# Patient Record
Sex: FEMALE | Race: WHITE | NOT HISPANIC OR LATINO
[De-identification: names, ages, dates, MRNs, and addresses within clinical notes are randomized per-mention and may not be internally consistent; named-entity substitution may affect disease eponyms.]

---

## 2017-01-17 ENCOUNTER — RX RENEWAL (OUTPATIENT)
Age: 62
End: 2017-01-17

## 2017-02-08 ENCOUNTER — APPOINTMENT (OUTPATIENT)
Dept: INTERNAL MEDICINE | Facility: CLINIC | Age: 62
End: 2017-02-08

## 2017-02-08 VITALS
RESPIRATION RATE: 14 BRPM | HEIGHT: 63 IN | OXYGEN SATURATION: 98 % | BODY MASS INDEX: 25.87 KG/M2 | HEART RATE: 53 BPM | DIASTOLIC BLOOD PRESSURE: 80 MMHG | TEMPERATURE: 98.1 F | WEIGHT: 146 LBS | SYSTOLIC BLOOD PRESSURE: 118 MMHG

## 2017-03-09 ENCOUNTER — APPOINTMENT (OUTPATIENT)
Dept: INTERNAL MEDICINE | Facility: CLINIC | Age: 62
End: 2017-03-09

## 2017-03-09 VITALS
OXYGEN SATURATION: 95 % | HEIGHT: 63 IN | TEMPERATURE: 98.2 F | DIASTOLIC BLOOD PRESSURE: 60 MMHG | HEART RATE: 62 BPM | WEIGHT: 142 LBS | BODY MASS INDEX: 25.16 KG/M2 | RESPIRATION RATE: 14 BRPM | SYSTOLIC BLOOD PRESSURE: 110 MMHG

## 2017-03-09 DIAGNOSIS — R11.2 NAUSEA WITH VOMITING, UNSPECIFIED: ICD-10-CM

## 2017-03-09 RX ORDER — DOXYCYCLINE HYCLATE 100 MG/1
100 CAPSULE ORAL TWICE DAILY
Qty: 14 | Refills: 0 | Status: DISCONTINUED | COMMUNITY
Start: 2017-02-08 | End: 2017-03-09

## 2017-03-13 LAB
ALBUMIN SERPL ELPH-MCNC: 5.1 G/DL
ALP BLD-CCNC: 133 U/L
ALT SERPL-CCNC: 10 U/L
ANION GAP SERPL CALC-SCNC: 19 MMOL/L
AST SERPL-CCNC: 19 U/L
BASOPHILS # BLD AUTO: 0.05 K/UL
BASOPHILS NFR BLD AUTO: 0.7 %
BILIRUB SERPL-MCNC: 0.5 MG/DL
BUN SERPL-MCNC: 13 MG/DL
CALCIUM SERPL-MCNC: 10.2 MG/DL
CHLORIDE SERPL-SCNC: 99 MMOL/L
CO2 SERPL-SCNC: 21 MMOL/L
CREAT SERPL-MCNC: 1.05 MG/DL
EOSINOPHIL # BLD AUTO: 0.1 K/UL
EOSINOPHIL NFR BLD AUTO: 1.5 %
GLUCOSE SERPL-MCNC: 109 MG/DL
HCT VFR BLD CALC: 42.6 %
HGB BLD-MCNC: 13.6 G/DL
IMM GRANULOCYTES NFR BLD AUTO: 0 %
LPL SERPL-CCNC: 31 U/L
LYMPHOCYTES # BLD AUTO: 1.92 K/UL
LYMPHOCYTES NFR BLD AUTO: 28.8 %
MAN DIFF?: NORMAL
MCHC RBC-ENTMCNC: 29.6 PG
MCHC RBC-ENTMCNC: 31.9 GM/DL
MCV RBC AUTO: 92.6 FL
MONOCYTES # BLD AUTO: 0.42 K/UL
MONOCYTES NFR BLD AUTO: 6.3 %
NEUTROPHILS # BLD AUTO: 4.18 K/UL
NEUTROPHILS NFR BLD AUTO: 62.7 %
PLATELET # BLD AUTO: 335 K/UL
POTASSIUM SERPL-SCNC: 4.9 MMOL/L
PROT SERPL-MCNC: 7.6 G/DL
RBC # BLD: 4.6 M/UL
RBC # FLD: 13.6 %
SODIUM SERPL-SCNC: 139 MMOL/L
WBC # FLD AUTO: 6.67 K/UL

## 2017-04-13 ENCOUNTER — APPOINTMENT (OUTPATIENT)
Dept: INTERNAL MEDICINE | Facility: CLINIC | Age: 62
End: 2017-04-13

## 2017-04-13 VITALS
OXYGEN SATURATION: 97 % | DIASTOLIC BLOOD PRESSURE: 70 MMHG | RESPIRATION RATE: 14 BRPM | HEIGHT: 63 IN | BODY MASS INDEX: 24.63 KG/M2 | WEIGHT: 139 LBS | SYSTOLIC BLOOD PRESSURE: 100 MMHG | HEART RATE: 65 BPM

## 2017-04-13 VITALS — TEMPERATURE: 98.2 F

## 2017-04-13 DIAGNOSIS — M54.2 CERVICALGIA: ICD-10-CM

## 2017-04-14 ENCOUNTER — MEDICATION RENEWAL (OUTPATIENT)
Age: 62
End: 2017-04-14

## 2017-04-17 LAB
ALBUMIN SERPL ELPH-MCNC: 4.9 G/DL
ALP BLD-CCNC: 118 U/L
ALT SERPL-CCNC: 11 U/L
ANION GAP SERPL CALC-SCNC: 19 MMOL/L
AST SERPL-CCNC: 15 U/L
BASOPHILS # BLD AUTO: 0.02 K/UL
BASOPHILS NFR BLD AUTO: 0.2 %
BILIRUB SERPL-MCNC: 0.3 MG/DL
BUN SERPL-MCNC: 23 MG/DL
CALCIUM SERPL-MCNC: 10.1 MG/DL
CHLORIDE SERPL-SCNC: 99 MMOL/L
CO2 SERPL-SCNC: 19 MMOL/L
CREAT SERPL-MCNC: 0.91 MG/DL
EOSINOPHIL # BLD AUTO: 0.09 K/UL
EOSINOPHIL NFR BLD AUTO: 1 %
ERYTHROCYTE [SEDIMENTATION RATE] IN BLOOD BY WESTERGREN METHOD: 18 MM/HR
GLUCOSE SERPL-MCNC: 109 MG/DL
HCT VFR BLD CALC: 40.1 %
HGB BLD-MCNC: 13 G/DL
IMM GRANULOCYTES NFR BLD AUTO: 0.1 %
LYMPHOCYTES # BLD AUTO: 2.03 K/UL
LYMPHOCYTES NFR BLD AUTO: 23.4 %
MAN DIFF?: NORMAL
MCHC RBC-ENTMCNC: 29.3 PG
MCHC RBC-ENTMCNC: 32.4 GM/DL
MCV RBC AUTO: 90.5 FL
MONOCYTES # BLD AUTO: 0.77 K/UL
MONOCYTES NFR BLD AUTO: 8.9 %
NEUTROPHILS # BLD AUTO: 5.77 K/UL
NEUTROPHILS NFR BLD AUTO: 66.4 %
PLATELET # BLD AUTO: 311 K/UL
POTASSIUM SERPL-SCNC: 4.2 MMOL/L
PROT SERPL-MCNC: 7.2 G/DL
RBC # BLD: 4.43 M/UL
RBC # FLD: 13.2 %
SODIUM SERPL-SCNC: 137 MMOL/L
WBC # FLD AUTO: 8.69 K/UL

## 2017-04-20 ENCOUNTER — RX RENEWAL (OUTPATIENT)
Age: 62
End: 2017-04-20

## 2017-07-20 ENCOUNTER — APPOINTMENT (OUTPATIENT)
Dept: INTERNAL MEDICINE | Facility: CLINIC | Age: 62
End: 2017-07-20

## 2017-07-20 VITALS
OXYGEN SATURATION: 98 % | HEIGHT: 63 IN | BODY MASS INDEX: 24.8 KG/M2 | SYSTOLIC BLOOD PRESSURE: 144 MMHG | WEIGHT: 140 LBS | RESPIRATION RATE: 14 BRPM | DIASTOLIC BLOOD PRESSURE: 82 MMHG | TEMPERATURE: 97.6 F | HEART RATE: 57 BPM

## 2017-07-20 RX ORDER — BENZONATATE 100 MG/1
100 CAPSULE ORAL
Qty: 15 | Refills: 0 | Status: DISCONTINUED | COMMUNITY
Start: 2017-02-01 | End: 2017-07-20

## 2017-07-20 RX ORDER — AZITHROMYCIN 250 MG/1
250 TABLET, FILM COATED ORAL
Qty: 1 | Refills: 0 | Status: DISCONTINUED | COMMUNITY
Start: 2017-02-08 | End: 2017-07-20

## 2017-08-06 ENCOUNTER — RX RENEWAL (OUTPATIENT)
Age: 62
End: 2017-08-06

## 2017-08-14 ENCOUNTER — RX RENEWAL (OUTPATIENT)
Age: 62
End: 2017-08-14

## 2017-09-20 ENCOUNTER — APPOINTMENT (OUTPATIENT)
Dept: INTERNAL MEDICINE | Facility: CLINIC | Age: 62
End: 2017-09-20
Payer: MEDICARE

## 2017-09-20 VITALS
OXYGEN SATURATION: 99 % | WEIGHT: 145 LBS | HEIGHT: 63 IN | SYSTOLIC BLOOD PRESSURE: 108 MMHG | TEMPERATURE: 98 F | DIASTOLIC BLOOD PRESSURE: 76 MMHG | BODY MASS INDEX: 25.69 KG/M2 | HEART RATE: 59 BPM | RESPIRATION RATE: 14 BRPM

## 2017-09-20 PROCEDURE — 99214 OFFICE O/P EST MOD 30 MIN: CPT

## 2017-10-03 ENCOUNTER — APPOINTMENT (OUTPATIENT)
Dept: NEUROLOGY | Facility: CLINIC | Age: 62
End: 2017-10-03

## 2017-10-11 ENCOUNTER — APPOINTMENT (OUTPATIENT)
Dept: INTERNAL MEDICINE | Facility: CLINIC | Age: 62
End: 2017-10-11
Payer: MEDICARE

## 2017-10-11 VITALS
HEART RATE: 76 BPM | HEIGHT: 63 IN | TEMPERATURE: 98 F | BODY MASS INDEX: 26.22 KG/M2 | RESPIRATION RATE: 14 BRPM | SYSTOLIC BLOOD PRESSURE: 124 MMHG | DIASTOLIC BLOOD PRESSURE: 76 MMHG | OXYGEN SATURATION: 98 % | WEIGHT: 148 LBS

## 2017-10-11 DIAGNOSIS — R79.9 ABNORMAL FINDING OF BLOOD CHEMISTRY, UNSPECIFIED: ICD-10-CM

## 2017-10-11 DIAGNOSIS — Z12.31 ENCOUNTER FOR SCREENING MAMMOGRAM FOR MALIGNANT NEOPLASM OF BREAST: ICD-10-CM

## 2017-10-11 PROCEDURE — 99213 OFFICE O/P EST LOW 20 MIN: CPT

## 2017-10-12 LAB
BUN SERPL-MCNC: 25 MG/DL
CREAT SERPL-MCNC: 1.15 MG/DL
HBA1C MFR BLD HPLC: 5.8 %

## 2017-10-31 ENCOUNTER — NON-APPOINTMENT (OUTPATIENT)
Age: 62
End: 2017-10-31

## 2017-10-31 ENCOUNTER — RECORD ABSTRACTING (OUTPATIENT)
Age: 62
End: 2017-10-31

## 2017-10-31 ENCOUNTER — APPOINTMENT (OUTPATIENT)
Dept: CARDIOLOGY | Facility: CLINIC | Age: 62
End: 2017-10-31
Payer: MEDICARE

## 2017-10-31 VITALS
OXYGEN SATURATION: 99 % | WEIGHT: 148 LBS | SYSTOLIC BLOOD PRESSURE: 156 MMHG | DIASTOLIC BLOOD PRESSURE: 78 MMHG | BODY MASS INDEX: 26.22 KG/M2 | HEART RATE: 55 BPM | HEIGHT: 63 IN

## 2017-10-31 VITALS — SYSTOLIC BLOOD PRESSURE: 146 MMHG | DIASTOLIC BLOOD PRESSURE: 80 MMHG

## 2017-10-31 DIAGNOSIS — R94.31 ABNORMAL ELECTROCARDIOGRAM [ECG] [EKG]: ICD-10-CM

## 2017-10-31 PROCEDURE — 99214 OFFICE O/P EST MOD 30 MIN: CPT | Mod: 25

## 2017-10-31 PROCEDURE — 93000 ELECTROCARDIOGRAM COMPLETE: CPT

## 2017-11-03 ENCOUNTER — MEDICATION RENEWAL (OUTPATIENT)
Age: 62
End: 2017-11-03

## 2017-11-16 ENCOUNTER — RESULT CHARGE (OUTPATIENT)
Age: 62
End: 2017-11-16

## 2017-11-27 ENCOUNTER — APPOINTMENT (OUTPATIENT)
Dept: CARDIOLOGY | Facility: CLINIC | Age: 62
End: 2017-11-27

## 2017-12-11 ENCOUNTER — APPOINTMENT (OUTPATIENT)
Dept: ORTHOPEDIC SURGERY | Facility: CLINIC | Age: 62
End: 2017-12-11
Payer: MEDICARE

## 2017-12-11 VITALS — SYSTOLIC BLOOD PRESSURE: 145 MMHG | DIASTOLIC BLOOD PRESSURE: 75 MMHG | HEART RATE: 80 BPM

## 2017-12-11 PROCEDURE — 99214 OFFICE O/P EST MOD 30 MIN: CPT

## 2017-12-11 PROCEDURE — 73522 X-RAY EXAM HIPS BI 3-4 VIEWS: CPT

## 2017-12-11 RX ORDER — SUMATRIPTAN 100 MG/1
100 TABLET, FILM COATED ORAL
Qty: 12 | Refills: 0 | Status: ACTIVE | COMMUNITY
Start: 2017-11-13

## 2017-12-12 ENCOUNTER — CHART COPY (OUTPATIENT)
Age: 62
End: 2017-12-12

## 2017-12-18 ENCOUNTER — APPOINTMENT (OUTPATIENT)
Dept: INTERNAL MEDICINE | Facility: CLINIC | Age: 62
End: 2017-12-18
Payer: MEDICARE

## 2017-12-18 VITALS
WEIGHT: 145 LBS | OXYGEN SATURATION: 98 % | TEMPERATURE: 97.9 F | HEART RATE: 63 BPM | DIASTOLIC BLOOD PRESSURE: 84 MMHG | RESPIRATION RATE: 14 BRPM | HEIGHT: 63 IN | SYSTOLIC BLOOD PRESSURE: 120 MMHG | BODY MASS INDEX: 25.69 KG/M2

## 2017-12-18 DIAGNOSIS — M79.641 PAIN IN RIGHT HAND: ICD-10-CM

## 2017-12-18 PROCEDURE — 99213 OFFICE O/P EST LOW 20 MIN: CPT

## 2017-12-19 LAB
ALBUMIN SERPL ELPH-MCNC: 4.2 G/DL
ALP BLD-CCNC: 128 U/L
ALT SERPL-CCNC: 10 U/L
ANION GAP SERPL CALC-SCNC: 16 MMOL/L
AST SERPL-CCNC: 14 U/L
BILIRUB SERPL-MCNC: 0.2 MG/DL
BUN SERPL-MCNC: 22 MG/DL
CALCIUM SERPL-MCNC: 9.5 MG/DL
CHLORIDE SERPL-SCNC: 106 MMOL/L
CHOLEST SERPL-MCNC: 166 MG/DL
CHOLEST/HDLC SERPL: 2.4 RATIO
CO2 SERPL-SCNC: 18 MMOL/L
CREAT SERPL-MCNC: 1.05 MG/DL
GLUCOSE SERPL-MCNC: 112 MG/DL
HBA1C MFR BLD HPLC: 5.7 %
HDLC SERPL-MCNC: 69 MG/DL
LDLC SERPL CALC-MCNC: 69 MG/DL
POTASSIUM SERPL-SCNC: 4.4 MMOL/L
PROT SERPL-MCNC: 7.4 G/DL
SODIUM SERPL-SCNC: 140 MMOL/L
TRIGL SERPL-MCNC: 138 MG/DL

## 2017-12-20 LAB — HEMOCCULT STL QL IA: NEGATIVE

## 2018-02-02 ENCOUNTER — RX RENEWAL (OUTPATIENT)
Age: 63
End: 2018-02-02

## 2018-02-09 ENCOUNTER — RX RENEWAL (OUTPATIENT)
Age: 63
End: 2018-02-09

## 2018-02-15 ENCOUNTER — APPOINTMENT (OUTPATIENT)
Dept: NEUROLOGY | Facility: CLINIC | Age: 63
End: 2018-02-15

## 2018-07-07 ENCOUNTER — APPOINTMENT (OUTPATIENT)
Dept: INTERNAL MEDICINE | Facility: CLINIC | Age: 63
End: 2018-07-07

## 2018-07-11 ENCOUNTER — APPOINTMENT (OUTPATIENT)
Dept: INTERNAL MEDICINE | Facility: CLINIC | Age: 63
End: 2018-07-11
Payer: MEDICARE

## 2018-07-11 VITALS
HEIGHT: 63 IN | WEIGHT: 147 LBS | DIASTOLIC BLOOD PRESSURE: 80 MMHG | HEART RATE: 58 BPM | TEMPERATURE: 97.8 F | BODY MASS INDEX: 26.05 KG/M2 | OXYGEN SATURATION: 97 % | RESPIRATION RATE: 14 BRPM | SYSTOLIC BLOOD PRESSURE: 122 MMHG

## 2018-07-11 DIAGNOSIS — T75.3XXA MOTION SICKNESS, INITIAL ENCOUNTER: ICD-10-CM

## 2018-07-11 PROCEDURE — 99214 OFFICE O/P EST MOD 30 MIN: CPT

## 2018-07-11 NOTE — HISTORY OF PRESENT ILLNESS
[FreeTextEntry1] : needs meds [de-identified] : Pt is going on a cruise to [place] for 4-5 days. She gets terrible sea sickness and would like the patches. \par \par c/o diarrhea after she eats, mostly after dinner. gets painful cramps and goes away after the BM. Happens 1-2 times a day. Weight is stable. \par \par Hip continues to hurt.

## 2018-07-11 NOTE — PHYSICAL EXAM
[No Acute Distress] : no acute distress [Well Nourished] : well nourished [Well Developed] : well developed [No Respiratory Distress] : no respiratory distress  [Clear to Auscultation] : lungs were clear to auscultation bilaterally [Normal Rate] : normal rate  [No Edema] : there was no peripheral edema [Soft] : abdomen soft [Non Tender] : non-tender [Normal Affect] : the affect was normal [Normal Insight/Judgement] : insight and judgment were intact

## 2018-07-26 ENCOUNTER — RX RENEWAL (OUTPATIENT)
Age: 63
End: 2018-07-26

## 2018-08-07 ENCOUNTER — RX RENEWAL (OUTPATIENT)
Age: 63
End: 2018-08-07

## 2018-10-23 ENCOUNTER — APPOINTMENT (OUTPATIENT)
Dept: INTERNAL MEDICINE | Facility: CLINIC | Age: 63
End: 2018-10-23
Payer: MEDICARE

## 2018-10-23 DIAGNOSIS — Z23 ENCOUNTER FOR IMMUNIZATION: ICD-10-CM

## 2018-10-23 PROCEDURE — G0008: CPT

## 2018-10-23 PROCEDURE — 90686 IIV4 VACC NO PRSV 0.5 ML IM: CPT

## 2018-11-01 ENCOUNTER — APPOINTMENT (OUTPATIENT)
Dept: INTERNAL MEDICINE | Facility: CLINIC | Age: 63
End: 2018-11-01
Payer: MEDICARE

## 2018-11-01 ENCOUNTER — NON-APPOINTMENT (OUTPATIENT)
Age: 63
End: 2018-11-01

## 2018-11-01 VITALS
HEART RATE: 55 BPM | SYSTOLIC BLOOD PRESSURE: 120 MMHG | OXYGEN SATURATION: 91 % | BODY MASS INDEX: 27.11 KG/M2 | TEMPERATURE: 98.8 F | RESPIRATION RATE: 14 BRPM | HEIGHT: 63 IN | DIASTOLIC BLOOD PRESSURE: 70 MMHG | WEIGHT: 153 LBS

## 2018-11-01 DIAGNOSIS — Z00.00 ENCOUNTER FOR GENERAL ADULT MEDICAL EXAMINATION W/OUT ABNORMAL FINDINGS: ICD-10-CM

## 2018-11-01 PROCEDURE — 93000 ELECTROCARDIOGRAM COMPLETE: CPT

## 2018-11-01 PROCEDURE — G0439: CPT

## 2018-11-01 RX ORDER — BENZTROPINE MESYLATE 0.5 MG/1
0.5 TABLET ORAL
Qty: 60 | Refills: 0 | Status: DISCONTINUED | COMMUNITY
Start: 2017-11-13 | End: 2018-11-01

## 2018-11-01 RX ORDER — DOXYCYCLINE HYCLATE 100 MG/1
100 TABLET ORAL
Qty: 20 | Refills: 0 | Status: DISCONTINUED | COMMUNITY
Start: 2017-07-20 | End: 2018-11-01

## 2018-11-01 RX ORDER — METHYLPREDNISOLONE 4 MG/1
4 TABLET ORAL
Qty: 21 | Refills: 0 | Status: DISCONTINUED | COMMUNITY
Start: 2017-09-20 | End: 2018-11-01

## 2018-11-01 RX ORDER — TOPIRAMATE 25 MG/1
25 TABLET, FILM COATED ORAL
Qty: 60 | Refills: 0 | Status: DISCONTINUED | COMMUNITY
Start: 2017-09-27 | End: 2018-11-01

## 2018-11-01 RX ORDER — TRIAZOLAM 0.25 MG/1
0.25 TABLET ORAL
Qty: 2 | Refills: 0 | Status: DISCONTINUED | COMMUNITY
Start: 2017-11-03 | End: 2018-11-01

## 2018-11-01 RX ORDER — ZALEPLON 10 MG/1
10 CAPSULE ORAL
Qty: 30 | Refills: 0 | Status: DISCONTINUED | COMMUNITY
Start: 2018-01-26 | End: 2018-11-01

## 2018-11-01 NOTE — HEALTH RISK ASSESSMENT
Clinic hours for Dr. García:  Monday    In Surgery  Tuesday 7:30am - 4:30pm  Wednesday 7:30am - 4:30pm  Thursday       7:30am - 4:30pm  Friday            7:30 - 11am    If you need a refill on your prescription please call your pharmacy and let them know. Please be proactive and call before your medication runs out.    The pharmacy will then contact us for the refill.  Please allow 24-48 hours for the refill to be processed.     If your physician has ordered additional laboratory or radiology testing as part of your ongoing plan of care, please allow 5-7 business days from the day of your lab draw or test for the results to be sent and reviewed by your provider. If your results are critical and require more immediate intervention, you will be contacted sooner. Your results will be conveyed to you via a phone call or letter.    You may be receiving a patient satisfaction survey in the mail.   Please take the time to complete, as your feedback is very important to us.   We strive to make your experience exceptional and your comments help us with that goal.  We look forward to hearing from you.       [] : No [No falls in past year] : Patient reported no falls in the past year [0] : 2) Feeling down, depressed, or hopeless: Not at all (0)

## 2019-01-24 ENCOUNTER — APPOINTMENT (OUTPATIENT)
Dept: CARDIOLOGY | Facility: CLINIC | Age: 64
End: 2019-01-24

## 2019-01-24 ENCOUNTER — APPOINTMENT (OUTPATIENT)
Dept: INTERNAL MEDICINE | Facility: CLINIC | Age: 64
End: 2019-01-24
Payer: MEDICARE

## 2019-01-24 ENCOUNTER — NON-APPOINTMENT (OUTPATIENT)
Age: 64
End: 2019-01-24

## 2019-01-24 VITALS
DIASTOLIC BLOOD PRESSURE: 60 MMHG | BODY MASS INDEX: 26.93 KG/M2 | SYSTOLIC BLOOD PRESSURE: 90 MMHG | TEMPERATURE: 98 F | RESPIRATION RATE: 14 BRPM | HEART RATE: 64 BPM | OXYGEN SATURATION: 95 % | HEIGHT: 63 IN | WEIGHT: 152 LBS

## 2019-01-24 VITALS — DIASTOLIC BLOOD PRESSURE: 62 MMHG | SYSTOLIC BLOOD PRESSURE: 118 MMHG

## 2019-01-24 PROCEDURE — 99214 OFFICE O/P EST MOD 30 MIN: CPT | Mod: 25

## 2019-01-24 PROCEDURE — 93000 ELECTROCARDIOGRAM COMPLETE: CPT

## 2019-01-24 RX ORDER — DEUTETRABENAZINE 9 MG/1
9 TABLET, COATED ORAL TWICE DAILY
Refills: 0 | Status: DISCONTINUED | COMMUNITY
Start: 2018-11-01 | End: 2019-01-24
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
## 2019-01-24 NOTE — PHYSICAL EXAM
[No Acute Distress] : no acute distress [Well Nourished] : well nourished [Well Developed] : well developed [Well-Appearing] : well-appearing [No Respiratory Distress] : no respiratory distress  [Clear to Auscultation] : lungs were clear to auscultation bilaterally [Normal Rate] : normal rate  [Regular Rhythm] : with a regular rhythm [No Edema] : there was no peripheral edema [Soft] : abdomen soft [Non Tender] : non-tender [Normal Gait] : normal gait [Coordination Grossly Intact] : coordination grossly intact [Normal Affect] : the affect was normal [Normal Insight/Judgement] : insight and judgment were intact [de-identified] : bradycardic [de-identified] : abrasion to left hand

## 2019-01-25 ENCOUNTER — APPOINTMENT (OUTPATIENT)
Dept: CARDIOLOGY | Facility: CLINIC | Age: 64
End: 2019-01-25
Payer: MEDICARE

## 2019-01-25 VITALS
SYSTOLIC BLOOD PRESSURE: 145 MMHG | DIASTOLIC BLOOD PRESSURE: 77 MMHG | HEIGHT: 63 IN | HEART RATE: 59 BPM | BODY MASS INDEX: 26.93 KG/M2 | WEIGHT: 152 LBS | OXYGEN SATURATION: 98 %

## 2019-01-25 DIAGNOSIS — Z87.09 PERSONAL HISTORY OF OTHER DISEASES OF THE RESPIRATORY SYSTEM: ICD-10-CM

## 2019-01-25 LAB
25(OH)D3 SERPL-MCNC: 33.3 NG/ML
ALBUMIN SERPL ELPH-MCNC: 4.4 G/DL
ALP BLD-CCNC: 105 U/L
ALT SERPL-CCNC: 5 U/L
ANION GAP SERPL CALC-SCNC: 12 MMOL/L
APPEARANCE: CLEAR
AST SERPL-CCNC: 15 U/L
BASOPHILS # BLD AUTO: 0.03 K/UL
BASOPHILS NFR BLD AUTO: 0.4 %
BILIRUB SERPL-MCNC: 0.3 MG/DL
BILIRUBIN URINE: NEGATIVE
BLOOD URINE: NEGATIVE
BUN SERPL-MCNC: 17 MG/DL
CALCIUM SERPL-MCNC: 9.2 MG/DL
CHLORIDE SERPL-SCNC: 114 MMOL/L
CHOLEST SERPL-MCNC: 159 MG/DL
CHOLEST/HDLC SERPL: 2.7 RATIO
CO2 SERPL-SCNC: 18 MMOL/L
COLOR: YELLOW
CREAT SERPL-MCNC: 0.92 MG/DL
CREAT SPEC-SCNC: 177 MG/DL
EOSINOPHIL # BLD AUTO: 0.11 K/UL
EOSINOPHIL NFR BLD AUTO: 1.3 %
FOLATE SERPL-MCNC: 7.4 NG/ML
GLUCOSE QUALITATIVE U: NEGATIVE MG/DL
GLUCOSE SERPL-MCNC: 108 MG/DL
HBA1C MFR BLD HPLC: 5.4 %
HCT VFR BLD CALC: 44.1 %
HDLC SERPL-MCNC: 58 MG/DL
HGB BLD-MCNC: 13.6 G/DL
IMM GRANULOCYTES NFR BLD AUTO: 0.2 %
KETONES URINE: NEGATIVE
LDLC SERPL CALC-MCNC: 58 MG/DL
LEUKOCYTE ESTERASE URINE: NEGATIVE
LYMPHOCYTES # BLD AUTO: 1.72 K/UL
LYMPHOCYTES NFR BLD AUTO: 20.6 %
MAGNESIUM SERPL-MCNC: 2.2 MG/DL
MAN DIFF?: NORMAL
MCHC RBC-ENTMCNC: 29.4 PG
MCHC RBC-ENTMCNC: 30.8 GM/DL
MCV RBC AUTO: 95.2 FL
MICROALBUMIN 24H UR DL<=1MG/L-MCNC: <1.2 MG/DL
MICROALBUMIN/CREAT 24H UR-RTO: NORMAL
MONOCYTES # BLD AUTO: 0.45 K/UL
MONOCYTES NFR BLD AUTO: 5.4 %
NEUTROPHILS # BLD AUTO: 6 K/UL
NEUTROPHILS NFR BLD AUTO: 72.1 %
NITRITE URINE: NEGATIVE
PH URINE: 6
PLATELET # BLD AUTO: 259 K/UL
POTASSIUM SERPL-SCNC: 4.1 MMOL/L
PROT SERPL-MCNC: 7.1 G/DL
PROTEIN URINE: NEGATIVE MG/DL
RBC # BLD: 4.63 M/UL
RBC # FLD: 13.7 %
SODIUM SERPL-SCNC: 144 MMOL/L
SPECIFIC GRAVITY URINE: 1.03
TRIGL SERPL-MCNC: 214 MG/DL
TSH SERPL-ACNC: 1.18 UIU/ML
URATE SERPL-MCNC: 4.6 MG/DL
UROBILINOGEN URINE: NEGATIVE MG/DL
VIT B12 SERPL-MCNC: 447 PG/ML
WBC # FLD AUTO: 8.33 K/UL

## 2019-01-25 PROCEDURE — 99214 OFFICE O/P EST MOD 30 MIN: CPT
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
## 2019-01-25 NOTE — PHYSICAL EXAM
[Well Groomed] : well groomed [General Appearance - In No Acute Distress] : no acute distress [No Oral Cyanosis] : no oral cyanosis [FreeTextEntry1] : No JVD [Respiration, Rhythm And Depth] : normal respiratory rhythm and effort [Auscultation Breath Sounds / Voice Sounds] : lungs were clear to auscultation bilaterally [Heart Rate And Rhythm] : heart rate and rhythm were normal [Heart Sounds] : normal S1 and S2 [Murmurs] : no murmurs present [Edema] : no peripheral edema present [Abnormal Walk] : normal gait [Nail Clubbing] : no clubbing of the fingernails [Cyanosis, Localized] : no localized cyanosis [Skin Color & Pigmentation] : normal skin color and pigmentation [] : no rash [Oriented To Time, Place, And Person] : oriented to person, place, and time [Impaired Insight] : insight and judgment were intact

## 2019-01-25 NOTE — REASON FOR VISIT
[Follow-Up - Clinic] : a clinic follow-up of [Abnormal ECG] : an abnormal ECG [Hyperlipidemia] : hyperlipidemia [Medication Management] : Medication management [FreeTextEntry1] : possible syncope

## 2019-01-25 NOTE — REVIEW OF SYSTEMS
[Headache] : no headache [Feeling Fatigued] : feeling fatigued [Eyeglasses] : currently wearing eyeglasses [see HPI] : see HPI [Chest  Pressure] : no chest pressure [Chest Pain] : no chest pain [Lower Ext Edema] : no extremity edema [Palpitations] : no palpitations [Cough] : no cough [Depression] : depression [Anxiety] : anxiety [Under Stress] : under stress [Negative] : Heme/Lymph [FreeTextEntry2] : dry eyes [FreeTextEntry3] : Allergies

## 2019-01-31 ENCOUNTER — RX RENEWAL (OUTPATIENT)
Age: 64
End: 2019-01-31

## 2019-02-04 ENCOUNTER — APPOINTMENT (OUTPATIENT)
Dept: CARDIOLOGY | Facility: CLINIC | Age: 64
End: 2019-02-04
Payer: MEDICARE

## 2019-02-04 PROCEDURE — 93224 XTRNL ECG REC UP TO 48 HRS: CPT

## 2019-02-05 ENCOUNTER — APPOINTMENT (OUTPATIENT)
Dept: CARDIOLOGY | Facility: CLINIC | Age: 64
End: 2019-02-05
Payer: MEDICARE

## 2019-02-05 ENCOUNTER — RX RENEWAL (OUTPATIENT)
Age: 64
End: 2019-02-05

## 2019-02-05 PROCEDURE — 93306 TTE W/DOPPLER COMPLETE: CPT

## 2019-02-07 ENCOUNTER — NON-APPOINTMENT (OUTPATIENT)
Age: 64
End: 2019-02-07

## 2019-02-08 ENCOUNTER — NON-APPOINTMENT (OUTPATIENT)
Age: 64
End: 2019-02-08

## 2019-02-08 ENCOUNTER — RESULT REVIEW (OUTPATIENT)
Age: 64
End: 2019-02-08

## 2019-02-14 ENCOUNTER — APPOINTMENT (OUTPATIENT)
Dept: INTERNAL MEDICINE | Facility: CLINIC | Age: 64
End: 2019-02-14

## 2019-02-15 ENCOUNTER — APPOINTMENT (OUTPATIENT)
Dept: FAMILY MEDICINE | Facility: CLINIC | Age: 64
End: 2019-02-15
Payer: MEDICARE

## 2019-02-15 VITALS
OXYGEN SATURATION: 95 % | TEMPERATURE: 97.8 F | WEIGHT: 150 LBS | DIASTOLIC BLOOD PRESSURE: 74 MMHG | SYSTOLIC BLOOD PRESSURE: 118 MMHG | HEART RATE: 92 BPM | BODY MASS INDEX: 26.58 KG/M2 | RESPIRATION RATE: 14 BRPM | HEIGHT: 63 IN

## 2019-02-15 DIAGNOSIS — B37.2 CANDIDIASIS OF SKIN AND NAIL: ICD-10-CM

## 2019-02-15 PROCEDURE — 99213 OFFICE O/P EST LOW 20 MIN: CPT

## 2019-02-15 NOTE — PHYSICAL EXAM

## 2019-02-15 NOTE — HISTORY OF PRESENT ILLNESS
[FreeTextEntry8] : Pt here for red rash under both breast folds. Applying hydrocortisone and improving somewhat but not completely. Has had this before.

## 2019-02-15 NOTE — PLAN
[FreeTextEntry1] : -advised to not wear constricting garments including bra for next few days, pat area dry several times a day \par

## 2019-03-01 ENCOUNTER — MEDICATION RENEWAL (OUTPATIENT)
Age: 64
End: 2019-03-01

## 2019-03-27 ENCOUNTER — MEDICATION RENEWAL (OUTPATIENT)
Age: 64
End: 2019-03-27

## 2019-04-19 ENCOUNTER — TRANSCRIPTION ENCOUNTER (OUTPATIENT)
Age: 64
End: 2019-04-19

## 2019-05-02 ENCOUNTER — MEDICATION RENEWAL (OUTPATIENT)
Age: 64
End: 2019-05-02

## 2019-10-21 ENCOUNTER — APPOINTMENT (OUTPATIENT)
Dept: INTERNAL MEDICINE | Facility: CLINIC | Age: 64
End: 2019-10-21

## 2020-01-16 ENCOUNTER — EMERGENCY (EMERGENCY)
Facility: HOSPITAL | Age: 65
LOS: 1 days | Discharge: HOME | End: 2020-01-16
Attending: EMERGENCY MEDICINE | Admitting: EMERGENCY MEDICINE
Payer: MEDICARE

## 2020-01-16 VITALS
DIASTOLIC BLOOD PRESSURE: 94 MMHG | SYSTOLIC BLOOD PRESSURE: 158 MMHG | WEIGHT: 149.91 LBS | TEMPERATURE: 98 F | RESPIRATION RATE: 17 BRPM | HEART RATE: 83 BPM | HEIGHT: 63 IN | OXYGEN SATURATION: 99 %

## 2020-01-16 VITALS
OXYGEN SATURATION: 99 % | RESPIRATION RATE: 14 BRPM | HEART RATE: 85 BPM | DIASTOLIC BLOOD PRESSURE: 89 MMHG | TEMPERATURE: 98 F | SYSTOLIC BLOOD PRESSURE: 148 MMHG

## 2020-01-16 DIAGNOSIS — I44.7 LEFT BUNDLE-BRANCH BLOCK, UNSPECIFIED: ICD-10-CM

## 2020-01-16 DIAGNOSIS — H40.9 UNSPECIFIED GLAUCOMA: ICD-10-CM

## 2020-01-16 DIAGNOSIS — F31.9 BIPOLAR DISORDER, UNSPECIFIED: ICD-10-CM

## 2020-01-16 DIAGNOSIS — Z79.01 LONG TERM (CURRENT) USE OF ANTICOAGULANTS: ICD-10-CM

## 2020-01-16 DIAGNOSIS — I50.9 HEART FAILURE, UNSPECIFIED: ICD-10-CM

## 2020-01-16 DIAGNOSIS — R05 COUGH: ICD-10-CM

## 2020-01-16 DIAGNOSIS — Z88.2 ALLERGY STATUS TO SULFONAMIDES: ICD-10-CM

## 2020-01-16 DIAGNOSIS — I10 ESSENTIAL (PRIMARY) HYPERTENSION: ICD-10-CM

## 2020-01-16 DIAGNOSIS — J40 BRONCHITIS, NOT SPECIFIED AS ACUTE OR CHRONIC: ICD-10-CM

## 2020-01-16 DIAGNOSIS — G25.2 OTHER SPECIFIED FORMS OF TREMOR: ICD-10-CM

## 2020-01-16 DIAGNOSIS — Z88.0 ALLERGY STATUS TO PENICILLIN: ICD-10-CM

## 2020-01-16 DIAGNOSIS — K57.92 DIVERTICULITIS OF INTESTINE, PART UNSPECIFIED, WITHOUT PERFORATION OR ABSCESS WITHOUT BLEEDING: ICD-10-CM

## 2020-01-16 DIAGNOSIS — Z98.89 OTHER SPECIFIED POSTPROCEDURAL STATES: Chronic | ICD-10-CM

## 2020-01-16 LAB
ALBUMIN SERPL ELPH-MCNC: 3.7 G/DL — SIGNIFICANT CHANGE UP (ref 3.3–5)
ALP SERPL-CCNC: 127 U/L — HIGH (ref 30–120)
ALT FLD-CCNC: 59 U/L DA — SIGNIFICANT CHANGE UP (ref 10–60)
ANION GAP SERPL CALC-SCNC: 10 MMOL/L — SIGNIFICANT CHANGE UP (ref 5–17)
APTT BLD: 29.9 SEC — SIGNIFICANT CHANGE UP (ref 28.5–37)
AST SERPL-CCNC: 27 U/L — SIGNIFICANT CHANGE UP (ref 10–40)
BASOPHILS # BLD AUTO: 0.07 K/UL — SIGNIFICANT CHANGE UP (ref 0–0.2)
BASOPHILS NFR BLD AUTO: 0.7 % — SIGNIFICANT CHANGE UP (ref 0–2)
BILIRUB SERPL-MCNC: 0.2 MG/DL — SIGNIFICANT CHANGE UP (ref 0.2–1.2)
BUN SERPL-MCNC: 17 MG/DL — SIGNIFICANT CHANGE UP (ref 7–23)
CALCIUM SERPL-MCNC: 9.1 MG/DL — SIGNIFICANT CHANGE UP (ref 8.4–10.5)
CHLORIDE SERPL-SCNC: 105 MMOL/L — SIGNIFICANT CHANGE UP (ref 96–108)
CO2 SERPL-SCNC: 23 MMOL/L — SIGNIFICANT CHANGE UP (ref 22–31)
CREAT SERPL-MCNC: 1.07 MG/DL — SIGNIFICANT CHANGE UP (ref 0.5–1.3)
EOSINOPHIL # BLD AUTO: 0.13 K/UL — SIGNIFICANT CHANGE UP (ref 0–0.5)
EOSINOPHIL NFR BLD AUTO: 1.3 % — SIGNIFICANT CHANGE UP (ref 0–6)
GLUCOSE SERPL-MCNC: 118 MG/DL — HIGH (ref 70–99)
HCT VFR BLD CALC: 42.7 % — SIGNIFICANT CHANGE UP (ref 34.5–45)
HGB BLD-MCNC: 13.3 G/DL — SIGNIFICANT CHANGE UP (ref 11.5–15.5)
IMM GRANULOCYTES NFR BLD AUTO: 1 % — SIGNIFICANT CHANGE UP (ref 0–1.5)
INR BLD: 0.99 RATIO — SIGNIFICANT CHANGE UP (ref 0.88–1.16)
LYMPHOCYTES # BLD AUTO: 1.7 K/UL — SIGNIFICANT CHANGE UP (ref 1–3.3)
LYMPHOCYTES # BLD AUTO: 17.1 % — SIGNIFICANT CHANGE UP (ref 13–44)
MCHC RBC-ENTMCNC: 29.2 PG — SIGNIFICANT CHANGE UP (ref 27–34)
MCHC RBC-ENTMCNC: 31.1 GM/DL — LOW (ref 32–36)
MCV RBC AUTO: 93.8 FL — SIGNIFICANT CHANGE UP (ref 80–100)
MONOCYTES # BLD AUTO: 0.59 K/UL — SIGNIFICANT CHANGE UP (ref 0–0.9)
MONOCYTES NFR BLD AUTO: 5.9 % — SIGNIFICANT CHANGE UP (ref 2–14)
NEUTROPHILS # BLD AUTO: 7.36 K/UL — SIGNIFICANT CHANGE UP (ref 1.8–7.4)
NEUTROPHILS NFR BLD AUTO: 74 % — SIGNIFICANT CHANGE UP (ref 43–77)
NRBC # BLD: 0 /100 WBCS — SIGNIFICANT CHANGE UP (ref 0–0)
PLATELET # BLD AUTO: 262 K/UL — SIGNIFICANT CHANGE UP (ref 150–400)
POTASSIUM SERPL-MCNC: 3.8 MMOL/L — SIGNIFICANT CHANGE UP (ref 3.5–5.3)
POTASSIUM SERPL-SCNC: 3.8 MMOL/L — SIGNIFICANT CHANGE UP (ref 3.5–5.3)
PROT SERPL-MCNC: 7.4 G/DL — SIGNIFICANT CHANGE UP (ref 6–8.3)
PROTHROM AB SERPL-ACNC: 11 SEC — SIGNIFICANT CHANGE UP (ref 10–12.9)
RBC # BLD: 4.55 M/UL — SIGNIFICANT CHANGE UP (ref 3.8–5.2)
RBC # FLD: 13.3 % — SIGNIFICANT CHANGE UP (ref 10.3–14.5)
SODIUM SERPL-SCNC: 138 MMOL/L — SIGNIFICANT CHANGE UP (ref 135–145)
TROPONIN I SERPL-MCNC: 0 NG/ML — LOW (ref 0.02–0.06)
WBC # BLD: 9.95 K/UL — SIGNIFICANT CHANGE UP (ref 3.8–10.5)
WBC # FLD AUTO: 9.95 K/UL — SIGNIFICANT CHANGE UP (ref 3.8–10.5)

## 2020-01-16 PROCEDURE — 36415 COLL VENOUS BLD VENIPUNCTURE: CPT

## 2020-01-16 PROCEDURE — 84484 ASSAY OF TROPONIN QUANT: CPT

## 2020-01-16 PROCEDURE — 71046 X-RAY EXAM CHEST 2 VIEWS: CPT | Mod: 26

## 2020-01-16 PROCEDURE — 93010 ELECTROCARDIOGRAM REPORT: CPT

## 2020-01-16 PROCEDURE — 85730 THROMBOPLASTIN TIME PARTIAL: CPT

## 2020-01-16 PROCEDURE — 71046 X-RAY EXAM CHEST 2 VIEWS: CPT

## 2020-01-16 PROCEDURE — 94640 AIRWAY INHALATION TREATMENT: CPT

## 2020-01-16 PROCEDURE — 85610 PROTHROMBIN TIME: CPT

## 2020-01-16 PROCEDURE — 80053 COMPREHEN METABOLIC PANEL: CPT

## 2020-01-16 PROCEDURE — 99283 EMERGENCY DEPT VISIT LOW MDM: CPT | Mod: 25

## 2020-01-16 PROCEDURE — 93005 ELECTROCARDIOGRAM TRACING: CPT

## 2020-01-16 PROCEDURE — 99285 EMERGENCY DEPT VISIT HI MDM: CPT

## 2020-01-16 PROCEDURE — 85027 COMPLETE CBC AUTOMATED: CPT

## 2020-01-16 RX ORDER — IPRATROPIUM/ALBUTEROL SULFATE 18-103MCG
3 AEROSOL WITH ADAPTER (GRAM) INHALATION
Refills: 0 | Status: COMPLETED | OUTPATIENT
Start: 2020-01-16 | End: 2020-01-16

## 2020-01-16 RX ADMIN — Medication 50 MILLIGRAM(S): at 14:08

## 2020-01-16 RX ADMIN — Medication 3 MILLILITER(S): at 13:32

## 2020-01-16 RX ADMIN — Medication 3 MILLILITER(S): at 13:35

## 2020-01-16 NOTE — ED PROVIDER NOTE - PATIENT PORTAL LINK FT
You can access the FollowMyHealth Patient Portal offered by [facility] by registering at the following website: [email or web address]. By joining [employer]’s FollowMyHealth portal, you will also be able to view your health information using other applications (apps) compatible with our system.

## 2020-01-16 NOTE — ED PROVIDER NOTE - CLINICAL SUMMARY MEDICAL DECISION MAKING FREE TEXT BOX
63 y/o with cough x 10 days without other URI sx or fever. went to  and sent to ER for abnormal ekg. well appearing with rhonchi on exam without resp. distress. suspect bronchitis however will obtain labs, rule out PNA, repeat ekg and troponin prior to contacting her cardiologist to discuss dispo planning. - Kiersten Dennison PA-C

## 2020-01-16 NOTE — ED PROVIDER NOTE - OBJECTIVE STATEMENT
63 y/o female hx bipolar, HTN presents from  for abnormal EKG. patient went to  this morning with complaint of s 65 y/o female hx bipolar, HTN presents from  for abnormal EKG. patient went to  this morning with complaint of nonproductive cough x 10 days that seems to be lingering. reporting she has phlegm but is unable to expectorate. no fever/chills or other URI symptoms. UTD on flu shot and endorses a neg flu swab at . she noted during her  visit that she had some "heartburn" last night, didn't have much appetite for dinner, resolved spontaneously. had an additional episode this morning that improved somewhat with tums. patient with no discomfort now. denies ever having chest pain or sob. 2/2 this symptom patient had EKG done at  which was abnormal, it was faxed to her cardiologist office who recommended she come here for enzymes. steroids and proventil inhaler sent to patients pharmacy by

## 2020-01-16 NOTE — ED ADULT NURSE NOTE - OBJECTIVE STATEMENT
Sent from Urgent Care, with abnormal EKG. Pt stated she had a cough x 10 days, and has been to Urgent care twice, has completed steroids, and now was just prescribed oral antibiotics.

## 2020-01-16 NOTE — ED PROVIDER NOTE - ATTENDING CONTRIBUTION TO CARE
pt referred from urgent care for abnormal ekg when seen for 10 days of sob and minimally productive cough. Pt did c/o heartburn type pain last night. pt given rx for inhaler and steroids sent to pharmacy by urgent care. no fevers, chills, ha, d/n/v, edema, calf pain, travel, no current chest pain. [facility]  [facility], wn female, nad mmm, s1s2 rrr normal pulses, lungs  mild wheeze, abd soft, nt, nd, ext no edema or calf tenderness

## 2020-01-16 NOTE — ED PROVIDER NOTE - NSFOLLOWUPINSTRUCTIONS_ED_ALL_ED_FT
Follow up with your Primary Care Physician within the next 2-3 days  Bring a copy of your test results with you to your appointment  Continue your current medication regimen  Return to the Emergency Room if you experience new or worsening symptoms   Take prednisone as prescribed by urgent car  Use inhaler as advised  Take Motrin 400-600mg every 6 hrs as needed for fever. Take with food   Take Tylenol 650mg every 6 hrs as needed for fever. Follow up with your Primary Care Physician within the next 2-3 days  Follow up with your cardiologist Dr Fernando regarding your EKG  Bring a copy of your test results with you to your appointment  Continue your current medication regimen  Return to the Emergency Room if you experience new or worsening symptoms   Take prednisone as prescribed by urgent care  Use inhaler as advised  Take Motrin 400-600mg every 6 hrs as needed for fever. Take with food   Take Tylenol 650mg every 6 hrs as needed for fever.

## 2020-01-16 NOTE — ED PROVIDER NOTE - PMH
Avascular necrosis of bone of hip, left    Bipolar 1 disorder    Bipolar disorder  last episode of suicidal thoughts was 1.5 yrs ago.  Pt also self mutilate last was "many yrs ago maybe 20 yrs ago".  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

## 2020-01-16 NOTE — ED PROVIDER NOTE - PROGRESS NOTE DETAILS
patient with persistent rhonchorus/bronchial breath sounds, no wheezing appreciated. Dr. Hutson spoke with Dr. Palla regarding patients enzyme and EKG, agree patient safe to DC home. discussed plan for steroids and inhaler with patient and return precautions - Kiersten Dennison PA-C

## 2020-02-10 ENCOUNTER — RX RENEWAL (OUTPATIENT)
Age: 65
End: 2020-02-10

## 2020-03-04 ENCOUNTER — RX RENEWAL (OUTPATIENT)
Age: 65
End: 2020-03-04

## 2020-03-12 ENCOUNTER — APPOINTMENT (OUTPATIENT)
Dept: INTERNAL MEDICINE | Facility: CLINIC | Age: 65
End: 2020-03-12

## 2020-03-13 NOTE — ED ADULT TRIAGE NOTE - ACCOMPANIED BY
Spouse/Significant other [Memory Loss] : memory loss [Anxiety] : anxiety [Negative] : Heme/Lymph [de-identified] : speech impairment, agitation

## 2020-03-17 ENCOUNTER — APPOINTMENT (OUTPATIENT)
Dept: CARDIOLOGY | Facility: CLINIC | Age: 65
End: 2020-03-17

## 2020-05-06 ENCOUNTER — RX RENEWAL (OUTPATIENT)
Age: 65
End: 2020-05-06

## 2020-07-22 ENCOUNTER — APPOINTMENT (OUTPATIENT)
Dept: INTERNAL MEDICINE | Facility: CLINIC | Age: 65
End: 2020-07-22
Payer: MEDICARE

## 2020-07-22 VITALS — BODY MASS INDEX: 26.58 KG/M2 | WEIGHT: 150 LBS | HEIGHT: 63 IN

## 2020-07-22 VITALS — HEART RATE: 73 BPM | RESPIRATION RATE: 12 BRPM | DIASTOLIC BLOOD PRESSURE: 82 MMHG | SYSTOLIC BLOOD PRESSURE: 128 MMHG

## 2020-07-22 PROCEDURE — 99213 OFFICE O/P EST LOW 20 MIN: CPT | Mod: 95

## 2020-07-22 NOTE — PHYSICAL EXAM
[No Respiratory Distress] : no respiratory distress  [Normal] : affect was normal and insight and judgment were intact

## 2020-07-22 NOTE — HISTORY OF PRESENT ILLNESS
[Home] : at home, [unfilled] , at the time of the visit. [Verbal consent obtained from patient] : the patient, [unfilled] [Medical Office: ([facility])___] : at the medical office located in  [de-identified] : Pt is telehealth visit for medication refills. She has been feeling well. Does not feel comfortable about an in office appt.\par She has an upcoming appt with Dr Gupta. \par She was sick with a severe flu symptoms and flu test was negative.

## 2020-08-04 ENCOUNTER — APPOINTMENT (OUTPATIENT)
Dept: CARDIOLOGY | Facility: CLINIC | Age: 65
End: 2020-08-04
Payer: MEDICARE

## 2020-08-04 ENCOUNTER — NON-APPOINTMENT (OUTPATIENT)
Age: 65
End: 2020-08-04

## 2020-08-04 VITALS
HEIGHT: 63 IN | DIASTOLIC BLOOD PRESSURE: 76 MMHG | OXYGEN SATURATION: 100 % | HEART RATE: 55 BPM | BODY MASS INDEX: 26.58 KG/M2 | RESPIRATION RATE: 12 BRPM | SYSTOLIC BLOOD PRESSURE: 133 MMHG | WEIGHT: 150 LBS

## 2020-08-04 VITALS
BODY MASS INDEX: 26.58 KG/M2 | WEIGHT: 150 LBS | HEIGHT: 63 IN | OXYGEN SATURATION: 100 % | DIASTOLIC BLOOD PRESSURE: 76 MMHG | SYSTOLIC BLOOD PRESSURE: 133 MMHG | HEART RATE: 55 BPM

## 2020-08-04 PROCEDURE — 93000 ELECTROCARDIOGRAM COMPLETE: CPT

## 2020-08-04 PROCEDURE — 99214 OFFICE O/P EST MOD 30 MIN: CPT

## 2020-08-04 NOTE — PHYSICAL EXAM
[Normal Appearance] : normal appearance [General Appearance - In No Acute Distress] : no acute distress [Eyelids - No Xanthelasma] : the eyelids demonstrated no xanthelasmas [FreeTextEntry1] : No JVD [Auscultation Breath Sounds / Voice Sounds] : lungs were clear to auscultation bilaterally [Heart Sounds] : normal S1 and S2 [Respiration, Rhythm And Depth] : normal respiratory rhythm and effort [Abnormal Walk] : normal gait [Murmurs] : no murmurs present [Edema] : no peripheral edema present [Skin Color & Pigmentation] : normal skin color and pigmentation [Nail Clubbing] : no clubbing of the fingernails [Cyanosis, Localized] : no localized cyanosis [] : no rash [Oriented To Time, Place, And Person] : oriented to person, place, and time

## 2020-08-04 NOTE — DISCUSSION/SUMMARY
[With Me] : with me [___ Year(s)] : [unfilled] year(s) [FreeTextEntry1] : \par Left bundle branch block: Today's ECG is similar to previous tracings; no cardiac symptoms.\par \par Sinus bradycardia:  Chronic; continue low-dose metoprolol.\par \par Hyperlipidemia: Tolerating low-dose atorvastatin.

## 2020-08-04 NOTE — REASON FOR VISIT
[Follow-Up - Clinic] : a clinic follow-up of [Abnormal ECG] : an abnormal ECG [Hyperlipidemia] : hyperlipidemia [Medication Management] : Medication management

## 2020-08-04 NOTE — HISTORY OF PRESENT ILLNESS
[FreeTextEntry1] : Samantha Flores is a 64-year-old woman with a history of remote cardiomyopathy (at age 52) with recovery/normalization of LV systolic function, left bundle branch block, hypertension, hypertriglyceridemia, and psychiatric illness (bipolar affective, obsessive-compulsive disorder) who returns for routine examination.  She has been feeling well since our last encounter in January 2019 and offers no new complaints.   She has been sedentary - hasn't been leaving her home during the pandemic.\par \par

## 2020-08-04 NOTE — REVIEW OF SYSTEMS
[Feeling Fatigued] : feeling fatigued [see HPI] : see HPI [Eyeglasses] : currently wearing eyeglasses [Shortness Of Breath] : no shortness of breath [Palpitations] : no palpitations [Chest Pain] : no chest pain [Chest  Pressure] : no chest pressure [Anxiety] : anxiety [Under Stress] : under stress [Negative] : Heme/Lymph [FreeTextEntry2] : dry eyes [FreeTextEntry3] : Allergies

## 2020-10-17 ENCOUNTER — INPATIENT (INPATIENT)
Facility: HOSPITAL | Age: 65
LOS: 7 days | Discharge: HOME | DRG: 417 | End: 2020-10-25
Attending: HOSPITALIST | Admitting: INTERNAL MEDICINE
Payer: MEDICARE

## 2020-10-17 VITALS
SYSTOLIC BLOOD PRESSURE: 170 MMHG | HEART RATE: 69 BPM | TEMPERATURE: 98 F | OXYGEN SATURATION: 99 % | WEIGHT: 149.91 LBS | DIASTOLIC BLOOD PRESSURE: 90 MMHG | HEIGHT: 63 IN | RESPIRATION RATE: 15 BRPM

## 2020-10-17 DIAGNOSIS — Z98.89 OTHER SPECIFIED POSTPROCEDURAL STATES: Chronic | ICD-10-CM

## 2020-10-17 DIAGNOSIS — K83.1 OBSTRUCTION OF BILE DUCT: ICD-10-CM

## 2020-10-17 LAB
ALBUMIN SERPL ELPH-MCNC: 4 G/DL — SIGNIFICANT CHANGE UP (ref 3.3–5)
ALP SERPL-CCNC: 123 U/L — HIGH (ref 40–120)
ALT FLD-CCNC: 32 U/L — SIGNIFICANT CHANGE UP (ref 12–78)
ANION GAP SERPL CALC-SCNC: 8 MMOL/L — SIGNIFICANT CHANGE UP (ref 5–17)
APPEARANCE UR: CLEAR — SIGNIFICANT CHANGE UP
APTT BLD: 31.8 SEC — SIGNIFICANT CHANGE UP (ref 27.5–35.5)
AST SERPL-CCNC: 40 U/L — HIGH (ref 15–37)
BACTERIA # UR AUTO: ABNORMAL
BASOPHILS # BLD AUTO: 0.08 K/UL — SIGNIFICANT CHANGE UP (ref 0–0.2)
BASOPHILS NFR BLD AUTO: 0.8 % — SIGNIFICANT CHANGE UP (ref 0–2)
BILIRUB SERPL-MCNC: 0.6 MG/DL — SIGNIFICANT CHANGE UP (ref 0.2–1.2)
BILIRUB UR-MCNC: NEGATIVE — SIGNIFICANT CHANGE UP
BLD GP AB SCN SERPL QL: SIGNIFICANT CHANGE UP
BUN SERPL-MCNC: 18 MG/DL — SIGNIFICANT CHANGE UP (ref 7–23)
CALCIUM SERPL-MCNC: 9.9 MG/DL — SIGNIFICANT CHANGE UP (ref 8.5–10.1)
CHLORIDE SERPL-SCNC: 112 MMOL/L — HIGH (ref 96–108)
CO2 SERPL-SCNC: 20 MMOL/L — LOW (ref 22–31)
COLOR SPEC: YELLOW — SIGNIFICANT CHANGE UP
CREAT SERPL-MCNC: 1.2 MG/DL — SIGNIFICANT CHANGE UP (ref 0.5–1.3)
DIFF PNL FLD: NEGATIVE — SIGNIFICANT CHANGE UP
EOSINOPHIL # BLD AUTO: 0.22 K/UL — SIGNIFICANT CHANGE UP (ref 0–0.5)
EOSINOPHIL NFR BLD AUTO: 2.1 % — SIGNIFICANT CHANGE UP (ref 0–6)
EPI CELLS # UR: SIGNIFICANT CHANGE UP
GLUCOSE SERPL-MCNC: 112 MG/DL — HIGH (ref 70–99)
GLUCOSE UR QL: NEGATIVE — SIGNIFICANT CHANGE UP
HCT VFR BLD CALC: 43.7 % — SIGNIFICANT CHANGE UP (ref 34.5–45)
HGB BLD-MCNC: 14.2 G/DL — SIGNIFICANT CHANGE UP (ref 11.5–15.5)
IMM GRANULOCYTES NFR BLD AUTO: 0.4 % — SIGNIFICANT CHANGE UP (ref 0–1.5)
INR BLD: 0.99 RATIO — SIGNIFICANT CHANGE UP (ref 0.88–1.16)
KETONES UR-MCNC: NEGATIVE — SIGNIFICANT CHANGE UP
LEUKOCYTE ESTERASE UR-ACNC: NEGATIVE — SIGNIFICANT CHANGE UP
LIDOCAIN IGE QN: 488 U/L — HIGH (ref 73–393)
LITHIUM SERPL-MCNC: 0.7 MMOL/L — SIGNIFICANT CHANGE UP (ref 0.6–1.2)
LYMPHOCYTES # BLD AUTO: 2.79 K/UL — SIGNIFICANT CHANGE UP (ref 1–3.3)
LYMPHOCYTES # BLD AUTO: 26.2 % — SIGNIFICANT CHANGE UP (ref 13–44)
MCHC RBC-ENTMCNC: 29.5 PG — SIGNIFICANT CHANGE UP (ref 27–34)
MCHC RBC-ENTMCNC: 32.5 GM/DL — SIGNIFICANT CHANGE UP (ref 32–36)
MCV RBC AUTO: 90.7 FL — SIGNIFICANT CHANGE UP (ref 80–100)
MONOCYTES # BLD AUTO: 0.7 K/UL — SIGNIFICANT CHANGE UP (ref 0–0.9)
MONOCYTES NFR BLD AUTO: 6.6 % — SIGNIFICANT CHANGE UP (ref 2–14)
NEUTROPHILS # BLD AUTO: 6.81 K/UL — SIGNIFICANT CHANGE UP (ref 1.8–7.4)
NEUTROPHILS NFR BLD AUTO: 63.9 % — SIGNIFICANT CHANGE UP (ref 43–77)
NITRITE UR-MCNC: NEGATIVE — SIGNIFICANT CHANGE UP
NRBC # BLD: 0 /100 WBCS — SIGNIFICANT CHANGE UP (ref 0–0)
PH UR: 7 — SIGNIFICANT CHANGE UP (ref 5–8)
PLATELET # BLD AUTO: 258 K/UL — SIGNIFICANT CHANGE UP (ref 150–400)
POTASSIUM SERPL-MCNC: 4.5 MMOL/L — SIGNIFICANT CHANGE UP (ref 3.5–5.3)
POTASSIUM SERPL-SCNC: 4.5 MMOL/L — SIGNIFICANT CHANGE UP (ref 3.5–5.3)
PROT SERPL-MCNC: 8 G/DL — SIGNIFICANT CHANGE UP (ref 6–8.3)
PROT UR-MCNC: NEGATIVE — SIGNIFICANT CHANGE UP
PROTHROM AB SERPL-ACNC: 11.6 SEC — SIGNIFICANT CHANGE UP (ref 10.6–13.6)
RBC # BLD: 4.82 M/UL — SIGNIFICANT CHANGE UP (ref 3.8–5.2)
RBC # FLD: 13.2 % — SIGNIFICANT CHANGE UP (ref 10.3–14.5)
SODIUM SERPL-SCNC: 140 MMOL/L — SIGNIFICANT CHANGE UP (ref 135–145)
SP GR SPEC: 1.01 — SIGNIFICANT CHANGE UP (ref 1.01–1.02)
UROBILINOGEN FLD QL: NEGATIVE — SIGNIFICANT CHANGE UP
WBC # BLD: 10.64 K/UL — HIGH (ref 3.8–10.5)
WBC # FLD AUTO: 10.64 K/UL — HIGH (ref 3.8–10.5)
WBC UR QL: SIGNIFICANT CHANGE UP

## 2020-10-17 PROCEDURE — 76700 US EXAM ABDOM COMPLETE: CPT | Mod: 26

## 2020-10-17 PROCEDURE — 99222 1ST HOSP IP/OBS MODERATE 55: CPT

## 2020-10-17 PROCEDURE — 71046 X-RAY EXAM CHEST 2 VIEWS: CPT | Mod: 26

## 2020-10-17 PROCEDURE — 99223 1ST HOSP IP/OBS HIGH 75: CPT

## 2020-10-17 PROCEDURE — 99285 EMERGENCY DEPT VISIT HI MDM: CPT

## 2020-10-17 PROCEDURE — 74176 CT ABD & PELVIS W/O CONTRAST: CPT | Mod: 26

## 2020-10-17 RX ORDER — LAMOTRIGINE 25 MG/1
200 TABLET, ORALLY DISINTEGRATING ORAL
Refills: 0 | Status: DISCONTINUED | OUTPATIENT
Start: 2020-10-17 | End: 2020-10-23

## 2020-10-17 RX ORDER — ONDANSETRON 8 MG/1
4 TABLET, FILM COATED ORAL EVERY 8 HOURS
Refills: 0 | Status: DISCONTINUED | OUTPATIENT
Start: 2020-10-17 | End: 2020-10-23

## 2020-10-17 RX ORDER — ZOLPIDEM TARTRATE 10 MG/1
5 TABLET ORAL AT BEDTIME
Refills: 0 | Status: DISCONTINUED | OUTPATIENT
Start: 2020-10-17 | End: 2020-10-22

## 2020-10-17 RX ORDER — FAMOTIDINE 10 MG/ML
20 INJECTION INTRAVENOUS ONCE
Refills: 0 | Status: COMPLETED | OUTPATIENT
Start: 2020-10-17 | End: 2020-10-17

## 2020-10-17 RX ORDER — FAMOTIDINE 10 MG/ML
20 INJECTION INTRAVENOUS DAILY
Refills: 0 | Status: DISCONTINUED | OUTPATIENT
Start: 2020-10-17 | End: 2020-10-23

## 2020-10-17 RX ORDER — MORPHINE SULFATE 50 MG/1
4 CAPSULE, EXTENDED RELEASE ORAL ONCE
Refills: 0 | Status: DISCONTINUED | OUTPATIENT
Start: 2020-10-17 | End: 2020-10-17

## 2020-10-17 RX ORDER — METOPROLOL TARTRATE 50 MG
25 TABLET ORAL DAILY
Refills: 0 | Status: DISCONTINUED | OUTPATIENT
Start: 2020-10-17 | End: 2020-10-23

## 2020-10-17 RX ORDER — ONDANSETRON 8 MG/1
4 TABLET, FILM COATED ORAL ONCE
Refills: 0 | Status: COMPLETED | OUTPATIENT
Start: 2020-10-17 | End: 2020-10-17

## 2020-10-17 RX ORDER — ZOLPIDEM TARTRATE 10 MG/1
10 TABLET ORAL AT BEDTIME
Refills: 0 | Status: DISCONTINUED | OUTPATIENT
Start: 2020-10-17 | End: 2020-10-23

## 2020-10-17 RX ORDER — CEFTRIAXONE 500 MG/1
1000 INJECTION, POWDER, FOR SOLUTION INTRAMUSCULAR; INTRAVENOUS EVERY 24 HOURS
Refills: 0 | Status: DISCONTINUED | OUTPATIENT
Start: 2020-10-17 | End: 2020-10-23

## 2020-10-17 RX ORDER — SODIUM CHLORIDE 9 MG/ML
1000 INJECTION INTRAMUSCULAR; INTRAVENOUS; SUBCUTANEOUS ONCE
Refills: 0 | Status: COMPLETED | OUTPATIENT
Start: 2020-10-17 | End: 2020-10-17

## 2020-10-17 RX ORDER — ACETAMINOPHEN 500 MG
650 TABLET ORAL EVERY 6 HOURS
Refills: 0 | Status: DISCONTINUED | OUTPATIENT
Start: 2020-10-17 | End: 2020-10-23

## 2020-10-17 RX ORDER — ENOXAPARIN SODIUM 100 MG/ML
40 INJECTION SUBCUTANEOUS DAILY
Refills: 0 | Status: DISCONTINUED | OUTPATIENT
Start: 2020-10-18 | End: 2020-10-22

## 2020-10-17 RX ORDER — TOPIRAMATE 25 MG
100 TABLET ORAL
Refills: 0 | Status: DISCONTINUED | OUTPATIENT
Start: 2020-10-17 | End: 2020-10-23

## 2020-10-17 RX ORDER — METOCLOPRAMIDE HCL 10 MG
10 TABLET ORAL ONCE
Refills: 0 | Status: COMPLETED | OUTPATIENT
Start: 2020-10-17 | End: 2020-10-17

## 2020-10-17 RX ORDER — LITHIUM CARBONATE 300 MG/1
300 TABLET, EXTENDED RELEASE ORAL
Refills: 0 | Status: DISCONTINUED | OUTPATIENT
Start: 2020-10-17 | End: 2020-10-23

## 2020-10-17 RX ORDER — MORPHINE SULFATE 50 MG/1
2 CAPSULE, EXTENDED RELEASE ORAL EVERY 4 HOURS
Refills: 0 | Status: DISCONTINUED | OUTPATIENT
Start: 2020-10-17 | End: 2020-10-22

## 2020-10-17 RX ORDER — SODIUM CHLORIDE 9 MG/ML
1000 INJECTION, SOLUTION INTRAVENOUS
Refills: 0 | Status: DISCONTINUED | OUTPATIENT
Start: 2020-10-17 | End: 2020-10-22

## 2020-10-17 RX ORDER — METRONIDAZOLE 500 MG
500 TABLET ORAL EVERY 8 HOURS
Refills: 0 | Status: DISCONTINUED | OUTPATIENT
Start: 2020-10-17 | End: 2020-10-23

## 2020-10-17 RX ORDER — ZIPRASIDONE HYDROCHLORIDE 20 MG/1
80 CAPSULE ORAL
Refills: 0 | Status: DISCONTINUED | OUTPATIENT
Start: 2020-10-18 | End: 2020-10-23

## 2020-10-17 RX ADMIN — LAMOTRIGINE 200 MILLIGRAM(S): 25 TABLET, ORALLY DISINTEGRATING ORAL at 17:49

## 2020-10-17 RX ADMIN — MORPHINE SULFATE 2 MILLIGRAM(S): 50 CAPSULE, EXTENDED RELEASE ORAL at 19:10

## 2020-10-17 RX ADMIN — ZOLPIDEM TARTRATE 5 MILLIGRAM(S): 10 TABLET ORAL at 23:35

## 2020-10-17 RX ADMIN — ONDANSETRON 4 MILLIGRAM(S): 8 TABLET, FILM COATED ORAL at 18:54

## 2020-10-17 RX ADMIN — SODIUM CHLORIDE 70 MILLILITER(S): 9 INJECTION, SOLUTION INTRAVENOUS at 18:30

## 2020-10-17 RX ADMIN — Medication 10 MILLIGRAM(S): at 12:44

## 2020-10-17 RX ADMIN — Medication 100 MILLIGRAM(S): at 17:50

## 2020-10-17 RX ADMIN — SODIUM CHLORIDE 1000 MILLILITER(S): 9 INJECTION INTRAMUSCULAR; INTRAVENOUS; SUBCUTANEOUS at 07:50

## 2020-10-17 RX ADMIN — MORPHINE SULFATE 4 MILLIGRAM(S): 50 CAPSULE, EXTENDED RELEASE ORAL at 12:44

## 2020-10-17 RX ADMIN — MORPHINE SULFATE 2 MILLIGRAM(S): 50 CAPSULE, EXTENDED RELEASE ORAL at 18:55

## 2020-10-17 RX ADMIN — MORPHINE SULFATE 4 MILLIGRAM(S): 50 CAPSULE, EXTENDED RELEASE ORAL at 12:59

## 2020-10-17 RX ADMIN — CEFTRIAXONE 100 MILLIGRAM(S): 500 INJECTION, POWDER, FOR SOLUTION INTRAMUSCULAR; INTRAVENOUS at 16:39

## 2020-10-17 RX ADMIN — LITHIUM CARBONATE 300 MILLIGRAM(S): 300 TABLET, EXTENDED RELEASE ORAL at 17:50

## 2020-10-17 RX ADMIN — FAMOTIDINE 20 MILLIGRAM(S): 10 INJECTION INTRAVENOUS at 07:55

## 2020-10-17 RX ADMIN — Medication 100 MILLIGRAM(S): at 21:46

## 2020-10-17 RX ADMIN — SODIUM CHLORIDE 1000 MILLILITER(S): 9 INJECTION INTRAMUSCULAR; INTRAVENOUS; SUBCUTANEOUS at 08:50

## 2020-10-17 NOTE — CONSULT NOTE ADULT - SUBJECTIVE AND OBJECTIVE BOX
SURGERY PA CONSULT NOTE:    CHIEF COMPLAINT:  Patient is a 65y old female who presents with a chief complaint of epigastric abdominal pain      HPI:  This is a pleasant, 65-yo female with pmhx of HTN, CHF, bipolar disorder, CBD stricture, diverticulitis, and bilateral hip AVN who presents to the ED earlier today with c/o severe epigastric abdominal pain since midnight last night.  Patient lives with her father, and is his primary caregiver, waited until he was awake before coming to the hospital at 0700.  Pain at time of onset is described as sharp, unremitting, localized to the epigastrium, non-radiating, and constant.  No relief with OTC Tums.  Currently, pain is considerably improved after Morphine given in the ED.  Patient also reports some nausea that began at about 1300 hours today (resolved with Reglan given in ED).  Denies vomiting, fevers, chills, sweats, CP/SOB/dyspnea, palpitations, dizziness, lightheadedness.  Reports slight increase in frequency of stool yesterday, otherwise reports no change in bowel/bladder habits.  Denies hematochezia or BRBPR.  Passing flatus.    Of note, patient has h/o CBD stricture prior, for which ERCP was performed approx. 10 years ago - she describes the reason for ERCP as "wildly elevated liver function".      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  Pt also self mutilate last was "many yrs ago maybe 20 yrs ago"    PAST SURGICAL HISTORY:  History of tonsillectomy  at 18 yrs old  Left CARLOS    REVIEW OF SYSTEMS:  General: No acute distress, awake and alert  Head: Normal cephalic/atraumatic  Neck: Denies neck pain or palpable masses, hoarseness or stridor  Lymphatic: Denies cervical or axillary or femoral lymphadenopathy  Chest: No chest pain, cough or hemoptysis  Heart: No chest pain, palpitations  Abdomen: No abdominal distention, nausea or vomiting, no abdominal pain  Extremities: Denies cyanosis, open sores or swollen extremity  Neuro: Denies weakness, paralysis, syncope, loss of vision  Psych: Denies hallucinations, visual disturbances, or depression    MEDICATIONS:  Home Medications:  Ambien 10 mg oral tablet: 1 tab(s) orally once a day (at bedtime) (17 Oct 2020 10:12)  atorvastatin 10 mg oral tablet: 1 tab(s) orally once a day (17 Oct 2020 16:10)  hydrocodone-acetaminophen 5 mg-325 mg oral tablet: 1 tab(s) orally every 6 hours, As Needed (17 Oct 2020 16:10)  LaMICtal 200 mg oral tablet: 1 tab(s) orally 2 times a day (17 Oct 2020 16:10)  lithium 300 mg oral capsule: 1 cap(s) orally 2 times a day (17 Oct 2020 16:10)  Metoprolol Tartrate 25 mg oral tablet: 1 tab(s) orally once a day    *pt confirms taking once per day (17 Oct 2020 16:10)  multivitamin: 1 tab(s) orally once a day (17 Oct 2020 16:10)  topiramate 100 mg oral tablet: 1 tab(s) orally 2 times a day (17 Oct 2020 16:10)  ziprasidone 80 mg oral capsule: 1 capsule by mouth once daily (17 Oct 2020 16:10)    ALLERGIES:  penicillins (Rash)  sulfa drugs (Rash)    SOCIAL HISTORY:  lives at home with father (she is his primary caregiver)  former nurse  former smoker (quit in teens)  Occasional/social ETOH  Denies illicit drug use    FAMILY HISTORY:  Family history of breast cancer in mother (Mother)  Family history of prostate cancer in father (Father)  Family history of atrial fibrillation (Mother)  mother  Family history of depression (Mother, Sibling)  mother and 3 siblings  Family history of hypertension (Father, Mother)  mother and father    VITALS SIGNS:  T(C): 36.6 (17 Oct 2020 07:10), Max: 36.6 (17 Oct 2020 07:10)  T(F): 97.9 (17 Oct 2020 07:10), Max: 97.9 (17 Oct 2020 07:10)  HR: 69 (17 Oct 2020 07:10) (69 - 69)  BP: 170/90 (17 Oct 2020 07:10) (170/90 - 170/90)  BP(mean): --  RR: 15 (17 Oct 2020 07:10) (15 - 15)  SpO2: 99% (17 Oct 2020 07:10) (99% - 99%)    PHYSICAL EXAM:  General:  Appears stated age, well-groomed, well-nourished, no distress  HENT:  WNL, no JVD  Chest:  Clear to auscultation bilaterally without W/R/R  Cardiovascular:  Regular rate & rhythm, S1S2  Abdomen: Soft, ND.  Moderate to marked tenderness RUQ.  +BS x 4, no rebound or guarding.    Extremities:  Calves soft, NT bilat without edema  Skin:  No rash  Musculoskeletal:  Normal strength  Neuro/Psych:  Alert, oriented to time, place, and person     LABS:                        14.2   10.64 )-----------( 258      ( 17 Oct 2020 07:52 )             43.7     10    140  |  112<H>  |  18  ----------------------------<  112<H>  4.5   |  20<L>  |  1.20    Ca    9.9      17 Oct 2020 07:52    TPro  8.0  /  Alb  4.0  /  TBili  0.6  /  DBili  x   /  AST  40<H>  /  ALT  32  /  AlkPhos  123<H>  1017    PT/INR - ( 17 Oct 2020 07:52 )   PT: 11.6 sec;   INR: 0.99 ratio         PTT - ( 17 Oct 2020 07:52 )  PTT:31.8 sec  Urinalysis Basic - ( 17 Oct 2020 08:59 )    Color: Yellow / Appearance: Clear / S.010 / pH: x  Gluc: x / Ketone: Negative  / Bili: Negative / Urobili: Negative   Blood: x / Protein: Negative / Nitrite: Negative   Leuk Esterase: Negative / RBC: x / WBC 0-2   Sq Epi: x / Non Sq Epi: Occasional / Bacteria: Occasional    RADIOLOGY & ADDITIONAL STUDIES:    EXAM:  CT ABDOMEN AND PELVIS                        PROCEDURE DATE:  10/17/2020    INTERPRETATION:  CT ABDOMEN AND PELVIS  CLINICAL INFORMATION: Epigastric pain  abd pain  COMPARISON: Same day abdominal ultrasound  PROCEDURE:  CT ofthe Abdomen and Pelvis was performed without intravenous contrast.  Intravenous contrast: None.  Oral contrast: None.  Sagittal and coronal reformats were performed.  FINDINGS:  LOWER CHEST: Clear.  LIVER: Normal.  BILE DUCTS: 17 mm CBD dilatation without intrahepatic dilatation.  GALLBLADDER: Normal.  SPLEEN: Normal.  PANCREAS: Normal.  ADRENALS: Normal.  KIDNEYS/URETERS: Punctate nonobstructing left renal calculus.  BLADDER: Obscured  REPRODUCTIVE ORGANS: Obscured  BOWEL: No bowel-related abnormality. Specifically, no evidence of acute diverticulitis. Normal appendix and ileocecal region. No bowel obstruction or bowel inflammation.  PERITONEUM: No free air or ascites.  VESSELS:  Aortoiliac atherosclerosis without aneurysm.  RETROPERITONEUM/LYMPH NODES: No adenopathy.  ABDOMINAL WALL: Normal.  BONES: Left hip Arthroplasty. No acute bony abnormality.  IMPRESSION:  17 mm CBD dilatation without intrahepatic dilatation. Correlation with LFTs is needed to determine the clinical significance.  ERIC PLUMMER M.D., ATTENDING RADIOLOGIST  This document has been electronically signed. Oct 17 2020  2:18PM    ASSESSMENT: 65-yo female with pmhx of HTN, CHF, bipolar disorder, CBD stricture, diverticulitis, and bilateral hip AVN who presents to the ED earlier today with c/o severe epigastric abdominal pain and profound dilation of the common bile duct  Likely choledocholithiasis (passed stone) with +/- components of low-grade Cholangitis, CBD stricture    PLAN: Seen in conjunction with Dr. Sharma  Hypertension noted - VS otherwise stable/non-suggestive.  Afeb   Mild leukocytosis, as well as somewhat elevated LFT's and lipase  Admit to Medical service  GI consult (possible MRCP/ERCP)  Rocephin/Flagyl initiated  Maintain NPO status  Discussed possibility of a repeat ERCP, as well as likelihood of further diagnostic testing (possible HIDA).  Also discussed the eventual possibility of cholecystectomy   Further treatment planning pending outcome of diagnostic workup  Will follow    SURGERY PA CONSULT NOTE:    CHIEF COMPLAINT:  Patient is a 65y old female who presents with a chief complaint of epigastric abdominal pain      HPI:  This is a pleasant, 65-yo female with pmhx of HTN, CHF, bipolar disorder, CBD stricture, diverticulitis, and bilateral hip AVN who presents to the ED earlier today with c/o severe epigastric abdominal pain since midnight last night.  Patient lives with her father, and is his primary caregiver, waited until he was awake before coming to the hospital at 0700.  Pain at time of onset is described as sharp, unremitting, localized to the epigastrium, non-radiating, and constant.  No relief with OTC Tums.  Currently, pain is considerably improved after Morphine given in the ED.  Patient also reports some nausea that began at about 1300 hours today (resolved with Reglan given in ED).  Denies vomiting, fevers, chills, sweats, CP/SOB/dyspnea, palpitations, dizziness, lightheadedness.  Reports slight increase in frequency of stool yesterday, otherwise reports no change in bowel/bladder habits.  Denies hematochezia or BRBPR.  Passing flatus.    Of note, patient has h/o CBD stricture prior, for which ERCP was performed approx. 10 years ago - she describes the reason for ERCP as "wildly elevated liver function".        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  Pt also self mutilate last was "many yrs ago maybe 20 yrs ago"      PAST SURGICAL HISTORY:  History of tonsillectomy  at 18 yrs old  Left CARLOS      REVIEW OF SYSTEMS:  General: No acute distress, awake and alert  Head: Normal cephalic/atraumatic  Neck: Denies neck pain or palpable masses, hoarseness or stridor  Lymphatic: Denies cervical or axillary or femoral lymphadenopathy  Chest: No chest pain, cough or hemoptysis  Heart: No chest pain, palpitations  Abdomen: No abdominal distention, nausea or vomiting, no abdominal pain  Extremities: Denies cyanosis, open sores or swollen extremity  Neuro: Denies weakness, paralysis, syncope, loss of vision  Psych: Denies hallucinations, visual disturbances, or depression      MEDICATIONS:  Home Medications:  Ambien 10 mg oral tablet: 1 tab(s) orally once a day (at bedtime) (17 Oct 2020 10:12)  atorvastatin 10 mg oral tablet: 1 tab(s) orally once a day (17 Oct 2020 16:10)  hydrocodone-acetaminophen 5 mg-325 mg oral tablet: 1 tab(s) orally every 6 hours, As Needed (17 Oct 2020 16:10)  LaMICtal 200 mg oral tablet: 1 tab(s) orally 2 times a day (17 Oct 2020 16:10)  lithium 300 mg oral capsule: 1 cap(s) orally 2 times a day (17 Oct 2020 16:10)  Metoprolol Tartrate 25 mg oral tablet: 1 tab(s) orally once a day      *pt confirms taking once per day (17 Oct 2020 16:10)  multivitamin: 1 tab(s) orally once a day (17 Oct 2020 16:10)  topiramate 100 mg oral tablet: 1 tab(s) orally 2 times a day (17 Oct 2020 16:10)  ziprasidone 80 mg oral capsule: 1 capsule by mouth once daily (17 Oct 2020 16:10)      ALLERGIES:  penicillins (Rash)  sulfa drugs (Rash)      SOCIAL HISTORY:  lives at home with father (she is his primary caregiver)  former nurse  former smoker (quit in teens)  Occasional/social ETOH  Denies illicit drug use      FAMILY HISTORY:  Family history of breast cancer in mother (Mother)  Family history of prostate cancer in father (Father)  Family history of atrial fibrillation (Mother)  mother  Family history of depression (Mother, Sibling)  mother and 3 siblings  Family history of hypertension (Father, Mother)  mother and father      VITALS SIGNS:  T(C): 36.6 (17 Oct 2020 07:10), Max: 36.6 (17 Oct 2020 07:10)  T(F): 97.9 (17 Oct 2020 07:10), Max: 97.9 (17 Oct 2020 07:10)  HR: 69 (17 Oct 2020 07:10) (69 - 69)  BP: 170/90 (17 Oct 2020 07:10) (170/90 - 170/90)  RR: 15 (17 Oct 2020 07:10) (15 - 15)  SpO2: 99% (17 Oct 2020 07:10) (99% - 99%)      PHYSICAL EXAM:  General:  Appears stated age, well-groomed, well-nourished, no distress  HENT:  WNL, no JVD  Chest:  Clear to auscultation bilaterally without W/R/R  Cardiovascular:  Regular rate & rhythm, S1S2  Abdomen: Soft, ND.  Moderate to marked tenderness RUQ.  +BS x 4, no rebound or guarding.    Extremities:  Calves soft, NT bilat without edema  Skin:  No rash  Musculoskeletal:  Normal strength  Neuro/Psych:  Alert, oriented to time, place, and person       LABS:                        14.2   10.64 )-----------( 258      ( 17 Oct 2020 07:52 )             43.7     10    140  |  112<H>  |  18  ----------------------------<  112<H>  4.5   |  20<L>  |  1.20    Ca    9.9      17 Oct 2020 07:52    TPro  8.0  /  Alb  4.0  /  TBili  0.6  /  DBili  x   /  AST  40<H>  /  ALT  32  /  AlkPhos  123<H>  1017    PT/INR - ( 17 Oct 2020 07:52 )   PT: 11.6 sec;   INR: 0.99 ratio    PTT - ( 17 Oct 2020 07:52 )  PTT:31.8 sec    Urinalysis Basic - ( 17 Oct 2020 08:59 )  Color: Yellow / Appearance: Clear / S.010 / pH: x  Gluc: x / Ketone: Negative  / Bili: Negative / Urobili: Negative   Blood: x / Protein: Negative / Nitrite: Negative   Leuk Esterase: Negative / RBC: x / WBC 0-2   Sq Epi: x / Non Sq Epi: Occasional / Bacteria: Occasional      RADIOLOGY & ADDITIONAL STUDIES:    EXAM:  CT ABDOMEN AND PELVIS                        PROCEDURE DATE:  10/17/2020    INTERPRETATION:  CT ABDOMEN AND PELVIS  CLINICAL INFORMATION: Epigastric pain  abd pain  COMPARISON: Same day abdominal ultrasound  PROCEDURE:  CT ofthe Abdomen and Pelvis was performed without intravenous contrast.  Intravenous contrast: None.  Oral contrast: None.  Sagittal and coronal reformats were performed.  FINDINGS:  LOWER CHEST: Clear.  LIVER: Normal.  BILE DUCTS: 17 mm CBD dilatation without intrahepatic dilatation.  GALLBLADDER: Normal.  SPLEEN: Normal.  PANCREAS: Normal.  ADRENALS: Normal.  KIDNEYS/URETERS: Punctate nonobstructing left renal calculus.  BLADDER: Obscured  REPRODUCTIVE ORGANS: Obscured  BOWEL: No bowel-related abnormality. Specifically, no evidence of acute diverticulitis. Normal appendix and ileocecal region. No bowel obstruction or bowel inflammation.  PERITONEUM: No free air or ascites.  VESSELS:  Aortoiliac atherosclerosis without aneurysm.  RETROPERITONEUM/LYMPH NODES: No adenopathy.  ABDOMINAL WALL: Normal.  BONES: Left hip Arthroplasty. No acute bony abnormality.  IMPRESSION:  17 mm CBD dilatation without intrahepatic dilatation. Correlation with LFTs is needed to determine the clinical significance.  ERIC PLUMMER M.D., ATTENDING RADIOLOGIST  This document has been electronically signed. Oct 17 2020  2:18PM      ASSESSMENT: 65-yo female with pmhx of HTN, CHF, bipolar disorder, CBD stricture, diverticulitis, and bilateral hip AVN who presents to the ED earlier today with c/o severe epigastric abdominal pain and profound dilation of the common bile duct  Likely choledocholithiasis (passed stone) with +/- components of low-grade Cholangitis, CBD stricture      PLAN: Seen in conjunction with Dr. Sharma  Hypertension noted - VS otherwise stable/non-suggestive.  Afeb   Mild leukocytosis, as well as somewhat elevated LFT's and lipase  Admit to Medical service  GI consult (possible MRCP/ERCP)  Rocephin/Flagyl initiated  Maintain NPO status  Discussed possibility of a repeat ERCP, as well as likelihood of further diagnostic testing (possible HIDA).  Also discussed the eventual possibility of cholecystectomy   Further treatment planning pending outcome of diagnostic workup  Will follow

## 2020-10-17 NOTE — CONSULT NOTE ADULT - ASSESSMENT
All as above in PA notation.  Patient personally seen and examined.  All labs reviewed and discussed with patient.  All radiology reports and images reviewed and discussed with PA.  Pain improving/ improved, though not resolved.  Labs consistent with low grade pancreatitis +/- passage of CBD debris.  Sono and CT go against acute cholecystitis.  However, given CBD dilation and history of prior distant ERCP, I would advocate for admission, supportive care and MRCP to assist with decision making.  She is pleased, she and her brother agree, they are able to verbalize concerns and plan as outlined above.

## 2020-10-17 NOTE — H&P ADULT - NSHPPHYSICALEXAM_GEN_ALL_CORE
Vital Signs (24 Hrs):  T(C): 36.6 (10-17-20 @ 07:10), Max: 36.6 (10-17-20 @ 07:10)  HR: 69 (10-17-20 @ 07:10) (69 - 69)  BP: 170/90 (10-17-20 @ 07:10) (170/90 - 170/90)  RR: 15 (10-17-20 @ 07:10) (15 - 15)  SpO2: 99% (10-17-20 @ 07:10) (99% - 99%)  Daily Height in cm: 160.02 (17 Oct 2020 07:10)

## 2020-10-17 NOTE — ED PROVIDER NOTE - PROGRESS NOTE DETAILS
anmol chao surgery, pt to be admitted to medicine for evaluation and futher study of the dilated cbd, abnl lft and amlyase. pmd is lidia, hospitalist called to admit

## 2020-10-17 NOTE — ED PROVIDER NOTE - OBJECTIVE STATEMENT
pt c/o 6 hours of upper abd pain. nonradiating. no fevers, chills, ha, nausea, vomiting, cp, sob, cough, diarrhea, dysuria, hematuria, freq.  pmd - lidia

## 2020-10-17 NOTE — H&P ADULT - NSICDXFAMILYHX_GEN_ALL_CORE_FT
FAMILY HISTORY:  Father  Still living? Yes, Estimated age: Age Unknown  Family history of hypertension, Age at diagnosis: Age Unknown  Family history of prostate cancer in father, Age at diagnosis: Age Unknown    Mother  Still living? Yes, Estimated age: 81-90  Family history of atrial fibrillation, Age at diagnosis: Age Unknown  Family history of breast cancer in mother, Age at diagnosis: Age Unknown  Family history of depression, Age at diagnosis: Age Unknown  Family history of hypertension, Age at diagnosis: Age Unknown    Sibling  Still living? Yes, Estimated age: Age Unknown  Family history of depression, Age at diagnosis: Age Unknown

## 2020-10-17 NOTE — H&P ADULT - HISTORY OF PRESENT ILLNESS
·65y Female complaining of abdominal pain c/o 6 hours of upper abd pain. nonradiating. no fevers, chills, ha, nausea, vomiting, cp, sob, cough, diarrhea, dysuria, hematuria, freq.     ·65y Female complaining of abdominal pain c/o 6 hours of upper abd pain. nonradiating. no fevers, chills, ha, nausea, vomiting, cp, sob, cough, diarrhea, dysuria, hematuria, freq.    Dilated common bile duct without intrahepatic biliary dilatation, correlate with bilirubin levels and further evaluation as clinically directed.    Mild hepatic steatosis.    Small gallbladder sludge. ·65y Female complaining of abdominal pain c/o 6 hours of upper abd pain. nonradiating. no fevers, chills, ha, nausea, vomiting, cp, sob, cough, diarrhea, dysuria, hematuria, freq.    Dilated common bile duct without intrahepatic biliary dilatation, correlate with bilirubin levels and further evaluation as clinically directed.    Mild hepatic steatosis.    LIVER: Normal.  BILE DUCTS: 17 mm CBD dilatation without intrahepatic dilatation.  GALLBLADDER: Normal.  SPLEEN: Normal.  PANCREAS: Normal.  ADRENALS: Normal.  KIDNEYS/URETERS: Punctate nonobstructing left renal calculus.  Small gallbladder sludge. 65y Female with HTN, Bipolar disorder, presents to the ED with c/o abdominal pain, epigastric and RUQ since 12 midnight.  Pt states she took 3 TUMS wity no relief.  Last meal was dinner, last night, she was unable to eat breakfast and lunch because of the discomfort.  She states pain is nonradiating, asso with nausea, but no vomiting, diarrhea, fever nor chills.  Denies chest pain, dyspnea, dysuria.    Abdominal sono reports 17 mm dilated common bile duct without intrahepatic biliary dilatation, mild hepatic steatosis. Small gallbladder sludge. Pancreas within normal.  Punctate nonobstructing left renal calculus.  Pt was given a dose of Ceftriaxone and Flagyl IV in the ED.

## 2020-10-17 NOTE — ED PROVIDER NOTE - NONTENDER LOCATION
left lower quadrant/right lower quadrant/left costovertebral angle/left upper quadrant/right costovertebral angle

## 2020-10-17 NOTE — H&P ADULT - NSICDXPASTMEDICALHX_GEN_ALL_CORE_FT
PAST MEDICAL HISTORY:  Avascular necrosis of bone of hip, left     Bipolar 1 disorder     Bipolar disorder last episode of suicidal thoughts was 1.5 yrs ago.  Pt also self mutilate last was "many yrs ago maybe 20 yrs ago".    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

## 2020-10-17 NOTE — ED ADULT NURSE REASSESSMENT NOTE - NS ED NURSE REASSESS COMMENT FT1
pt very particular about IV placement.  Pt wanted earlier one removed.  New IV placed on left hand.  Flushed well with 10 ml syringe.

## 2020-10-17 NOTE — ED ADULT NURSE NOTE - OBJECTIVE STATEMENT
Received pt in bed alert and oriented x4.  C/O abdominal pain x 6 hrs right upper quad radiating ot middle.  Pt denies n/v/d. Received pt in bed alert and oriented x4.  C/O abdominal pain x 6 hrs right upper quad radiating ot middle.  Pt denies n/v/d. Ongoing nursing care and safety maintained.

## 2020-10-17 NOTE — H&P ADULT - ASSESSMENT
65y Female with HTN, Bipolar disorder, presents with abdominal pain, epigastric and RUQ since 12 midnight, mild elev LFTs, abd sono with 17 mm dilated CBD, small GB sludge.    Admit  NPO, IV hydration  ffup LFTs, Lipase  Cont lithium, metoprolol, lamictal, topiramate, ziprasidone  GI eval - Dr Corbett group  GI/DVT prophylaxis    IMPROVE VTE Individual Risk Assessment    RISK                                                                Points  [  ] Previous VTE                                                  3  [  ] Thrombophilia                                               2  [  ] Lower limb paralysis                                      2        (unable to hold up >15 seconds)    [  ] Current Cancer                                              2         (within 6 months)  [  ] Immobilization > 24 hrs                                1  [  ] ICU/CCU stay > 24 hours                              1  [ x ] Age > 60                                                      1  IMPROVE VTE Score __1____  IMPROVE Score 0-1: Low Risk, No VTE prophylaxis required for most patients, encourage ambulation.   IMPROVE Score 2-3: At risk, pharmacologic VTE prophylaxis is indicated for most patients (in the absence of a contraindication)  IMPROVE Score > or = 4: High Risk, pharmacologic VTE prophylaxis is indicated for most patients (in the absence of a contraindication)

## 2020-10-17 NOTE — ED PROVIDER NOTE - CARDIOVASCULAR NEGATIVE STATEMENT, MLM
[[email or web address]] : Pt. c/o pain in lower abdomen, started about 1 month go and it is getting worse.\par Pain to lower abdomen on playing soccer. no chest pain

## 2020-10-18 DIAGNOSIS — K85.10 BILIARY ACUTE PANCREATITIS WITHOUT NECROSIS OR INFECTION: ICD-10-CM

## 2020-10-18 DIAGNOSIS — G43.909 MIGRAINE, UNSPECIFIED, NOT INTRACTABLE, WITHOUT STATUS MIGRAINOSUS: ICD-10-CM

## 2020-10-18 LAB
ALBUMIN SERPL ELPH-MCNC: 3.4 G/DL — SIGNIFICANT CHANGE UP (ref 3.3–5)
ALP SERPL-CCNC: 94 U/L — SIGNIFICANT CHANGE UP (ref 40–120)
ALT FLD-CCNC: 29 U/L — SIGNIFICANT CHANGE UP (ref 12–78)
ANION GAP SERPL CALC-SCNC: 9 MMOL/L — SIGNIFICANT CHANGE UP (ref 5–17)
AST SERPL-CCNC: 21 U/L — SIGNIFICANT CHANGE UP (ref 15–37)
BILIRUB SERPL-MCNC: 0.7 MG/DL — SIGNIFICANT CHANGE UP (ref 0.2–1.2)
BUN SERPL-MCNC: 13 MG/DL — SIGNIFICANT CHANGE UP (ref 7–23)
CALCIUM SERPL-MCNC: 8.7 MG/DL — SIGNIFICANT CHANGE UP (ref 8.5–10.1)
CHLORIDE SERPL-SCNC: 115 MMOL/L — HIGH (ref 96–108)
CO2 SERPL-SCNC: 21 MMOL/L — LOW (ref 22–31)
CREAT SERPL-MCNC: 0.92 MG/DL — SIGNIFICANT CHANGE UP (ref 0.5–1.3)
CULTURE RESULTS: SIGNIFICANT CHANGE UP
GLUCOSE SERPL-MCNC: 97 MG/DL — SIGNIFICANT CHANGE UP (ref 70–99)
HCT VFR BLD CALC: 37.6 % — SIGNIFICANT CHANGE UP (ref 34.5–45)
HCV AB S/CO SERPL IA: 0.08 S/CO — SIGNIFICANT CHANGE UP (ref 0–0.99)
HCV AB SERPL-IMP: SIGNIFICANT CHANGE UP
HGB BLD-MCNC: 12.2 G/DL — SIGNIFICANT CHANGE UP (ref 11.5–15.5)
LIDOCAIN IGE QN: 179 U/L — SIGNIFICANT CHANGE UP (ref 73–393)
MCHC RBC-ENTMCNC: 29.8 PG — SIGNIFICANT CHANGE UP (ref 27–34)
MCHC RBC-ENTMCNC: 32.4 GM/DL — SIGNIFICANT CHANGE UP (ref 32–36)
MCV RBC AUTO: 91.7 FL — SIGNIFICANT CHANGE UP (ref 80–100)
NRBC # BLD: 0 /100 WBCS — SIGNIFICANT CHANGE UP (ref 0–0)
PLATELET # BLD AUTO: 206 K/UL — SIGNIFICANT CHANGE UP (ref 150–400)
POTASSIUM SERPL-MCNC: 3.8 MMOL/L — SIGNIFICANT CHANGE UP (ref 3.5–5.3)
POTASSIUM SERPL-SCNC: 3.8 MMOL/L — SIGNIFICANT CHANGE UP (ref 3.5–5.3)
PROT SERPL-MCNC: 6.9 G/DL — SIGNIFICANT CHANGE UP (ref 6–8.3)
RBC # BLD: 4.1 M/UL — SIGNIFICANT CHANGE UP (ref 3.8–5.2)
RBC # FLD: 13.1 % — SIGNIFICANT CHANGE UP (ref 10.3–14.5)
SARS-COV-2 IGG SERPL QL IA: NEGATIVE — SIGNIFICANT CHANGE UP
SARS-COV-2 IGM SERPL IA-ACNC: <0.1 INDEX — SIGNIFICANT CHANGE UP
SODIUM SERPL-SCNC: 145 MMOL/L — SIGNIFICANT CHANGE UP (ref 135–145)
SPECIMEN SOURCE: SIGNIFICANT CHANGE UP
WBC # BLD: 8.67 K/UL — SIGNIFICANT CHANGE UP (ref 3.8–10.5)
WBC # FLD AUTO: 8.67 K/UL — SIGNIFICANT CHANGE UP (ref 3.8–10.5)

## 2020-10-18 PROCEDURE — 99233 SBSQ HOSP IP/OBS HIGH 50: CPT

## 2020-10-18 RX ORDER — SUMATRIPTAN SUCCINATE 4 MG/.5ML
100 INJECTION, SOLUTION SUBCUTANEOUS DAILY
Refills: 0 | Status: DISCONTINUED | OUTPATIENT
Start: 2020-10-18 | End: 2020-10-23

## 2020-10-18 RX ORDER — ONDANSETRON 8 MG/1
4 TABLET, FILM COATED ORAL ONCE
Refills: 0 | Status: COMPLETED | OUTPATIENT
Start: 2020-10-18 | End: 2020-10-18

## 2020-10-18 RX ADMIN — LITHIUM CARBONATE 300 MILLIGRAM(S): 300 TABLET, EXTENDED RELEASE ORAL at 21:14

## 2020-10-18 RX ADMIN — Medication 25 MILLIGRAM(S): at 10:30

## 2020-10-18 RX ADMIN — ONDANSETRON 4 MILLIGRAM(S): 8 TABLET, FILM COATED ORAL at 10:28

## 2020-10-18 RX ADMIN — FAMOTIDINE 20 MILLIGRAM(S): 10 INJECTION INTRAVENOUS at 12:10

## 2020-10-18 RX ADMIN — Medication 100 MILLIGRAM(S): at 21:14

## 2020-10-18 RX ADMIN — LAMOTRIGINE 200 MILLIGRAM(S): 25 TABLET, ORALLY DISINTEGRATING ORAL at 10:27

## 2020-10-18 RX ADMIN — CEFTRIAXONE 100 MILLIGRAM(S): 500 INJECTION, POWDER, FOR SOLUTION INTRAMUSCULAR; INTRAVENOUS at 17:29

## 2020-10-18 RX ADMIN — MORPHINE SULFATE 2 MILLIGRAM(S): 50 CAPSULE, EXTENDED RELEASE ORAL at 03:55

## 2020-10-18 RX ADMIN — ONDANSETRON 4 MILLIGRAM(S): 8 TABLET, FILM COATED ORAL at 21:14

## 2020-10-18 RX ADMIN — Medication 100 MILLIGRAM(S): at 14:42

## 2020-10-18 RX ADMIN — LITHIUM CARBONATE 300 MILLIGRAM(S): 300 TABLET, EXTENDED RELEASE ORAL at 10:27

## 2020-10-18 RX ADMIN — ZIPRASIDONE HYDROCHLORIDE 80 MILLIGRAM(S): 20 CAPSULE ORAL at 10:27

## 2020-10-18 RX ADMIN — SUMATRIPTAN SUCCINATE 100 MILLIGRAM(S): 4 INJECTION, SOLUTION SUBCUTANEOUS at 16:46

## 2020-10-18 RX ADMIN — LAMOTRIGINE 200 MILLIGRAM(S): 25 TABLET, ORALLY DISINTEGRATING ORAL at 21:14

## 2020-10-18 RX ADMIN — Medication 100 MILLIGRAM(S): at 06:47

## 2020-10-18 RX ADMIN — ZOLPIDEM TARTRATE 5 MILLIGRAM(S): 10 TABLET ORAL at 21:28

## 2020-10-18 RX ADMIN — MORPHINE SULFATE 2 MILLIGRAM(S): 50 CAPSULE, EXTENDED RELEASE ORAL at 03:40

## 2020-10-18 RX ADMIN — ONDANSETRON 4 MILLIGRAM(S): 8 TABLET, FILM COATED ORAL at 03:42

## 2020-10-18 RX ADMIN — Medication 100 MILLIGRAM(S): at 10:27

## 2020-10-18 RX ADMIN — SUMATRIPTAN SUCCINATE 100 MILLIGRAM(S): 4 INJECTION, SOLUTION SUBCUTANEOUS at 17:30

## 2020-10-18 RX ADMIN — ENOXAPARIN SODIUM 40 MILLIGRAM(S): 100 INJECTION SUBCUTANEOUS at 12:10

## 2020-10-18 NOTE — PROGRESS NOTE ADULT - ASSESSMENT
All as above in PA notation.  Patient personally seen and examined.  She reports "minimal" discomfort at present.  Vitals non-suggestive.  Abdomen soft and non tense and non tender for me at time of this examination.  Labs improving with stable CBC and normalized lipase and LFTs.  As such, clinical course goes against acute cholecystitis.  Favor CBD process.  I agrees with MRCP given dilation.  Further surgery recommendations to follow...

## 2020-10-18 NOTE — CONSULT NOTE ADULT - SUBJECTIVE AND OBJECTIVE BOX
[facility]  Ever Núñez PA-C  Formerly [facility] [place] [address]  [phone]      Chief Complaint:  Patient is a 65y old  Female who presents with a chief complaint of abdominal pain - epigastric, RUQ (17 Oct 2020 16:40)      HPI: 65y Female with HTN, Bipolar disorder, presents to the ED with c/o abdominal pain, epigastric and RUQ since 12 midnight.  Pt states she took 3 TUMS wity no relief.  Last meal was dinner, last night, she was unable to eat breakfast and lunch because of the discomfort.  She states pain is nonradiating, asso with nausea, but no vomiting, diarrhea, fever nor chills.  Denies chest pain, dyspnea, dysuria.    Abdominal sono reports 17 mm dilated common bile duct without intrahepatic biliary dilatation, mild hepatic steatosis. Small gallbladder sludge. Pancreas within normal.  Punctate nonobstructing left renal calculus.  Pt was given a dose of Ceftriaxone and Flagyl IV in the ED.    Allergies:  penicillins (Rash)  sulfa drugs (Rash)      Medications:  acetaminophen   Tablet .. 650 milliGRAM(s) Oral every 6 hours PRN  aluminum hydroxide/magnesium hydroxide/simethicone Suspension 30 milliLiter(s) Oral every 6 hours PRN  cefTRIAXone   IVPB 1000 milliGRAM(s) IV Intermittent every 24 hours  enoxaparin Injectable 40 milliGRAM(s) SubCutaneous daily  famotidine    Tablet 20 milliGRAM(s) Oral daily  lactated ringers. 1000 milliLiter(s) IV Continuous <Continuous>  lamoTRIgine 200 milliGRAM(s) Oral two times a day  lithium 300 milliGRAM(s) Oral two times a day  metoprolol tartrate 25 milliGRAM(s) Oral daily  metroNIDAZOLE  IVPB 500 milliGRAM(s) IV Intermittent every 8 hours  morphine  - Injectable 2 milliGRAM(s) IV Push every 4 hours PRN  ondansetron Injectable 4 milliGRAM(s) IV Push every 8 hours PRN  topiramate 100 milliGRAM(s) Oral two times a day  ziprasidone 80 milliGRAM(s) Oral <User Schedule>  zolpidem 5 milliGRAM(s) Oral at bedtime PRN  zolpidem 5 milliGRAM(s) Oral at bedtime PRN      PMHX/PSHX:  Pneumonia    Pill rolling tremors    Migraines    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, bilateral    Left bundle branch block    Avascular necrosis of bone of hip, left    Common bile duct (CBD) stricture    Diverticulitis    CHF (congestive heart failure)    HTN (hypertension)    Bipolar disorder    Restless leg syndrome    Hypertension    Bipolar 1 disorder    History of tonsillectomy    S/P tonsillectomy        Family history:  Family history of breast cancer in mother (Mother)    Family history of prostate cancer in father (Father)    Family history of atrial fibrillation (Mother)    Family history of depression (Mother, Sibling)    Family history of hypertension (Father, Mother)        Social History:     ROS:     General:  No wt loss, fevers, chills, night sweats, fatigue,   Eyes:  Good vision, no reported pain  ENT:  No sore throat, pain, runny nose, dysphagia  CV:  No pain, palpitations, hypo/hypertension  Resp:  No dyspnea, cough, tachypnea, wheezing  GI:  + pain, No nausea, No vomiting, No diarrhea, No constipation, No weight loss, No fever, No pruritis, No rectal bleeding, No tarry stools, No dysphagia,  :  No pain, bleeding, incontinence, nocturia  Muscle:  No pain, weakness  Neuro:  No weakness, tingling, memory problems  Psych:  No fatigue, insomnia, mood problems, depression  Endocrine:  No polyuria, polydipsia, cold/heat intolerance  Heme:  No petechiae, ecchymosis, easy bruisability  Skin:  No rash, tattoos, scars, edema      PHYSICAL EXAM:   Vital Signs:  Vital Signs Last 24 Hrs  T(C): 37 (18 Oct 2020 04:55), Max: 37.1 (17 Oct 2020 23:14)  T(F): 98.6 (18 Oct 2020 04:55), Max: 98.8 (17 Oct 2020 23:14)  HR: 59 (18 Oct 2020 04:55) (59 - 75)  BP: 128/71 (18 Oct 2020 04:55) (128/71 - 146/70)  BP(mean): --  RR: 17 (18 Oct 2020 04:55) (16 - 17)  SpO2: 95% (18 Oct 2020 04:55) (95% - 98%)  Daily     Daily     GENERAL:  Appears stated age, well-groomed, well-nourished, no distress  HEENT:  NC/AT,  conjunctivae clear and pink, no thyromegaly, nodules, adenopathy, no JVD, sclera -anicteric  CHEST:  Full & symmetric excursion, no increased effort, breath sounds clear  HEART:  Regular rhythm, S1, S2, no murmur/rub/S3/S4, no abdominal bruit, no edema  ABDOMEN:  Soft, non-tender, non-distended, normoactive bowel sounds,  no masses ,no hepato-splenomegaly, no signs of chronic liver disease  EXTEREMITIES:  no cyanosis,clubbing or edema  SKIN:  No rash/erythema/ecchymoses/petechiae/wounds/abscess/warm/dry  NEURO:  Alert, oriented, no asterixis, no tremor, no encephalopathy    LABS:                        14.2   10.64 )-----------( 258      ( 17 Oct 2020 07:52 )             43.7     10-17    140  |  112<H>  |  18  ----------------------------<  112<H>  4.5   |  20<L>  |  1.20    Ca    9.9      17 Oct 2020 07:52    TPro  8.0  /  Alb  4.0  /  TBili  0.6  /  DBili  x   /  AST  40<H>  /  ALT  32  /  AlkPhos  123<H>  10-17    LIVER FUNCTIONS - ( 17 Oct 2020 07:52 )  Alb: 4.0 g/dL / Pro: 8.0 g/dL / ALK PHOS: 123 U/L / ALT: 32 U/L / AST: 40 U/L / GGT: x           PT/INR - ( 17 Oct 2020 07:52 )   PT: 11.6 sec;   INR: 0.99 ratio         PTT - ( 17 Oct 2020 07:52 )  PTT:31.8 sec  Urinalysis Basic - ( 17 Oct 2020 08:59 )    Color: Yellow / Appearance: Clear / S.010 / pH: x  Gluc: x / Ketone: Negative  / Bili: Negative / Urobili: Negative   Blood: x / Protein: Negative / Nitrite: Negative   Leuk Esterase: Negative / RBC: x / WBC 0-2   Sq Epi: x / Non Sq Epi: Occasional / Bacteria: Occasional          Imaging:

## 2020-10-18 NOTE — PROGRESS NOTE ADULT - SUBJECTIVE AND OBJECTIVE BOX
Patient seen and examined at bedside.  +Nausea    Review of Systems:  General:denies fever chills, headache, weakness  HEENT: denies blurry vision,diffculty swallowing, difficulty hearing, tinnitus  Cardiovascular: denies chest pain  ,palpitations  Pulmonary:denies shortness of breath, cough, wheezing, hemoptysis  Gastrointestinal: denies abdominal pain, constipation, diarrhea,+++ nausea , vomiting, hematochezia  : denies hematuria, dysuria, or incontinence  Neurological: denies weakness, numbness , tingling, dizziness, tremors  MSK: denies muscle pain, difficulty ambulating, swelling, back pain  skin: denies skin rash, itching, burning, or  skin lesions  Psychiatrical: denies mood disturbances, anxierty, feeling depressed, depression , or difficulty sleeping    Objective:  Vitals  T(C): 37 (10-18-20 @ 04:55), Max: 37.1 (10-17-20 @ 23:14)  HR: 59 (10-18-20 @ 04:55) (59 - 75)  BP: 132/70 (10-18-20 @ 10:23) (128/71 - 146/70)  RR: 17 (10-18-20 @ 04:55) (16 - 17)  SpO2: 95% (10-18-20 @ 04:55) (95% - 98%)    Physical Exam:  General: comfortable, no acute distress, well nourished  HEENT: Atraumatic, no LAD, trachea midline, PERRLA  Cardiovascular: normal s1s2, no murmurs, gallops or fricition rubs  Pulmonary: clear to ausculation Bilaterally, no wheezing , rhonchi  Gastrointestinal: soft non tender non distended, no masses felt, no organomegally  Muscloskeletal: no lower extremity edema, intact bilateral lower extremity pulses  Neurological: CN II-12 intact. No focal weakness  Psychiatrical: normal mood, cooperative  SKIN: no rash, lesions or ulcers    Labs:                          12.2   8.67  )-----------( 206      ( 18 Oct 2020 08:57 )             37.6     10-18    145  |  115<H>  |  13  ----------------------------<  97  3.8   |  21<L>  |  0.92    Ca    8.7      18 Oct 2020 08:57    TPro  6.9  /  Alb  3.4  /  TBili  0.7  /  DBili  .20  /  AST  21  /  ALT  29  /  AlkPhos  94  10-18    LIVER FUNCTIONS - ( 18 Oct 2020 08:57 )  Alb: 3.4 g/dL / Pro: 6.9 g/dL / ALK PHOS: 94 U/L / ALT: 29 U/L / AST: 21 U/L / GGT: x           PT/INR - ( 17 Oct 2020 07:52 )   PT: 11.6 sec;   INR: 0.99 ratio         PTT - ( 17 Oct 2020 07:52 )  PTT:31.8 sec      Active Medications  MEDICATIONS  (STANDING):  cefTRIAXone   IVPB 1000 milliGRAM(s) IV Intermittent every 24 hours  enoxaparin Injectable 40 milliGRAM(s) SubCutaneous daily  famotidine    Tablet 20 milliGRAM(s) Oral daily  lactated ringers. 1000 milliLiter(s) (70 mL/Hr) IV Continuous <Continuous>  lamoTRIgine 200 milliGRAM(s) Oral two times a day  lithium 300 milliGRAM(s) Oral two times a day  metoprolol tartrate 25 milliGRAM(s) Oral daily  metroNIDAZOLE  IVPB 500 milliGRAM(s) IV Intermittent every 8 hours  topiramate 100 milliGRAM(s) Oral two times a day  ziprasidone 80 milliGRAM(s) Oral <User Schedule>    MEDICATIONS  (PRN):  acetaminophen   Tablet .. 650 milliGRAM(s) Oral every 6 hours PRN Temp greater or equal to 38C (100.4F), Mild Pain (1 - 3), Moderate Pain (4 - 6)  aluminum hydroxide/magnesium hydroxide/simethicone Suspension 30 milliLiter(s) Oral every 6 hours PRN Dyspepsia  morphine  - Injectable 2 milliGRAM(s) IV Push every 4 hours PRN Severe Pain (7 - 10)  ondansetron Injectable 4 milliGRAM(s) IV Push every 8 hours PRN Nausea and/or Vomiting  zolpidem 5 milliGRAM(s) Oral at bedtime PRN Insomnia  zolpidem 5 milliGRAM(s) Oral at bedtime PRN Insomnia

## 2020-10-18 NOTE — PROGRESS NOTE ADULT - SUBJECTIVE AND OBJECTIVE BOX
Hospital day: 1    65y Female admitted with Obstruction of bile duct    .    Patient seen and examined bedside resting comfortably.  Currently C/O nausea and a migraine.  States otherwise feels well.  Currently NPO with a non functioning IV.  Will replace.  States is awaiting home medication for her migraines.  Offered IV tylenol that patient refused.  Awaiting GI recs.  Pt seen with Dr. Medeiros in room.  Denies fevers, chills, SOB, chest pain, back pain.       T(F): 98.6 (10-18-20 @ 04:55), Max: 98.8 (10-17-20 @ 23:14)  HR: 59 (10-18-20 @ 04:55) (59 - 75)  BP: 128/71 (10-18-20 @ 04:55) (128/71 - 146/70)  RR: 17 (10-18-20 @ 04:55) (16 - 17)  SpO2: 95% (10-18-20 @ 04:55) (95% - 98%)  Wt(kg): --  CAPILLARY BLOOD GLUCOSE          PHYSICAL EXAM:  General: NAD  Neuro:  Alert & oriented x 3  CV: +S1+S2 regular rate and rhythm  Lung: clear to ausculation bilaterally  Abdomen: soft, BS+  + Tenderness to epigastric region of abdomen.    Extremities: no pedal edema or calf tenderness noted, Ambulating.   Right hand: Swelling proximal to IV site.  No surrounding erythema.       LABS:                        12.2   8.67  )-----------( 206      ( 18 Oct 2020 08:57 )             37.6     10-18    145  |  115<H>  |  13  ----------------------------<  97  3.8   |  21<L>  |  0.92    Ca    8.7      18 Oct 2020 08:57    TPro  6.9  /  Alb  3.4  /  TBili  0.7  /  DBili  .20  /  AST  21  /  ALT  29  /  AlkPhos  94  10-18    PT/INR - ( 17 Oct 2020 07:52 )   PT: 11.6 sec;   INR: 0.99 ratio         PTT - ( 17 Oct 2020 07:52 )  PTT:31.8 sec  I&O's Detail    17 Oct 2020 07:01  -  18 Oct 2020 07:00  --------------------------------------------------------  IN:    IV PiggyBack: 100 mL  Total IN: 100 mL    OUT:  Total OUT: 0 mL    Total NET: 100 mL          Impression: 65y Female admitted with Obstruction of bile duct        Plan:  -continue VTE prophylaxis- SCD, OOB  -Increase activity with PT, OOB, Ambulate  -Patient instructed on and encouraged incentive spirometry use  -Awaiting home medications for migraines (Sumatriptan- once received will be verified by pharmacy, then give to patient)  - Iv replaced due to infiltrating of prior IV.    - Zofran for nausea- already written  -GI recommends MRCP.    -Continue care per primary team.  -Continue NPO.  -will discuss with Dr Sharma. Hospital day: 1    65y Female admitted with Obstruction of bile duct    .    Patient seen and examined bedside resting comfortably.  Currently C/O nausea and a migraine.  States otherwise feels well.  Currently NPO with a non functioning IV.  Will replace.  States is awaiting home medication for her migraines.  Offered IV tylenol that patient refused.  Awaiting GI recs.  Pt seen with Dr. Medeiros in room.  Denies fevers, chills, SOB, chest pain, back pain.       T(F): 98.6 (10-18-20 @ 04:55), Max: 98.8 (10-17-20 @ 23:14)  HR: 59 (10-18-20 @ 04:55) (59 - 75)  BP: 128/71 (10-18-20 @ 04:55) (128/71 - 146/70)  RR: 17 (10-18-20 @ 04:55) (16 - 17)  SpO2: 95% (10-18-20 @ 04:55) (95% - 98%)  Wt(kg): --  CAPILLARY BLOOD GLUCOSE          PHYSICAL EXAM:  General: NAD  Neuro:  Alert & oriented x 3  CV: +S1+S2 regular rate and rhythm  Lung: clear to ausculation bilaterally  Abdomen: soft, BS+  + Tenderness to epigastric region of abdomen.    Extremities: no pedal edema or calf tenderness noted, Ambulating.   Right hand: Swelling proximal to IV site.  No surrounding erythema.       LABS:                        12.2   8.67  )-----------( 206      ( 18 Oct 2020 08:57 )             37.6     10-18    145  |  115<H>  |  13  ----------------------------<  97  3.8   |  21<L>  |  0.92    Ca    8.7      18 Oct 2020 08:57    TPro  6.9  /  Alb  3.4  /  TBili  0.7  /  DBili  .20  /  AST  21  /  ALT  29  /  AlkPhos  94  10-18    PT/INR - ( 17 Oct 2020 07:52 )   PT: 11.6 sec;   INR: 0.99 ratio         PTT - ( 17 Oct 2020 07:52 )  PTT:31.8 sec  I&O's Detail    17 Oct 2020 07:01  -  18 Oct 2020 07:00  --------------------------------------------------------  IN:    IV PiggyBack: 100 mL  Total IN: 100 mL    OUT:  Total OUT: 0 mL    Total NET: 100 mL          Impression: 65y Female admitted with Obstruction of bile duct  Currently with a normal white count, normal lipase level, normal LFT levels.         Plan:  -continue VTE prophylaxis- SCD, OOB  -Increase activity with PT, OOB, Ambulate  -Patient instructed on and encouraged incentive spirometry use  -Awaiting home medications for migraines (Sumatriptan- once received will be verified by pharmacy, then give to patient)  - Iv replaced due to infiltrating of prior IV.    - Zofran for nausea- already written  -GI recommends MRCP.    -Continue care per primary team.  -Continue NPO.  -will discuss with Dr Sharma. Hospital day: 1      65y Female admitted with Obstruction of bile duct      Patient seen and examined bedside resting comfortably.  Currently C/O nausea and a migraine.  States otherwise feels well.  Currently NPO with a non functioning IV.  Will replace.  States is awaiting home medication for her migraines.  Offered IV tylenol that patient refused.  Awaiting GI recs.  Pt seen with Dr. Medeiros in room.  Denies fevers, chills, SOB, chest pain, back pain.       T(F): 98.6 (10-18-20 @ 04:55), Max: 98.8 (10-17-20 @ 23:14)  HR: 59 (10-18-20 @ 04:55) (59 - 75)  BP: 128/71 (10-18-20 @ 04:55) (128/71 - 146/70)  RR: 17 (10-18-20 @ 04:55) (16 - 17)  SpO2: 95% (10-18-20 @ 04:55) (95% - 98%)      PHYSICAL EXAM:  General: NAD  Neuro:  Alert & oriented x 3  CV: +S1+S2 regular rate and rhythm  Lung: clear to ausculation bilaterally  Abdomen: soft, BS+  + Tenderness to epigastric region of abdomen.    Extremities: no pedal edema or calf tenderness noted, Ambulating.   Right hand: Swelling proximal to IV site.  No surrounding erythema.       LABS:                        12.2   8.67  )-----------( 206      ( 18 Oct 2020 08:57 )             37.6     10-18    145  |  115<H>  |  13  ----------------------------<  97  3.8   |  21<L>  |  0.92    Ca    8.7      18 Oct 2020 08:57    TPro  6.9  /  Alb  3.4  /  TBili  0.7  /  DBili  .20  /  AST  21  /  ALT  29  /  AlkPhos  94  10-18    PT/INR - ( 17 Oct 2020 07:52 )   PT: 11.6 sec;   INR: 0.99 ratio    PTT - ( 17 Oct 2020 07:52 )  PTT:31.8 sec      I&O's Detail    17 Oct 2020 07:01  -  18 Oct 2020 07:00  --------------------------------------------------------  IN:    IV PiggyBack: 100 mL  Total IN: 100 mL    OUT:  Total OUT: 0 mL    Total NET: 100 mL      Impression: 65y Female admitted with Obstruction of bile duct  Currently with a normal white count, normal lipase level, normal LFT levels.       Plan:  -continue VTE prophylaxis- SCD, OOB  -Increase activity with PT, OOB, Ambulate  -Patient instructed on and encouraged incentive spirometry use  -Awaiting home medications for migraines (Sumatriptan- once received will be verified by pharmacy, then give to patient)  - Iv replaced due to infiltrating of prior IV.    - Zofran for nausea- already written  -GI recommends MRCP.    -Continue care per primary team.  -Continue NPO.      -will discuss with Dr Sharma.

## 2020-10-18 NOTE — PROGRESS NOTE ADULT - ASSESSMENT
65y Female with HTN, Bipolar disorder, presents with abdominal pain, epigastric and RUQ since 12 midnight, mild elev LFTs, abd sono with 17 mm dilated CBD, small GB sludge.    RUQ Pain r/o cholecystitis vs choledolethaisis  Diet advanced  MRCP    Surgery and GI consulted  on rocephin and flaygl. ok     Bipolar disorder  Cont lithium, metoprolol, lamictal, topiramate, ziprasidone    GI/DVT prophylaxis  with pepcid and lovenox

## 2020-10-19 ENCOUNTER — TRANSCRIPTION ENCOUNTER (OUTPATIENT)
Age: 65
End: 2020-10-19

## 2020-10-19 LAB
ALBUMIN SERPL ELPH-MCNC: 3.4 G/DL — SIGNIFICANT CHANGE UP (ref 3.3–5)
ALP SERPL-CCNC: 100 U/L — SIGNIFICANT CHANGE UP (ref 40–120)
ALT FLD-CCNC: 27 U/L — SIGNIFICANT CHANGE UP (ref 12–78)
ANION GAP SERPL CALC-SCNC: 6 MMOL/L — SIGNIFICANT CHANGE UP (ref 5–17)
AST SERPL-CCNC: 15 U/L — SIGNIFICANT CHANGE UP (ref 15–37)
BASOPHILS # BLD AUTO: 0.08 K/UL — SIGNIFICANT CHANGE UP (ref 0–0.2)
BASOPHILS NFR BLD AUTO: 0.9 % — SIGNIFICANT CHANGE UP (ref 0–2)
BILIRUB SERPL-MCNC: 0.5 MG/DL — SIGNIFICANT CHANGE UP (ref 0.2–1.2)
BUN SERPL-MCNC: 13 MG/DL — SIGNIFICANT CHANGE UP (ref 7–23)
CALCIUM SERPL-MCNC: 8.7 MG/DL — SIGNIFICANT CHANGE UP (ref 8.5–10.1)
CHLORIDE SERPL-SCNC: 115 MMOL/L — HIGH (ref 96–108)
CO2 SERPL-SCNC: 23 MMOL/L — SIGNIFICANT CHANGE UP (ref 22–31)
CREAT SERPL-MCNC: 1 MG/DL — SIGNIFICANT CHANGE UP (ref 0.5–1.3)
EOSINOPHIL # BLD AUTO: 0.15 K/UL — SIGNIFICANT CHANGE UP (ref 0–0.5)
EOSINOPHIL NFR BLD AUTO: 1.8 % — SIGNIFICANT CHANGE UP (ref 0–6)
GLUCOSE SERPL-MCNC: 116 MG/DL — HIGH (ref 70–99)
HCT VFR BLD CALC: 38.6 % — SIGNIFICANT CHANGE UP (ref 34.5–45)
HGB BLD-MCNC: 12.4 G/DL — SIGNIFICANT CHANGE UP (ref 11.5–15.5)
IMM GRANULOCYTES NFR BLD AUTO: 0.4 % — SIGNIFICANT CHANGE UP (ref 0–1.5)
LIDOCAIN IGE QN: 218 U/L — SIGNIFICANT CHANGE UP (ref 73–393)
LYMPHOCYTES # BLD AUTO: 1.55 K/UL — SIGNIFICANT CHANGE UP (ref 1–3.3)
LYMPHOCYTES # BLD AUTO: 18.4 % — SIGNIFICANT CHANGE UP (ref 13–44)
MAGNESIUM SERPL-MCNC: 2.2 MG/DL — SIGNIFICANT CHANGE UP (ref 1.6–2.6)
MCHC RBC-ENTMCNC: 29.5 PG — SIGNIFICANT CHANGE UP (ref 27–34)
MCHC RBC-ENTMCNC: 32.1 GM/DL — SIGNIFICANT CHANGE UP (ref 32–36)
MCV RBC AUTO: 91.9 FL — SIGNIFICANT CHANGE UP (ref 80–100)
MONOCYTES # BLD AUTO: 0.94 K/UL — HIGH (ref 0–0.9)
MONOCYTES NFR BLD AUTO: 11.2 % — SIGNIFICANT CHANGE UP (ref 2–14)
NEUTROPHILS # BLD AUTO: 5.68 K/UL — SIGNIFICANT CHANGE UP (ref 1.8–7.4)
NEUTROPHILS NFR BLD AUTO: 67.3 % — SIGNIFICANT CHANGE UP (ref 43–77)
NRBC # BLD: 0 /100 WBCS — SIGNIFICANT CHANGE UP (ref 0–0)
PHOSPHATE SERPL-MCNC: 2.5 MG/DL — SIGNIFICANT CHANGE UP (ref 2.5–4.5)
PLATELET # BLD AUTO: 204 K/UL — SIGNIFICANT CHANGE UP (ref 150–400)
POTASSIUM SERPL-MCNC: 4 MMOL/L — SIGNIFICANT CHANGE UP (ref 3.5–5.3)
POTASSIUM SERPL-SCNC: 4 MMOL/L — SIGNIFICANT CHANGE UP (ref 3.5–5.3)
PROT SERPL-MCNC: 7.3 G/DL — SIGNIFICANT CHANGE UP (ref 6–8.3)
RBC # BLD: 4.2 M/UL — SIGNIFICANT CHANGE UP (ref 3.8–5.2)
RBC # FLD: 13 % — SIGNIFICANT CHANGE UP (ref 10.3–14.5)
SODIUM SERPL-SCNC: 144 MMOL/L — SIGNIFICANT CHANGE UP (ref 135–145)
WBC # BLD: 8.43 K/UL — SIGNIFICANT CHANGE UP (ref 3.8–10.5)
WBC # FLD AUTO: 8.43 K/UL — SIGNIFICANT CHANGE UP (ref 3.8–10.5)

## 2020-10-19 PROCEDURE — 74181 MRI ABDOMEN W/O CONTRAST: CPT | Mod: 26

## 2020-10-19 PROCEDURE — 99233 SBSQ HOSP IP/OBS HIGH 50: CPT

## 2020-10-19 RX ORDER — SIMETHICONE 80 MG/1
80 TABLET, CHEWABLE ORAL THREE TIMES A DAY
Refills: 0 | Status: DISCONTINUED | OUTPATIENT
Start: 2020-10-19 | End: 2020-10-23

## 2020-10-19 RX ADMIN — Medication 100 MILLIGRAM(S): at 21:45

## 2020-10-19 RX ADMIN — Medication 25 MILLIGRAM(S): at 05:27

## 2020-10-19 RX ADMIN — Medication 100 MILLIGRAM(S): at 21:40

## 2020-10-19 RX ADMIN — LITHIUM CARBONATE 300 MILLIGRAM(S): 300 TABLET, EXTENDED RELEASE ORAL at 21:40

## 2020-10-19 RX ADMIN — SODIUM CHLORIDE 70 MILLILITER(S): 9 INJECTION, SOLUTION INTRAVENOUS at 00:50

## 2020-10-19 RX ADMIN — ZOLPIDEM TARTRATE 5 MILLIGRAM(S): 10 TABLET ORAL at 23:08

## 2020-10-19 RX ADMIN — ZIPRASIDONE HYDROCHLORIDE 80 MILLIGRAM(S): 20 CAPSULE ORAL at 11:40

## 2020-10-19 RX ADMIN — FAMOTIDINE 20 MILLIGRAM(S): 10 INJECTION INTRAVENOUS at 11:40

## 2020-10-19 RX ADMIN — Medication 100 MILLIGRAM(S): at 05:26

## 2020-10-19 RX ADMIN — ZOLPIDEM TARTRATE 5 MILLIGRAM(S): 10 TABLET ORAL at 21:45

## 2020-10-19 RX ADMIN — Medication 100 MILLIGRAM(S): at 11:40

## 2020-10-19 RX ADMIN — Medication 100 MILLIGRAM(S): at 13:47

## 2020-10-19 RX ADMIN — SIMETHICONE 80 MILLIGRAM(S): 80 TABLET, CHEWABLE ORAL at 21:40

## 2020-10-19 RX ADMIN — ENOXAPARIN SODIUM 40 MILLIGRAM(S): 100 INJECTION SUBCUTANEOUS at 13:46

## 2020-10-19 RX ADMIN — LAMOTRIGINE 200 MILLIGRAM(S): 25 TABLET, ORALLY DISINTEGRATING ORAL at 21:40

## 2020-10-19 RX ADMIN — SIMETHICONE 80 MILLIGRAM(S): 80 TABLET, CHEWABLE ORAL at 13:46

## 2020-10-19 RX ADMIN — LAMOTRIGINE 200 MILLIGRAM(S): 25 TABLET, ORALLY DISINTEGRATING ORAL at 11:40

## 2020-10-19 RX ADMIN — CEFTRIAXONE 100 MILLIGRAM(S): 500 INJECTION, POWDER, FOR SOLUTION INTRAMUSCULAR; INTRAVENOUS at 17:28

## 2020-10-19 RX ADMIN — LITHIUM CARBONATE 300 MILLIGRAM(S): 300 TABLET, EXTENDED RELEASE ORAL at 11:40

## 2020-10-19 NOTE — PROGRESS NOTE ADULT - ASSESSMENT
65y Female with HTN, Bipolar disorder, presents with abdominal pain, epigastric and RUQ since midnight, mild elev LFTs, abd sono with 17 mm dilated CBD, small GB sludge.    RUQ Pain r/o cholecystitis vs choledolethaisis  -Diet advanced post MRCP  - MRCP (10/19): Small sludge or gallstones in the gallbladder. No evidence for thickened gallbladder wall, or pericholecystic fluid. Dilated common bile duct. Possible small sludge or gallstones in the very distal common bile duct.  -c/w rocephin and flagyl  -Surgery and GI consulted. f/u recs    Bipolar disorder  - Cont lithium, metoprolol, lamictal, topiramate, ziprasidone    GI/DVT prophylaxis  - c/w pepcid and lovenox     65y Female with HTN, Bipolar disorder, presents with abdominal pain, epigastric and RUQ since midnight, mild elev LFTs, abd sono with 17 mm dilated CBD, small GB sludge.    RUQ Pain r/o cholecystitis vs choledolethaisis  -Diet advanced post MRCP, now eating regular diet  - MRCP (10/19): Small sludge or gallstones in the gallbladder. No evidence for thickened gallbladder wall, or pericholecystic fluid. Dilated common bile duct. Possible small sludge or gallstones in the very distal common bile duct.  -c/w rocephin and flagyl  -Surgery and GI consulted  -GI recommending ERCP tomorrow 10/20, NPO after midnight tonight    Bipolar disorder  - Cont lithium, metoprolol, lamictal, topiramate, ziprasidone    GI/DVT prophylaxis  - c/w pepcid and lovenox     65y Female with HTN, Bipolar disorder, presents with abdominal pain, epigastric and RUQ since midnight, mild elev LFTs, abd sono with 17 mm dilated CBD, small GB sludge.    RUQ Pain r/o cholecystitis vs choledolethaisis  - Diet advanced post MRCP, now eating regular diet  - MRCP (10/19): Small sludge or gallstones in the gallbladder. No evidence for thickened gallbladder wall, or pericholecystic fluid. Dilated common bile duct. Possible small sludge or gallstones in the very distal common bile duct.  - c/w rocephin and flagyl  - Surgery and GI following  - GI recommending ERCP tomorrow 10/20, NPO after midnight tonight    Bipolar disorder  - Cont lithium, metoprolol, lamictal, topiramate, ziprasidone    GI/DVT prophylaxis  - c/w pepcid and lovenox

## 2020-10-19 NOTE — PROGRESS NOTE ADULT - SUBJECTIVE AND OBJECTIVE BOX
INTERVAL HPI/OVERNIGHT EVENTS:  pt seen and examined  c/o diffuse, dull abd pain, attributes to gas, also w mild nausea  denies vomiting, no recent bm  chetan diet yesterday  for mri today    MEDICATIONS  (STANDING):  cefTRIAXone   IVPB 1000 milliGRAM(s) IV Intermittent every 24 hours  enoxaparin Injectable 40 milliGRAM(s) SubCutaneous daily  famotidine    Tablet 20 milliGRAM(s) Oral daily  lactated ringers. 1000 milliLiter(s) (70 mL/Hr) IV Continuous <Continuous>  lamoTRIgine 200 milliGRAM(s) Oral two times a day  lithium 300 milliGRAM(s) Oral two times a day  metoprolol tartrate 25 milliGRAM(s) Oral daily  metroNIDAZOLE  IVPB 500 milliGRAM(s) IV Intermittent every 8 hours  topiramate 100 milliGRAM(s) Oral two times a day  ziprasidone 80 milliGRAM(s) Oral <User Schedule>    MEDICATIONS  (PRN):  acetaminophen   Tablet .. 650 milliGRAM(s) Oral every 6 hours PRN Temp greater or equal to 38C (100.4F), Mild Pain (1 - 3), Moderate Pain (4 - 6)  aluminum hydroxide/magnesium hydroxide/simethicone Suspension 30 milliLiter(s) Oral every 6 hours PRN Dyspepsia  morphine  - Injectable 2 milliGRAM(s) IV Push every 4 hours PRN Severe Pain (7 - 10)  ondansetron Injectable 4 milliGRAM(s) IV Push every 8 hours PRN Nausea and/or Vomiting  SUMAtriptan 100 milliGRAM(s) Oral daily PRN Headache/migraine  zolpidem 5 milliGRAM(s) Oral at bedtime PRN Insomnia  zolpidem 5 milliGRAM(s) Oral at bedtime PRN Insomnia      Allergies    penicillins (Rash)  sulfa drugs (Rash)    Intolerances        Review of Systems:    General:  No wt loss, fevers, chills, night sweats, fatigue   Eyes:  Good vision, no reported pain  ENT:  No sore throat, pain, runny nose, dysphagia  CV:  No pain, palpitations, hypo/hypertension  Resp:  No dyspnea, cough, tachypnea, wheezing  GI:  see above  :  No pain, bleeding, incontinence, nocturia  Muscle:  No pain, weakness  Neuro:  No weakness, tingling, memory problems  Psych:  No fatigue, insomnia, mood problems, depression  Endocrine:  No polyuria, polydypsia, cold/heat intolerance  Heme:  No petechiae, ecchymosis, easy bruisability  Skin:  No rash, tattoos, scars, edema      Vital Signs Last 24 Hrs  T(C): 37.2 (19 Oct 2020 04:33), Max: 37.2 (19 Oct 2020 04:33)  T(F): 98.9 (19 Oct 2020 04:33), Max: 98.9 (19 Oct 2020 04:33)  HR: 68 (19 Oct 2020 04:33) (59 - 68)  BP: 134/68 (19 Oct 2020 04:33) (132/69 - 156/83)  BP(mean): --  RR: 19 (19 Oct 2020 04:33) (18 - 19)  SpO2: 96% (19 Oct 2020 04:33) (95% - 96%)    PHYSICAL EXAM:    Constitutional: lying in bed  HEENT: ncat  Gastrointestinal: softly dt nt  Extremities: No peripheral edema  Vascular: 2+ peripheral pulses  Neurological: Awake alert appropriate     LABS:                        12.2   8.67  )-----------( 206      ( 18 Oct 2020 08:57 )             37.6     10-18    145  |  115<H>  |  13  ----------------------------<  97  3.8   |  21<L>  |  0.92    Ca    8.7      18 Oct 2020 08:57    TPro  6.9  /  Alb  3.4  /  TBili  0.7  /  DBili  .20  /  AST  21  /  ALT  29  /  AlkPhos  94  10-18          RADIOLOGY & ADDITIONAL TESTS:

## 2020-10-19 NOTE — PROGRESS NOTE ADULT - PROBLEM SELECTOR PLAN 1
had ercp in past   clinically improved  f/u am labs  f/u MRI/mrcp  on empiric abx  diet as tolerated

## 2020-10-19 NOTE — PROGRESS NOTE ADULT - SUBJECTIVE AND OBJECTIVE BOX
CHARTING IN PROGRESS    Interval Events:    Review of Systems:  General:denies fever chills, headache, weakness  HEENT: denies blurry vision,diffculty swallowing, difficulty hearing, tinnitus  Cardiovascular: denies chest pain  ,palpitations  Pulmonary:denies shortness of breath, cough, wheezing, hemoptysis  Gastrointestinal: denies abdominal pain, constipation, diarrhea,nausea , vomiting, hematochezia  : denies hematuria, dysuria, or incontinence  Neurological: denies weakness, numbness , tingling, dizziness, tremors  MSK: denies muscle pain, difficulty ambulating, swelling, back pain  skin: denies skin rash, itching, burning, or  skin lesions  Psychiatrical: denies mood disturbances, anxierty, feeling depressed, depression , or difficulty sleeping    Objective:  Vitals  T(C): 37.2 (10-19-20 @ 04:33), Max: 37.2 (10-19-20 @ 04:33)  HR: 68 (10-19-20 @ 04:33) (59 - 68)  BP: 134/68 (10-19-20 @ 04:33) (132/69 - 156/83)  RR: 19 (10-19-20 @ 04:33) (18 - 19)  SpO2: 96% (10-19-20 @ 04:33) (95% - 96%)    Physical Exam:  General: comfortable, no acute distress, well nourished  HEENT: Atraumatic, no LAD, trachea midline, PERRLA  Cardiovascular: normal s1s2, no murmurs, gallops or fricition rubs  Pulmonary: clear to ausculation Bilaterally, no wheezing , rhonchi  Gastrointestinal: soft non tender non distended, no masses felt, no organomegally  Muscloskeletal: no lower extremity edema, intact bilateral lower extremity pulses  Neurological: CN II-12 intact. No focal weakness  Psychiatrical: normal mood, cooperative  SKIN: no rash, lesions or ulcers    Labs:                          12.2   8.67  )-----------( 206      ( 18 Oct 2020 08:57 )             37.6     10-18    145  |  115<H>  |  13  ----------------------------<  97  3.8   |  21<L>  |  0.92    Ca    8.7      18 Oct 2020 08:57    TPro  6.9  /  Alb  3.4  /  TBili  0.7  /  DBili  .20  /  AST  21  /  ALT  29  /  AlkPhos  94  10-18    LIVER FUNCTIONS - ( 18 Oct 2020 08:57 )  Alb: 3.4 g/dL / Pro: 6.9 g/dL / ALK PHOS: 94 U/L / ALT: 29 U/L / AST: 21 U/L / GGT: x           PT/INR - ( 17 Oct 2020 07:52 )   PT: 11.6 sec;   INR: 0.99 ratio         PTT - ( 17 Oct 2020 07:52 )  PTT:31.8 sec      Active Medications  MEDICATIONS  (STANDING):  cefTRIAXone   IVPB 1000 milliGRAM(s) IV Intermittent every 24 hours  enoxaparin Injectable 40 milliGRAM(s) SubCutaneous daily  famotidine    Tablet 20 milliGRAM(s) Oral daily  lactated ringers. 1000 milliLiter(s) (70 mL/Hr) IV Continuous <Continuous>  lamoTRIgine 200 milliGRAM(s) Oral two times a day  lithium 300 milliGRAM(s) Oral two times a day  metoprolol tartrate 25 milliGRAM(s) Oral daily  metroNIDAZOLE  IVPB 500 milliGRAM(s) IV Intermittent every 8 hours  topiramate 100 milliGRAM(s) Oral two times a day  ziprasidone 80 milliGRAM(s) Oral <User Schedule>    MEDICATIONS  (PRN):  acetaminophen   Tablet .. 650 milliGRAM(s) Oral every 6 hours PRN Temp greater or equal to 38C (100.4F), Mild Pain (1 - 3), Moderate Pain (4 - 6)  aluminum hydroxide/magnesium hydroxide/simethicone Suspension 30 milliLiter(s) Oral every 6 hours PRN Dyspepsia  morphine  - Injectable 2 milliGRAM(s) IV Push every 4 hours PRN Severe Pain (7 - 10)  ondansetron Injectable 4 milliGRAM(s) IV Push every 8 hours PRN Nausea and/or Vomiting  SUMAtriptan 100 milliGRAM(s) Oral daily PRN Headache/migraine  zolpidem 5 milliGRAM(s) Oral at bedtime PRN Insomnia  zolpidem 5 milliGRAM(s) Oral at bedtime PRN Insomnia     CHARTING IN PROGRESS    Interval Events: Patient seen and examined at bedside this morning. Admits to diffuse abdominal discomfort and mild nausea but denies any localized pain in the RUQ. Denies vomiting, diarrhea, constipation, SOB, palpitations. NPO for MCRP this morning, now on regular diet.      Review of Systems:  General:denies fever chills, headache, weakness  HEENT: denies blurry vision, throat pain  Cardiovascular: denies chest pain, palpitations  Pulmonary:denies SOB, cough, wheezing  Gastrointestinal: admits to dull abdominal ache and mild nausea, denies constipation, diarrhea, vomiting  : denies hematuria, dysuria, or incontinence  Neurological: denies weakness, numbness , tingling, dizziness  MSK: denies muscle pain  skin: denies skin rash, itching, burning, or  skin lesions  Psychiatrical: denies mood disturbances, anxiety     Objective:  Vitals  T(C): 37.2 (10-19-20 @ 04:33), Max: 37.2 (10-19-20 @ 04:33)  HR: 68 (10-19-20 @ 04:33) (59 - 68)  BP: 134/68 (10-19-20 @ 04:33) (132/69 - 156/83)  RR: 19 (10-19-20 @ 04:33) (18 - 19)  SpO2: 96% (10-19-20 @ 04:33) (95% - 96%)    Physical Exam:  General: appears stated age, comfortable, no acute distress  HEENT: NCAT, PERRLA  Cardiovascular: RRR, s1s2, no murmurs, gallops or fricition rubs  Pulmonary: cta b/l, no wheezing , rhonchi  Gastrointestinal: soft non tender non distended, + BS  Muscloskeletal: no lower extremity edema, intact bilateral lower extremity pulses  Neurological: No focal weakness or tingling  Psychiatrical: normal mood, cooperative  SKIN: no rash, lesions or ulcers    Labs:                          12.2   8.67  )-----------( 206      ( 18 Oct 2020 08:57 )             37.6     10-18    145  |  115<H>  |  13  ----------------------------<  97  3.8   |  21<L>  |  0.92    Ca    8.7      18 Oct 2020 08:57    TPro  6.9  /  Alb  3.4  /  TBili  0.7  /  DBili  .20  /  AST  21  /  ALT  29  /  AlkPhos  94  10-18    LIVER FUNCTIONS - ( 18 Oct 2020 08:57 )  Alb: 3.4 g/dL / Pro: 6.9 g/dL / ALK PHOS: 94 U/L / ALT: 29 U/L / AST: 21 U/L / GGT: x           PT/INR - ( 17 Oct 2020 07:52 )   PT: 11.6 sec;   INR: 0.99 ratio         PTT - ( 17 Oct 2020 07:52 )  PTT:31.8 sec      Active Medications  MEDICATIONS  (STANDING):  cefTRIAXone   IVPB 1000 milliGRAM(s) IV Intermittent every 24 hours  enoxaparin Injectable 40 milliGRAM(s) SubCutaneous daily  famotidine    Tablet 20 milliGRAM(s) Oral daily  lactated ringers. 1000 milliLiter(s) (70 mL/Hr) IV Continuous <Continuous>  lamoTRIgine 200 milliGRAM(s) Oral two times a day  lithium 300 milliGRAM(s) Oral two times a day  metoprolol tartrate 25 milliGRAM(s) Oral daily  metroNIDAZOLE  IVPB 500 milliGRAM(s) IV Intermittent every 8 hours  topiramate 100 milliGRAM(s) Oral two times a day  ziprasidone 80 milliGRAM(s) Oral <User Schedule>    MEDICATIONS  (PRN):  acetaminophen   Tablet .. 650 milliGRAM(s) Oral every 6 hours PRN Temp greater or equal to 38C (100.4F), Mild Pain (1 - 3), Moderate Pain (4 - 6)  aluminum hydroxide/magnesium hydroxide/simethicone Suspension 30 milliLiter(s) Oral every 6 hours PRN Dyspepsia  morphine  - Injectable 2 milliGRAM(s) IV Push every 4 hours PRN Severe Pain (7 - 10)  ondansetron Injectable 4 milliGRAM(s) IV Push every 8 hours PRN Nausea and/or Vomiting  SUMAtriptan 100 milliGRAM(s) Oral daily PRN Headache/migraine  zolpidem 5 milliGRAM(s) Oral at bedtime PRN Insomnia  zolpidem 5 milliGRAM(s) Oral at bedtime PRN Insomnia     Interval Events: Patient seen and examined at bedside this morning. No acute events overnight. Admits to diffuse abdominal discomfort and mild nausea but denies any localized pain in the RUQ. Did not require analgesia overnight. Denies vomiting, diarrhea, constipation, SOB, palpitations. NPO for MCRP this morning, now on regular diet.    Review of Systems:  General:denies fever chills, headache, weakness  HEENT: denies blurry vision, throat pain  Cardiovascular: denies chest pain, palpitations  Pulmonary:denies SOB, cough, wheezing  Gastrointestinal: admits to dull abdominal ache and mild nausea, denies constipation, diarrhea, vomiting  : denies hematuria, dysuria, or incontinence  Neurological: denies weakness, numbness , tingling, dizziness  MSK: denies muscle pain  skin: denies skin rash, itching, burning, or  skin lesions  Psychiatrical: denies mood disturbances, anxiety     Objective:  Vitals  T(C): 37.2 (10-19-20 @ 04:33), Max: 37.2 (10-19-20 @ 04:33)  HR: 68 (10-19-20 @ 04:33) (59 - 68)  BP: 134/68 (10-19-20 @ 04:33) (132/69 - 156/83)  RR: 19 (10-19-20 @ 04:33) (18 - 19)  SpO2: 96% (10-19-20 @ 04:33) (95% - 96%)    Physical Exam:  General: appears stated age, comfortable, no acute distress  HEENT: NCAT, PERRLA  Cardiovascular: RRR, s1s2, no murmurs, gallops or friction rubs  Pulmonary: cta b/l, no wheezing , rhonchi  Gastrointestinal: soft non tender non distended, + BS  Musculoskeletal no lower extremity edema, intact bilateral lower extremity pulses  Neurological: No focal weakness or tingling  Psychiatrical: normal mood, cooperative  SKIN: no rash, lesions or ulcers    Labs:    Complete Blood Count + Automated Diff in AM (10.19.20 @ 09:24)   WBC Count: 8.43 K/uL   RBC Count: 4.20 M/uL   Hemoglobin: 12.4 g/dL   Hematocrit: 38.6 %   Mean Cell Volume: 91.9 fl   Mean Cell Hemoglobin: 29.5 pg   Mean Cell Hemoglobin Conc: 32.1 gm/dL   Red Cell Distrib Width: 13.0 %   Platelet Count - Automated: 204 K/uL Lipase, Serum in AM (10.19.20 @ 09:24)   Lipase, Serum: 218 U/L Magnesium, Serum: 2.2 mg/dL (10.19.20 @ 09:24) Phosphorus Level, Serum: 2.5 mg/dL (10.19.20 @ 09:24) Comprehensive Metabolic Panel in AM (10.19.20 @ 09:24)   Sodium, Serum: 144 mmol/L   Potassium, Serum: 4.0 mmol/L   Chloride, Serum: 115 mmol/L   Carbon Dioxide, Serum: 23 mmol/L   Anion Gap, Serum: 6 mmol/L   Blood Urea Nitrogen, Serum: 13 mg/dL   Creatinine, Serum: 1.00 mg/dL   Glucose, Serum: 116 mg/dL   Calcium, Total Serum: 8.7 mg/dL   Protein Total, Serum: 7.3 g/dL   Albumin, Serum: 3.4 g/dL   Bilirubin Total, Serum: 0.5 mg/dL   Alkaline Phosphatase, Serum: 100 U/L   Aspartate Aminotransferase (AST/SGOT): 15 U/L   Alanine Aminotransferase (ALT/SGPT): 27 U/L                         12.2   8.67  )-----------( 206      ( 18 Oct 2020 08:57 )             37.6     10-18    145  |  115<H>  |  13  ----------------------------<  97  3.8   |  21<L>  |  0.92    Ca    8.7      18 Oct 2020 08:57    TPro  6.9  /  Alb  3.4  /  TBili  0.7  /  DBili  .20  /  AST  21  /  ALT  29  /  AlkPhos  94  10-18    LIVER FUNCTIONS - ( 18 Oct 2020 08:57 )  Alb: 3.4 g/dL / Pro: 6.9 g/dL / ALK PHOS: 94 U/L / ALT: 29 U/L / AST: 21 U/L / GGT: x           PT/INR - ( 17 Oct 2020 07:52 )   PT: 11.6 sec;   INR: 0.99 ratio         PTT - ( 17 Oct 2020 07:52 )  PTT:31.8 sec      Active Medications  MEDICATIONS  (STANDING):  cefTRIAXone   IVPB 1000 milliGRAM(s) IV Intermittent every 24 hours  enoxaparin Injectable 40 milliGRAM(s) SubCutaneous daily  famotidine    Tablet 20 milliGRAM(s) Oral daily  lactated ringers. 1000 milliLiter(s) (70 mL/Hr) IV Continuous <Continuous>  lamoTRIgine 200 milliGRAM(s) Oral two times a day  lithium 300 milliGRAM(s) Oral two times a day  metoprolol tartrate 25 milliGRAM(s) Oral daily  metroNIDAZOLE  IVPB 500 milliGRAM(s) IV Intermittent every 8 hours  topiramate 100 milliGRAM(s) Oral two times a day  ziprasidone 80 milliGRAM(s) Oral <User Schedule>    MEDICATIONS  (PRN):  acetaminophen   Tablet .. 650 milliGRAM(s) Oral every 6 hours PRN Temp greater or equal to 38C (100.4F), Mild Pain (1 - 3), Moderate Pain (4 - 6)  aluminum hydroxide/magnesium hydroxide/simethicone Suspension 30 milliLiter(s) Oral every 6 hours PRN Dyspepsia  morphine  - Injectable 2 milliGRAM(s) IV Push every 4 hours PRN Severe Pain (7 - 10)  ondansetron Injectable 4 milliGRAM(s) IV Push every 8 hours PRN Nausea and/or Vomiting  SUMAtriptan 100 milliGRAM(s) Oral daily PRN Headache/migraine  zolpidem 5 milliGRAM(s) Oral at bedtime PRN Insomnia  zolpidem 5 milliGRAM(s) Oral at bedtime PRN Insomnia     Interval Events: Patient seen and examined at bedside this morning. No acute events overnight. Admits to diffuse abdominal discomfort and mild nausea but denies any localized pain in the RUQ. Did not require analgesia overnight. Denies vomiting, diarrhea, constipation, SOB, palpitations. NPO for MCRP this morning, now on regular diet.    Review of Systems:  General:denies fever chills, headache, weakness  HEENT: denies blurry vision, throat pain  Cardiovascular: denies chest pain, palpitations  Pulmonary:denies SOB, cough, wheezing  Gastrointestinal: admits to dull abdominal ache and mild nausea, denies constipation, diarrhea, vomiting  : denies hematuria, dysuria, or incontinence  Neurological: denies weakness, numbness , tingling, dizziness  MSK: denies muscle pain  skin: denies skin rash, itching, burning, or  skin lesions  Psychiatrical: denies mood disturbances, anxiety     Objective:  Vitals  T(C): 37.2 (10-19-20 @ 04:33), Max: 37.2 (10-19-20 @ 04:33)  HR: 68 (10-19-20 @ 04:33) (59 - 68)  BP: 134/68 (10-19-20 @ 04:33) (132/69 - 156/83)  RR: 19 (10-19-20 @ 04:33) (18 - 19)  SpO2: 96% (10-19-20 @ 04:33) (95% - 96%)    Physical Exam:  General: appears stated age, comfortable, no acute distress  HEENT: NCAT, PERRLA  Cardiovascular: RRR, s1s2, no murmurs, gallops or friction rubs  Pulmonary: cta b/l, no wheezing , rhonchi  Gastrointestinal: soft non tender non distended, + BS  Musculoskeletal no lower extremity edema, intact bilateral lower extremity pulses  Neurological: No focal weakness or tingling  Psychiatrical: normal mood, cooperative  SKIN: no rash, lesions or ulcers    Labs:    Complete Blood Count + Automated Diff in AM (10.19.20 @ 09:24)   WBC Count: 8.43 K/uL   Hemoglobin: 12.4 g/dL   Hematocrit: 38.6 %   Platelet Count - Automated: 204 K/uL     Lipase, Serum: 218 U/L   Magnesium, Serum: 2.2 mg/dL (10.19.20 @ 09:24)   Phosphorus Level, Serum: 2.5 mg/dL (10.19.20 @ 09:24)   Comprehensive Metabolic Panel in AM (10.19.20 @ 09:24)   Sodium, Serum: 144 mmol/L   Potassium, Serum: 4.0 mmol/L   Chloride, Serum: 115 mmol/L   Carbon Dioxide, Serum: 23 mmol/L   Blood Urea Nitrogen, Serum: 13 mg/dL   Creatinine, Serum: 1.00 mg/dL   Glucose, Serum: 116 mg/dL   Calcium, Total Serum: 8.7 mg/dL   Protein Total, Serum: 7.3 g/dL   Albumin, Serum: 3.4 g/dL   Bilirubin Total, Serum: 0.5 mg/dL   Alkaline Phosphatase, Serum: 100 U/L   Aspartate Aminotransferase (AST/SGOT): 15 U/L   Alanine Aminotransferase (ALT/SGPT): 27 U/L                         12.2   8.67  )-----------( 206      ( 18 Oct 2020 08:57 )             37.6     10-18    145  |  115<H>  |  13  ----------------------------<  97  3.8   |  21<L>  |  0.92    Ca    8.7      18 Oct 2020 08:57    TPro  6.9  /  Alb  3.4  /  TBili  0.7  /  DBili  .20  /  AST  21  /  ALT  29  /  AlkPhos  94  10-18    LIVER FUNCTIONS - ( 18 Oct 2020 08:57 )  Alb: 3.4 g/dL / Pro: 6.9 g/dL / ALK PHOS: 94 U/L / ALT: 29 U/L / AST: 21 U/L / GGT: x           PT/INR - ( 17 Oct 2020 07:52 )   PT: 11.6 sec;   INR: 0.99 ratio         PTT - ( 17 Oct 2020 07:52 )  PTT:31.8 sec      Active Medications  MEDICATIONS  (STANDING):  cefTRIAXone   IVPB 1000 milliGRAM(s) IV Intermittent every 24 hours  enoxaparin Injectable 40 milliGRAM(s) SubCutaneous daily  famotidine    Tablet 20 milliGRAM(s) Oral daily  lactated ringers. 1000 milliLiter(s) (70 mL/Hr) IV Continuous <Continuous>  lamoTRIgine 200 milliGRAM(s) Oral two times a day  lithium 300 milliGRAM(s) Oral two times a day  metoprolol tartrate 25 milliGRAM(s) Oral daily  metroNIDAZOLE  IVPB 500 milliGRAM(s) IV Intermittent every 8 hours  topiramate 100 milliGRAM(s) Oral two times a day  ziprasidone 80 milliGRAM(s) Oral <User Schedule>    MEDICATIONS  (PRN):  acetaminophen   Tablet .. 650 milliGRAM(s) Oral every 6 hours PRN Temp greater or equal to 38C (100.4F), Mild Pain (1 - 3), Moderate Pain (4 - 6)  aluminum hydroxide/magnesium hydroxide/simethicone Suspension 30 milliLiter(s) Oral every 6 hours PRN Dyspepsia  morphine  - Injectable 2 milliGRAM(s) IV Push every 4 hours PRN Severe Pain (7 - 10)  ondansetron Injectable 4 milliGRAM(s) IV Push every 8 hours PRN Nausea and/or Vomiting  SUMAtriptan 100 milliGRAM(s) Oral daily PRN Headache/migraine  zolpidem 5 milliGRAM(s) Oral at bedtime PRN Insomnia  zolpidem 5 milliGRAM(s) Oral at bedtime PRN Insomnia

## 2020-10-19 NOTE — PROGRESS NOTE ADULT - ASSESSMENT
All as above in PA notation.  Patient personally seen and examined.  Reports minimal residual discomfort now.  Vitals non-suggestive.  Abdomen soft and non tense and non tender on my examination.  Labs reviewed and reassuring overall with normal WBCs, Lipase and LFTs.  MRCP results noted:  -possible CBD sludge, debris, small stones  -negative for obvious signs of acute cholecystitis with no local gallbladder wall thickening or obvious inflammatory changes  Will discuss above with GI.  Pending their assessment, may be a candidate for ERCP.  Completion cholecystectomy recommended to patient.

## 2020-10-19 NOTE — PROGRESS NOTE ADULT - SUBJECTIVE AND OBJECTIVE BOX
Department of Surgery ACP Note    Overnight there were no significant events noted  Patient states pain is improved but admits to some generalized discomfort, worse in RUQ  Admits to associated nausea but denies vomiting  Patient denies back pain, fever, chills, diarrhea  Patient alert and orientated x 3 at time of visit        T(C): 37.2 (10-19-20 @ 04:33), Max: 37.2 (10-19-20 @ 04:33)  HR: 68 (10-19-20 @ 04:33) (59 - 68)  BP: 134/68 (10-19-20 @ 04:33) (132/69 - 156/83)  RR: 19 (10-19-20 @ 04:33) (18 - 19)  SpO2: 96% (10-19-20 @ 04:33) (95% - 96%)      10-18-20 @ 07:01  -  10-19-20 @ 07:00  --------------------------------------------------------  IN: 1680 mL / OUT: 0 mL / NET: 1680 mL        acetaminophen   Tablet .. 650 milliGRAM(s) Oral PRN  aluminum hydroxide/magnesium hydroxide/simethicone Suspension 30 milliLiter(s) Oral PRN  cefTRIAXone   IVPB 1000 milliGRAM(s) IV Intermittent  enoxaparin Injectable 40 milliGRAM(s) SubCutaneous  famotidine    Tablet 20 milliGRAM(s) Oral  lactated ringers. 1000 milliLiter(s) IV Continuous  lamoTRIgine 200 milliGRAM(s) Oral  lithium 300 milliGRAM(s) Oral  metoprolol tartrate 25 milliGRAM(s) Oral  metroNIDAZOLE  IVPB 500 milliGRAM(s) IV Intermittent  morphine  - Injectable 2 milliGRAM(s) IV Push PRN  ondansetron Injectable 4 milliGRAM(s) IV Push PRN  SUMAtriptan 100 milliGRAM(s) Oral PRN  topiramate 100 milliGRAM(s) Oral  ziprasidone 80 milliGRAM(s) Oral  zolpidem 5 milliGRAM(s) Oral PRN  zolpidem 5 milliGRAM(s) Oral PRN          General: A+O x 3 in NAD  HEENT: PERRLA, EOM intact  Neck: trachea midline  Abdomen: soft non distended, + mild tenderness to RUQ/epigastric region, non tympanic, BS x 4, no guarding noted  Extremities: no edema noted, warm,  no calf tenderness                   LABS PENDING      Assessment and Plan  65-yo female with significant pmhx of CBD stricture treated with ERCP ~10 years ago with improving abdominal pain with likely passed CBD stone        Plan  -  follow up MRCP   - continue NPO for now  - IV fluid hydration   - continue IV antibiotics and will convert to PO when appropriate  - Activity as tolerated  - check labs  - F/u GI recommendations after MRCP  - plan to be discussed with Surgical Attending     Department of Surgery ACP Note      Overnight there were no significant events noted  Patient states pain is improved but admits to some generalized discomfort, worse in RUQ  Admits to associated nausea but denies vomiting  Patient denies back pain, fever, chills, diarrhea  Patient alert and orientated x 3 at time of visit      T(C): 37.2 (10-19-20 @ 04:33), Max: 37.2 (10-19-20 @ 04:33)  HR: 68 (10-19-20 @ 04:33) (59 - 68)  BP: 134/68 (10-19-20 @ 04:33) (132/69 - 156/83)  RR: 19 (10-19-20 @ 04:33) (18 - 19)  SpO2: 96% (10-19-20 @ 04:33) (95% - 96%)      10-18-20 @ 07:01  -  10-19-20 @ 07:00  --------------------------------------------------------  IN: 1680 mL / OUT: 0 mL / NET: 1680 mL      acetaminophen   Tablet .. 650 milliGRAM(s) Oral PRN  aluminum hydroxide/magnesium hydroxide/simethicone Suspension 30 milliLiter(s) Oral PRN  cefTRIAXone   IVPB 1000 milliGRAM(s) IV Intermittent  enoxaparin Injectable 40 milliGRAM(s) SubCutaneous  famotidine    Tablet 20 milliGRAM(s) Oral  lactated ringers. 1000 milliLiter(s) IV Continuous  lamoTRIgine 200 milliGRAM(s) Oral  lithium 300 milliGRAM(s) Oral  metoprolol tartrate 25 milliGRAM(s) Oral  metroNIDAZOLE  IVPB 500 milliGRAM(s) IV Intermittent  morphine  - Injectable 2 milliGRAM(s) IV Push PRN  ondansetron Injectable 4 milliGRAM(s) IV Push PRN  SUMAtriptan 100 milliGRAM(s) Oral PRN  topiramate 100 milliGRAM(s) Oral  ziprasidone 80 milliGRAM(s) Oral  zolpidem 5 milliGRAM(s) Oral PRN  zolpidem 5 milliGRAM(s) Oral PRN      General: A+O x 3 in NAD  HEENT: PERRLA, EOM intact  Neck: trachea midline  Abdomen: soft non distended, + mild tenderness to RUQ/epigastric region, non tympanic, BS x 4, no guarding noted  Extremities: no edema noted, warm,  no calf tenderness          LABS PENDING      Assessment and Plan  65-yo female with significant pmhx of CBD stricture treated with ERCP ~10 years ago with improving abdominal pain with likely passed CBD stone      Plan  -  follow up MRCP   - continue NPO for now  - IV fluid hydration   - continue IV antibiotics and will convert to PO when appropriate  - Activity as tolerated  - check labs  - F/u GI recommendations after MRCP    - plan to be discussed with Surgical Attending

## 2020-10-20 DIAGNOSIS — F31.9 BIPOLAR DISORDER, UNSPECIFIED: ICD-10-CM

## 2020-10-20 DIAGNOSIS — Z29.9 ENCOUNTER FOR PROPHYLACTIC MEASURES, UNSPECIFIED: ICD-10-CM

## 2020-10-20 LAB
ALBUMIN SERPL ELPH-MCNC: 3 G/DL — LOW (ref 3.3–5)
ALP SERPL-CCNC: 91 U/L — SIGNIFICANT CHANGE UP (ref 40–120)
ALT FLD-CCNC: 22 U/L — SIGNIFICANT CHANGE UP (ref 12–78)
ANION GAP SERPL CALC-SCNC: 6 MMOL/L — SIGNIFICANT CHANGE UP (ref 5–17)
AST SERPL-CCNC: 14 U/L — LOW (ref 15–37)
BILIRUB SERPL-MCNC: 0.2 MG/DL — SIGNIFICANT CHANGE UP (ref 0.2–1.2)
BUN SERPL-MCNC: 11 MG/DL — SIGNIFICANT CHANGE UP (ref 7–23)
CALCIUM SERPL-MCNC: 8.6 MG/DL — SIGNIFICANT CHANGE UP (ref 8.5–10.1)
CHLORIDE SERPL-SCNC: 116 MMOL/L — HIGH (ref 96–108)
CO2 SERPL-SCNC: 21 MMOL/L — LOW (ref 22–31)
CREAT SERPL-MCNC: 0.88 MG/DL — SIGNIFICANT CHANGE UP (ref 0.5–1.3)
GLUCOSE SERPL-MCNC: 120 MG/DL — HIGH (ref 70–99)
HCT VFR BLD CALC: 36.2 % — SIGNIFICANT CHANGE UP (ref 34.5–45)
HGB BLD-MCNC: 11.3 G/DL — LOW (ref 11.5–15.5)
MCHC RBC-ENTMCNC: 29.3 PG — SIGNIFICANT CHANGE UP (ref 27–34)
MCHC RBC-ENTMCNC: 31.2 GM/DL — LOW (ref 32–36)
MCV RBC AUTO: 93.8 FL — SIGNIFICANT CHANGE UP (ref 80–100)
NRBC # BLD: 0 /100 WBCS — SIGNIFICANT CHANGE UP (ref 0–0)
PLATELET # BLD AUTO: 193 K/UL — SIGNIFICANT CHANGE UP (ref 150–400)
POTASSIUM SERPL-MCNC: 3.9 MMOL/L — SIGNIFICANT CHANGE UP (ref 3.5–5.3)
POTASSIUM SERPL-SCNC: 3.9 MMOL/L — SIGNIFICANT CHANGE UP (ref 3.5–5.3)
PROT SERPL-MCNC: 6.3 G/DL — SIGNIFICANT CHANGE UP (ref 6–8.3)
RBC # BLD: 3.86 M/UL — SIGNIFICANT CHANGE UP (ref 3.8–5.2)
RBC # FLD: 13.2 % — SIGNIFICANT CHANGE UP (ref 10.3–14.5)
SODIUM SERPL-SCNC: 143 MMOL/L — SIGNIFICANT CHANGE UP (ref 135–145)
WBC # BLD: 5.38 K/UL — SIGNIFICANT CHANGE UP (ref 3.8–10.5)
WBC # FLD AUTO: 5.38 K/UL — SIGNIFICANT CHANGE UP (ref 3.8–10.5)

## 2020-10-20 PROCEDURE — 99233 SBSQ HOSP IP/OBS HIGH 50: CPT

## 2020-10-20 PROCEDURE — 99233 SBSQ HOSP IP/OBS HIGH 50: CPT | Mod: GC

## 2020-10-20 RX ORDER — HYDROMORPHONE HYDROCHLORIDE 2 MG/ML
1 INJECTION INTRAMUSCULAR; INTRAVENOUS; SUBCUTANEOUS
Refills: 0 | Status: DISCONTINUED | OUTPATIENT
Start: 2020-10-20 | End: 2020-10-20

## 2020-10-20 RX ORDER — HYDROMORPHONE HYDROCHLORIDE 2 MG/ML
1 INJECTION INTRAMUSCULAR; INTRAVENOUS; SUBCUTANEOUS ONCE
Refills: 0 | Status: DISCONTINUED | OUTPATIENT
Start: 2020-10-20 | End: 2020-10-20

## 2020-10-20 RX ORDER — LACTOBACILLUS ACIDOPHILUS 100MM CELL
1 CAPSULE ORAL
Refills: 0 | Status: DISCONTINUED | OUTPATIENT
Start: 2020-10-20 | End: 2020-10-23

## 2020-10-20 RX ORDER — HYDROMORPHONE HYDROCHLORIDE 2 MG/ML
0.5 INJECTION INTRAMUSCULAR; INTRAVENOUS; SUBCUTANEOUS
Refills: 0 | Status: DISCONTINUED | OUTPATIENT
Start: 2020-10-20 | End: 2020-10-20

## 2020-10-20 RX ORDER — POTASSIUM PHOSPHATE, MONOBASIC POTASSIUM PHOSPHATE, DIBASIC 236; 224 MG/ML; MG/ML
30 INJECTION, SOLUTION INTRAVENOUS ONCE
Refills: 0 | Status: DISCONTINUED | OUTPATIENT
Start: 2020-10-20 | End: 2020-10-20

## 2020-10-20 RX ORDER — SODIUM CHLORIDE 9 MG/ML
1000 INJECTION, SOLUTION INTRAVENOUS
Refills: 0 | Status: DISCONTINUED | OUTPATIENT
Start: 2020-10-20 | End: 2020-10-20

## 2020-10-20 RX ADMIN — Medication 100 MILLIGRAM(S): at 05:23

## 2020-10-20 RX ADMIN — Medication 100 MILLIGRAM(S): at 09:21

## 2020-10-20 RX ADMIN — Medication 1 TABLET(S): at 17:18

## 2020-10-20 RX ADMIN — SIMETHICONE 80 MILLIGRAM(S): 80 TABLET, CHEWABLE ORAL at 05:23

## 2020-10-20 RX ADMIN — ZOLPIDEM TARTRATE 5 MILLIGRAM(S): 10 TABLET ORAL at 21:10

## 2020-10-20 RX ADMIN — MORPHINE SULFATE 2 MILLIGRAM(S): 50 CAPSULE, EXTENDED RELEASE ORAL at 23:05

## 2020-10-20 RX ADMIN — Medication 100 MILLIGRAM(S): at 14:27

## 2020-10-20 RX ADMIN — MORPHINE SULFATE 2 MILLIGRAM(S): 50 CAPSULE, EXTENDED RELEASE ORAL at 23:20

## 2020-10-20 RX ADMIN — FAMOTIDINE 20 MILLIGRAM(S): 10 INJECTION INTRAVENOUS at 14:28

## 2020-10-20 RX ADMIN — Medication 25 MILLIGRAM(S): at 05:23

## 2020-10-20 RX ADMIN — HYDROMORPHONE HYDROCHLORIDE 1 MILLIGRAM(S): 2 INJECTION INTRAMUSCULAR; INTRAVENOUS; SUBCUTANEOUS at 14:53

## 2020-10-20 RX ADMIN — Medication 100 MILLIGRAM(S): at 21:10

## 2020-10-20 RX ADMIN — SODIUM CHLORIDE 75 MILLILITER(S): 9 INJECTION, SOLUTION INTRAVENOUS at 13:34

## 2020-10-20 RX ADMIN — SODIUM CHLORIDE 70 MILLILITER(S): 9 INJECTION, SOLUTION INTRAVENOUS at 09:20

## 2020-10-20 RX ADMIN — SIMETHICONE 80 MILLIGRAM(S): 80 TABLET, CHEWABLE ORAL at 21:10

## 2020-10-20 RX ADMIN — LITHIUM CARBONATE 300 MILLIGRAM(S): 300 TABLET, EXTENDED RELEASE ORAL at 09:21

## 2020-10-20 RX ADMIN — ZOLPIDEM TARTRATE 5 MILLIGRAM(S): 10 TABLET ORAL at 22:37

## 2020-10-20 RX ADMIN — SODIUM CHLORIDE 75 MILLILITER(S): 9 INJECTION, SOLUTION INTRAVENOUS at 14:28

## 2020-10-20 RX ADMIN — CEFTRIAXONE 100 MILLIGRAM(S): 500 INJECTION, POWDER, FOR SOLUTION INTRAMUSCULAR; INTRAVENOUS at 12:30

## 2020-10-20 RX ADMIN — ZIPRASIDONE HYDROCHLORIDE 80 MILLIGRAM(S): 20 CAPSULE ORAL at 09:21

## 2020-10-20 RX ADMIN — LITHIUM CARBONATE 300 MILLIGRAM(S): 300 TABLET, EXTENDED RELEASE ORAL at 21:10

## 2020-10-20 RX ADMIN — HYDROMORPHONE HYDROCHLORIDE 1 MILLIGRAM(S): 2 INJECTION INTRAMUSCULAR; INTRAVENOUS; SUBCUTANEOUS at 15:10

## 2020-10-20 RX ADMIN — LAMOTRIGINE 200 MILLIGRAM(S): 25 TABLET, ORALLY DISINTEGRATING ORAL at 21:10

## 2020-10-20 RX ADMIN — LAMOTRIGINE 200 MILLIGRAM(S): 25 TABLET, ORALLY DISINTEGRATING ORAL at 09:21

## 2020-10-20 NOTE — PROGRESS NOTE ADULT - SUBJECTIVE AND OBJECTIVE BOX
Patient was seen lying comfortably in bed. No acute events overnight.  Pt admits to mild pain but states it is tolerable with meds.  Is tolerating diet well without any nausea or vomiting. Has been NPO since midnight.   Ambulating well. Voiding without difficulty. Pt has passed flatus, had two episodes of diarrhea overnight  Pt is denying any chest pain, sob, dizziness, weakness     T(C): 37.1 (10-20-20 @ 04:43), Max: 37.1 (10-19-20 @ 21:39)  HR: 65 (10-20-20 @ 04:43) (65 - 72)  BP: 132/67 (10-20-20 @ 04:43) (132/67 - 146/89)  RR: 17 (10-20-20 @ 04:43) (17 - 18)  SpO2: 97% (10-20-20 @ 04:43) (96% - 97%)      10-19-20 @ 07:01  -  10-20-20 @ 07:00  --------------------------------------------------------  IN: 1660 mL / OUT: 0 mL / NET: 1660 mL    PE  General: Pt is lying in bed, NAD, A+O x 3  Cardio: RRR  Lungs: NLB  Abdomen: Belly is soft, non distended and non tender to palpation  Ext: Calves are soft and non tender to palpation b/l.  ( - ) edema    IMAGING  MRCP 10/19: Small sludge or gallstones in the gallbladder. No evidence for thickened gallbladder wall, or pericholecystic fluid.    Dilated common bile duct. Possible small sludge or gallstones in the very distal common bile duct.      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 Abdominal pain is improving, labs reassuring, normal WBC lipase and LFTs trend. Likely passed CBD stone. MRCP showing possible small sludge or gallstones in distal CBD, but NEGATIVE for any signs of acute cholecystitis (no local GB wall thickening). Awaiting ERCP per GI.       PLAN  Antibiotics- Flagyl, ceftriaxone   Respiration- IS use encouraged   Ambulation- OOB as tolerated  Diet- NPO for ERCP, IVF  Pain meds PRN  f/u AM labs   Plan for ERCP today with GI  Plan for tentative laparoscopic cholecystectomy pending course after ERCP    Patient was seen lying comfortably in bed. No acute events overnight.  Pt admits to mild pain but states it is tolerable with meds.  Is tolerating diet well without any nausea or vomiting. Has been NPO since midnight.   Ambulating well. Voiding without difficulty. Pt has passed flatus, had two episodes of diarrhea overnight  Pt is denying any chest pain, sob, dizziness, weakness       T(C): 37.1 (10-20-20 @ 04:43), Max: 37.1 (10-19-20 @ 21:39)  HR: 65 (10-20-20 @ 04:43) (65 - 72)  BP: 132/67 (10-20-20 @ 04:43) (132/67 - 146/89)  RR: 17 (10-20-20 @ 04:43) (17 - 18)  SpO2: 97% (10-20-20 @ 04:43) (96% - 97%)      10-19-20 @ 07:01  -  10-20-20 @ 07:00  --------------------------------------------------------  IN: 1660 mL / OUT: 0 mL / NET: 1660 mL      PE  General: Pt is lying in bed, NAD, A+O x 3  Cardio: RRR  Lungs: NLB  Abdomen: Belly is soft, non distended and non tender to palpation  Ext: Calves are soft and non tender to palpation b/l.  ( - ) edema      IMAGING  MRCP 10/19: Small sludge or gallstones in the gallbladder. No evidence for thickened gallbladder wall, or pericholecystic fluid.    Dilated common bile duct. Possible small sludge or gallstones in the very distal common bile duct.      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 Abdominal pain is improving, labs reassuring, normal WBC lipase and LFTs trend. Likely passed CBD stone. MRCP showing possible small sludge or gallstones in distal CBD, but NEGATIVE for any signs of acute cholecystitis (no local GB wall thickening). Awaiting ERCP per GI.       PLAN  Antibiotics- Flagyl, ceftriaxone   Respiration- IS use encouraged   Ambulation- OOB as tolerated  Diet- NPO for ERCP, IVF  Pain meds PRN  f/u AM labs   Plan for ERCP today with GI  Plan for tentative laparoscopic cholecystectomy pending course after ERCP

## 2020-10-20 NOTE — PROGRESS NOTE ADULT - ASSESSMENT
65y Female with HTN, Bipolar disorder, presents with abdominal pain, epigastric and RUQ since midnight, mild elev LFTs, abd sono with 17 mm dilated CBD, small GB sludge.    RUQ Pain r/o cholecystitis vs choledolethaisis  - NPO since midnight, going for ERCP today  - MRCP (10/19): Small sludge or gallstones in the gallbladder. No evidence for thickened gallbladder wall, or pericholecystic fluid. Dilated common bile duct. Possible small sludge or gallstones in the very distal common bile duct.  - c/w rocephin and flagyl  - Pt c/o 4 bouts of watery diarrhea this morning, like 2/2 antibiotics  - Surgery and GI following  - Plan for tentative laparoscopic cholecystectomy pending course after ERCP    Bipolar disorder  - Cont lithium, metoprolol, lamictal, topiramate, ziprasidone    GI/DVT prophylaxis  - c/w pepcid and lovenox     65y Female with HTN, Bipolar disorder, presents with abdominal pain, epigastric and RUQ since midnight, mild elev LFTs, abd sono with 17 mm dilated CBD, small GB sludge.

## 2020-10-20 NOTE — PROGRESS NOTE ADULT - SUBJECTIVE AND OBJECTIVE BOX
s/p ercp    extension of sphincterotomy  large amount of sludge removed for bile duct    rec:   clear liquid diet  surgery f/u re ccy

## 2020-10-20 NOTE — PROGRESS NOTE ADULT - SUBJECTIVE AND OBJECTIVE BOX
CHARTING IN PROGRESS    Patient is a 65y old  Female who presents with a chief complaint of abdominal pain - epigastric, RUQ (20 Oct 2020 07:42)      HPI:  65y Female with HTN, Bipolar disorder, presents to the ED with c/o abdominal pain, epigastric and RUQ since 12 midnight.  Pt states she took 3 TUMS wity no relief.  Last meal was dinner, last night, she was unable to eat breakfast and lunch because of the discomfort.  She states pain is nonradiating, asso with nausea, but no vomiting, diarrhea, fever nor chills.  Denies chest pain, dyspnea, dysuria.    Abdominal sono reports 17 mm dilated common bile duct without intrahepatic biliary dilatation, mild hepatic steatosis. Small gallbladder sludge. Pancreas within normal.  Punctate nonobstructing left renal calculus.  Pt was given a dose of Ceftriaxone and Flagyl IV in the ED. (17 Oct 2020 16:40)      INTERVAL HPI/OVERNIGHT EVENTS:  T(C): 37.1 (10-20-20 @ 04:43), Max: 37.1 (10-19-20 @ 21:39)  HR: 65 (10-20-20 @ 04:43) (65 - 72)  BP: 132/67 (10-20-20 @ 04:43) (132/67 - 146/89)  RR: 17 (10-20-20 @ 04:43) (17 - 18)  SpO2: 97% (10-20-20 @ 04:43) (96% - 97%)  Wt(kg): --  I&O's Summary    19 Oct 2020 07:01  -  20 Oct 2020 07:00  --------------------------------------------------------  IN: 1660 mL / OUT: 0 mL / NET: 1660 mL        REVIEW OF SYSTEMS:  CONSTITUTIONAL: No fever, weight loss, or fatigue  EYES: No eye pain, visual disturbances, or discharge  ENMT:  No difficulty hearing, tinnitus, vertigo; No sinus or throat pain  NECK: No pain or stiffness  BREASTS: No pain, no masses,   RESPIRATORY: No cough, wheezing, chills or hemoptysis; No shortness of breath  CARDIOVASCULAR: No chest pain, palpitations, dizziness, or leg swelling  GASTROINTESTINAL: No abdominal or epigastric pain. No nausea, vomiting, or hematemesis; No diarrhea or constipation. No melena or hematochezia.  GENITOURINARY: No dysuria, frequency, hematuria, or incontinence  NEUROLOGICAL: No headaches, memory loss, loss of strength, numbness, or tremors  SKIN: No itching, burning, rashes, or lesions   LYMPH NODES: No enlarged glands  MUSCULOSKELETAL: No joint pain or swelling; No muscle, back, or extremity pain  PSYCHIATRIC: No depression, anxiety, mood swings, or difficulty sleeping  ALLERY No hives or eczema    PHYSICAL EXAM:  GENERAL: NAD, well-groomed, well-developed  HEAD:  Atraumatic, Normocephalic  EYES: EOMI, PERRLA, conjunctiva and sclera clear  ENMT: No tonsillar erythema, exudates, or enlargement; Moist mucous membranes, Good dentition, No lesions  NECK: Supple, No JVD, Normal thyroid  NERVOUS SYSTEM:  Alert & Oriented X3, Good concentration; Motor Strength 5/5 B/L upper and lower extremities; DTRs 2+ intact and symmetric  CHEST/LUNG: Clear to percussion bilaterally; No rales, rhonchi, wheezing, or rubs  HEART: Regular rate and rhythm; No murmurs, rubs, or gallops  ABDOMEN: Soft, Nontender, Nondistended; Bowel sounds present  EXTREMITIES:  2+ Peripheral Pulses, No clubbing, cyanosis, or edema  LYMPH: No lymphadenopathy noted  SKIN: No rashes or lesions    MEDICATIONS  (STANDING):  cefTRIAXone   IVPB 1000 milliGRAM(s) IV Intermittent every 24 hours  enoxaparin Injectable 40 milliGRAM(s) SubCutaneous daily  famotidine    Tablet 20 milliGRAM(s) Oral daily  lactated ringers. 1000 milliLiter(s) (70 mL/Hr) IV Continuous <Continuous>  lamoTRIgine 200 milliGRAM(s) Oral two times a day  lithium 300 milliGRAM(s) Oral two times a day  metoprolol tartrate 25 milliGRAM(s) Oral daily  metroNIDAZOLE  IVPB 500 milliGRAM(s) IV Intermittent every 8 hours  simethicone 80 milliGRAM(s) Chew three times a day  topiramate 100 milliGRAM(s) Oral two times a day  ziprasidone 80 milliGRAM(s) Oral <User Schedule>    MEDICATIONS  (PRN):  acetaminophen   Tablet .. 650 milliGRAM(s) Oral every 6 hours PRN Temp greater or equal to 38C (100.4F), Mild Pain (1 - 3), Moderate Pain (4 - 6)  aluminum hydroxide/magnesium hydroxide/simethicone Suspension 30 milliLiter(s) Oral every 6 hours PRN Dyspepsia  morphine  - Injectable 2 milliGRAM(s) IV Push every 4 hours PRN Severe Pain (7 - 10)  ondansetron Injectable 4 milliGRAM(s) IV Push every 8 hours PRN Nausea and/or Vomiting  SUMAtriptan 100 milliGRAM(s) Oral daily PRN Headache/migraine  zolpidem 5 milliGRAM(s) Oral at bedtime PRN Insomnia  zolpidem 5 milliGRAM(s) Oral at bedtime PRN Insomnia      LABS:                        11.3   5.38  )-----------( 193      ( 20 Oct 2020 08:14 )             36.2     10-20    143  |  116<H>  |  11  ----------------------------<  120<H>  3.9   |  21<L>  |  0.88    Ca    8.6      20 Oct 2020 08:14  Phos  2.5     10-19  Mg     2.2     10-19    TPro  6.3  /  Alb  3.0<L>  /  TBili  0.2  /  DBili  x   /  AST  14<L>  /  ALT  22  /  AlkPhos  91  10-20        CAPILLARY BLOOD GLUCOSE          10-17 @ 10:12   <10,000 CFU/mL Normal Urogenital Deanne  --  --          RADIOLOGY & ADDITIONAL TESTS:    Imaging Personally Reviewed:       Advance Directives:      Palliative Care:               CHARTING IN PROGRESS    Patient is a 65y old  Female who presents with a chief complaint of abdominal pain - epigastric, RUQ (20 Oct 2020 07:42)      HPI:  65y Female with HTN, Bipolar disorder, presents to the ED with c/o abdominal pain, epigastric and RUQ since 12 midnight.  Pt states she took 3 TUMS wity no relief.  Last meal was dinner, last night, she was unable to eat breakfast and lunch because of the discomfort.  She states pain is nonradiating, asso with nausea, but no vomiting, diarrhea, fever nor chills.  Denies chest pain, dyspnea, dysuria.    Abdominal sono reports 17 mm dilated common bile duct without intrahepatic biliary dilatation, mild hepatic steatosis. Small gallbladder sludge. Pancreas within normal.  Punctate nonobstructing left renal calculus.  Pt was given a dose of Ceftriaxone and Flagyl IV in the ED. (17 Oct 2020 16:40)      INTERVAL HPI/OVERNIGHT EVENTS: Patient seen and examined at bedside this morning. Patient endorses 4 loose, watery, stools overnight. Patient admits to diffuse abdominal pain 1/10 overnight, but states it is tolerable with pain medication. Pt has been NPO since midnight for ERCP today. Patient is ambulating well and denies SOB, palpitations, dizziness, or weakness.        T(C): 37.1 (10-20-20 @ 04:43), Max: 37.1 (10-19-20 @ 21:39)  HR: 65 (10-20-20 @ 04:43) (65 - 72)  BP: 132/67 (10-20-20 @ 04:43) (132/67 - 146/89)  RR: 17 (10-20-20 @ 04:43) (17 - 18)  SpO2: 97% (10-20-20 @ 04:43) (96% - 97%)  Wt(kg): --  I&O's Summary    19 Oct 2020 07:01  -  20 Oct 2020 07:00  --------------------------------------------------------  IN: 1660 mL / OUT: 0 mL / NET: 1660 mL        Review of Systems:  General: comfortable, denies fever chills, headache, weakness  HEENT: denies blurry vision, throat pain  Cardiovascular: denies chest pain, palpitations  Pulmonary: denies SOB, cough, wheezing  Gastrointestinal: admits to dull 1/10 abdominal ache, mild nausea, and watery diarrhea, denies constipation and vomiting  : denies hematuria, dysuria, or incontinence  Neurological: denies weakness, numbness , tingling, dizziness  MSK: denies muscle pain  skin: denies skin rash, itching, burning, or  skin lesions  Psychiatrical: denies mood disturbances, anxiety           PHYSICAL EXAM:    General: NAD, comfortable, well-groomed, appears stated age  HEENT: NCAT, PERRLA, EOMI  Cardiovascular: RRR, s1s2, no murmurs, gallops or friction rubs  Pulmonary: cta b/l, no wheezing , rhonchi, or rales  Gastrointestinal: soft non tender non distended, + BS  Musculoskeletal: no lower extremity edema, intact bilateral lower extremity pulses, no muscle weakness or pain  Neurological: A&O x3, No focal weakness or tingling  Psychiatrical: normal mood, cooperative  SKIN: no rash, lesions or ulcers      MEDICATIONS  (STANDING):  cefTRIAXone   IVPB 1000 milliGRAM(s) IV Intermittent every 24 hours  enoxaparin Injectable 40 milliGRAM(s) SubCutaneous daily  famotidine    Tablet 20 milliGRAM(s) Oral daily  lactated ringers. 1000 milliLiter(s) (70 mL/Hr) IV Continuous <Continuous>  lamoTRIgine 200 milliGRAM(s) Oral two times a day  lithium 300 milliGRAM(s) Oral two times a day  metoprolol tartrate 25 milliGRAM(s) Oral daily  metroNIDAZOLE  IVPB 500 milliGRAM(s) IV Intermittent every 8 hours  simethicone 80 milliGRAM(s) Chew three times a day  topiramate 100 milliGRAM(s) Oral two times a day  ziprasidone 80 milliGRAM(s) Oral <User Schedule>    MEDICATIONS  (PRN):  acetaminophen   Tablet .. 650 milliGRAM(s) Oral every 6 hours PRN Temp greater or equal to 38C (100.4F), Mild Pain (1 - 3), Moderate Pain (4 - 6)  aluminum hydroxide/magnesium hydroxide/simethicone Suspension 30 milliLiter(s) Oral every 6 hours PRN Dyspepsia  morphine  - Injectable 2 milliGRAM(s) IV Push every 4 hours PRN Severe Pain (7 - 10)  ondansetron Injectable 4 milliGRAM(s) IV Push every 8 hours PRN Nausea and/or Vomiting  SUMAtriptan 100 milliGRAM(s) Oral daily PRN Headache/migraine  zolpidem 5 milliGRAM(s) Oral at bedtime PRN Insomnia  zolpidem 5 milliGRAM(s) Oral at bedtime PRN Insomnia      LABS:                        11.3   5.38  )-----------( 193      ( 20 Oct 2020 08:14 )             36.2     10-20    143  |  116<H>  |  11  ----------------------------<  120<H>  3.9   |  21<L>  |  0.88    Ca    8.6      20 Oct 2020 08:14  Phos  2.5     10-19  Mg     2.2     10-19    TPro  6.3  /  Alb  3.0<L>  /  TBili  0.2  /  DBili  x   /  AST  14<L>  /  ALT  22  /  AlkPhos  91  10-20        CAPILLARY BLOOD GLUCOSE          10-17 @ 10:12   <10,000 CFU/mL Normal Urogenital Deanne  --  --          RADIOLOGY & ADDITIONAL TESTS:    Imaging Personally Reviewed:       Advance Directives:      Palliative Care:               Patient is a 65y old  Female who presents with a chief complaint of abdominal pain - epigastric, RUQ (20 Oct 2020 07:42)      HPI:  65y Female with HTN, Bipolar disorder, presents to the ED with c/o abdominal pain, epigastric and RUQ since 12 midnight.  Pt states she took 3 TUMS wity no relief.  Last meal was dinner, last night, she was unable to eat breakfast and lunch because of the discomfort.  She states pain is nonradiating, asso with nausea, but no vomiting, diarrhea, fever nor chills.  Denies chest pain, dyspnea, dysuria.    Abdominal sono reports 17 mm dilated common bile duct without intrahepatic biliary dilatation, mild hepatic steatosis. Small gallbladder sludge. Pancreas within normal.  Punctate nonobstructing left renal calculus.  Pt was given a dose of Ceftriaxone and Flagyl IV in the ED. (17 Oct 2020 16:40)      INTERVAL HPI/OVERNIGHT EVENTS: Patient seen and examined at bedside this morning. Patient endorses 4 loose, watery stools overnight. Patient admits to improvement in diffuse abdominal pain rated as 1/10 overnight, but states it is tolerable with pain medication. Pt has been NPO since midnight for ERCP today. Patient is ambulating well and denies SOB, palpitations, dizziness, or weakness.        T(C): 37.1 (10-20-20 @ 04:43), Max: 37.1 (10-19-20 @ 21:39)  HR: 65 (10-20-20 @ 04:43) (65 - 72)  BP: 132/67 (10-20-20 @ 04:43) (132/67 - 146/89)  RR: 17 (10-20-20 @ 04:43) (17 - 18)  SpO2: 97% (10-20-20 @ 04:43) (96% - 97%)    I&O's Summary    19 Oct 2020 07:01  -  20 Oct 2020 07:00  --------------------------------------------------------  IN: 1660 mL / OUT: 0 mL / NET: 1660 mL        Review of Systems:  General: comfortable, denies fever chills, headache, weakness  HEENT: denies blurry vision, throat pain  Cardiovascular: denies chest pain, palpitations  Pulmonary: denies SOB, cough, wheezing  Gastrointestinal: admits to dull 1/10 abdominal ache, mild nausea, and watery diarrhea, denies constipation and vomiting  : denies hematuria, dysuria, or incontinence  Neurological: denies weakness, numbness , tingling, dizziness  MSK: denies muscle pain  skin: denies skin rash, itching, burning, or  skin lesions  Psychiatrical: denies mood disturbances, anxiety           PHYSICAL EXAM:  General: NAD, comfortable, well-groomed, appears stated age  HEENT: NCAT, PERRLA, EOMI  Cardiovascular: RRR, s1s2, no murmurs, gallops or friction rubs  Pulmonary: cta b/l, no wheezing , rhonchi, or rales  Gastrointestinal: soft non tender non distended, + BS  Musculoskeletal: no lower extremity edema, intact bilateral lower extremity pulses, no muscle weakness or pain  Neurological: A&O x3, No focal weakness or tingling  Psychiatrical: normal mood, cooperative  SKIN: no rash, lesions or ulcers      MEDICATIONS  (STANDING):  cefTRIAXone   IVPB 1000 milliGRAM(s) IV Intermittent every 24 hours  enoxaparin Injectable 40 milliGRAM(s) SubCutaneous daily  famotidine    Tablet 20 milliGRAM(s) Oral daily  lactated ringers. 1000 milliLiter(s) (70 mL/Hr) IV Continuous <Continuous>  lamoTRIgine 200 milliGRAM(s) Oral two times a day  lithium 300 milliGRAM(s) Oral two times a day  metoprolol tartrate 25 milliGRAM(s) Oral daily  metroNIDAZOLE  IVPB 500 milliGRAM(s) IV Intermittent every 8 hours  simethicone 80 milliGRAM(s) Chew three times a day  topiramate 100 milliGRAM(s) Oral two times a day  ziprasidone 80 milliGRAM(s) Oral <User Schedule>    MEDICATIONS  (PRN):  acetaminophen   Tablet .. 650 milliGRAM(s) Oral every 6 hours PRN Temp greater or equal to 38C (100.4F), Mild Pain (1 - 3), Moderate Pain (4 - 6)  aluminum hydroxide/magnesium hydroxide/simethicone Suspension 30 milliLiter(s) Oral every 6 hours PRN Dyspepsia  morphine  - Injectable 2 milliGRAM(s) IV Push every 4 hours PRN Severe Pain (7 - 10)  ondansetron Injectable 4 milliGRAM(s) IV Push every 8 hours PRN Nausea and/or Vomiting  SUMAtriptan 100 milliGRAM(s) Oral daily PRN Headache/migraine  zolpidem 5 milliGRAM(s) Oral at bedtime PRN Insomnia  zolpidem 5 milliGRAM(s) Oral at bedtime PRN Insomnia      LABS:                        11.3   5.38  )-----------( 193      ( 20 Oct 2020 08:14 )             36.2     10-20    143  |  116<H>  |  11  ----------------------------<  120<H>  3.9   |  21<L>  |  0.88    Ca    8.6      20 Oct 2020 08:14  Phos  2.5     10-19  Mg     2.2     10-19    TPro  6.3  /  Alb  3.0<L>  /  TBili  0.2  /  DBili  x   /  AST  14<L>  /  ALT  22  /  AlkPhos  91  10-20        10-17 @ 10:12   <10,000 CFU/mL Normal Urogenital Deanne  --  --          RADIOLOGY & ADDITIONAL TESTS:    EXAM:  MR ABDOMEN                        PROCEDURE DATE:  10/19/2020    INTERPRETATION:  MRCP without IV contrast    Indication: Dilated common bile duct.    Technique: Utilizing a 1.5 Reena high-field magnet, multiplanar multisequence MRimages of the abdomen are acquired without IV contrast, supplemented by thin and thick slab MRCP images.    Comparison: No prior abdominal MR is available for comparison. Reference is made with previous abdominal ultrasound and abdominal CT dated 10/17/2020.    Findings: Small sludge or gallstones in the gallbladder (image 83 series 13). No evidence for thickened gallbladder wall, or pericholecystic fluid. Dilated common bile duct measuring up to 1.4 cm in diameter. Possible small sludge or stones in the very distal common bile duct best seen on series 13. The main pancreatic duct maintains normal caliber without dilatation.    Allowing for the noncontrast technique, the liver, pancreas, spleen, and adrenals appear grossly unremarkable. A fewsmall brightly T2 hyperintense lesions in the kidneys bilaterally, representing probable cysts.    No evidence of ascites, or enlarged lymph node.    The visualized bowel structures appear grossly unremarkable.    Impression:    Small sludge or gallstones in the gallbladder. No evidence for thickened gallbladder wall, or pericholecystic fluid.    Dilated common bile duct. Possible small sludge or gallstones in the very distal common bile duct.      Imaging Personally Reviewed: yes.    Advance Directives: none           Patient is a 65y old  Female who presents with a chief complaint of abdominal pain - epigastric, RUQ (20 Oct 2020 07:42)      HPI:  65y Female with HTN, Bipolar disorder, presents to the ED with c/o abdominal pain, epigastric and RUQ since 12 midnight.  Pt states she took 3 TUMS wity no relief.  Last meal was dinner, last night, she was unable to eat breakfast and lunch because of the discomfort.  She states pain is nonradiating, asso with nausea, but no vomiting, diarrhea, fever nor chills.  Denies chest pain, dyspnea, dysuria.    Abdominal sono reports 17 mm dilated common bile duct without intrahepatic biliary dilatation, mild hepatic steatosis. Small gallbladder sludge. Pancreas within normal.  Punctate nonobstructing left renal calculus.  Pt was given a dose of Ceftriaxone and Flagyl IV in the ED. (17 Oct 2020 16:40)    admitted - RUQ pain likely Choledocholithiasis  INTERVAL HPI/OVERNIGHT EVENTS: Patient seen and examined at bedside this morning. Patient endorses 4 loose, watery stools overnight. Patient admits to improvement in diffuse abdominal pain rated as 1/10 overnight, but states it is tolerable with pain medication. Pt has been NPO since midnight for ERCP today. Patient is ambulating well and denies SOB, palpitations, dizziness, or weakness.        T(C): 37.1 (10-20-20 @ 04:43), Max: 37.1 (10-19-20 @ 21:39)  HR: 65 (10-20-20 @ 04:43) (65 - 72)  BP: 132/67 (10-20-20 @ 04:43) (132/67 - 146/89)  RR: 17 (10-20-20 @ 04:43) (17 - 18)  SpO2: 97% (10-20-20 @ 04:43) (96% - 97%)    I&O's Summary    19 Oct 2020 07:01  -  20 Oct 2020 07:00  --------------------------------------------------------  IN: 1660 mL / OUT: 0 mL / NET: 1660 mL        Review of Systems:  General: comfortable, denies fever chills, headache, weakness  HEENT: denies blurry vision, throat pain  Cardiovascular: denies chest pain, palpitations  Pulmonary: denies SOB, cough, wheezing  Gastrointestinal: admits to dull 1/10 abdominal ache, mild nausea, and watery diarrhea, denies constipation and vomiting  : denies hematuria, dysuria, or incontinence  Neurological: denies weakness, numbness , tingling, dizziness  MSK: denies muscle pain  skin: denies skin rash, itching, burning, or  skin lesions  Psychiatrical: denies mood disturbances, anxiety           PHYSICAL EXAM:  General: NAD, comfortable, well-groomed,  HEENT: NCAT, PERRLA, EOMI  Cardiovascular: RRR, s1s2, no murmurs, gallops or friction rubs  Pulmonary: cta b/l, no wheezing , rhonchi, or rales  Gastrointestinal: soft non tender non distended, + BS  Musculoskeletal: no lower extremity edema, intact bilateral lower extremity pulses, no muscle weakness or pain  Neurological: A&O x3, No focal weakness or tingling  Psychiatrical: normal mood, cooperative  SKIN: no rash, lesions or ulcers  gu intact     MEDICATIONS  (STANDING):  cefTRIAXone   IVPB 1000 milliGRAM(s) IV Intermittent every 24 hours  enoxaparin Injectable 40 milliGRAM(s) SubCutaneous daily  famotidine    Tablet 20 milliGRAM(s) Oral daily  lactated ringers. 1000 milliLiter(s) (70 mL/Hr) IV Continuous <Continuous>  lamoTRIgine 200 milliGRAM(s) Oral two times a day  lithium 300 milliGRAM(s) Oral two times a day  metoprolol tartrate 25 milliGRAM(s) Oral daily  metroNIDAZOLE  IVPB 500 milliGRAM(s) IV Intermittent every 8 hours  simethicone 80 milliGRAM(s) Chew three times a day  topiramate 100 milliGRAM(s) Oral two times a day  ziprasidone 80 milliGRAM(s) Oral <User Schedule>    MEDICATIONS  (PRN):  acetaminophen   Tablet .. 650 milliGRAM(s) Oral every 6 hours PRN Temp greater or equal to 38C (100.4F), Mild Pain (1 - 3), Moderate Pain (4 - 6)  aluminum hydroxide/magnesium hydroxide/simethicone Suspension 30 milliLiter(s) Oral every 6 hours PRN Dyspepsia  morphine  - Injectable 2 milliGRAM(s) IV Push every 4 hours PRN Severe Pain (7 - 10)  ondansetron Injectable 4 milliGRAM(s) IV Push every 8 hours PRN Nausea and/or Vomiting  SUMAtriptan 100 milliGRAM(s) Oral daily PRN Headache/migraine  zolpidem 5 milliGRAM(s) Oral at bedtime PRN Insomnia  zolpidem 5 milliGRAM(s) Oral at bedtime PRN Insomnia      LABS:                        11.3   5.38  )-----------( 193      ( 20 Oct 2020 08:14 )             36.2     10-20    143  |  116<H>  |  11  ----------------------------<  120<H>  3.9   |  21<L>  |  0.88    Ca    8.6      20 Oct 2020 08:14  Phos  2.5     10-19  Mg     2.2     10-19    TPro  6.3  /  Alb  3.0<L>  /  TBili  0.2  /  DBili  x   /  AST  14<L>  /  ALT  22  /  AlkPhos  91  10-20        10-17 @ 10:12   <10,000 CFU/mL Normal Urogenital Deanne  --  --          RADIOLOGY & ADDITIONAL TESTS:    EXAM:  MR ABDOMEN                        PROCEDURE DATE:  10/19/2020    INTERPRETATION:  MRCP without IV contrast    Indication: Dilated common bile duct.    Technique: Utilizing a 1.5 Reena high-field magnet, multiplanar multisequence MRimages of the abdomen are acquired without IV contrast, supplemented by thin and thick slab MRCP images.    Comparison: No prior abdominal MR is available for comparison. Reference is made with previous abdominal ultrasound and abdominal CT dated 10/17/2020.    Findings: Small sludge or gallstones in the gallbladder (image 83 series 13). No evidence for thickened gallbladder wall, or pericholecystic fluid. Dilated common bile duct measuring up to 1.4 cm in diameter. Possible small sludge or stones in the very distal common bile duct best seen on series 13. The main pancreatic duct maintains normal caliber without dilatation.    Allowing for the noncontrast technique, the liver, pancreas, spleen, and adrenals appear grossly unremarkable. A fewsmall brightly T2 hyperintense lesions in the kidneys bilaterally, representing probable cysts.    No evidence of ascites, or enlarged lymph node.    The visualized bowel structures appear grossly unremarkable.    Impression:    Small sludge or gallstones in the gallbladder. No evidence for thickened gallbladder wall, or pericholecystic fluid.    Dilated common bile duct. Possible small sludge or gallstones in the very distal common bile duct.      Imaging Personally Reviewed: yes.    Advance Directives: none           Patient is a 65y old  Female who presents with a chief complaint of abdominal pain - epigastric, RUQ (20 Oct 2020 07:42)      HPI:  65y Female with HTN, Bipolar disorder, presents to the ED with c/o abdominal pain, epigastric and RUQ since 12 midnight.  Pt states she took 3 TUMS wity no relief.  Last meal was dinner, last night, she was unable to eat breakfast and lunch because of the discomfort.  She states pain is nonradiating, asso with nausea, but no vomiting, diarrhea, fever nor chills.  Denies chest pain, dyspnea, dysuria.    Abdominal sono reports 17 mm dilated common bile duct without intrahepatic biliary dilatation, mild hepatic steatosis. Small gallbladder sludge. Pancreas within normal.  Punctate nonobstructing left renal calculus.  Pt was given a dose of Ceftriaxone and Flagyl IV in the ED. (17 Oct 2020 16:40)    admitted - RUQ pain likely Choledocholithiasis  INTERVAL HPI/OVERNIGHT EVENTS: Patient seen and examined at bedside this morning. Patient endorses 4 loose, watery stools overnight. Patient admits to improvement in diffuse abdominal pain rated as 1/10 overnight, but states it is tolerable with pain medication. Pt has been NPO since midnight for ERCP today. Patient is ambulating well and denies SOB, palpitations, dizziness, or weakness.        T(C): 37.1 (10-20-20 @ 04:43), Max: 37.1 (10-19-20 @ 21:39)  HR: 65 (10-20-20 @ 04:43) (65 - 72)  BP: 132/67 (10-20-20 @ 04:43) (132/67 - 146/89)  RR: 17 (10-20-20 @ 04:43) (17 - 18)  SpO2: 97% (10-20-20 @ 04:43) (96% - 97%)    I&O's Summary    19 Oct 2020 07:01  -  20 Oct 2020 07:00  --------------------------------------------------------  IN: 1660 mL / OUT: 0 mL / NET: 1660 mL        Review of Systems:  General: comfortable, denies fever chills, headache, weakness  HEENT: denies blurry vision, throat pain  Cardiovascular: denies chest pain, palpitations  Pulmonary: denies SOB, cough, wheezing  Gastrointestinal: admits to dull 1/10 abdominal ache, mild nausea, and watery diarrhea, denies constipation and vomiting  : denies hematuria, dysuria, or incontinence  Neurological: denies weakness, numbness , tingling, dizziness  MSK: denies muscle pain  skin: denies skin rash, itching, burning, or  skin lesions  Psychiatrical: denies mood disturbances, anxiety           PHYSICAL EXAM:  General: NAD, comfortable, well-groomed,  HEENT: NCAT, PERRLA, EOMI  Cardiovascular: RRR, s1s2, no murmurs, gallops or friction rubs  Pulmonary: cta b/l, no wheezing , rhonchi, or rales  Gastrointestinal: soft non tender non distended, + BS  Musculoskeletal: no lower extremity edema, intact bilateral lower extremity pulses, no muscle weakness or pain  Neurological: A&O x3, No focal weakness or tingling  Psychiatrical: normal mood, cooperative  SKIN: no rash, lesions or ulcers  gu intact     MEDICATIONS  (STANDING):  cefTRIAXone   IVPB 1000 milliGRAM(s) IV Intermittent every 24 hours  enoxaparin Injectable 40 milliGRAM(s) SubCutaneous daily  famotidine    Tablet 20 milliGRAM(s) Oral daily  lactated ringers. 1000 milliLiter(s) (70 mL/Hr) IV Continuous <Continuous>  lamoTRIgine 200 milliGRAM(s) Oral two times a day  lithium 300 milliGRAM(s) Oral two times a day  metoprolol tartrate 25 milliGRAM(s) Oral daily  metroNIDAZOLE  IVPB 500 milliGRAM(s) IV Intermittent every 8 hours  simethicone 80 milliGRAM(s) Chew three times a day  topiramate 100 milliGRAM(s) Oral two times a day  ziprasidone 80 milliGRAM(s) Oral <User Schedule>    MEDICATIONS  (PRN):  acetaminophen   Tablet .. 650 milliGRAM(s) Oral every 6 hours PRN Temp greater or equal to 38C (100.4F), Mild Pain (1 - 3), Moderate Pain (4 - 6)  aluminum hydroxide/magnesium hydroxide/simethicone Suspension 30 milliLiter(s) Oral every 6 hours PRN Dyspepsia  morphine  - Injectable 2 milliGRAM(s) IV Push every 4 hours PRN Severe Pain (7 - 10)  ondansetron Injectable 4 milliGRAM(s) IV Push every 8 hours PRN Nausea and/or Vomiting  SUMAtriptan 100 milliGRAM(s) Oral daily PRN Headache/migraine  zolpidem 5 milliGRAM(s) Oral at bedtime PRN Insomnia  zolpidem 5 milliGRAM(s) Oral at bedtime PRN Insomnia      LABS:                        11.3   5.38  )-----------( 193      ( 20 Oct 2020 08:14 )             36.2     10-20    143  |  116<H>  |  11  ----------------------------<  120<H>  3.9   |  21<L>  |  0.88    Ca    8.6      20 Oct 2020 08:14  Phos  2.5     10-19  Mg     2.2     10-19    TPro  6.3  /  Alb  3.0<L>  /  TBili  0.2  /  DBili  x   /  AST  14<L>  /  ALT  22  /  AlkPhos  91  10-20        10-17 @ 10:12   <10,000 CFU/mL Normal Urogenital Deanne  --  --          RADIOLOGY & ADDITIONAL TESTS:    EXAM:  MR ABDOMEN                        PROCEDURE DATE:  10/19/2020    INTERPRETATION:  MRCP without IV contrast    Indication: Dilated common bile duct.    Technique: Utilizing a 1.5 Reena high-field magnet, multiplanar multisequence MRimages of the abdomen are acquired without IV contrast, supplemented by thin and thick slab MRCP images.    Comparison: No prior abdominal MR is available for comparison. Reference is made with previous abdominal ultrasound and abdominal CT dated 10/17/2020.    Findings: Small sludge or gallstones in the gallbladder (image 83 series 13). No evidence for thickened gallbladder wall, or pericholecystic fluid. Dilated common bile duct measuring up to 1.4 cm in diameter. Possible small sludge or stones in the very distal common bile duct best seen on series 13. The main pancreatic duct maintains normal caliber without dilatation.    Allowing for the noncontrast technique, the liver, pancreas, spleen, and adrenals appear grossly unremarkable. A fewsmall brightly T2 hyperintense lesions in the kidneys bilaterally, representing probable cysts.    No evidence of ascites, or enlarged lymph node.    The visualized bowel structures appear grossly unremarkable.    Impression:    Small sludge or gallstones in the gallbladder. No evidence for thickened gallbladder wall, or pericholecystic fluid.    Dilated common bile duct. Possible small sludge or gallstones in the very distal common bile duct.      Imaging Personally Reviewed: yes.    Advance Directives: full code

## 2020-10-20 NOTE — PROGRESS NOTE ADULT - ASSESSMENT
All as above in PA notation.  Patient personally seen and examined.  Denies abdominal pain to me now.  Vitals non-suggestive.  Abdomen soft and non tense and non tender.  Labs reassuring overall.  MRCP positive for CBD debris with choledochomegaly again appreciated (also previously noted on sono and CT).  For ERCP today.  Plan is for completion cholecystectomy pending her progress and today's procedure.  She is pleased, agrees and is aware of issues and plan as outlined above.

## 2020-10-20 NOTE — PROGRESS NOTE ADULT - PROBLEM SELECTOR PLAN 1
- NPO since midnight, going for ERCP today  - MRCP (10/19): Small sludge or gallstones in the gallbladder. No evidence for thickened gallbladder wall, or pericholecystic fluid. Dilated common bile duct. Possible small sludge or gallstones in the very distal common bile duct.  - c/w rocephin and flagyl  - Pt c/o 4 bouts of watery diarrhea this morning, like 2/2 antibiotics  - Surgery and GI following  - Plan for tentative laparoscopic cholecystectomy pending course after ERCP - RUQ pain likely Choledocholithiasis  - NPO since midnight, going for ERCP today  - MRCP (10/19): Small sludge or gallstones in the gallbladder. No evidence for thickened gallbladder wall, or pericholecystic fluid. Dilated common bile duct. Possible small sludge or gallstones in the very distal common bile duct.  - c/w rocephin and flagyl  - Pt endorses 4 bouts of watery diarrhea this morning, like 2/2 antibiotics  - Surgery and GI following  - Plan for tentative laparoscopic cholecystectomy pending course after ERCP - RUQ pain likely Choledocholithiasis  - NPO since midnight, going for ERCP today  - MRCP (10/19): Small sludge or gallstones in the gallbladder. No evidence for thickened gallbladder wall, or pericholecystic fluid. Dilated common bile duct. Possible small sludge or gallstones in the very distal common bile duct.  - c/w rocephin and flagyl  - Pt endorses 4 bouts of watery diarrhea this morning, like 2/2 antibiotics, will determine need for abx after discussion with GI  - Surgery and GI following  - Plan for tentative laparoscopic cholecystectomy pending course after ERCP - RUQ pain likely Choledocholithiasis  - NPO since midnight, going for ERCP today  - MRCP (10/19): Small sludge or gallstones in the gallbladder. No evidence for thickened gallbladder wall, or pericholecystic fluid. Dilated common bile duct. Possible small sludge or gallstones in the very distal common bile duct.  - c/w rocephin and flagyl  - Pt endorses 4 bouts of watery diarrhea this morning, like 2/2 antibiotics, will determine need for abx after discussion with GI  - Bacid added  - Surgery and GI following  - Plan for tentative laparoscopic cholecystectomy pending course after ERCP

## 2020-10-21 ENCOUNTER — TRANSCRIPTION ENCOUNTER (OUTPATIENT)
Age: 65
End: 2020-10-21

## 2020-10-21 LAB
ALBUMIN SERPL ELPH-MCNC: 3.3 G/DL — SIGNIFICANT CHANGE UP (ref 3.3–5)
ALP SERPL-CCNC: 83 U/L — SIGNIFICANT CHANGE UP (ref 40–120)
ALT FLD-CCNC: 20 U/L — SIGNIFICANT CHANGE UP (ref 12–78)
ANION GAP SERPL CALC-SCNC: 7 MMOL/L — SIGNIFICANT CHANGE UP (ref 5–17)
AST SERPL-CCNC: 12 U/L — LOW (ref 15–37)
BILIRUB SERPL-MCNC: 0.2 MG/DL — SIGNIFICANT CHANGE UP (ref 0.2–1.2)
BUN SERPL-MCNC: 8 MG/DL — SIGNIFICANT CHANGE UP (ref 7–23)
CALCIUM SERPL-MCNC: 8.9 MG/DL — SIGNIFICANT CHANGE UP (ref 8.5–10.1)
CHLORIDE SERPL-SCNC: 114 MMOL/L — HIGH (ref 96–108)
CO2 SERPL-SCNC: 22 MMOL/L — SIGNIFICANT CHANGE UP (ref 22–31)
CREAT SERPL-MCNC: 0.97 MG/DL — SIGNIFICANT CHANGE UP (ref 0.5–1.3)
GLUCOSE SERPL-MCNC: 117 MG/DL — HIGH (ref 70–99)
HCT VFR BLD CALC: 36.4 % — SIGNIFICANT CHANGE UP (ref 34.5–45)
HGB BLD-MCNC: 11.8 G/DL — SIGNIFICANT CHANGE UP (ref 11.5–15.5)
MAGNESIUM SERPL-MCNC: 2.1 MG/DL — SIGNIFICANT CHANGE UP (ref 1.6–2.6)
MCHC RBC-ENTMCNC: 29.5 PG — SIGNIFICANT CHANGE UP (ref 27–34)
MCHC RBC-ENTMCNC: 32.4 GM/DL — SIGNIFICANT CHANGE UP (ref 32–36)
MCV RBC AUTO: 91 FL — SIGNIFICANT CHANGE UP (ref 80–100)
NRBC # BLD: 0 /100 WBCS — SIGNIFICANT CHANGE UP (ref 0–0)
PHOSPHATE SERPL-MCNC: 2.2 MG/DL — LOW (ref 2.5–4.5)
PLATELET # BLD AUTO: 226 K/UL — SIGNIFICANT CHANGE UP (ref 150–400)
POTASSIUM SERPL-MCNC: 3.7 MMOL/L — SIGNIFICANT CHANGE UP (ref 3.5–5.3)
POTASSIUM SERPL-SCNC: 3.7 MMOL/L — SIGNIFICANT CHANGE UP (ref 3.5–5.3)
PROT SERPL-MCNC: 6.6 G/DL — SIGNIFICANT CHANGE UP (ref 6–8.3)
RBC # BLD: 4 M/UL — SIGNIFICANT CHANGE UP (ref 3.8–5.2)
RBC # FLD: 13.2 % — SIGNIFICANT CHANGE UP (ref 10.3–14.5)
SODIUM SERPL-SCNC: 143 MMOL/L — SIGNIFICANT CHANGE UP (ref 135–145)
WBC # BLD: 8.93 K/UL — SIGNIFICANT CHANGE UP (ref 3.8–10.5)
WBC # FLD AUTO: 8.93 K/UL — SIGNIFICANT CHANGE UP (ref 3.8–10.5)

## 2020-10-21 PROCEDURE — 99233 SBSQ HOSP IP/OBS HIGH 50: CPT

## 2020-10-21 PROCEDURE — 99232 SBSQ HOSP IP/OBS MODERATE 35: CPT | Mod: GC

## 2020-10-21 RX ORDER — POTASSIUM PHOSPHATE, MONOBASIC POTASSIUM PHOSPHATE, DIBASIC 236; 224 MG/ML; MG/ML
30 INJECTION, SOLUTION INTRAVENOUS ONCE
Refills: 0 | Status: COMPLETED | OUTPATIENT
Start: 2020-10-21 | End: 2020-10-21

## 2020-10-21 RX ADMIN — Medication 100 MILLIGRAM(S): at 09:04

## 2020-10-21 RX ADMIN — SIMETHICONE 80 MILLIGRAM(S): 80 TABLET, CHEWABLE ORAL at 21:08

## 2020-10-21 RX ADMIN — ZOLPIDEM TARTRATE 5 MILLIGRAM(S): 10 TABLET ORAL at 22:45

## 2020-10-21 RX ADMIN — ENOXAPARIN SODIUM 40 MILLIGRAM(S): 100 INJECTION SUBCUTANEOUS at 12:47

## 2020-10-21 RX ADMIN — Medication 1 TABLET(S): at 05:50

## 2020-10-21 RX ADMIN — SIMETHICONE 80 MILLIGRAM(S): 80 TABLET, CHEWABLE ORAL at 17:39

## 2020-10-21 RX ADMIN — POTASSIUM PHOSPHATE, MONOBASIC POTASSIUM PHOSPHATE, DIBASIC 83.33 MILLIMOLE(S): 236; 224 INJECTION, SOLUTION INTRAVENOUS at 12:47

## 2020-10-21 RX ADMIN — Medication 100 MILLIGRAM(S): at 05:51

## 2020-10-21 RX ADMIN — CEFTRIAXONE 100 MILLIGRAM(S): 500 INJECTION, POWDER, FOR SOLUTION INTRAMUSCULAR; INTRAVENOUS at 16:28

## 2020-10-21 RX ADMIN — Medication 100 MILLIGRAM(S): at 15:00

## 2020-10-21 RX ADMIN — LITHIUM CARBONATE 300 MILLIGRAM(S): 300 TABLET, EXTENDED RELEASE ORAL at 09:04

## 2020-10-21 RX ADMIN — SIMETHICONE 80 MILLIGRAM(S): 80 TABLET, CHEWABLE ORAL at 05:50

## 2020-10-21 RX ADMIN — Medication 1 TABLET(S): at 17:38

## 2020-10-21 RX ADMIN — LAMOTRIGINE 200 MILLIGRAM(S): 25 TABLET, ORALLY DISINTEGRATING ORAL at 21:08

## 2020-10-21 RX ADMIN — ZIPRASIDONE HYDROCHLORIDE 80 MILLIGRAM(S): 20 CAPSULE ORAL at 09:04

## 2020-10-21 RX ADMIN — ZOLPIDEM TARTRATE 5 MILLIGRAM(S): 10 TABLET ORAL at 21:44

## 2020-10-21 RX ADMIN — LITHIUM CARBONATE 300 MILLIGRAM(S): 300 TABLET, EXTENDED RELEASE ORAL at 21:08

## 2020-10-21 RX ADMIN — MORPHINE SULFATE 2 MILLIGRAM(S): 50 CAPSULE, EXTENDED RELEASE ORAL at 03:12

## 2020-10-21 RX ADMIN — Medication 25 MILLIGRAM(S): at 05:50

## 2020-10-21 RX ADMIN — MORPHINE SULFATE 2 MILLIGRAM(S): 50 CAPSULE, EXTENDED RELEASE ORAL at 03:43

## 2020-10-21 RX ADMIN — LAMOTRIGINE 200 MILLIGRAM(S): 25 TABLET, ORALLY DISINTEGRATING ORAL at 09:04

## 2020-10-21 RX ADMIN — Medication 100 MILLIGRAM(S): at 21:08

## 2020-10-21 RX ADMIN — FAMOTIDINE 20 MILLIGRAM(S): 10 INJECTION INTRAVENOUS at 12:47

## 2020-10-21 RX ADMIN — Medication 100 MILLIGRAM(S): at 21:09

## 2020-10-21 NOTE — DISCHARGE NOTE PROVIDER - HOSPITAL COURSE
FROM ADMISSION H+P:   HPI:  65y Female with HTN, Bipolar disorder, presents to the ED with c/o abdominal pain, epigastric and RUQ since 12 midnight.  Pt states she took 3 TUMS wity no relief.  Last meal was dinner, last night, she was unable to eat breakfast and lunch because of the discomfort.  She states pain is nonradiating, asso with nausea, but no vomiting, diarrhea, fever nor chills.  Denies chest pain, dyspnea, dysuria.    Abdominal sono reports 17 mm dilated common bile duct without intrahepatic biliary dilatation, mild hepatic steatosis. Small gallbladder sludge. Pancreas within normal.  Punctate nonobstructing left renal calculus.  Pt was given a dose of Ceftriaxone and Flagyl IV in the ED. (17 Oct 2020 16:40)      ---  HOSPITAL COURSE:   Patient was subsequently admitted to the general medical floor for further work up and management of choledocholithiasis. Patient was kept NPO for MRCP and was started on Rocephin and Flagyl for possible low-grade cholangitis. MRCP showed small sludge or gallstones within the gallbladder without GB wall thickening or pericholecystic fluid as well as dilated CBD with distal sludge or gallstones. ERCP was then performed with large amount of sludge removed from CBD. Patient's diet was advanced as tolerated. Surgery Dr. Sharma was consulted and recommended cholecystectomy. Patient's clinical status improved during admission and is stable for discharge to _______.    (updated through 10/21)    ---  CONSULTANTS:   Surgery - Dr. Sharma  Gastroenterology - Dr. Medeiros    ---  TIME SPENT:  I, the attending physician, was physically present for the key portions of the evaluation and management (E/M) service provided. The total amount of time spent reviewing the hospital notes, laboratory values, imaging findings, assessing/counseling the patient, discussing with consultant physicians, social work, nursing staff was -- minutes    ---  Primary care provider was made aware of plan for discharge:      [  ] NO     [  ] YES   FROM ADMISSION H+P:   HPI:  65y Female with HTN, Bipolar disorder, presents to the ED with c/o abdominal pain, epigastric and RUQ since 12 midnight.  Pt states she took 3 TUMS wity no relief.  Last meal was dinner, last night, she was unable to eat breakfast and lunch because of the discomfort.  She states pain is nonradiating, asso with nausea, but no vomiting, diarrhea, fever nor chills.  Denies chest pain, dyspnea, dysuria.    Abdominal sono reports 17 mm dilated common bile duct without intrahepatic biliary dilatation, mild hepatic steatosis. Small gallbladder sludge. Pancreas within normal.  Punctate nonobstructing left renal calculus.  Pt was given a dose of Ceftriaxone and Flagyl IV in the ED. (17 Oct 2020 16:40)      ---  HOSPITAL COURSE:   Patient was subsequently admitted to the general medical floor for further work up and management of choledocholithiasis. Patient was kept NPO for MRCP and was started on Rocephin and Flagyl for possible low-grade cholangitis. MRCP showed small sludge or gallstones within the gallbladder without GB wall thickening or pericholecystic fluid as well as dilated CBD with distal sludge or gallstones. ERCP was then performed with large amount of sludge removed from CBD. Patient's diet was advanced as tolerated. Surgery Dr. Sharma was consulted and recommended cholecystectomy, which was performed on 10/23/2020. Patient's clinical status improved during admission and is stable for discharge to _______.    (updated through 10/22)    Patient was seen and examined on day of discharge:    ---  CONSULTANTS:   Surgery - Dr. Sharma  Gastroenterology - Dr. Medeiros    ---  TIME SPENT:  I, the attending physician, was physically present for the key portions of the evaluation and management (E/M) service provided. The total amount of time spent reviewing the hospital notes, laboratory values, imaging findings, assessing/counseling the patient, discussing with consultant physicians, social work, nursing staff was -- minutes    ---  Primary care provider was made aware of plan for discharge:      [  ] NO     [  ] YES   FROM ADMISSION H+P:   HPI:  65y Female with HTN, Bipolar disorder, presents to the ED with c/o abdominal pain, epigastric and RUQ since 12 midnight.  Pt states she took 3 TUMS wity no relief.  Last meal was dinner, last night, she was unable to eat breakfast and lunch because of the discomfort.  She states pain is nonradiating, asso with nausea, but no vomiting, diarrhea, fever nor chills.  Denies chest pain, dyspnea, dysuria.    Abdominal sono reports 17 mm dilated common bile duct without intrahepatic biliary dilatation, mild hepatic steatosis. Small gallbladder sludge. Pancreas within normal.  Punctate nonobstructing left renal calculus.  Pt was given a dose of Ceftriaxone and Flagyl IV in the ED. (17 Oct 2020 16:40)      ---  HOSPITAL COURSE:   Patient was subsequently admitted to the general medical floor for further work up and management of choledocholithiasis. Patient was kept NPO for MRCP and was started on Rocephin and Flagyl for possible low-grade cholangitis. MRCP showed small sludge or gallstones within the gallbladder without GB wall thickening or pericholecystic fluid as well as dilated CBD with distal sludge or gallstones. ERCP was then performed with large amount of sludge removed from CBD. Patient's diet was advanced as tolerated. Surgery Dr. Sharma was consulted and recommended cholecystectomy, which was performed on 10/23/2020. Patient's clinical status improved during admission and is stable for discharge to home.      Patient was seen and examined on day of discharge.  Pt. tolerating diet.  +Flatus.  Abdominal pain improved.  Afebrile.      ---  CONSULTANTS:   Surgery - Dr. Sharma  Gastroenterology - Dr. Medeiros    ---  TIME SPENT:  I, the attending physician, was physically present for the key portions of the evaluation and management (E/M) service provided. The total amount of time spent reviewing the hospital notes, laboratory values, imaging findings, assessing/counseling the patient, discussing with consultant physicians, social work, nursing staff was 40 minutes    ---

## 2020-10-21 NOTE — DISCHARGE NOTE PROVIDER - PROVIDER TOKENS
PROVIDER:[TOKEN:[[id number]],FOLLOWUP:[1 week]] PROVIDER:[TOKEN:[[id number]],FOLLOWUP:[1 week]],PROVIDER:[TOKEN:[[id number]],FOLLOWUP:[1-3 days]]

## 2020-10-21 NOTE — DISCHARGE NOTE PROVIDER - CARE PROVIDERS DIRECT ADDRESSES
[email or web address]

## 2020-10-21 NOTE — DISCHARGE NOTE PROVIDER - NSDCCPCAREPLAN_GEN_ALL_CORE_FT
PRINCIPAL DISCHARGE DIAGNOSIS  Diagnosis: Common biliary duct obstruction  Assessment and Plan of Treatment: During work up for your abdominal pain, abdominal ultrasound showed a significantly enlarged common bile duct, which connects your liver, gallbladder, and pancrease to your intestine. You were evaluated by a gastroenterologist who reommenced further, detailed imaging with MRCP. ERCP was performed, which allowed for a large amount of sludge to be removed and relieve any obstruction. You were started on antibiotics called Rocephin and Flagyl for any underlying infection. You were evaluated by a surgeon as well who recommended surgical removal of your gallbladder to prevent further similar episodes.      SECONDARY DISCHARGE DIAGNOSES  Diagnosis: Migraines  Assessment and Plan of Treatment: You were previously diagnosed with migraines. Continue to take your topiramate for prevention of migraines and follow up with your primary care provider.    Diagnosis: Bipolar disorder  Assessment and Plan of Treatment: You were previously diagnosed with bipolar disorder. Continue to take your home medications and follow up with your outpatient provider(s) for further management.     PRINCIPAL DISCHARGE DIAGNOSIS  Diagnosis: Common biliary duct obstruction  Assessment and Plan of Treatment: During work up for your abdominal pain, abdominal ultrasound showed a significantly enlarged common bile duct, which connects your liver, gallbladder, and pancrease to your intestine. You were evaluated by a gastroenterologist who reommenced further, detailed imaging with MRCP. ERCP was performed, which allowed for a large amount of sludge to be removed and relieve any obstruction. You were started on antibiotics called Rocephin and Flagyl for any underlying infection. You were evaluated by a surgeon as well who recommended surgical removal of your gallbladder to prevent further similar episodes.      SECONDARY DISCHARGE DIAGNOSES  Diagnosis: HTN (hypertension)  Assessment and Plan of Treatment: You were previously diagnosed with high blood pressure. Please continue on your home medications at this time and follow up with your PMD for further surveillance and management of your high blood pressure.    Diagnosis: Migraines  Assessment and Plan of Treatment: You were previously diagnosed with migraines. Continue to take your topiramate for prevention of migraines and follow up with your primary care provider.    Diagnosis: Bipolar disorder  Assessment and Plan of Treatment: You were previously diagnosed with bipolar disorder. Continue to take your home medications and follow up with your outpatient provider(s) for further management.     PRINCIPAL DISCHARGE DIAGNOSIS  Diagnosis: Common biliary duct obstruction  Assessment and Plan of Treatment: During work up for your abdominal pain, abdominal ultrasound showed a significantly enlarged common bile duct, which connects your liver, gallbladder, and pancrease to your intestine. You were evaluated by a gastroenterologist who reommenced further, detailed imaging with MRCP. ERCP was performed, which allowed for a large amount of sludge to be removed and relieve any obstruction. You were started on antibiotics called Rocephin and Flagyl for any underlying infection. You were evaluated by a surgeon as well who recommended surgical removal of your gallbladder to prevent further similar episodes.  Continue course of antibiotics.  Follow up with Dr. Sharma this week for possible drain removal.      SECONDARY DISCHARGE DIAGNOSES  Diagnosis: HTN (hypertension)  Assessment and Plan of Treatment: You were previously diagnosed with high blood pressure. Please continue on your home medications at this time and follow up with your PMD for further surveillance and management of your high blood pressure.    Diagnosis: Migraines  Assessment and Plan of Treatment: You were previously diagnosed with migraines. Continue to take your topiramate for prevention of migraines and follow up with your primary care provider.    Diagnosis: Bipolar disorder  Assessment and Plan of Treatment: You were previously diagnosed with bipolar disorder. Continue to take your home medications and follow up with your outpatient provider(s) for further management.

## 2020-10-21 NOTE — DISCHARGE NOTE PROVIDER - NSDCMRMEDTOKEN_GEN_ALL_CORE_FT
Ambien 10 mg oral tablet: 1 tab(s) orally once a day (at bedtime)  atorvastatin 10 mg oral tablet: 1 tab(s) orally once a day  hydrocodone-acetaminophen 5 mg-325 mg oral tablet: 1 tab(s) orally every 6 hours, As Needed  LaMICtal 200 mg oral tablet: 1 tab(s) orally 2 times a day  lithium 300 mg oral capsule: 1 cap(s) orally 2 times a day  Metoprolol Tartrate 25 mg oral tablet: 1 tab(s) orally once a day    *pt confirms taking once per day  multivitamin: 1 tab(s) orally once a day  topiramate 100 mg oral tablet: 1 tab(s) orally 2 times a day  ziprasidone 80 mg oral capsule: 1 capsule by mouth once daily   acetaminophen 325 mg oral tablet: 2 tab(s) orally every 6 hours, As needed, Mild Pain (1 - 3)  Ambien 10 mg oral tablet: 1 tab(s) orally once a day (at bedtime)  atorvastatin 10 mg oral tablet: 1 tab(s) orally once a day  cefpodoxime 200 mg oral tablet: 1 tab(s) orally every 12 hours  Flagyl 500 mg oral tablet: 1 tab(s) orally 3 times a day  lactobacillus acidophilus oral capsule: 1 tab(s) orally 3 times a day (with meals)  LaMICtal 200 mg oral tablet: 1 tab(s) orally 2 times a day  lithium 300 mg oral capsule: 1 cap(s) orally 2 times a day  Metoprolol Tartrate 25 mg oral tablet: 1 tab(s) orally once a day    *pt confirms taking once per day  multivitamin: 1 tab(s) orally once a day  oxycodone-acetaminophen 5 mg-325 mg oral tablet: 1-2 tab(s) orally every 6 hours, As needed, Moderate to severe pain MDD:4  simethicone 80 mg oral tablet, chewable: 1 tab(s) orally 3 times a day  topiramate 100 mg oral tablet: 1 tab(s) orally 2 times a day  ziprasidone 80 mg oral capsule: 1 capsule by mouth once daily   04-Feb-2017 05:06

## 2020-10-21 NOTE — PROGRESS NOTE ADULT - SUBJECTIVE AND OBJECTIVE BOX
INTERVAL HPI/OVERNIGHT EVENTS:  pt seen and examined  s/p ercp    MEDICATIONS  (STANDING):  cefTRIAXone   IVPB 1000 milliGRAM(s) IV Intermittent every 24 hours  enoxaparin Injectable 40 milliGRAM(s) SubCutaneous daily  famotidine    Tablet 20 milliGRAM(s) Oral daily  lactated ringers. 1000 milliLiter(s) (70 mL/Hr) IV Continuous <Continuous>  lamoTRIgine 200 milliGRAM(s) Oral two times a day  lithium 300 milliGRAM(s) Oral two times a day  metoprolol tartrate 25 milliGRAM(s) Oral daily  metroNIDAZOLE  IVPB 500 milliGRAM(s) IV Intermittent every 8 hours  topiramate 100 milliGRAM(s) Oral two times a day  ziprasidone 80 milliGRAM(s) Oral <User Schedule>    MEDICATIONS  (PRN):  acetaminophen   Tablet .. 650 milliGRAM(s) Oral every 6 hours PRN Temp greater or equal to 38C (100.4F), Mild Pain (1 - 3), Moderate Pain (4 - 6)  aluminum hydroxide/magnesium hydroxide/simethicone Suspension 30 milliLiter(s) Oral every 6 hours PRN Dyspepsia  morphine  - Injectable 2 milliGRAM(s) IV Push every 4 hours PRN Severe Pain (7 - 10)  ondansetron Injectable 4 milliGRAM(s) IV Push every 8 hours PRN Nausea and/or Vomiting  SUMAtriptan 100 milliGRAM(s) Oral daily PRN Headache/migraine  zolpidem 5 milliGRAM(s) Oral at bedtime PRN Insomnia  zolpidem 5 milliGRAM(s) Oral at bedtime PRN Insomnia      Allergies    penicillins (Rash)  sulfa drugs (Rash)    Intolerances        Review of Systems:    General:  No wt loss, fevers, chills, night sweats, fatigue   Eyes:  Good vision, no reported pain  ENT:  No sore throat, pain, runny nose, dysphagia  CV:  No pain, palpitations, hypo/hypertension  Resp:  No dyspnea, cough, tachypnea, wheezing  GI:  see above  :  No pain, bleeding, incontinence, nocturia  Muscle:  No pain, weakness  Neuro:  No weakness, tingling, memory problems  Psych:  No fatigue, insomnia, mood problems, depression  Endocrine:  No polyuria, polydypsia, cold/heat intolerance  Heme:  No petechiae, ecchymosis, easy bruisability  Skin:  No rash, tattoos, scars, edema      Vital Signs Last 24 Hrs  T(C): 37.2 (19 Oct 2020 04:33), Max: 37.2 (19 Oct 2020 04:33)  T(F): 98.9 (19 Oct 2020 04:33), Max: 98.9 (19 Oct 2020 04:33)  HR: 68 (19 Oct 2020 04:33) (59 - 68)  BP: 134/68 (19 Oct 2020 04:33) (132/69 - 156/83)  BP(mean): --  RR: 19 (19 Oct 2020 04:33) (18 - 19)  SpO2: 96% (19 Oct 2020 04:33) (95% - 96%)    PHYSICAL EXAM:    Constitutional: lying in bed  HEENT: ncat  Gastrointestinal: softly dt nt  Extremities: No peripheral edema  Vascular: 2+ peripheral pulses  Neurological: Awake alert appropriate     LABS:                        12.2   8.67  )-----------( 206      ( 18 Oct 2020 08:57 )             37.6     10-18    145  |  115<H>  |  13  ----------------------------<  97  3.8   |  21<L>  |  0.92    Ca    8.7      18 Oct 2020 08:57    TPro  6.9  /  Alb  3.4  /  TBili  0.7  /  DBili  .20  /  AST  21  /  ALT  29  /  AlkPhos  94  10-18          RADIOLOGY & ADDITIONAL TESTS:

## 2020-10-21 NOTE — PROGRESS NOTE ADULT - SUBJECTIVE AND OBJECTIVE BOX
Hospital day: 4    65y Female admitted with Obstruction of bile duct  S/P ERCP 10/20- removal of sludge and sphincterotomy  .    Patient seen and examined bedside resting comfortably.  States she is having the epigastric discomfort again since the ERCP yesterday.  States had 3 bowel movements this AM- loose.  No N/V, feels would like to eat something more than liquids.  Denies fevers, SOB, chest pain, back pain, lower leg/calf tenderness.     T(F): 99 (10-21-20 @ 04:19), Max: 99 (10-21-20 @ 04:19)  HR: 68 (10-21-20 @ 04:19) (66 - 72)  BP: 132/70 (10-21-20 @ 04:19) (122/68 - 155/58)  RR: 17 (10-21-20 @ 04:19) (14 - 17)  SpO2: 96% (10-21-20 @ 04:19) (96% - 100%)  Wt(kg): --  CAPILLARY BLOOD GLUCOSE          PHYSICAL EXAM:  General: NAD   Neuro:  Alert & oriented x 3  CV: +S1+S2 regular rate and rhythm  Lung: clear to ausculation bilaterally  Abdomen: BS+ Soft.  Mild tenderness to deep palpation in mid epigastric region.  Some mild discomfort in lower abdomen.  Extremities: no pedal edema or calf tenderness noted       LABS:                        11.3   5.38  )-----------( 193      ( 20 Oct 2020 08:14 )             36.2     10-20    143  |  116<H>  |  11  ----------------------------<  120<H>  3.9   |  21<L>  |  0.88    Ca    8.6      20 Oct 2020 08:14    TPro  6.3  /  Alb  3.0<L>  /  TBili  0.2  /  DBili  x   /  AST  14<L>  /  ALT  22  /  AlkPhos  91  10-20      I&O's Detail    20 Oct 2020 07:01  -  21 Oct 2020 07:00  --------------------------------------------------------  IN:    Lactated Ringers: 75 mL    Lactated Ringers: 630 mL  Total IN: 705 mL    OUT:  Total OUT: 0 mL    Total NET: 705 mL          Impression: 65y Female admitted with Obstruction of bile duct  S/P ERCP       Plan:  -continue VTE prophylaxis- SCD, OOB  -Increase activity with PT, OOB, Ambulate  -Patient instructed on and encouraged incentive spirometry use  -Continue clears- as per GI- awaiting dietary recommendations  -f/u AM labs- still pending.  -Continue management per primary team.  -Will discuss with Dr. Sharma. Hospital day: 4      65y Female admitted with Obstruction of bile duct      S/P ERCP 10/20- removal of sludge and sphincterotomy      Patient seen and examined bedside resting comfortably.  States she is having the epigastric discomfort again since the ERCP yesterday.  States had 3 bowel movements this AM- loose.  No N/V, feels would like to eat something more than liquids.  Denies fevers, SOB, chest pain, back pain, lower leg/calf tenderness.       T(F): 99 (10-21-20 @ 04:19), Max: 99 (10-21-20 @ 04:19)  HR: 68 (10-21-20 @ 04:19) (66 - 72)  BP: 132/70 (10-21-20 @ 04:19) (122/68 - 155/58)  RR: 17 (10-21-20 @ 04:19) (14 - 17)  SpO2: 96% (10-21-20 @ 04:19) (96% - 100%)      PHYSICAL EXAM:  General: NAD   Neuro:  Alert & oriented x 3  CV: +S1+S2 regular rate and rhythm  Lung: clear to ausculation bilaterally  Abdomen: BS+ Soft.  Mild tenderness to deep palpation in mid epigastric region.  Some mild discomfort in lower abdomen.  Extremities: no pedal edema or calf tenderness noted       LABS:                        11.3   5.38  )-----------( 193      ( 20 Oct 2020 08:14 )             36.2     10-20    143  |  116<H>  |  11  ----------------------------<  120<H>  3.9   |  21<L>  |  0.88    Ca    8.6      20 Oct 2020 08:14    TPro  6.3  /  Alb  3.0<L>  /  TBili  0.2  /  DBili  x   /  AST  14<L>  /  ALT  22  /  AlkPhos  91  10-20      I&O's Detail    20 Oct 2020 07:01  -  21 Oct 2020 07:00  --------------------------------------------------------  IN:    Lactated Ringers: 75 mL    Lactated Ringers: 630 mL  Total IN: 705 mL    OUT:  Total OUT: 0 mL    Total NET: 705 mL      Impression: 65y Female admitted with Obstruction of bile duct  S/P ERCP       Plan:  -continue VTE prophylaxis- SCD, OOB  -Increase activity with PT, OOB, Ambulate  -Patient instructed on and encouraged incentive spirometry use  -Continue clears- as per GI- awaiting dietary recommendations  -f/u AM labs- still pending.  -Continue management per primary team.      -Will discuss with Dr. Sharma.

## 2020-10-21 NOTE — PROGRESS NOTE ADULT - SUBJECTIVE AND OBJECTIVE BOX
CHIEF COMPLAINT/INTERVAL HISTORY:    REVIEW OF SYSTEMS:    Vital Signs Last 24 Hrs  T(C): 37.2 (21 Oct 2020 04:19), Max: 37.2 (21 Oct 2020 04:19)  T(F): 99 (21 Oct 2020 04:19), Max: 99 (21 Oct 2020 04:19)  HR: 68 (21 Oct 2020 04:19) (66 - 72)  BP: 132/70 (21 Oct 2020 04:19) (122/68 - 155/58)  BP(mean): 100 (20 Oct 2020 21:03) (100 - 100)  RR: 17 (21 Oct 2020 04:19) (14 - 17)  SpO2: 96% (21 Oct 2020 04:19) (96% - 100%)    PHYSICAL EXAM:  GENERAL: NAD  HEENT: EOMI, hearing normal, conjunctiva and sclera clear  Chest: CTA bilaterally, no wheezing  CV: S1S2, RRR,   GI: soft, +BS, NT/ND  Musculoskeletal: no edema  Psychiatric: affect nL, mood nL  Skin: warm and dry    LABS:                        11.8   8.93  )-----------( 226      ( 21 Oct 2020 09:56 )             36.4     10-21    143  |  114<H>  |  8   ----------------------------<  117<H>  3.7   |  22  |  0.97    Ca    8.9      21 Oct 2020 09:56    TPro  6.6  /  Alb  3.3  /  TBili  0.2  /  DBili  x   /  AST  12<L>  /  ALT  20  /  AlkPhos  83  10-21          Assessment and Plan: CHIEF COMPLAINT/INTERVAL HISTORY: Patient seen and examined at bedside this morning. Pt admits to epigastric discomfort and a diffuse abdominal ache s/p ERCP yesterday. States she has had 4 bouts of watery diarrhea this morning. She denies n/v, but is requesting to be advanced to a regular diet. Patient is ambulating well and denies SOB, headhache, dizziness, palpitations, or chest pain.     REVIEW OF SYSTEMS:    General: comfortable, denies fever chills, headache, weakness  HEENT: denies blurry vision, throat pain  Cardiovascular: denies chest pain, palpitations  Pulmonary: denies SOB, cough, wheezing  Gastrointestinal: admits to dull abdominal ache, mild nausea, and watery diarrhea, denies constipation and vomiting  : denies hematuria, dysuria, or incontinence  Neurological: denies weakness, numbness , tingling, dizziness  MSK: denies muscle pain or weakness  skin: denies skin rash, itching, burning, or skin lesions  Psychiatrical: denies mood disturbances, anxiety    Vital Signs Last 24 Hrs  T(C): 37.2 (21 Oct 2020 04:19), Max: 37.2 (21 Oct 2020 04:19)  T(F): 99 (21 Oct 2020 04:19), Max: 99 (21 Oct 2020 04:19)  HR: 68 (21 Oct 2020 04:19) (66 - 72)  BP: 132/70 (21 Oct 2020 04:19) (122/68 - 155/58)  BP(mean): 100 (20 Oct 2020 21:03) (100 - 100)  RR: 17 (21 Oct 2020 04:19) (14 - 17)  SpO2: 96% (21 Oct 2020 04:19) (96% - 100%)    PHYSICAL EXAM:    General: NAD, comfortable, well-groomed  HEENT: NCAT, PERRL, EOMI, neck supple  Cardiovascular: RRR, s1s2, no murmurs, gallops or friction rubs  Pulmonary: cta b/l, no wheezing , rhonchi, or rales  Gastrointestinal: soft non tender non distended, + BS  Musculoskeletal: no lower extremity edema, intact bilateral lower extremity pulses, no muscle weakness or pain  Neurological: A&O x3, No focal weakness or tingling  Psychiatrical: normal mood, cooperative  SKIN: no rash, lesions or ulcers    LABS:                        11.8   8.93  )-----------( 226      ( 21 Oct 2020 09:56 )             36.4     10-21    143  |  114<H>  |  8   ----------------------------<  117<H>  3.7   |  22  |  0.97    Ca    8.9      21 Oct 2020 09:56    TPro  6.6  /  Alb  3.3  /  TBili  0.2  /  DBili  x   /  AST  12<L>  /  ALT  20  /  AlkPhos  83  10-21          Assessment and Plan:    65y Female with HTN, Bipolar disorder, presents with abdominal pain, epigastric and RUQ since midnight, mild elev LFTs, abd sono with 17 mm dilated CBD, small GB sludge.      # R/O Common biliary duct obstruction  - RUQ pain likely Choledocholithiasis  - MRCP (10/19): Small sludge or gallstones in the gallbladder. No evidence for thickened gallbladder wall, or pericholecystic fluid. Dilated common bile duct. Possible small sludge or gallstones in the very distal common bile duct.  - s/p ERCP (10/20): extension of sphincterotomy; large amount of sludge removed for bile duct  - c/w rocephin and flagyl  - Pt endorses 4 bouts of watery diarrhea this morning, like 2/2 antibiotics, will determine need for abx after discussion with GI  - Bacid added  - Continue clears - as per GI - awaiting dietary recs  - Surgery and GI following  - Plan for tentative laparoscopic cholecystectomy pending GI/surgery  recs    #Bipolar disorder  - Cont lithium, metoprolol, lamictal, topiramate, ziprasidone.     #DVT prophylaxis   - c/w lovenox      CHIEF COMPLAINT/INTERVAL HISTORY: Patient seen and examined at bedside this morning. Pt admits to epigastric discomfort and a diffuse abdominal ache s/p ERCP yesterday. States she has had 4 bouts of watery diarrhea this morning. She denies n/v, but is requesting to be advanced to a regular diet. Patient is ambulating well and denies SOB, headhache, dizziness, palpitations, or chest pain.     REVIEW OF SYSTEMS:    General: comfortable, denies fever chills, headache, weakness  HEENT: denies blurry vision, throat pain  Cardiovascular: denies chest pain, palpitations  Pulmonary: denies SOB, cough, wheezing  Gastrointestinal: admits to dull abdominal ache, mild nausea, and watery diarrhea, denies constipation and vomiting  : denies hematuria, dysuria, or incontinence  Neurological: denies weakness, numbness , tingling, dizziness  MSK: denies muscle pain or weakness  skin: denies skin rash, itching, burning, or skin lesions  Psychiatrical: denies mood disturbances, anxiety    Vital Signs Last 24 Hrs  T(C): 37.2 (21 Oct 2020 04:19), Max: 37.2 (21 Oct 2020 04:19)  T(F): 99 (21 Oct 2020 04:19), Max: 99 (21 Oct 2020 04:19)  HR: 68 (21 Oct 2020 04:19) (66 - 72)  BP: 132/70 (21 Oct 2020 04:19) (122/68 - 155/58)  BP(mean): 100 (20 Oct 2020 21:03) (100 - 100)  RR: 17 (21 Oct 2020 04:19) (14 - 17)  SpO2: 96% (21 Oct 2020 04:19) (96% - 100%)    PHYSICAL EXAM:    General: NAD, comfortable, well-groomed  HEENT: NCAT, PERRL, EOMI, neck supple  Cardiovascular: RRR, s1s2, no murmurs, gallops or friction rubs  Pulmonary: cta b/l, no wheezing , rhonchi, or rales  Gastrointestinal: soft non tender non distended, + BS  Musculoskeletal: no lower extremity edema, intact bilateral lower extremity pulses, no muscle weakness or pain  Neurological: A&O x3, No focal weakness or tingling  Psychiatrical: normal mood, cooperative  SKIN: no rash, lesions or ulcers    LABS:                        11.8   8.93  )-----------( 226      ( 21 Oct 2020 09:56 )             36.4     10-21    143  |  114<H>  |  8   ----------------------------<  117<H>  3.7   |  22  |  0.97    Ca    8.9      21 Oct 2020 09:56    TPro  6.6  /  Alb  3.3  /  TBili  0.2  /  DBili  x   /  AST  12<L>  /  ALT  20  /  AlkPhos  83  10-21    Radiology:       EXAM:  MR ABDOMEN                            PROCEDURE DATE:  10/19/2020          INTERPRETATION:  MRCP without IV contrast    Indication: Dilated common bile duct.    Technique: Utilizing a 1.5 Reena high-field magnet, multiplanar multisequence MRimages of the abdomen are acquired without IV contrast, supplemented by thin and thick slab MRCP images.    Comparison: No prior abdominal MR is available for comparison. Reference is made with previous abdominal ultrasound and abdominal CT dated 10/17/2020.    Findings: Small sludge or gallstones in the gallbladder (image 83 series 13). No evidence for thickened gallbladder wall, or pericholecystic fluid. Dilated common bile duct measuring up to 1.4 cm in diameter. Possible small sludge or stones in the very distal common bile duct best seen on series 13. The main pancreatic duct maintains normal caliber without dilatation.    Allowing for the noncontrast technique, the liver, pancreas, spleen, and adrenals appear grossly unremarkable. A fewsmall brightly T2 hyperintense lesions in the kidneys bilaterally, representing probable cysts.    No evidence of ascites, or enlarged lymph node.    The visualized bowel structures appear grossly unremarkable.    Impression:    Small sludge or gallstones in the gallbladder. No evidence for thickened gallbladder wall, or pericholecystic fluid.    Dilated common bile duct. Possible small sludge or gallstones in the very distal common bile duct.        Assessment and Plan:    65y Female with HTN, Bipolar disorder, presents with abdominal pain, epigastric and RUQ since midnight, mild elev LFTs, abd sono with 17 mm dilated CBD, small GB sludge.      # R/O Common biliary duct obstruction  - RUQ pain likely Choledocholithiasis  - MRCP (10/19): Small sludge or gallstones in the gallbladder. No evidence for thickened gallbladder wall, or pericholecystic fluid. Dilated common bile duct. Possible small sludge or gallstones in the very distal common bile duct.  - s/p ERCP (10/20): extension of sphincterotomy; large amount of sludge removed for bile duct  - c/w rocephin and flagyl  - Pt endorses 4 bouts of watery diarrhea this morning, like 2/2 antibiotics, will determine need for abx after discussion with GI  - c/w Bacid   - Advanced to DASH diet  - Surgery and GI following  - Plan for tentative laparoscopic cholecystectomy pending GI/surgery  recs    #Bipolar disorder  - Cont lithium, metoprolol, lamictal, topiramate, ziprasidone.     #DVT prophylaxis   - c/w lovenox      CHIEF COMPLAINT/INTERVAL HISTORY: Patient seen and examined at bedside this morning. Pt admits to epigastric discomfort and a diffuse abdominal ache s/p ERCP yesterday. States she has had 4 bouts of loose diarrhea this morning. She denies n/v, but is requesting to be advanced to a regular diet. Patient is ambulating well and denies SOB, headhache, dizziness, palpitations, or chest pain.     REVIEW OF SYSTEMS:  General: comfortable, denies fever chills, headache, weakness  HEENT: denies blurry vision, throat pain  Cardiovascular: denies chest pain, palpitations  Pulmonary: denies SOB, cough, wheezing  Gastrointestinal: admits to dull abdominal ache, mild nausea, and loose diarrhea, denies constipation and vomiting  : denies hematuria, dysuria, or incontinence  Neurological: denies weakness, numbness , tingling, dizziness  MSK: denies muscle pain or weakness  skin: denies skin rash, itching, burning, or skin lesions  Psychiatrical: denies mood disturbances, anxiety    Vital Signs Last 24 Hrs  T(C): 37.2 (21 Oct 2020 04:19), Max: 37.2 (21 Oct 2020 04:19)  T(F): 99 (21 Oct 2020 04:19), Max: 99 (21 Oct 2020 04:19)  HR: 68 (21 Oct 2020 04:19) (66 - 72)  BP: 132/70 (21 Oct 2020 04:19) (122/68 - 155/58)  BP(mean): 100 (20 Oct 2020 21:03) (100 - 100)  RR: 17 (21 Oct 2020 04:19) (14 - 17)  SpO2: 96% (21 Oct 2020 04:19) (96% - 100%)    PHYSICAL EXAM:  General: NAD, comfortable, well-groomed  HEENT: NCAT, PERRL, EOMI, neck supple  Cardiovascular: RRR, s1s2, no murmurs, gallops or friction rubs  Pulmonary: cta b/l, no wheezing , rhonchi, or rales  Gastrointestinal: soft non tender non distended, + BS  Musculoskeletal: no lower extremity edema, intact bilateral lower extremity pulses, no muscle weakness or pain  Neurological: A&O x3, No focal weakness or tingling  Psychiatrical: normal mood, cooperative  SKIN: no rash, lesions or ulcers    LABS:                        11.8   8.93  )-----------( 226      ( 21 Oct 2020 09:56 )             36.4     10-21    143  |  114<H>  |  8   ----------------------------<  117<H>  3.7   |  22  |  0.97    Ca    8.9      21 Oct 2020 09:56    TPro  6.6  /  Alb  3.3  /  TBili  0.2  /  DBili  x   /  AST  12<L>  /  ALT  20  /  AlkPhos  83  10-21        Assessment and Plan:  65y Female with HTN, Bipolar disorder, presents with abdominal pain, epigastric and RUQ since midnight, mild elev LFTs, abd sono with 17 mm dilated CBD, small GB sludge.    # R/O Common biliary duct obstruction  - RUQ pain likely Choledocholithiasis  - MRCP (10/19): Small sludge or gallstones in the gallbladder. No evidence for thickened gallbladder wall, or pericholecystic fluid. Dilated common bile duct. Possible small sludge or gallstones in the very distal common bile duct.  - s/p ERCP (10/20): extension of sphincterotomy; large amount of sludge removed for bile duct  - c/w rocephin and flagyl  - Pt endorses 4 bouts of diarrhea this morning, like 2/2 antibiotics, will determine need for abx pending plans for cholecystectomy during this admission  - c/w Bacid   - Advanced to DASH diet  - Plan for tentative laparoscopic cholecystectomy pending surgery recs    #Bipolar disorder  - Cont lithium, metoprolol, lamictal, topiramate, ziprasidone.     #DVT prophylaxis   - c/w lovenox

## 2020-10-21 NOTE — DISCHARGE NOTE PROVIDER - CARE PROVIDER_API CALL
Ani Palmer  INTERNAL MEDICINE  [address] [place] [address]  Phone: [phone]  Fax: [phone]  Follow Up Time: 1 week   Ani Palmer  INTERNAL MEDICINE  [address] [place] [id number]  Phone: [phone]  Fax: [phone]  Follow Up Time: 1 week    David Sharma  SURGERY  [address] [place] [id number]  Phone: [phone]  Fax: [phone]  Follow Up Time: 1-3 days

## 2020-10-21 NOTE — PROGRESS NOTE ADULT - ASSESSMENT
All as above in PA notation.  Patient personally seen and examined.  She reports no further diarrhea since overnight.  She reports the epigastric pain of this am has also now resolved.  Ms. Flores denies acute pain presently.  Vitals non-suggestive.  Abdomen soft and non tense and non tender.  Labs reviewed and reassuring.  As such:  -Clinically improving  -Surgically stable at present  -Clinical course c/w gallstone pancreatitis  -ERCP has addressed choledocholithiasis  -Recommending completion cholecystectomy prior to discharge  -Patient pleased and eager to proceed  -Following clinical course today, surgery tentatively planned for Friday  Ms. Flores is pleased, denies concerns and is eager to proceed as outlined above...

## 2020-10-22 ENCOUNTER — TRANSCRIPTION ENCOUNTER (OUTPATIENT)
Age: 65
End: 2020-10-22

## 2020-10-22 LAB
ALBUMIN SERPL ELPH-MCNC: 3.5 G/DL — SIGNIFICANT CHANGE UP (ref 3.3–5)
ALP SERPL-CCNC: 88 U/L — SIGNIFICANT CHANGE UP (ref 40–120)
ALT FLD-CCNC: 43 U/L — SIGNIFICANT CHANGE UP (ref 12–78)
ANION GAP SERPL CALC-SCNC: 7 MMOL/L — SIGNIFICANT CHANGE UP (ref 5–17)
APTT BLD: 30.3 SEC — SIGNIFICANT CHANGE UP (ref 27.5–35.5)
AST SERPL-CCNC: 41 U/L — HIGH (ref 15–37)
BILIRUB SERPL-MCNC: 0.3 MG/DL — SIGNIFICANT CHANGE UP (ref 0.2–1.2)
BLD GP AB SCN SERPL QL: SIGNIFICANT CHANGE UP
BUN SERPL-MCNC: 11 MG/DL — SIGNIFICANT CHANGE UP (ref 7–23)
CALCIUM SERPL-MCNC: 9 MG/DL — SIGNIFICANT CHANGE UP (ref 8.5–10.1)
CHLORIDE SERPL-SCNC: 115 MMOL/L — HIGH (ref 96–108)
CO2 SERPL-SCNC: 22 MMOL/L — SIGNIFICANT CHANGE UP (ref 22–31)
CREAT SERPL-MCNC: 1 MG/DL — SIGNIFICANT CHANGE UP (ref 0.5–1.3)
GLUCOSE SERPL-MCNC: 113 MG/DL — HIGH (ref 70–99)
HCT VFR BLD CALC: 37 % — SIGNIFICANT CHANGE UP (ref 34.5–45)
HGB BLD-MCNC: 12.2 G/DL — SIGNIFICANT CHANGE UP (ref 11.5–15.5)
INR BLD: 1.04 RATIO — SIGNIFICANT CHANGE UP (ref 0.88–1.16)
LIDOCAIN IGE QN: 221 U/L — SIGNIFICANT CHANGE UP (ref 73–393)
MAGNESIUM SERPL-MCNC: 2.2 MG/DL — SIGNIFICANT CHANGE UP (ref 1.6–2.6)
MCHC RBC-ENTMCNC: 30 PG — SIGNIFICANT CHANGE UP (ref 27–34)
MCHC RBC-ENTMCNC: 33 GM/DL — SIGNIFICANT CHANGE UP (ref 32–36)
MCV RBC AUTO: 91.1 FL — SIGNIFICANT CHANGE UP (ref 80–100)
NRBC # BLD: 0 /100 WBCS — SIGNIFICANT CHANGE UP (ref 0–0)
PHOSPHATE SERPL-MCNC: 2.9 MG/DL — SIGNIFICANT CHANGE UP (ref 2.5–4.5)
PLATELET # BLD AUTO: 234 K/UL — SIGNIFICANT CHANGE UP (ref 150–400)
POTASSIUM SERPL-MCNC: 3.8 MMOL/L — SIGNIFICANT CHANGE UP (ref 3.5–5.3)
POTASSIUM SERPL-SCNC: 3.8 MMOL/L — SIGNIFICANT CHANGE UP (ref 3.5–5.3)
PROT SERPL-MCNC: 6.8 G/DL — SIGNIFICANT CHANGE UP (ref 6–8.3)
PROTHROM AB SERPL-ACNC: 12.2 SEC — SIGNIFICANT CHANGE UP (ref 10.6–13.6)
RBC # BLD: 4.06 M/UL — SIGNIFICANT CHANGE UP (ref 3.8–5.2)
RBC # FLD: 13.5 % — SIGNIFICANT CHANGE UP (ref 10.3–14.5)
SODIUM SERPL-SCNC: 144 MMOL/L — SIGNIFICANT CHANGE UP (ref 135–145)
WBC # BLD: 9.55 K/UL — SIGNIFICANT CHANGE UP (ref 3.8–10.5)
WBC # FLD AUTO: 9.55 K/UL — SIGNIFICANT CHANGE UP (ref 3.8–10.5)

## 2020-10-22 PROCEDURE — 99233 SBSQ HOSP IP/OBS HIGH 50: CPT | Mod: 57

## 2020-10-22 PROCEDURE — 99497 ADVNCD CARE PLAN 30 MIN: CPT

## 2020-10-22 PROCEDURE — 99223 1ST HOSP IP/OBS HIGH 75: CPT

## 2020-10-22 PROCEDURE — 99233 SBSQ HOSP IP/OBS HIGH 50: CPT | Mod: GC

## 2020-10-22 RX ORDER — MORPHINE SULFATE 50 MG/1
2 CAPSULE, EXTENDED RELEASE ORAL EVERY 4 HOURS
Refills: 0 | Status: DISCONTINUED | OUTPATIENT
Start: 2020-10-22 | End: 2020-10-23

## 2020-10-22 RX ORDER — SODIUM CHLORIDE 9 MG/ML
1000 INJECTION, SOLUTION INTRAVENOUS
Refills: 0 | Status: DISCONTINUED | OUTPATIENT
Start: 2020-10-22 | End: 2020-10-23

## 2020-10-22 RX ADMIN — SIMETHICONE 80 MILLIGRAM(S): 80 TABLET, CHEWABLE ORAL at 05:44

## 2020-10-22 RX ADMIN — LITHIUM CARBONATE 300 MILLIGRAM(S): 300 TABLET, EXTENDED RELEASE ORAL at 21:15

## 2020-10-22 RX ADMIN — FAMOTIDINE 20 MILLIGRAM(S): 10 INJECTION INTRAVENOUS at 12:37

## 2020-10-22 RX ADMIN — Medication 100 MILLIGRAM(S): at 05:44

## 2020-10-22 RX ADMIN — SIMETHICONE 80 MILLIGRAM(S): 80 TABLET, CHEWABLE ORAL at 14:35

## 2020-10-22 RX ADMIN — ZOLPIDEM TARTRATE 10 MILLIGRAM(S): 10 TABLET ORAL at 22:20

## 2020-10-22 RX ADMIN — Medication 100 MILLIGRAM(S): at 21:15

## 2020-10-22 RX ADMIN — SIMETHICONE 80 MILLIGRAM(S): 80 TABLET, CHEWABLE ORAL at 21:15

## 2020-10-22 RX ADMIN — Medication 1 TABLET(S): at 18:20

## 2020-10-22 RX ADMIN — Medication 100 MILLIGRAM(S): at 09:29

## 2020-10-22 RX ADMIN — LAMOTRIGINE 200 MILLIGRAM(S): 25 TABLET, ORALLY DISINTEGRATING ORAL at 09:28

## 2020-10-22 RX ADMIN — ZIPRASIDONE HYDROCHLORIDE 80 MILLIGRAM(S): 20 CAPSULE ORAL at 09:28

## 2020-10-22 RX ADMIN — LITHIUM CARBONATE 300 MILLIGRAM(S): 300 TABLET, EXTENDED RELEASE ORAL at 09:28

## 2020-10-22 RX ADMIN — CEFTRIAXONE 100 MILLIGRAM(S): 500 INJECTION, POWDER, FOR SOLUTION INTRAMUSCULAR; INTRAVENOUS at 17:14

## 2020-10-22 RX ADMIN — Medication 25 MILLIGRAM(S): at 05:45

## 2020-10-22 RX ADMIN — Medication 100 MILLIGRAM(S): at 14:35

## 2020-10-22 RX ADMIN — Medication 1 TABLET(S): at 05:45

## 2020-10-22 RX ADMIN — LAMOTRIGINE 200 MILLIGRAM(S): 25 TABLET, ORALLY DISINTEGRATING ORAL at 21:15

## 2020-10-22 NOTE — PROGRESS NOTE ADULT - PROBLEM SELECTOR PLAN 1
s.p ercp  diet as tolerated  cont antibiotics   surgery f/u re: cholecystectomy, planned for tomorrow, npo p mn

## 2020-10-22 NOTE — CONSULT NOTE ADULT - ATTENDING COMMENTS
Seen/examined. agree with above.  history of lbbb and nicm, which has resolved on echo in 2019  no sign of acute ischemia or volume overload  no cardiac contraindication to proceeding with angélica  routine cardiac monitoring is recommended.

## 2020-10-22 NOTE — PROGRESS NOTE ADULT - SUBJECTIVE AND OBJECTIVE BOX
SURGERY PA NOTE ON BEHALF OF DR. PEREZ / GENERAL SURGERY:    S: Patient seen and examined at bedside.  Reports improvement/essentially resolved abdominal pain, though with some gas/bloating today.   Tolerating DASH/TLC diet without N/V.  Still with some diarrhea (last episode was earlier this morning).  +F, + urinating.      MEDICATIONS:  acetaminophen   Tablet .. 650 milliGRAM(s) Oral every 6 hours PRN  aluminum hydroxide/magnesium hydroxide/simethicone Suspension 30 milliLiter(s) Oral every 6 hours PRN  cefTRIAXone   IVPB 1000 milliGRAM(s) IV Intermittent every 24 hours  enoxaparin Injectable 40 milliGRAM(s) SubCutaneous daily  famotidine    Tablet 20 milliGRAM(s) Oral daily  lactated ringers. 1000 milliLiter(s) IV Continuous <Continuous>  lactobacillus acidophilus 1 Tablet(s) Oral two times a day  lamoTRIgine 200 milliGRAM(s) Oral two times a day  lithium 300 milliGRAM(s) Oral two times a day  metoprolol tartrate 25 milliGRAM(s) Oral daily  metroNIDAZOLE  IVPB 500 milliGRAM(s) IV Intermittent every 8 hours  morphine  - Injectable 2 milliGRAM(s) IV Push every 4 hours PRN  ondansetron Injectable 4 milliGRAM(s) IV Push every 8 hours PRN  simethicone 80 milliGRAM(s) Chew three times a day  SUMAtriptan 100 milliGRAM(s) Oral daily PRN  topiramate 100 milliGRAM(s) Oral two times a day  ziprasidone 80 milliGRAM(s) Oral <User Schedule>  zolpidem 5 milliGRAM(s) Oral at bedtime PRN  zolpidem 5 milliGRAM(s) Oral at bedtime PRN      O:  Vital Signs Last 24 Hrs  T(C): 37.4 (22 Oct 2020 05:33), Max: 37.4 (22 Oct 2020 05:33)  T(F): 99.3 (22 Oct 2020 05:33), Max: 99.3 (22 Oct 2020 05:33)  HR: 60 (22 Oct 2020 05:33) (60 - 68)  BP: 147/79 (22 Oct 2020 05:33) (147/79 - 151/80)  BP(mean): 104 (21 Oct 2020 21:14) (104 - 104)  RR: 18 (22 Oct 2020 05:33) (17 - 18)  SpO2: 96% (22 Oct 2020 05:33) (96% - 98%)    I&O SUMMARY:    10-21-20 @ 07:01  -  10-22-20 @ 07:00  --------------------------------------------------------  IN: 1290 mL / OUT: 0 mL / NET: 1290 mL        PHYSICAL EXAM:  Lungs: CTA bilat without W/R/R  Card: S1S2  Abd: Soft, NT, ND.  +BS x 4.  No rebound/guarding.    Ext: Calves soft, NT, without edema bilat LE.    LABS:                        12.2   9.55  )-----------( 234      ( 22 Oct 2020 08:53 )             37.0     10-22    144  |  115<H>  |  11  ----------------------------<  113<H>  3.8   |  22  |  1.00    Ca    9.0      22 Oct 2020 08:53  Phos  2.9     10-22  Mg     2.2     10-22    TPro  6.8  /  Alb  3.5  /  TBili  0.3  /  DBili  x   /  AST  41<H>  /  ALT  43  /  AlkPhos  88  10-22            A: 65-yo female admitted with RUQ pain, choledocholithiasis, picture c/w GS pancreatitis    P: CBD stones addressed with ERCP, doing well with regard to pain, overall clinical course improving      Leukocytosis resolved, LFT/tbili have essentially normalized       VS stable, afebrile      Plan for completion cholecystectomy tomorrow, will pre-op/NPOM tonight      Will d/w Dr. Perez      Will follow        SURGERY PA NOTE ON BEHALF OF DR. PEREZ / GENERAL SURGERY:      S: Patient seen and examined at bedside.  Reports improvement/essentially resolved abdominal pain, though with some gas/bloating today.   Tolerating DASH/TLC diet without N/V.  Still with some diarrhea (last episode was earlier this morning).  +F, + urinating.        MEDICATIONS:  acetaminophen   Tablet .. 650 milliGRAM(s) Oral every 6 hours PRN  aluminum hydroxide/magnesium hydroxide/simethicone Suspension 30 milliLiter(s) Oral every 6 hours PRN  cefTRIAXone   IVPB 1000 milliGRAM(s) IV Intermittent every 24 hours  enoxaparin Injectable 40 milliGRAM(s) SubCutaneous daily  famotidine    Tablet 20 milliGRAM(s) Oral daily  lactated ringers. 1000 milliLiter(s) IV Continuous <Continuous>  lactobacillus acidophilus 1 Tablet(s) Oral two times a day  lamoTRIgine 200 milliGRAM(s) Oral two times a day  lithium 300 milliGRAM(s) Oral two times a day  metoprolol tartrate 25 milliGRAM(s) Oral daily  metroNIDAZOLE  IVPB 500 milliGRAM(s) IV Intermittent every 8 hours  morphine  - Injectable 2 milliGRAM(s) IV Push every 4 hours PRN  ondansetron Injectable 4 milliGRAM(s) IV Push every 8 hours PRN  simethicone 80 milliGRAM(s) Chew three times a day  SUMAtriptan 100 milliGRAM(s) Oral daily PRN  topiramate 100 milliGRAM(s) Oral two times a day  ziprasidone 80 milliGRAM(s) Oral <User Schedule>  zolpidem 5 milliGRAM(s) Oral at bedtime PRN  zolpidem 5 milliGRAM(s) Oral at bedtime PRN      O:  Vital Signs Last 24 Hrs  T(F): 99.3 (22 Oct 2020 05:33), Max: 99.3 (22 Oct 2020 05:33)  HR: 60 (22 Oct 2020 05:33) (60 - 68)  BP: 147/79 (22 Oct 2020 05:33) (147/79 - 151/80)  BP(mean): 104 (21 Oct 2020 21:14) (104 - 104)  RR: 18 (22 Oct 2020 05:33) (17 - 18)  SpO2: 96% (22 Oct 2020 05:33) (96% - 98%)      I&O SUMMARY:    10-21-20 @ 07:01  -  10-22-20 @ 07:00  --------------------------------------------------------  IN: 1290 mL / OUT: 0 mL / NET: 1290 mL      PHYSICAL EXAM:  Lungs: CTA bilat without W/R/R  Card: S1S2  Abd: Soft, NT, ND.  +BS x 4.  No rebound/guarding.    Ext: Calves soft, NT, without edema bilat LE.      LABS:                        12.2   9.55  )-----------( 234      ( 22 Oct 2020 08:53 )             37.0     10-22    144  |  115<H>  |  11  ----------------------------<  113<H>  3.8   |  22  |  1.00    Ca    9.0      22 Oct 2020 08:53  Phos  2.9     10-22  Mg     2.2     10-22    TPro  6.8  /  Alb  3.5  /  TBili  0.3  /  DBili  x   /  AST  41<H>  /  ALT  43  /  AlkPhos  88  10-22      A: 65-yo female admitted with RUQ pain, choledocholithiasis, picture c/w GS pancreatitis      P: CBD stones addressed with ERCP, doing well with regard to pain, overall clinical course improving      Leukocytosis resolved, LFT/tbili have essentially normalized       VS stable, afebrile      Plan for completion cholecystectomy tomorrow, will pre-op/NPOM tonight      Will d/w Dr. Perez      Will follow

## 2020-10-22 NOTE — CONSULT NOTE ADULT - SUBJECTIVE AND OBJECTIVE BOX
Patient is a 65y old  Female who presents with a chief complaint of abdominal pain - epigastric, RUQ (22 Oct 2020 10:25)      HPI:  65y Female with HTN, Bipolar disorder, presents to the ED with c/o abdominal pain, epigastric and RUQ since 12 midnight.  Pt states she took 3 TUMS wity no relief.  Last meal was dinner, last night, she was unable to eat breakfast and lunch because of the discomfort.  She states pain is nonradiating, asso with nausea, but no vomiting, diarrhea, fever nor chills.  Denies chest pain, dyspnea, dysuria.    Abdominal sono reports 17 mm dilated common bile duct without intrahepatic biliary dilatation, mild hepatic steatosis. Small gallbladder sludge. Pancreas within normal.  Punctate nonobstructing left renal calculus.  Pt was given a dose of Ceftriaxone and Flagyl IV in the ED. (17 Oct 2020 16:40)      INTERVAL EVENTS/HPI:  Patient seen and examined at bedside by cardiology team.    Patient follows with Dr. Gupta and was last seen in the office 8/4/2020. Patient had no complaints at the time. Cardiology history includes remote cardiomyopathy (at age 52) with recovery/normalization of LV systolic function, LBBB, HTN and triglyceridemia. EKG performed showed Sinus rhythm, LBBB and left axis deviation. Last ECHO obtained on 2/5/2019 showed normal LV size and systolic function, EF 59%. Mild diastolic dysfunction. Minimal MR, mild pulmonic regurgitation, mild MR. Holter monitoring performed February 2019 shoed sinus rhythm, sinus jamir, rare APCs and couplets with shortness of breath  with ECG noting sinus rhythm with an increate rate and APC at that time.     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  Pt also self mutilate last was &quot;many yrs ago maybe 20 yrs ago&quot;.    Hypertension    Bipolar 1 disorder    History of tonsillectomy  at 18 yrs old    S/P tonsillectomy      ECHO FINDINGS:   2/5/2019 showed normal LV size and systolic function, EF 59%. Mild diastolic dysfunction. Minimal MR, mild pulmonic regurgitation, mild MR.       MEDICATIONS  (STANDING):  cefTRIAXone   IVPB 1000 milliGRAM(s) IV Intermittent every 24 hours  famotidine    Tablet 20 milliGRAM(s) Oral daily  lactated ringers. 1000 milliLiter(s) (70 mL/Hr) IV Continuous <Continuous>  lactobacillus acidophilus 1 Tablet(s) Oral two times a day  lamoTRIgine 200 milliGRAM(s) Oral two times a day  lithium 300 milliGRAM(s) Oral two times a day  metoprolol tartrate 25 milliGRAM(s) Oral daily  metroNIDAZOLE  IVPB 500 milliGRAM(s) IV Intermittent every 8 hours  simethicone 80 milliGRAM(s) Chew three times a day  topiramate 100 milliGRAM(s) Oral two times a day  ziprasidone 80 milliGRAM(s) Oral <User Schedule>    MEDICATIONS  (PRN):  acetaminophen   Tablet .. 650 milliGRAM(s) Oral every 6 hours PRN Temp greater or equal to 38C (100.4F), Mild Pain (1 - 3), Moderate Pain (4 - 6)  aluminum hydroxide/magnesium hydroxide/simethicone Suspension 30 milliLiter(s) Oral every 6 hours PRN Dyspepsia  morphine  - Injectable 2 milliGRAM(s) IV Push every 4 hours PRN Severe Pain (7 - 10)  ondansetron Injectable 4 milliGRAM(s) IV Push every 8 hours PRN Nausea and/or Vomiting  SUMAtriptan 100 milliGRAM(s) Oral daily PRN Headache/migraine  zolpidem 10 milliGRAM(s) Oral at bedtime PRN Insomnia      FAMILY HISTORY:  Family history of breast cancer in mother (Mother)    Family history of prostate cancer in father (Father)    Family history of atrial fibrillation (Mother)  mother    Family history of depression (Mother, Sibling)  mother and 3 siblings    Family history of hypertension (Father, Mother)  mother and father      Denies Family history of CAD or early MI      Constitutional: denies fever, chills  HEENT: denies blurry vision, difficulty hearing  Respiratory: denies SOB, CANCINO, cough  Cardiovascular: denies CP, palpitations, orthopnea, PND, LE edema  Gastrointestinal: denies nausea, vomiting, abdominal pain  Genitourinary: denies urinary changes  Skin: Denies rashes, itching  Neurologic: denies headache, weakness, dizziness  Hematology/Oncology: denies bleeding, easy bruising  ROS negative except as noted above      SOCIAL HISTORY:    No tobacco, Alcohol or Ddrug use    Vital Signs Last 24 Hrs  T(C): 37.4 (22 Oct 2020 05:33), Max: 37.4 (22 Oct 2020 05:33)  T(F): 99.3 (22 Oct 2020 05:33), Max: 99.3 (22 Oct 2020 05:33)  HR: 60 (22 Oct 2020 05:33) (60 - 68)  BP: 147/79 (22 Oct 2020 05:33) (147/79 - 151/80)  BP(mean): 104 (21 Oct 2020 21:14) (104 - 104)  RR: 18 (22 Oct 2020 05:33) (17 - 18)  SpO2: 96% (22 Oct 2020 05:33) (96% - 98%)    Physical Exam:  General: Well developed, well nourished, NAD  HEENT: NCAT, PERRLA, EOMI bl, moist mucous membranes   Neck: Supple, nontender, no mass  Neurology: A&Ox3, nonfocal, sensation intact   Respiratory: CTA B/L, No W/R/R  CV: RRR, +S1/S2, no murmurs, rubs or gallops  Abdominal: Soft, NT, ND +BSx4, no palpable masses  Extremities: No C/C/E, + peripheral pulses  MSK: Normal ROM, no joint erythema or warmth, no joint swelling   Heme: No obvious ecchymosis or petechiae   Skin: warm, dry, normal color      ECG: < from: 12 Lead ECG (10.17.20 @ 08:27) >  Diagnosis Line Sinus bradycardia with 1st degree AV block  Left axis deviation  Left bundle branch block    < end of copied text >      I&O's Detail    21 Oct 2020 07:01  -  22 Oct 2020 07:00  --------------------------------------------------------  IN:    IV PiggyBack: 100 mL    Lactated Ringers: 1190 mL  Total IN: 1290 mL    OUT:  Total OUT: 0 mL    Total NET: 1290 mL          LABS:                        12.2   9.55  )-----------( 234      ( 22 Oct 2020 08:53 )             37.0     10-22    144  |  115<H>  |  11  ----------------------------<  113<H>  3.8   |  22  |  1.00    Ca    9.0      22 Oct 2020 08:53  Phos  2.9     10-22  Mg     2.2     10-22    TPro  6.8  /  Alb  3.5  /  TBili  0.3  /  DBili  x   /  AST  41<H>  /  ALT  43  /  AlkPhos  88  10-22            I&O's Summary    21 Oct 2020 07:01  -  22 Oct 2020 07:00  --------------------------------------------------------  IN: 1290 mL / OUT: 0 mL / NET: 1290 mL      RADIOLOGY & ADDITIONAL STUDIES:  < from: Xray Chest 2 Views PA/Lat (10.17.20 @ 08:00) >  EXAM:  XR CHEST PA LAT 2V                            PROCEDURE DATE:  10/17/2020          INTERPRETATION:  Upper abdominal pain.    PA lateral. Prior 1/16/2020    Heart and mediastinum normal.  Lungs clear.  Costophrenic angles sharp bilaterally.    IMPRESSION: Normal chest            RAIN PIERCE M.D., ATTENDING RADIOLOGIST  This document has been electronically signed. Oct 17 2020 10:32AM    < end of copied text >  < from: CT Abdomen and Pelvis No Cont (10.17.20 @ 13:59) >  EXAM:  CT ABDOMEN AND PELVIS                            PROCEDURE DATE:  10/17/2020          INTERPRETATION:  CT ABDOMEN AND PELVIS    CLINICAL INFORMATION: Epigastric pain  abd pain    COMPARISON: Same day abdominal ultrasound    PROCEDURE:  CT ofthe Abdomen and Pelvis was performed without intravenous contrast.  Intravenous contrast: None.  Oral contrast: None.  Sagittal and coronal reformats were performed.    FINDINGS:  LOWER CHEST: Clear.    LIVER: Normal.  BILE DUCTS: 17 mm CBD dilatation without intrahepatic dilatation.  GALLBLADDER: Normal.  SPLEEN: Normal.  PANCREAS: Normal.  ADRENALS: Normal.  KIDNEYS/URETERS: Punctate nonobstructing left renal calculus.    BLADDER: Obscured  REPRODUCTIVE ORGANS: Obscured    BOWEL: No bowel-related abnormality. Specifically, no evidence of acute diverticulitis. Normal appendix and ileocecal region. No bowel obstruction or bowel inflammation.  PERITONEUM: No free air or ascites.  VESSELS:  Aortoiliac atherosclerosis without aneurysm.  RETROPERITONEUM/LYMPH NODES: No adenopathy.  ABDOMINAL WALL: Normal.  BONES: Left hip Arthroplasty. No acute bony abnormality.    IMPRESSION:    17 mm CBD dilatation without intrahepatic dilatation. Correlation with LFTs is needed to determine the clinical significance.              ERIC PLUMMER M.D., ATTENDING RADIOLOGIST  This document has been electronically signed. Oct 17 2020  2:18PM    < end of copied text >   Patient is a 65y old  Female who presents with a chief complaint of abdominal pain - epigastric, RUQ (22 Oct 2020 10:25)      HPI:  65y Female with HTN, Bipolar disorder, presents to the ED with c/o abdominal pain, epigastric and RUQ since 12 midnight.  Pt states she took 3 TUMS wity no relief.  Last meal was dinner, last night, she was unable to eat breakfast and lunch because of the discomfort.  She states pain is nonradiating, asso with nausea, but no vomiting, diarrhea, fever nor chills.  Denies chest pain, dyspnea, dysuria.    Abdominal sono reports 17 mm dilated common bile duct without intrahepatic biliary dilatation, mild hepatic steatosis. Small gallbladder sludge. Pancreas within normal.  Punctate nonobstructing left renal calculus.  Pt was given a dose of Ceftriaxone and Flagyl IV in the ED. (17 Oct 2020 16:40)      INTERVAL EVENTS/HPI:  Patient seen and examined at bedside by cardiology team. Denies any chest pain, palpitations or shortness of breath. Denies any dyspnea on exertion. Denies any chest pain or SOB with walking up stairs.  Patient follows with Dr. Gupta and was last seen in the office 8/4/2020. Patient had no complaints at the time. Cardiology history includes remote cardiomyopathy (at age 52) with recovery/normalization of LV systolic function, LBBB, HTN and triglyceridemia. EKG performed showed Sinus rhythm, LBBB and left axis deviation. Last ECHO obtained on 2/5/2019 showed normal LV size and systolic function, EF 59%. Mild diastolic dysfunction. Minimal MR, mild pulmonic regurgitation, mild MR. Holter monitoring performed February 2019 showed sinus rhythm, sinus jamir, rare APCs and couplets with shortness of breath  with ECG noting sinus rhythm with an increate rate and APC at that time.     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  Pt also self mutilate last was &quot;many yrs ago maybe 20 yrs ago&quot;.    Hypertension    Bipolar 1 disorder    History of tonsillectomy  at 18 yrs old    S/P tonsillectomy      ECHO FINDINGS:   2/5/2019 showed normal LV size and systolic function, EF 59%. Mild diastolic dysfunction. Minimal MR, mild pulmonic regurgitation, mild MR.       MEDICATIONS  (STANDING):  cefTRIAXone   IVPB 1000 milliGRAM(s) IV Intermittent every 24 hours  famotidine    Tablet 20 milliGRAM(s) Oral daily  lactated ringers. 1000 milliLiter(s) (70 mL/Hr) IV Continuous <Continuous>  lactobacillus acidophilus 1 Tablet(s) Oral two times a day  lamoTRIgine 200 milliGRAM(s) Oral two times a day  lithium 300 milliGRAM(s) Oral two times a day  metoprolol tartrate 25 milliGRAM(s) Oral daily  metroNIDAZOLE  IVPB 500 milliGRAM(s) IV Intermittent every 8 hours  simethicone 80 milliGRAM(s) Chew three times a day  topiramate 100 milliGRAM(s) Oral two times a day  ziprasidone 80 milliGRAM(s) Oral <User Schedule>    MEDICATIONS  (PRN):  acetaminophen   Tablet .. 650 milliGRAM(s) Oral every 6 hours PRN Temp greater or equal to 38C (100.4F), Mild Pain (1 - 3), Moderate Pain (4 - 6)  aluminum hydroxide/magnesium hydroxide/simethicone Suspension 30 milliLiter(s) Oral every 6 hours PRN Dyspepsia  morphine  - Injectable 2 milliGRAM(s) IV Push every 4 hours PRN Severe Pain (7 - 10)  ondansetron Injectable 4 milliGRAM(s) IV Push every 8 hours PRN Nausea and/or Vomiting  SUMAtriptan 100 milliGRAM(s) Oral daily PRN Headache/migraine  zolpidem 10 milliGRAM(s) Oral at bedtime PRN Insomnia      FAMILY HISTORY:  Family history of breast cancer in mother (Mother)    Family history of prostate cancer in father (Father)    Family history of atrial fibrillation (Mother)  mother    Family history of depression (Mother, Sibling)  mother and 3 siblings    Family history of hypertension (Father, Mother)  mother and father      Denies Family history of CAD or early MI      Constitutional: denies fever, chills  HEENT: denies blurry vision, difficulty hearing  Respiratory: denies SOB, CANCINO, cough  Cardiovascular: denies CP, palpitations, orthopnea, PND, LE edema  Gastrointestinal: denies nausea, vomiting, abdominal pain  Genitourinary: denies urinary changes  Skin: Denies rashes, itching  Neurologic: denies headache, weakness, dizziness  Hematology/Oncology: denies bleeding, easy bruising  ROS negative except as noted above      SOCIAL HISTORY:    No tobacco, Alcohol or Ddrug use    Vital Signs Last 24 Hrs  T(C): 37.4 (22 Oct 2020 05:33), Max: 37.4 (22 Oct 2020 05:33)  T(F): 99.3 (22 Oct 2020 05:33), Max: 99.3 (22 Oct 2020 05:33)  HR: 60 (22 Oct 2020 05:33) (60 - 68)  BP: 147/79 (22 Oct 2020 05:33) (147/79 - 151/80)  BP(mean): 104 (21 Oct 2020 21:14) (104 - 104)  RR: 18 (22 Oct 2020 05:33) (17 - 18)  SpO2: 96% (22 Oct 2020 05:33) (96% - 98%)    Physical Exam:  General: Well developed, well nourished, NAD  HEENT: NCAT, PERRLA, EOMI bl, moist mucous membranes   Neck: Supple, nontender, no mass  Neurology: A&Ox3, nonfocal, sensation intact   Respiratory: CTA B/L, No W/R/R  CV: RRR, +S1/S2, no murmurs, rubs or gallops  Abdominal: Soft, NT, ND +BSx4, no palpable masses  Extremities: No C/C/E, + peripheral pulses  MSK: Normal ROM, no joint erythema or warmth, no joint swelling   Heme: No obvious ecchymosis or petechiae   Skin: warm, dry, normal color      ECG: < from: 12 Lead ECG (10.17.20 @ 08:27) >  Diagnosis Line Sinus bradycardia with 1st degree AV block  Left axis deviation  Left bundle branch block    < end of copied text >      I&O's Detail    21 Oct 2020 07:01  -  22 Oct 2020 07:00  --------------------------------------------------------  IN:    IV PiggyBack: 100 mL    Lactated Ringers: 1190 mL  Total IN: 1290 mL    OUT:  Total OUT: 0 mL    Total NET: 1290 mL          LABS:                        12.2   9.55  )-----------( 234      ( 22 Oct 2020 08:53 )             37.0     10-22    144  |  115<H>  |  11  ----------------------------<  113<H>  3.8   |  22  |  1.00    Ca    9.0      22 Oct 2020 08:53  Phos  2.9     10-22  Mg     2.2     10-22    TPro  6.8  /  Alb  3.5  /  TBili  0.3  /  DBili  x   /  AST  41<H>  /  ALT  43  /  AlkPhos  88  10-22            I&O's Summary    21 Oct 2020 07:01  -  22 Oct 2020 07:00  --------------------------------------------------------  IN: 1290 mL / OUT: 0 mL / NET: 1290 mL      RADIOLOGY & ADDITIONAL STUDIES:  < from: Xray Chest 2 Views PA/Lat (10.17.20 @ 08:00) >  EXAM:  XR CHEST PA LAT 2V                            PROCEDURE DATE:  10/17/2020          INTERPRETATION:  Upper abdominal pain.    PA lateral. Prior 1/16/2020    Heart and mediastinum normal.  Lungs clear.  Costophrenic angles sharp bilaterally.    IMPRESSION: Normal chest            RAIN PIERCE M.D., ATTENDING RADIOLOGIST  This document has been electronically signed. Oct 17 2020 10:32AM    < end of copied text >  < from: CT Abdomen and Pelvis No Cont (10.17.20 @ 13:59) >  EXAM:  CT ABDOMEN AND PELVIS                            PROCEDURE DATE:  10/17/2020          INTERPRETATION:  CT ABDOMEN AND PELVIS    CLINICAL INFORMATION: Epigastric pain  abd pain    COMPARISON: Same day abdominal ultrasound    PROCEDURE:  CT ofthe Abdomen and Pelvis was performed without intravenous contrast.  Intravenous contrast: None.  Oral contrast: None.  Sagittal and coronal reformats were performed.    FINDINGS:  LOWER CHEST: Clear.    LIVER: Normal.  BILE DUCTS: 17 mm CBD dilatation without intrahepatic dilatation.  GALLBLADDER: Normal.  SPLEEN: Normal.  PANCREAS: Normal.  ADRENALS: Normal.  KIDNEYS/URETERS: Punctate nonobstructing left renal calculus.    BLADDER: Obscured  REPRODUCTIVE ORGANS: Obscured    BOWEL: No bowel-related abnormality. Specifically, no evidence of acute diverticulitis. Normal appendix and ileocecal region. No bowel obstruction or bowel inflammation.  PERITONEUM: No free air or ascites.  VESSELS:  Aortoiliac atherosclerosis without aneurysm.  RETROPERITONEUM/LYMPH NODES: No adenopathy.  ABDOMINAL WALL: Normal.  BONES: Left hip Arthroplasty. No acute bony abnormality.    IMPRESSION:    17 mm CBD dilatation without intrahepatic dilatation. Correlation with LFTs is needed to determine the clinical significance.              ERIC PLUMMER M.D., ATTENDING RADIOLOGIST  This document has been electronically signed. Oct 17 2020  2:18PM    < end of copied text >

## 2020-10-22 NOTE — PROGRESS NOTE ADULT - SUBJECTIVE AND OBJECTIVE BOX
INTERVAL HPI/OVERNIGHT EVENTS:  pt seen and examined  denies n/v/abd pain  +loose stool  chetan diet    MEDICATIONS  (STANDING):  cefTRIAXone   IVPB 1000 milliGRAM(s) IV Intermittent every 24 hours  enoxaparin Injectable 40 milliGRAM(s) SubCutaneous daily  famotidine    Tablet 20 milliGRAM(s) Oral daily  lactated ringers. 1000 milliLiter(s) (70 mL/Hr) IV Continuous <Continuous>  lamoTRIgine 200 milliGRAM(s) Oral two times a day  lithium 300 milliGRAM(s) Oral two times a day  metoprolol tartrate 25 milliGRAM(s) Oral daily  metroNIDAZOLE  IVPB 500 milliGRAM(s) IV Intermittent every 8 hours  topiramate 100 milliGRAM(s) Oral two times a day  ziprasidone 80 milliGRAM(s) Oral <User Schedule>    MEDICATIONS  (PRN):  acetaminophen   Tablet .. 650 milliGRAM(s) Oral every 6 hours PRN Temp greater or equal to 38C (100.4F), Mild Pain (1 - 3), Moderate Pain (4 - 6)  aluminum hydroxide/magnesium hydroxide/simethicone Suspension 30 milliLiter(s) Oral every 6 hours PRN Dyspepsia  morphine  - Injectable 2 milliGRAM(s) IV Push every 4 hours PRN Severe Pain (7 - 10)  ondansetron Injectable 4 milliGRAM(s) IV Push every 8 hours PRN Nausea and/or Vomiting  SUMAtriptan 100 milliGRAM(s) Oral daily PRN Headache/migraine  zolpidem 5 milliGRAM(s) Oral at bedtime PRN Insomnia  zolpidem 5 milliGRAM(s) Oral at bedtime PRN Insomnia      Allergies    penicillins (Rash)  sulfa drugs (Rash)    Intolerances        Review of Systems:    General:  No wt loss, fevers, chills, night sweats, fatigue   Eyes:  Good vision, no reported pain  ENT:  No sore throat, pain, runny nose, dysphagia  CV:  No pain, palpitations, hypo/hypertension  Resp:  No dyspnea, cough, tachypnea, wheezing  GI:  see above  :  No pain, bleeding, incontinence, nocturia  Muscle:  No pain, weakness  Neuro:  No weakness, tingling, memory problems  Psych:  No fatigue, insomnia, mood problems, depression  Endocrine:  No polyuria, polydypsia, cold/heat intolerance  Heme:  No petechiae, ecchymosis, easy bruisability  Skin:  No rash, tattoos, scars, edema      Vital Signs Last 24 Hrs  T(C): 37.2 (19 Oct 2020 04:33), Max: 37.2 (19 Oct 2020 04:33)  T(F): 98.9 (19 Oct 2020 04:33), Max: 98.9 (19 Oct 2020 04:33)  HR: 68 (19 Oct 2020 04:33) (59 - 68)  BP: 134/68 (19 Oct 2020 04:33) (132/69 - 156/83)  BP(mean): --  RR: 19 (19 Oct 2020 04:33) (18 - 19)  SpO2: 96% (19 Oct 2020 04:33) (95% - 96%)    PHYSICAL EXAM:    Constitutional: lying in bed  HEENT: ncat  Gastrointestinal: softly dt nt  Extremities: No peripheral edema  Vascular: 2+ peripheral pulses  Neurological: Awake alert appropriate     LABS:                        12.2   8.67  )-----------( 206      ( 18 Oct 2020 08:57 )             37.6     10-18    145  |  115<H>  |  13  ----------------------------<  97  3.8   |  21<L>  |  0.92    Ca    8.7      18 Oct 2020 08:57    TPro  6.9  /  Alb  3.4  /  TBili  0.7  /  DBili  .20  /  AST  21  /  ALT  29  /  AlkPhos  94  10-18          RADIOLOGY & ADDITIONAL TESTS:

## 2020-10-22 NOTE — PROGRESS NOTE ADULT - ASSESSMENT
All as above in PA notation.  Patient personally seen and examined.  Denies pain to me tonight.  Vitals non-suggestive.  Abdomen soft.  RUQ free of visible mass, appreciable collection or any tenderness.  Labs reviewed and reassuring.  Prior Sono, CT and MRCP reviewed and discussed with patient in detail.  Clinically improved, overall.  Surgically, stable at present.  However, s/p episode of gallstone pancreatitis with ERCP for CBD sludge, debris, microlithiasis, etc.  As such, given her overall good health, active life and life expectancy, completion cholecystectomy is indicated and appropriate.  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  For OR.

## 2020-10-22 NOTE — PROGRESS NOTE ADULT - NSHPATTENDINGPLANDISCUSS_GEN_ALL_CORE
patient in detail, Gastroenterology attending, Surgery PA and today's RN.
patient in detail, Gastroenterology attending, Surgery PA and today's RN.
patient in detail, Gastroenterology team and Surgery PA.
patient in detail, Gastroenterology team, Surgery PA and today's RN.
patient in detail,
Medical Team
pt / nurse

## 2020-10-22 NOTE — CONSULT NOTE ADULT - CONSULT REASON
Clearance for cholecystectomy
Epigastric abdominal pain
abdominal pain  abnormal ct  pancreatitis
19.6

## 2020-10-22 NOTE — CONSULT NOTE ADULT - ASSESSMENT
65y Female with HTN, Bipolar disorder, presents to the ED with c/o abdominal pain, epigastric and RUQ admitted with choledocholithiasis and consistent gallstone pancreatitis. For cholecystectomy tomorrow, 10/23    Gallstone pancreatitis  - Patient with RUQ pain likely Choledocholithiasis  - Plan for cholecystectomy tomorrow, 10/23.   - Patient has no modifiable active cardiac risk factors and in the setting of low risk cholecystectomy patient is optimized as best as possible from cardiovascular standpoint to proceed with planned procedure with routine hemodynamic monitoring.     HTN  - BP: 147/79 (22 Oct 2020 05:33) (147/79 - 151/80)  - BP acceptable, continue metoprolol  - Monitor routine hemodynamics    - Monitor and replete lytes, keep K>4, Mg>2.  - Other cardiovascular workup will depend on clinical course. All other workup per primary team.  - Will follow. 65y Female with HTN, Bipolar disorder, presents to the ED with c/o abdominal pain, epigastric and RUQ admitted with choledocholithiasis and consistent gallstone pancreatitis. For cholecystectomy tomorrow, 10/23    Abn ekg  - known lbbb, unchanged from prior ekgs  - history of a cardiomyopathy that has resolved on last echo in 2019  - no sign of acute ischemia or volume overload.    Gallstone pancreatitis  - Patient with RUQ pain likely Choledocholithiasis  - Plan for cholecystectomy tomorrow, 10/23.   - Patient has no modifiable active cardiac risk factors and in the setting of low risk cholecystectomy patient is optimized as best as possible from cardiovascular standpoint to proceed with planned procedure with routine hemodynamic monitoring.     HTN  - BP: 147/79 (22 Oct 2020 05:33) (147/79 - 151/80)  - BP acceptable, continue metoprolol  - Monitor routine hemodynamics    - Monitor and replete lytes, keep K>4, Mg>2.  - Other cardiovascular workup will depend on clinical course. All other workup per primary team.  - Will follow.

## 2020-10-22 NOTE — GOALS OF CARE CONVERSATION - ADVANCED CARE PLANNING - CONVERSATION DETAILS
Writer met with pt.Reviewed patient's medical and social history as well as events leading to patient's hospitalization. Writer discussed patient's current diagnosis (obstructed common bile duct,LBBB,CHF,HTN,bipolar) medical condition and management,. Inquired about patient's wishes regarding extent of medical care to be provided including escalation of medical care into the ICU and use of vasopressor support. In addition, the writer inquired about thoughts regarding cardiopulmnary resuscitation, artificial nutrition and hydration including use of feeding tubes and IVF, antibiotics, and further investigative studies such as blood draws and radiology. Pt. showed  insight into medical condition. All questions answered. Writer recommended she complete HCP.Pt named her father and sister as HCP/alt HCP.She will discuss her wishes with them. No further needs at this time.

## 2020-10-22 NOTE — PROGRESS NOTE ADULT - SUBJECTIVE AND OBJECTIVE BOX
CHIEF COMPLAINT/INTERVAL HISTORY:    REVIEW OF SYSTEMS:  CONSTITUTIONAL: denies fever, chills, fatigue, weakness  HEENT: denies blurred vision, sore throat  SKIN: denies new lesions, rash  CARDIOVASCULAR: denies chest pain, chest pressure, palpitations  RESPIRATORY: denies shortness of breath, sputum production  GASTROINTESTINAL: denies nausea, vomiting, diarrhea, abdominal pain  GENITOURINARY: denies dysuria, discharge  NEUROLOGICAL: denies numbness, headache, focal weakness  MUSCULOSKELETAL: denies new joint pain, muscle aches  HEMATOLOGIC: denies gross bleeding, bruising  LYMPHATICS: denies enlarged lymph nodes, extremity swelling  PSYCHIATRIC: denies recent changes in anxiety, depression  ENDOCRINOLOGIC: denies sweating, cold or heat intolerance    Vital Signs Last 24 Hrs  T(C): 37.4 (22 Oct 2020 05:33), Max: 37.4 (22 Oct 2020 05:33)  T(F): 99.3 (22 Oct 2020 05:33), Max: 99.3 (22 Oct 2020 05:33)  HR: 60 (22 Oct 2020 05:33) (60 - 68)  BP: 147/79 (22 Oct 2020 05:33) (147/79 - 151/80)  BP(mean): 104 (21 Oct 2020 21:14) (104 - 104)  RR: 18 (22 Oct 2020 05:33) (17 - 18)  SpO2: 96% (22 Oct 2020 05:33) (96% - 98%)    PHYSICAL EXAM:  GENERAL: NAD  HEENT: EOMI, hearing normal, conjunctiva and sclera clear  Chest: CTA bilaterally, no wheezing  CV: S1S2, RRR,   GI: soft, +BS, NT/ND  Musculoskeletal: no edema  Psychiatric: affect nL, mood nL  Skin: warm and dry    LABS:                        12.2   9.55  )-----------( 234      ( 22 Oct 2020 08:53 )             37.0     10-22    144  |  115<H>  |  11  ----------------------------<  113<H>  3.8   |  22  |  1.00    Ca    9.0      22 Oct 2020 08:53  Phos  2.9     10-22  Mg     2.2     10-22    TPro  6.8  /  Alb  3.5  /  TBili  0.3  /  DBili  x   /  AST  41<H>  /  ALT  43  /  AlkPhos  88  10-22          Assessment and Plan: CHIEF COMPLAINT/INTERVAL HISTORY: Patient seen and examined at bedside this morning. No acute events overnight. Patient was requesting switch to 10mg Ambien at bedtime as she has gotten no sleep for the past two nights. Reports improvement in abd pain but admits to gas and bloating. Still endorsing diarrhea overnight, but less frequent than yesterday. Patient is afebrile and tolerating diet well, no n/v. Denies weakness, palpitations, SOB, dysuria.    REVIEW OF SYSTEMS:  CONSTITUTIONAL: denies fever, chills, fatigue, weakness  HEENT: denies blurred vision, sore throat  CARDIOVASCULAR: denies chest pain, chest pressure, palpitations  RESPIRATORY: denies SOB, difficulty breathing  GASTROINTESTINAL: endorses bloating, denies n/v/d, abd pain  GENITOURINARY: denies dysuria  NEUROLOGICAL: denies numbness, headache, focal weakness  MUSCULOSKELETAL: denies pain or weaknes  LYMPHATICS: denies extremity swelling  PSYCHIATRIC: denies recent changes in anxiety, depression    Vital Signs Last 24 Hrs  T(C): 37.4 (22 Oct 2020 05:33), Max: 37.4 (22 Oct 2020 05:33)  T(F): 99.3 (22 Oct 2020 05:33), Max: 99.3 (22 Oct 2020 05:33)  HR: 60 (22 Oct 2020 05:33) (60 - 68)  BP: 147/79 (22 Oct 2020 05:33) (147/79 - 151/80)  BP(mean): 104 (21 Oct 2020 21:14) (104 - 104)  RR: 18 (22 Oct 2020 05:33) (17 - 18)  SpO2: 96% (22 Oct 2020 05:33) (96% - 98%)    PHYSICAL EXAM:  GENERAL: NAD, pleasant  HEENT: EOMI, PERRL, hearing normal, conjunctiva and sclera clear  Chest: CTA bilaterally, no wheezing, rales, or rhonchi  CV: S1S2, RRR, no murmurs, rubs, or gallops  GI: slightly distended, soft, +BS, nontender  Musculoskeletal: no edema, no weakness  Psychiatric: affect nL, mood nL  Skin: warm and dry    LABS:                        12.2   9.55  )-----------( 234      ( 22 Oct 2020 08:53 )             37.0     10-22    144  |  115<H>  |  11  ----------------------------<  113<H>  3.8   |  22  |  1.00    Ca    9.0      22 Oct 2020 08:53  Phos  2.9     10-22  Mg     2.2     10-22    TPro  6.8  /  Alb  3.5  /  TBili  0.3  /  DBili  x   /  AST  41<H>  /  ALT  43  /  AlkPhos  88  10-22          Assessment and Plan:    65y Female with HTN, Bipolar disorder, presents with abdominal pain, epigastric and RUQ, mild elev LFTs, abd sono with 17 mm dilated CBD, and small GB sludge.    # R/O Common biliary duct obstruction  - RUQ pain likely Choledocholithiasis  - MRCP (10/19): Small sludge or gallstones in the gallbladder. No evidence for thickened gallbladder wall, or pericholecystic fluid. Dilated common bile duct. Possible small sludge or gallstones in the very distal common bile duct.  - s/p ERCP (10/20): extension of sphincterotomy; large amount of sludge removed for bile duct  - c/w rocephin and flagyl  - Pt endorses diarrhea this morning, like 2/2 antibiotics, will determine need for abx pending plans for cholecystectomy during this admission    - c/w Bacid   - Plan for laparoscopic cholecystectomy tomorrow (10/23)  - Cardiology consulted for pre-op eval, f/u recs  - GI and Surgery following    #Bloating  - Pt endorses gas and bloating, abd slightly distended  - C/w simethicone  - Start Maalox 30 mL PRN for dyspepsia      #Bipolar disorder  - Cont lithium, metoprolol, lamictal, topiramate, ziprasidone.     #DVT prophylaxis   - c/w lovenox        CHIEF COMPLAINT/INTERVAL HISTORY: Patient seen and examined at bedside this morning. No acute events overnight. Patient was requesting switch to 10mg Ambien at bedtime as she has gotten no sleep for the past two nights. Reports improvement in abd pain but admits to gas and bloating. Still endorsing diarrhea overnight, but less frequent than yesterday. Patient is afebrile and tolerating diet well, no n/v. Denies weakness, palpitations, SOB, dysuria.    REVIEW OF SYSTEMS:  CONSTITUTIONAL: denies fever, chills, fatigue, weakness  HEENT: denies blurred vision, sore throat  CARDIOVASCULAR: denies chest pain, chest pressure, palpitations  RESPIRATORY: denies SOB, difficulty breathing  GASTROINTESTINAL: endorses bloating, denies n/v/d, abd pain  GENITOURINARY: denies dysuria  NEUROLOGICAL: denies numbness, headache, focal weakness  MUSCULOSKELETAL: denies pain or weaknes  LYMPHATICS: denies extremity swelling  PSYCHIATRIC: denies recent changes in anxiety, depression    Vital Signs Last 24 Hrs  T(C): 37.4 (22 Oct 2020 05:33), Max: 37.4 (22 Oct 2020 05:33)  T(F): 99.3 (22 Oct 2020 05:33), Max: 99.3 (22 Oct 2020 05:33)  HR: 60 (22 Oct 2020 05:33) (60 - 68)  BP: 147/79 (22 Oct 2020 05:33) (147/79 - 151/80)  BP(mean): 104 (21 Oct 2020 21:14) (104 - 104)  RR: 18 (22 Oct 2020 05:33) (17 - 18)  SpO2: 96% (22 Oct 2020 05:33) (96% - 98%)    PHYSICAL EXAM:  GENERAL: NAD, pleasant  HEENT: EOMI, PERRL, hearing normal, conjunctiva and sclera clear  Chest: CTA bilaterally, no wheezing, rales, or rhonchi  CV: S1S2, RRR, no murmurs, rubs, or gallops  GI: slightly distended, soft, +BS, nontender  Musculoskeletal: no edema, no weakness  Psychiatric: affect nL, mood nL  Skin: warm and dry    LABS:                        12.2   9.55  )-----------( 234      ( 22 Oct 2020 08:53 )             37.0     10-22    144  |  115<H>  |  11  ----------------------------<  113<H>  3.8   |  22  |  1.00    Ca    9.0      22 Oct 2020 08:53  Phos  2.9     10-22  Mg     2.2     10-22    TPro  6.8  /  Alb  3.5  /  TBili  0.3  /  DBili  x   /  AST  41<H>  /  ALT  43  /  AlkPhos  88  10-22          Assessment and Plan:    65y Female with HTN, Bipolar disorder, presents with abdominal pain, epigastric and RUQ, mild elev LFTs, abd sono with 17 mm dilated CBD, and small GB sludge.    # R/O Common biliary duct obstruction  - RUQ pain likely Choledocholithiasis  - MRCP (10/19): Small sludge or gallstones in the gallbladder. No evidence for thickened gallbladder wall, or pericholecystic fluid. Dilated common bile duct. Possible small sludge or gallstones in the very distal common bile duct.  - s/p ERCP (10/20): extension of sphincterotomy; large amount of sludge removed for bile duct  - c/w rocephin and flagyl  - Pt endorses diarrhea this morning, like 2/2 antibiotics, will determine need for abx pending plans for cholecystectomy during this admission    - c/w Bacid   - Plan for laparoscopic cholecystectomy tomorrow (10/23)  - Cardiology consulted for pre-op eval, f/u recs  - GI and Surgery following    #Bloating  - Pt endorses gas and bloating, abd slightly distended  - C/w simethicone  - Maalox 30 mL PRN added for dyspepsia      #Bipolar disorder  - Cont lithium, metoprolol, lamictal, topiramate, ziprasidone.     #DVT prophylaxis   - c/w lovenox        CHIEF COMPLAINT/INTERVAL HISTORY: Patient seen and examined at bedside this morning. No acute events overnight. Patient was requesting switch to 10mg Ambien as one time dose at bedtime as she has gotten no sleep for the past two nights. Reports improvement in abd pain but admits to gas and bloating. Still endorsing diarrhea overnight, but less frequent than yesterday. Patient is afebrile and tolerating diet well, no n/v. Denies weakness, palpitations, SOB, dysuria.    REVIEW OF SYSTEMS:  CONSTITUTIONAL: denies fever, chills, fatigue, weakness  HEENT: denies blurred vision, sore throat  CARDIOVASCULAR: denies chest pain, chest pressure, palpitations  RESPIRATORY: denies SOB, difficulty breathing  GASTROINTESTINAL: endorses bloating, denies n/v/d, abd pain  GENITOURINARY: denies dysuria  NEUROLOGICAL: denies numbness, headache, focal weakness  MUSCULOSKELETAL: denies pain or weaknes  LYMPHATICS: denies extremity swelling  PSYCHIATRIC: denies recent changes in anxiety, depression    Vital Signs Last 24 Hrs  T(C): 37.4 (22 Oct 2020 05:33), Max: 37.4 (22 Oct 2020 05:33)  T(F): 99.3 (22 Oct 2020 05:33), Max: 99.3 (22 Oct 2020 05:33)  HR: 60 (22 Oct 2020 05:33) (60 - 68)  BP: 147/79 (22 Oct 2020 05:33) (147/79 - 151/80)  BP(mean): 104 (21 Oct 2020 21:14) (104 - 104)  RR: 18 (22 Oct 2020 05:33) (17 - 18)  SpO2: 96% (22 Oct 2020 05:33) (96% - 98%)    PHYSICAL EXAM:  GENERAL: NAD, pleasant  HEENT: EOMI, PERRL, hearing normal, conjunctiva and sclera clear  Chest: CTA bilaterally, no wheezing, rales, or rhonchi  CV: S1S2, RRR, no murmurs, rubs, or gallops  GI: slightly distended, soft, +BS, nontender  Musculoskeletal: no edema, no weakness  Psychiatric: affect nL, mood nL  Skin: warm and dry    LABS:                        12.2   9.55  )-----------( 234      ( 22 Oct 2020 08:53 )             37.0     10-22    144  |  115<H>  |  11  ----------------------------<  113<H>  3.8   |  22  |  1.00    Ca    9.0      22 Oct 2020 08:53  Phos  2.9     10-22  Mg     2.2     10-22    TPro  6.8  /  Alb  3.5  /  TBili  0.3  /  DBili  x   /  AST  41<H>  /  ALT  43  /  AlkPhos  88  10-22          Assessment and Plan:  65y Female with HTN, Bipolar disorder, presents with abdominal pain, epigastric and RUQ, mild elev LFTs, abd sono with 17 mm dilated CBD, and small GB sludge.    # R/O Common biliary duct obstruction  - RUQ pain likely Choledocholithiasis  - MRCP (10/19): Small sludge or gallstones in the gallbladder. No evidence for thickened gallbladder wall, or pericholecystic fluid. Dilated common bile duct. Possible small sludge or gallstones in the very distal common bile duct.  - s/p ERCP (10/20): extension of sphincterotomy; large amount of sludge removed for bile duct  - c/w rocephin and flagyl  - Pt endorses improving diarrhea, likely 2/2 antibiotics, will determine need for abx post cholecystectomy  - c/w Bacid   - Plan for laparoscopic cholecystectomy tomorrow (10/23)  - Cardiology consulted for pre-op eval, f/u recs  - GI and Surgery following    RCRI score 0, class I risk. Patient has no absolute medical contraindications. She is considered low risk for a low risk laparoscopic procedure. She has no prior complications with anesthesia and reports being able to complete at least 4 METs of activity.    #Bloating  - Pt endorses gas and bloating, abd slightly distended  - C/w simethicone  - Maalox 30 mL PRN q6h for dyspepsia    #Bipolar disorder  - Cont lithium, metoprolol, lamictal, topiramate, ziprasidone.     #DVT prophylaxis   - Hold chem dvt ppx for plan for angélica tomorrow, restart pending surgery recs

## 2020-10-23 ENCOUNTER — RESULT REVIEW (OUTPATIENT)
Age: 65
End: 2020-10-23

## 2020-10-23 LAB
ALBUMIN SERPL ELPH-MCNC: 3.4 G/DL — SIGNIFICANT CHANGE UP (ref 3.3–5)
ALP SERPL-CCNC: 90 U/L — SIGNIFICANT CHANGE UP (ref 40–120)
ALT FLD-CCNC: 37 U/L — SIGNIFICANT CHANGE UP (ref 12–78)
ANION GAP SERPL CALC-SCNC: 7 MMOL/L — SIGNIFICANT CHANGE UP (ref 5–17)
AST SERPL-CCNC: 22 U/L — SIGNIFICANT CHANGE UP (ref 15–37)
BILIRUB SERPL-MCNC: 0.4 MG/DL — SIGNIFICANT CHANGE UP (ref 0.2–1.2)
BUN SERPL-MCNC: 12 MG/DL — SIGNIFICANT CHANGE UP (ref 7–23)
CALCIUM SERPL-MCNC: 9 MG/DL — SIGNIFICANT CHANGE UP (ref 8.5–10.1)
CHLORIDE SERPL-SCNC: 116 MMOL/L — HIGH (ref 96–108)
CO2 SERPL-SCNC: 22 MMOL/L — SIGNIFICANT CHANGE UP (ref 22–31)
CREAT SERPL-MCNC: 1 MG/DL — SIGNIFICANT CHANGE UP (ref 0.5–1.3)
GLUCOSE SERPL-MCNC: 118 MG/DL — HIGH (ref 70–99)
HCT VFR BLD CALC: 38.8 % — SIGNIFICANT CHANGE UP (ref 34.5–45)
HGB BLD-MCNC: 12.3 G/DL — SIGNIFICANT CHANGE UP (ref 11.5–15.5)
MCHC RBC-ENTMCNC: 29.3 PG — SIGNIFICANT CHANGE UP (ref 27–34)
MCHC RBC-ENTMCNC: 31.7 GM/DL — LOW (ref 32–36)
MCV RBC AUTO: 92.4 FL — SIGNIFICANT CHANGE UP (ref 80–100)
NRBC # BLD: 0 /100 WBCS — SIGNIFICANT CHANGE UP (ref 0–0)
PLATELET # BLD AUTO: 244 K/UL — SIGNIFICANT CHANGE UP (ref 150–400)
POTASSIUM SERPL-MCNC: 3.7 MMOL/L — SIGNIFICANT CHANGE UP (ref 3.5–5.3)
POTASSIUM SERPL-SCNC: 3.7 MMOL/L — SIGNIFICANT CHANGE UP (ref 3.5–5.3)
PROT SERPL-MCNC: 6.9 G/DL — SIGNIFICANT CHANGE UP (ref 6–8.3)
RBC # BLD: 4.2 M/UL — SIGNIFICANT CHANGE UP (ref 3.8–5.2)
RBC # FLD: 13.3 % — SIGNIFICANT CHANGE UP (ref 10.3–14.5)
SODIUM SERPL-SCNC: 145 MMOL/L — SIGNIFICANT CHANGE UP (ref 135–145)
WBC # BLD: 10.31 K/UL — SIGNIFICANT CHANGE UP (ref 3.8–10.5)
WBC # FLD AUTO: 10.31 K/UL — SIGNIFICANT CHANGE UP (ref 3.8–10.5)

## 2020-10-23 PROCEDURE — 47562 LAPAROSCOPIC CHOLECYSTECTOMY: CPT | Mod: AS

## 2020-10-23 PROCEDURE — 99233 SBSQ HOSP IP/OBS HIGH 50: CPT | Mod: GC

## 2020-10-23 PROCEDURE — 88304 TISSUE EXAM BY PATHOLOGIST: CPT | Mod: 26

## 2020-10-23 PROCEDURE — 47562 LAPAROSCOPIC CHOLECYSTECTOMY: CPT

## 2020-10-23 PROCEDURE — 99232 SBSQ HOSP IP/OBS MODERATE 35: CPT

## 2020-10-23 RX ORDER — MORPHINE SULFATE 50 MG/1
2 CAPSULE, EXTENDED RELEASE ORAL EVERY 4 HOURS
Refills: 0 | Status: DISCONTINUED | OUTPATIENT
Start: 2020-10-23 | End: 2020-10-24

## 2020-10-23 RX ORDER — SIMETHICONE 80 MG/1
80 TABLET, CHEWABLE ORAL THREE TIMES A DAY
Refills: 0 | Status: DISCONTINUED | OUTPATIENT
Start: 2020-10-23 | End: 2020-10-25

## 2020-10-23 RX ORDER — LACTOBACILLUS ACIDOPHILUS 100MM CELL
1 CAPSULE ORAL
Refills: 0 | Status: DISCONTINUED | OUTPATIENT
Start: 2020-10-23 | End: 2020-10-25

## 2020-10-23 RX ORDER — FAMOTIDINE 10 MG/ML
20 INJECTION INTRAVENOUS DAILY
Refills: 0 | Status: DISCONTINUED | OUTPATIENT
Start: 2020-10-23 | End: 2020-10-25

## 2020-10-23 RX ORDER — SUMATRIPTAN SUCCINATE 4 MG/.5ML
100 INJECTION, SOLUTION SUBCUTANEOUS DAILY
Refills: 0 | Status: DISCONTINUED | OUTPATIENT
Start: 2020-10-23 | End: 2020-10-25

## 2020-10-23 RX ORDER — HYDROMORPHONE HYDROCHLORIDE 2 MG/ML
0.5 INJECTION INTRAMUSCULAR; INTRAVENOUS; SUBCUTANEOUS
Refills: 0 | Status: DISCONTINUED | OUTPATIENT
Start: 2020-10-23 | End: 2020-10-23

## 2020-10-23 RX ORDER — CEFTRIAXONE 500 MG/1
1000 INJECTION, POWDER, FOR SOLUTION INTRAMUSCULAR; INTRAVENOUS EVERY 24 HOURS
Refills: 0 | Status: DISCONTINUED | OUTPATIENT
Start: 2020-10-23 | End: 2020-10-25

## 2020-10-23 RX ORDER — SODIUM CHLORIDE 9 MG/ML
1000 INJECTION, SOLUTION INTRAVENOUS
Refills: 0 | Status: DISCONTINUED | OUTPATIENT
Start: 2020-10-23 | End: 2020-10-24

## 2020-10-23 RX ORDER — LITHIUM CARBONATE 300 MG/1
300 TABLET, EXTENDED RELEASE ORAL
Refills: 0 | Status: DISCONTINUED | OUTPATIENT
Start: 2020-10-23 | End: 2020-10-25

## 2020-10-23 RX ORDER — ACETAMINOPHEN 500 MG
650 TABLET ORAL EVERY 6 HOURS
Refills: 0 | Status: DISCONTINUED | OUTPATIENT
Start: 2020-10-23 | End: 2020-10-25

## 2020-10-23 RX ORDER — ONDANSETRON 8 MG/1
4 TABLET, FILM COATED ORAL ONCE
Refills: 0 | Status: DISCONTINUED | OUTPATIENT
Start: 2020-10-23 | End: 2020-10-23

## 2020-10-23 RX ORDER — SODIUM CHLORIDE 9 MG/ML
1000 INJECTION, SOLUTION INTRAVENOUS
Refills: 0 | Status: DISCONTINUED | OUTPATIENT
Start: 2020-10-23 | End: 2020-10-23

## 2020-10-23 RX ORDER — ZIPRASIDONE HYDROCHLORIDE 20 MG/1
80 CAPSULE ORAL
Refills: 0 | Status: DISCONTINUED | OUTPATIENT
Start: 2020-10-23 | End: 2020-10-25

## 2020-10-23 RX ORDER — METRONIDAZOLE 500 MG
500 TABLET ORAL EVERY 8 HOURS
Refills: 0 | Status: DISCONTINUED | OUTPATIENT
Start: 2020-10-23 | End: 2020-10-25

## 2020-10-23 RX ORDER — TOPIRAMATE 25 MG
100 TABLET ORAL
Refills: 0 | Status: DISCONTINUED | OUTPATIENT
Start: 2020-10-23 | End: 2020-10-25

## 2020-10-23 RX ORDER — ZOLPIDEM TARTRATE 10 MG/1
10 TABLET ORAL AT BEDTIME
Refills: 0 | Status: DISCONTINUED | OUTPATIENT
Start: 2020-10-23 | End: 2020-10-24

## 2020-10-23 RX ORDER — OXYCODONE HYDROCHLORIDE 5 MG/1
5 TABLET ORAL ONCE
Refills: 0 | Status: DISCONTINUED | OUTPATIENT
Start: 2020-10-23 | End: 2020-10-23

## 2020-10-23 RX ORDER — LAMOTRIGINE 25 MG/1
200 TABLET, ORALLY DISINTEGRATING ORAL
Refills: 0 | Status: DISCONTINUED | OUTPATIENT
Start: 2020-10-23 | End: 2020-10-25

## 2020-10-23 RX ORDER — METOPROLOL TARTRATE 50 MG
25 TABLET ORAL DAILY
Refills: 0 | Status: DISCONTINUED | OUTPATIENT
Start: 2020-10-23 | End: 2020-10-25

## 2020-10-23 RX ORDER — ONDANSETRON 8 MG/1
4 TABLET, FILM COATED ORAL EVERY 8 HOURS
Refills: 0 | Status: DISCONTINUED | OUTPATIENT
Start: 2020-10-23 | End: 2020-10-25

## 2020-10-23 RX ADMIN — Medication 1 TABLET(S): at 06:09

## 2020-10-23 RX ADMIN — MORPHINE SULFATE 2 MILLIGRAM(S): 50 CAPSULE, EXTENDED RELEASE ORAL at 10:26

## 2020-10-23 RX ADMIN — Medication 100 MILLIGRAM(S): at 08:30

## 2020-10-23 RX ADMIN — HYDROMORPHONE HYDROCHLORIDE 0.5 MILLIGRAM(S): 2 INJECTION INTRAMUSCULAR; INTRAVENOUS; SUBCUTANEOUS at 17:26

## 2020-10-23 RX ADMIN — LITHIUM CARBONATE 300 MILLIGRAM(S): 300 TABLET, EXTENDED RELEASE ORAL at 08:30

## 2020-10-23 RX ADMIN — ZIPRASIDONE HYDROCHLORIDE 80 MILLIGRAM(S): 20 CAPSULE ORAL at 08:30

## 2020-10-23 RX ADMIN — HYDROMORPHONE HYDROCHLORIDE 0.5 MILLIGRAM(S): 2 INJECTION INTRAMUSCULAR; INTRAVENOUS; SUBCUTANEOUS at 17:16

## 2020-10-23 RX ADMIN — LAMOTRIGINE 200 MILLIGRAM(S): 25 TABLET, ORALLY DISINTEGRATING ORAL at 08:30

## 2020-10-23 RX ADMIN — HYDROMORPHONE HYDROCHLORIDE 0.5 MILLIGRAM(S): 2 INJECTION INTRAMUSCULAR; INTRAVENOUS; SUBCUTANEOUS at 17:06

## 2020-10-23 RX ADMIN — Medication 25 MILLIGRAM(S): at 06:09

## 2020-10-23 RX ADMIN — MORPHINE SULFATE 2 MILLIGRAM(S): 50 CAPSULE, EXTENDED RELEASE ORAL at 08:31

## 2020-10-23 RX ADMIN — SIMETHICONE 80 MILLIGRAM(S): 80 TABLET, CHEWABLE ORAL at 21:01

## 2020-10-23 RX ADMIN — SIMETHICONE 80 MILLIGRAM(S): 80 TABLET, CHEWABLE ORAL at 06:09

## 2020-10-23 RX ADMIN — Medication 100 MILLIGRAM(S): at 06:09

## 2020-10-23 RX ADMIN — HYDROMORPHONE HYDROCHLORIDE 0.5 MILLIGRAM(S): 2 INJECTION INTRAMUSCULAR; INTRAVENOUS; SUBCUTANEOUS at 17:36

## 2020-10-23 RX ADMIN — MORPHINE SULFATE 2 MILLIGRAM(S): 50 CAPSULE, EXTENDED RELEASE ORAL at 21:12

## 2020-10-23 RX ADMIN — MORPHINE SULFATE 2 MILLIGRAM(S): 50 CAPSULE, EXTENDED RELEASE ORAL at 20:57

## 2020-10-23 RX ADMIN — SODIUM CHLORIDE 75 MILLILITER(S): 9 INJECTION, SOLUTION INTRAVENOUS at 08:32

## 2020-10-23 NOTE — PROGRESS NOTE ADULT - SUBJECTIVE AND OBJECTIVE BOX
SUBJECTIVE:  Patient seen and examined at bedside. No overnight events. Patient c/o hip pain at this time. She states she has chronic hip pain and is on chronic pain medication at home. She denies abdominal pain. For OR this afternoon for lap angélica. Patient denies any fevers, chills, chest pain, shortness of breath, abdominal pain, nausea, vomiting or diarrhea.    Vital Signs Last 24 Hrs  T(C): 37.1 (23 Oct 2020 04:43), Max: 37.1 (22 Oct 2020 21:06)  T(F): 98.8 (23 Oct 2020 04:43), Max: 98.8 (23 Oct 2020 04:43)  HR: 65 (23 Oct 2020 06:10) (56 - 69)  BP: 149/70 (23 Oct 2020 04:43) (146/71 - 162/86)  BP(mean): 111 (22 Oct 2020 21:06) (111 - 111)  RR: 17 (23 Oct 2020 04:43) (17 - 17)  SpO2: 98% (23 Oct 2020 04:43) (98% - 98%)    PHYSICAL EXAM:  GENERAL: NAD, resting in bed  HEAD:  Atraumatic, Normocephalic  ABDOMEN: Soft, non-distended. Nontender to palpation. +BS  NEUROLOGY: A&O x 3     LABS: 10/23 AM labs pending       ASSESSMENT  65y Female admitted with RUQ pain, choledocholithiasis, s/p ERCP on 10/20/20, labs improved, feeling well    PLAN  - Continue care as per primary team  - pain control, supportive care, OOB  - continue abx  - For OR this afternoon for laparoscopic cholecystectomy   - F/U AM labs  - Case discussed with Dr. Sharma    Surgical Team Contact Information  Spectralink: Ext: [phone] or [phone]  Pager: [phone]

## 2020-10-23 NOTE — PROGRESS NOTE ADULT - PROBLEM SELECTOR PLAN 1
resolved  s/p ercp  npo for OR today; f/u surgery recs  cont antibiotics   cont probiotic  monitor gi fxn resolved  s/p ercp  npo for OR today; f/u surgery recs  cont antibiotics   cont pepcid  cont probiotic  monitor gi fxn

## 2020-10-23 NOTE — PROGRESS NOTE ADULT - SUBJECTIVE AND OBJECTIVE BOX
[facility]    Mili March, Pop, Nuris, Valdo, Alfa, Jani      [phone]    CHIEF COMPLAINT: Patient is a 65y old  Female who presents with a chief complaint of abdominal pain - epigastric, RUQ (23 Oct 2020 09:19)      Follow Up: nicm, preop    Interim history: The patient reports no new symptoms.  Denies chest discomfort and shortness of breath.  Improved abdominal pain.  No new neurologic symptoms.      MEDICATIONS  (STANDING):    MEDICATIONS  (PRN):      REVIEW OF SYSTEMS:  eye, ent, GI, , allergic, dermatologic, musculoskeletal and neurologic are negative except as described above    Vital Signs Last 24 Hrs  T(C): 36.8 (23 Oct 2020 11:39), Max: 37.1 (22 Oct 2020 21:06)  T(F): 98.2 (23 Oct 2020 11:39), Max: 98.8 (23 Oct 2020 04:43)  HR: 63 (23 Oct 2020 11:39) (56 - 69)  BP: 172/80 (23 Oct 2020 11:39) (149/70 - 172/80)  BP(mean): 111 (22 Oct 2020 21:06) (111 - 111)  RR: 16 (23 Oct 2020 11:39) (15 - 17)  SpO2: 100% (23 Oct 2020 11:39) (98% - 100%)    I&O's Summary      Telemetry past 24h:    PHYSICAL EXAM:    Constitutional: well-nourished, well-developed, NAD   HEENT:  MMM, sclerae anicteric, conjunctivae clear, no oral cyanosis.  Pulmonary: Non-labored, breath sounds are clear bilaterally, No wheezing, rales or rhonchi  Cardiovascular: Regular, S1 and S2.  No murmur.  No rubs, gallops or clicks  Gastrointestinal: Bowel Sounds present, soft, mild tenderness  Lymph: No peripheral edema.   Neurological: Alert, no focal deficits  Skin: No rashes.  Psych:  Mood & affect appropriate    LABS: All Labs Reviewed:                        12.3   10.31 )-----------( 244      ( 23 Oct 2020 08:19 )             38.8                         12.2   9.55  )-----------( 234      ( 22 Oct 2020 08:53 )             37.0                         11.8   8.93  )-----------( 226      ( 21 Oct 2020 09:56 )             36.4     23 Oct 2020 08:19    145    |  116    |  12     ----------------------------<  118    3.7     |  22     |  1.00   22 Oct 2020 08:53    144    |  115    |  11     ----------------------------<  113    3.8     |  22     |  1.00   21 Oct 2020 09:56    143    |  114    |  8      ----------------------------<  117    3.7     |  22     |  0.97     Ca    9.0        23 Oct 2020 08:19  Ca    9.0        22 Oct 2020 08:53  Ca    8.9        21 Oct 2020 09:56  Phos  2.9       22 Oct 2020 08:53  Phos  2.2       21 Oct 2020 09:56  Mg     2.2       22 Oct 2020 08:53  Mg     2.1       21 Oct 2020 09:56    TPro  6.9    /  Alb  3.4    /  TBili  0.4    /  DBili  x      /  AST  22     /  ALT  37     /  AlkPhos  90     23 Oct 2020 08:19  TPro  6.8    /  Alb  3.5    /  TBili  0.3    /  DBili  x      /  AST  41     /  ALT  43     /  AlkPhos  88     22 Oct 2020 08:53  TPro  6.6    /  Alb  3.3    /  TBili  0.2    /  DBili  x      /  AST  12     /  ALT  20     /  AlkPhos  83     21 Oct 2020 09:56    PT/INR - ( 22 Oct 2020 11:54 )   PT: 12.2 sec;   INR: 1.04 ratio         PTT - ( 22 Oct 2020 11:54 )  PTT:30.3 sec      Blood Culture:         RADIOLOGY:    EKG:    Echo:

## 2020-10-23 NOTE — PROGRESS NOTE ADULT - SUBJECTIVE AND OBJECTIVE BOX
Post Operative Note  Patient: DEEPTHI FISH 65y (1955) Female   MRN: [id number]  Location: [place] [address]  Visit: 10-17-20 Inpatient  Date: 10-23-20 @ 23:22    Procedure: S/P Laparoscopic cholecystectomy    Subjective:   Patient seen and examined at bedside, reports ambulating, voiding.  States has not passed flatus or had a bowel movement since surgery.  Has not eaten yet either.  States feels "gassy"  Patient denies dizziness, chest pain, sob, nausea, vomiting, abdominal pain, or urinary complaints.    Objective:  Vitals: T(F): 98.7 (10-23-20 @ 20:27), Max: 99 (10-23-20 @ 18:20)  HR: 79 (10-23-20 @ 20:27)  BP: 133/76 (10-23-20 @ 20:27) (133/76 - 172/80)  RR: 17 (10-23-20 @ 20:27)  SpO2: 96% (10-23-20 @ 20:27)  Vent Settings:     In:   10-23-20 @ 07:01  -  10-23-20 @ 23:22  --------------------------------------------------------  IN: 150 mL      IV Fluids: lactated ringers. 1000 milliLiter(s) (75 mL/Hr) IV Continuous <Continuous>      Out:   10-23-20 @ 07:01  -  10-23-20 @ 23:22  --------------------------------------------------------  OUT: 410 mL      EBL:     Voided Urine:   10-23-20 @ 07:01  -  10-23-20 @ 23:22  --------------------------------------------------------  OUT: 410 mL      Bennett Catheter: yes no   Drains:   FANNY:   10-23-20 @ 07:01  -  10-23-20 @ 23:22  --------------------------------------------------------  OUT: 10 mL     ,   Chest Tube:      NG Tube:       PHYSICAL EXAM  GENERAL:  Well-nourished, well-developed Female lying comfortably in bed in NAD  CARDIO:  Regular rate and rhythm.  No murmur, gallop or rub appreciated.  RESPIRATORY:  Clear to auscultation bilaterally.  No wheezing, rales or rhonchi appreciated.  ABDOMEN:  Incisions with steri strips in place.  Clean dry intact.  FANNY drain in place to right lower incision.  Scant serosanguinous fluid in bulb.    EXTREMITIES: No calf tenderness  SKIN:  No jaundice, pallor, or cyanosis  NEURO:  A&O x 3  Surgical dressings clean, dry, intact.    Medications: [Standing]  acetaminophen   Tablet .. 650 milliGRAM(s) Oral every 6 hours PRN  aluminum hydroxide/magnesium hydroxide/simethicone Suspension 30 milliLiter(s) Oral every 6 hours PRN  cefTRIAXone   IVPB 1000 milliGRAM(s) IV Intermittent every 24 hours  famotidine    Tablet 20 milliGRAM(s) Oral daily  lactated ringers. 1000 milliLiter(s) IV Continuous <Continuous>  lactobacillus acidophilus 1 Tablet(s) Oral two times a day  lamoTRIgine 200 milliGRAM(s) Oral two times a day  lithium 300 milliGRAM(s) Oral two times a day  metoprolol tartrate 25 milliGRAM(s) Oral daily  metroNIDAZOLE  IVPB 500 milliGRAM(s) IV Intermittent every 8 hours  morphine  - Injectable 2 milliGRAM(s) IV Push every 4 hours PRN  ondansetron Injectable 4 milliGRAM(s) IV Push every 8 hours PRN  simethicone 80 milliGRAM(s) Chew three times a day  SUMAtriptan 100 milliGRAM(s) Oral daily PRN  topiramate 100 milliGRAM(s) Oral two times a day  ziprasidone 80 milliGRAM(s) Oral <User Schedule>  zolpidem 10 milliGRAM(s) Oral at bedtime PRN    Medications: [PRN]  acetaminophen   Tablet .. 650 milliGRAM(s) Oral every 6 hours PRN  aluminum hydroxide/magnesium hydroxide/simethicone Suspension 30 milliLiter(s) Oral every 6 hours PRN  cefTRIAXone   IVPB 1000 milliGRAM(s) IV Intermittent every 24 hours  famotidine    Tablet 20 milliGRAM(s) Oral daily  lactated ringers. 1000 milliLiter(s) IV Continuous <Continuous>  lactobacillus acidophilus 1 Tablet(s) Oral two times a day  lamoTRIgine 200 milliGRAM(s) Oral two times a day  lithium 300 milliGRAM(s) Oral two times a day  metoprolol tartrate 25 milliGRAM(s) Oral daily  metroNIDAZOLE  IVPB 500 milliGRAM(s) IV Intermittent every 8 hours  morphine  - Injectable 2 milliGRAM(s) IV Push every 4 hours PRN  ondansetron Injectable 4 milliGRAM(s) IV Push every 8 hours PRN  simethicone 80 milliGRAM(s) Chew three times a day  SUMAtriptan 100 milliGRAM(s) Oral daily PRN  topiramate 100 milliGRAM(s) Oral two times a day  ziprasidone 80 milliGRAM(s) Oral <User Schedule>  zolpidem 10 milliGRAM(s) Oral at bedtime PRN    Labs:                        12.3   10.31 )-----------( 244      ( 23 Oct 2020 08:19 )             38.8     10-23    145  |  116<H>  |  12  ----------------------------<  118<H>  3.7   |  22  |  1.00    Ca    9.0      23 Oct 2020 08:19  Phos  2.9     10-22  Mg     2.2     10-22    TPro  6.9  /  Alb  3.4  /  TBili  0.4  /  DBili  x   /  AST  22  /  ALT  37  /  AlkPhos  90  10-23    PT/INR - ( 22 Oct 2020 11:54 )   PT: 12.2 sec;   INR: 1.04 ratio         PTT - ( 22 Oct 2020 11:54 )  PTT:30.3 sec      Imaging:  No post-op imaging studies    Assessment:  65yFemale patient S/P lap angélica for gallstone pancreatitis    Plan:  - Continue diet  - Continue abx  - Pain control  - Zofran PRN  - Incentive spirometry- strongly encouraged.   - Encourage ambulation  - SCDs- while in bed.   - Follow up AM labs  - Will continue to monitor

## 2020-10-23 NOTE — PROGRESS NOTE ADULT - SUBJECTIVE AND OBJECTIVE BOX
INTERVAL HPI/OVERNIGHT EVENTS:  pt seen and examined  denies n/v/abd pain  reports loose stools yesterday, soft, not watery, ?dark in color  for OR today    MEDICATIONS  (STANDING):  cefTRIAXone   IVPB 1000 milliGRAM(s) IV Intermittent every 24 hours  enoxaparin Injectable 40 milliGRAM(s) SubCutaneous daily  famotidine    Tablet 20 milliGRAM(s) Oral daily  lactated ringers. 1000 milliLiter(s) (70 mL/Hr) IV Continuous <Continuous>  lamoTRIgine 200 milliGRAM(s) Oral two times a day  lithium 300 milliGRAM(s) Oral two times a day  metoprolol tartrate 25 milliGRAM(s) Oral daily  metroNIDAZOLE  IVPB 500 milliGRAM(s) IV Intermittent every 8 hours  topiramate 100 milliGRAM(s) Oral two times a day  ziprasidone 80 milliGRAM(s) Oral <User Schedule>    MEDICATIONS  (PRN):  acetaminophen   Tablet .. 650 milliGRAM(s) Oral every 6 hours PRN Temp greater or equal to 38C (100.4F), Mild Pain (1 - 3), Moderate Pain (4 - 6)  aluminum hydroxide/magnesium hydroxide/simethicone Suspension 30 milliLiter(s) Oral every 6 hours PRN Dyspepsia  morphine  - Injectable 2 milliGRAM(s) IV Push every 4 hours PRN Severe Pain (7 - 10)  ondansetron Injectable 4 milliGRAM(s) IV Push every 8 hours PRN Nausea and/or Vomiting  SUMAtriptan 100 milliGRAM(s) Oral daily PRN Headache/migraine  zolpidem 5 milliGRAM(s) Oral at bedtime PRN Insomnia  zolpidem 5 milliGRAM(s) Oral at bedtime PRN Insomnia      Allergies    penicillins (Rash)  sulfa drugs (Rash)    Intolerances        Review of Systems:    General:  No wt loss, fevers, chills, night sweats, fatigue   Eyes:  Good vision, no reported pain  ENT:  No sore throat, pain, runny nose, dysphagia  CV:  No pain, palpitations, hypo/hypertension  Resp:  No dyspnea, cough, tachypnea, wheezing  GI:  see above  :  No pain, bleeding, incontinence, nocturia  Muscle:  No pain, weakness  Neuro:  No weakness, tingling, memory problems  Psych:  No fatigue, insomnia, mood problems, depression  Endocrine:  No polyuria, polydypsia, cold/heat intolerance  Heme:  No petechiae, ecchymosis, easy bruisability  Skin:  No rash, tattoos, scars, edema      Vital Signs Last 24 Hrs  T(C): 37.2 (19 Oct 2020 04:33), Max: 37.2 (19 Oct 2020 04:33)  T(F): 98.9 (19 Oct 2020 04:33), Max: 98.9 (19 Oct 2020 04:33)  HR: 68 (19 Oct 2020 04:33) (59 - 68)  BP: 134/68 (19 Oct 2020 04:33) (132/69 - 156/83)  BP(mean): --  RR: 19 (19 Oct 2020 04:33) (18 - 19)  SpO2: 96% (19 Oct 2020 04:33) (95% - 96%)    PHYSICAL EXAM:    Constitutional: lying in bed  HEENT: ncat  Gastrointestinal: soft nt mild dt  Extremities: No peripheral edema  Vascular: 2+ peripheral pulses  Neurological: Awake alert appropriate     LABS:                        12.2   8.67  )-----------( 206      ( 18 Oct 2020 08:57 )             37.6     10-18    145  |  115<H>  |  13  ----------------------------<  97  3.8   |  21<L>  |  0.92    Ca    8.7      18 Oct 2020 08:57    TPro  6.9  /  Alb  3.4  /  TBili  0.7  /  DBili  .20  /  AST  21  /  ALT  29  /  AlkPhos  94  10-18          RADIOLOGY & ADDITIONAL TESTS:

## 2020-10-23 NOTE — PROGRESS NOTE ADULT - ASSESSMENT
65y Female with HTN, Bipolar disorder, presents to the ED with c/o abdominal pain, epigastric and RUQ admitted with choledocholithiasis and consistent gallstone pancreatitis. For cholecystectomy tomorrow, 10/23    Abn ekg  - known lbbb, unchanged from prior ekgs  - history of a cardiomyopathy that has resolved on last echo in 2019  - no sign of acute ischemia or volume overload.    Gallstone pancreatitis  - Patient with RUQ pain likely Choledocholithiasis  - Plan for cholecystectomy .   - Patient has no modifiable active cardiac risk factors and in the setting of low risk cholecystectomy patient is optimized as best as possible from cardiovascular standpoint to proceed with planned procedure with routine hemodynamic monitoring.     HTN  - BP: 147/79 (22 Oct 2020 05:33) (147/79 - 151/80)  - BP acceptable, continue metoprolol  - Monitor routine hemodynamics    - Monitor and replete lytes, keep K>4, Mg>2.  - Other cardiovascular workup will depend on clinical course. All other workup per primary team.  - Will follow.

## 2020-10-23 NOTE — PROGRESS NOTE ADULT - SUBJECTIVE AND OBJECTIVE BOX
CHIEF COMPLAINT/INTERVAL HISTORY:    REVIEW OF SYSTEMS:  CONSTITUTIONAL: denies fever, chills, fatigue, weakness  HEENT: denies blurred vision, sore throat  SKIN: denies new lesions, rash  CARDIOVASCULAR: denies chest pain, chest pressure, palpitations  RESPIRATORY: denies shortness of breath, sputum production  GASTROINTESTINAL: denies nausea, vomiting, diarrhea, abdominal pain  GENITOURINARY: denies dysuria, discharge  NEUROLOGICAL: denies numbness, headache, focal weakness  MUSCULOSKELETAL: denies new joint pain, muscle aches  HEMATOLOGIC: denies gross bleeding, bruising  LYMPHATICS: denies enlarged lymph nodes, extremity swelling  PSYCHIATRIC: denies recent changes in anxiety, depression  ENDOCRINOLOGIC: denies sweating, cold or heat intolerance    Vital Signs Last 24 Hrs  T(C): 37.1 (23 Oct 2020 04:43), Max: 37.1 (22 Oct 2020 21:06)  T(F): 98.8 (23 Oct 2020 04:43), Max: 98.8 (23 Oct 2020 04:43)  HR: 65 (23 Oct 2020 06:10) (56 - 69)  BP: 149/70 (23 Oct 2020 04:43) (146/71 - 162/86)  BP(mean): 111 (22 Oct 2020 21:06) (111 - 111)  RR: 17 (23 Oct 2020 04:43) (17 - 17)  SpO2: 98% (23 Oct 2020 04:43) (98% - 98%)    PHYSICAL EXAM:  GENERAL: NAD  HEENT: EOMI, hearing normal, conjunctiva and sclera clear  Chest: CTA bilaterally, no wheezing  CV: S1S2, RRR,   GI: soft, +BS, NT/ND  Musculoskeletal: no edema  Psychiatric: affect nL, mood nL  Skin: warm and dry    LABS:                        12.3   10.31 )-----------( 244      ( 23 Oct 2020 08:19 )             38.8     10-23    145  |  116<H>  |  12  ----------------------------<  118<H>  3.7   |  22  |  1.00    Ca    9.0      23 Oct 2020 08:19  Phos  2.9     10-22  Mg     2.2     10-22    TPro  6.9  /  Alb  3.4  /  TBili  0.4  /  DBili  x   /  AST  22  /  ALT  37  /  AlkPhos  90  10-23    PT/INR - ( 22 Oct 2020 11:54 )   PT: 12.2 sec;   INR: 1.04 ratio         PTT - ( 22 Oct 2020 11:54 )  PTT:30.3 sec      Assessment and Plan: CHIEF COMPLAINT/INTERVAL HISTORY: Patient seen and examined at bedside this morning. No acute events overnight. Patient slept well but is still endorsing gas and bloating. Pt reports dark stool last night, but states her diarrhea has been less frequent. Patient is afebrile and has been NPO since midnight; going for cholecystectomy today. Denies weakness, palpitations, SOB, or dysuria.    REVIEW OF SYSTEMS:  CONSTITUTIONAL: denies fever, chills, fatigue, weakness  HEENT: denies blurred vision, sore throat  CARDIOVASCULAR: denies chest pain, chest pressure, palpitations  RESPIRATORY: denies SOB, difficulty breathing  GASTROINTESTINAL: endorses bloating and diarrhea, denies n/v, abd pain  GENITOURINARY: denies dysuria  NEUROLOGICAL: denies numbness, headache, focal weakness  MUSCULOSKELETAL: denies pain or weaknes  LYMPHATICS: denies extremity swelling  PSYCHIATRIC: denies recent changes in anxiety, depression        Vital Signs Last 24 Hrs  T(C): 37.1 (23 Oct 2020 04:43), Max: 37.1 (22 Oct 2020 21:06)  T(F): 98.8 (23 Oct 2020 04:43), Max: 98.8 (23 Oct 2020 04:43)  HR: 65 (23 Oct 2020 06:10) (56 - 69)  BP: 149/70 (23 Oct 2020 04:43) (146/71 - 162/86)  BP(mean): 111 (22 Oct 2020 21:06) (111 - 111)  RR: 17 (23 Oct 2020 04:43) (17 - 17)  SpO2: 98% (23 Oct 2020 04:43) (98% - 98%)    PHYSICAL EXAM:  GENERAL: NAD, pleasant  HEENT: EOMI, PERRL, hearing normal, conjunctiva and sclera clear  Chest: CTA bilaterally, no wheezing, rales, or rhonchi  CV: S1S2, RRR, no murmurs, rubs, or gallops  GI: slightly distended, soft, +BS, nontender  Musculoskeletal: no edema, no weakness  Psychiatric: affect nL, mood nL  Skin: warm and dry      LABS:                        12.3   10.31 )-----------( 244      ( 23 Oct 2020 08:19 )             38.8     10-23    145  |  116<H>  |  12  ----------------------------<  118<H>  3.7   |  22  |  1.00    Ca    9.0      23 Oct 2020 08:19  Phos  2.9     10-22  Mg     2.2     10-22    TPro  6.9  /  Alb  3.4  /  TBili  0.4  /  DBili  x   /  AST  22  /  ALT  37  /  AlkPhos  90  10-23    PT/INR - ( 22 Oct 2020 11:54 )   PT: 12.2 sec;   INR: 1.04 ratio         PTT - ( 22 Oct 2020 11:54 )  PTT:30.3 sec      Assessment and Plan:  65y Female with HTN, Bipolar disorder, presents with abdominal pain, epigastric and RUQ, mild elev LFTs, abd sono with 17 mm dilated CBD, and small GB sludge.    # R/O Common biliary duct obstruction  - RUQ pain likely Choledocholithiasis  - MRCP (10/19): Small sludge or gallstones in the gallbladder. No evidence for thickened gallbladder wall, or pericholecystic fluid. Dilated common bile duct. Possible small sludge or gallstones in the very distal common bile duct.  - s/p ERCP (10/20): extension of sphincterotomy; large amount of sludge removed for bile duct  - c/w rocephin and flagyl  - Pt endorses improving diarrhea, likely 2/2 antibiotics, will determine need for abx post cholecystectomy  - c/w Bacid   - Plan for laparoscopic cholecystectomy today(10/23); patient NPO since midnight  - Per cardiology: patient has no modifiable active cardiac risk factors; optimized for low risk angélica  - GI and Surgery following    RCRI score 0, class I risk. Patient has no absolute medical contraindications. She is considered low risk for a low risk laparoscopic procedure. She has no prior complications with anesthesia and reports being able to complete at least 4 METs of activity.    #Bloating  - Pt endorses gas and bloating, abd slightly distended  - C/w simethicone  - Maalox 30 mL PRN q6h for dyspepsia    #Bipolar disorder  - Cont lithium, metoprolol, lamictal, topiramate, ziprasidone.   - Ambien dose changed to 10mg at bedtime    #DVT prophylaxis   - Hold chem dvt ppx for plan for angélica tomorrow, restart pending surgery recs     CHIEF COMPLAINT/INTERVAL HISTORY: Patient seen and examined at bedside this morning. No acute events overnight. Patient slept well but is still endorsing gas and bloating. Pt reports dark stool last night, but states her diarrhea has been less frequent. Patient is afebrile and has been NPO since midnight; going for cholecystectomy this afternoon. Denies weakness, palpitations, SOB, or dysuria.    REVIEW OF SYSTEMS:  CONSTITUTIONAL: denies fever, chills, fatigue, weakness  HEENT: denies blurred vision, sore throat  CARDIOVASCULAR: denies chest pain, chest pressure, palpitations  RESPIRATORY: denies SOB, difficulty breathing  GASTROINTESTINAL: endorses bloating and diarrhea, denies n/v, abd pain  GENITOURINARY: denies dysuria  NEUROLOGICAL: denies numbness, headache, focal weakness  MUSCULOSKELETAL: endorses hip pain (chronic), denies weakness  LYMPHATICS: denies extremity swelling  PSYCHIATRIC: denies recent changes in anxiety, depression        Vital Signs Last 24 Hrs  T(C): 37.1 (23 Oct 2020 04:43), Max: 37.1 (22 Oct 2020 21:06)  T(F): 98.8 (23 Oct 2020 04:43), Max: 98.8 (23 Oct 2020 04:43)  HR: 65 (23 Oct 2020 06:10) (56 - 69)  BP: 149/70 (23 Oct 2020 04:43) (146/71 - 162/86)  BP(mean): 111 (22 Oct 2020 21:06) (111 - 111)  RR: 17 (23 Oct 2020 04:43) (17 - 17)  SpO2: 98% (23 Oct 2020 04:43) (98% - 98%)    PHYSICAL EXAM:  GENERAL: NAD, pleasant  HEENT: EOMI, PERRL, hearing normal, conjunctiva and sclera clear  Chest: CTA bilaterally, no wheezing, rales, or rhonchi  CV: S1S2, RRR, no murmurs, rubs, or gallops  GI: slightly distended, soft, +BS, nontender  Musculoskeletal: no edema, no weakness  Psychiatric: affect nL, mood nL  Skin: warm and dry      LABS:                        12.3   10.31 )-----------( 244      ( 23 Oct 2020 08:19 )             38.8     10-23    145  |  116<H>  |  12  ----------------------------<  118<H>  3.7   |  22  |  1.00    Ca    9.0      23 Oct 2020 08:19  Phos  2.9     10-22  Mg     2.2     10-22    TPro  6.9  /  Alb  3.4  /  TBili  0.4  /  DBili  x   /  AST  22  /  ALT  37  /  AlkPhos  90  10-23    PT/INR - ( 22 Oct 2020 11:54 )   PT: 12.2 sec;   INR: 1.04 ratio         PTT - ( 22 Oct 2020 11:54 )  PTT:30.3 sec      Assessment and Plan:  65y Female with HTN, Bipolar disorder, presents with abdominal pain, epigastric and RUQ, mild elev LFTs, abd sono with 17 mm dilated CBD, and small GB sludge.    # R/O Common biliary duct obstruction  - RUQ pain likely Choledocholithiasis  - s/p MRCP (10/19): Small sludge or gallstones in the gallbladder. No evidence for thickened gallbladder wall, or pericholecystic fluid. Dilated common bile duct. Possible small sludge or gallstones in the very distal common bile duct.  - s/p ERCP (10/20): extension of sphincterotomy; large amount of sludge removed for bile duct  - c/w rocephin and flagyl  - Pt endorses improving diarrhea, likely 2/2 antibiotics, will determine need for abx post cholecystectomy  - c/w Bacid   - Going for laparoscopic cholecystectomy today(10/23); patient NPO since midnight  - Per cardiology: patient has no modifiable active cardiac risk factors; optimized for low risk cholecytectomy  - GI and Surgery following    RCRI score 0, class I risk. Patient has no absolute medical contraindications. She is considered low risk for a low risk laparoscopic procedure. She has no prior complications with anesthesia and reports being able to complete at least 4 METs of activity.    #Bloating  - Pt endorses gas and bloating, abd slightly distended  - C/w simethicone  - Maalox 30 mL PRN q6h for dyspepsia    #Bipolar disorder  - Cont lithium, metoprolol, lamictal, topiramate, ziprasidone.   - Ambien dose changed to 10mg at bedtime    #DVT prophylaxis   - Hold chem dvt ppx for plan for angélica tomorrow, restart pending surgery recs     CHIEF COMPLAINT/INTERVAL HISTORY: Patient seen and examined at bedside this morning. No acute events overnight. Patient slept well but is still endorsing gas and bloating. Pt reports dark stool last night, but states her diarrhea has been less frequent. Patient is afebrile and has been NPO since midnight; going for cholecystectomy this afternoon. Denies weakness, palpitations, SOB, or dysuria.    REVIEW OF SYSTEMS:  CONSTITUTIONAL: denies fever, chills, fatigue, weakness  HEENT: denies blurred vision, sore throat  CARDIOVASCULAR: denies chest pain, chest pressure, palpitations  RESPIRATORY: denies SOB, difficulty breathing  GASTROINTESTINAL: endorses bloating and diarrhea, denies n/v, abd pain  GENITOURINARY: denies dysuria  NEUROLOGICAL: denies numbness, headache, focal weakness  MUSCULOSKELETAL: endorses hip pain (chronic), denies weakness  LYMPHATICS: denies extremity swelling  PSYCHIATRIC: denies recent changes in anxiety, depression        Vital Signs Last 24 Hrs  T(C): 37.1 (23 Oct 2020 04:43), Max: 37.1 (22 Oct 2020 21:06)  T(F): 98.8 (23 Oct 2020 04:43), Max: 98.8 (23 Oct 2020 04:43)  HR: 65 (23 Oct 2020 06:10) (56 - 69)  BP: 149/70 (23 Oct 2020 04:43) (146/71 - 162/86)  BP(mean): 111 (22 Oct 2020 21:06) (111 - 111)  RR: 17 (23 Oct 2020 04:43) (17 - 17)  SpO2: 98% (23 Oct 2020 04:43) (98% - 98%)    PHYSICAL EXAM:  GENERAL: NAD, pleasant  HEENT: EOMI, PERRL, hearing normal, conjunctiva and sclera clear  Chest: CTA bilaterally, no wheezing, rales, or rhonchi  CV: S1S2, RRR, no murmurs, rubs, or gallops  GI: slightly distended, soft, +BS, nontender  Musculoskeletal: no edema, no weakness  Psychiatric: affect nL, mood nL  Skin: warm and dry      LABS:                        12.3   10.31 )-----------( 244      ( 23 Oct 2020 08:19 )             38.8     10-23    145  |  116<H>  |  12  ----------------------------<  118<H>  3.7   |  22  |  1.00    Ca    9.0      23 Oct 2020 08:19  Phos  2.9     10-22  Mg     2.2     10-22    TPro  6.9  /  Alb  3.4  /  TBili  0.4  /  DBili  x   /  AST  22  /  ALT  37  /  AlkPhos  90  10-23    PT/INR - ( 22 Oct 2020 11:54 )   PT: 12.2 sec;   INR: 1.04 ratio         PTT - ( 22 Oct 2020 11:54 )  PTT:30.3 sec      Assessment and Plan:  65y Female with HTN, Bipolar disorder, presents with abdominal pain, epigastric and RUQ, mild elev LFTs, abd sono with 17 mm dilated CBD, and small GB sludge.    # R/O Common biliary duct obstruction  - RUQ pain likely Choledocholithiasis  - s/p MRCP (10/19): Small sludge or gallstones in the gallbladder. No evidence for thickened gallbladder wall, or pericholecystic fluid. Dilated common bile duct. Possible small sludge or gallstones in the very distal common bile duct.  - s/p ERCP (10/20): extension of sphincterotomy; large amount of sludge removed for bile duct  - c/w rocephin and flagyl  - Pt endorses improving diarrhea, likely 2/2 antibiotics, will determine need for abx post cholecystectomy  - Pt endorses dark stool overnight; f/u FOBT  - c/w Bacid   - Going for laparoscopic cholecystectomy today(10/23); patient NPO since midnight  - Per cardiology: patient has no modifiable active cardiac risk factors; optimized for low risk cholecytectomy  - GI and Surgery following    RCRI score 0, class I risk. Patient has no absolute medical contraindications. She is considered low risk for a low risk laparoscopic procedure. She has no prior complications with anesthesia and reports being able to complete at least 4 METs of activity.    #Bloating  - Pt endorses gas and bloating, abd slightly distended  - C/w simethicone  - Maalox 30 mL PRN q6h for dyspepsia    #Bipolar disorder  - Cont lithium, metoprolol, lamictal, topiramate, ziprasidone.   - Ambien dose changed to 10mg at bedtime    #DVT prophylaxis   - Hold chem dvt ppx for plan for angélica tomorrow, restart pending surgery recs     CHIEF COMPLAINT/INTERVAL HISTORY: Patient seen and examined at bedside this morning. No acute events overnight. Patient slept better but is still endorsing gas and bloating. Pt reports dark stool last night, but states her diarrhea has been less frequent. Patient is afebrile and has been NPO since midnight; going for cholecystectomy this afternoon. Denies weakness, palpitations, SOB, or dysuria.    REVIEW OF SYSTEMS:  CONSTITUTIONAL: denies fever, chills, fatigue, weakness  HEENT: denies blurred vision, sore throat  CARDIOVASCULAR: denies chest pain, chest pressure, palpitations  RESPIRATORY: denies SOB, difficulty breathing  GASTROINTESTINAL: endorses bloating and diarrhea, denies n/v, abd pain  GENITOURINARY: denies dysuria  NEUROLOGICAL: denies numbness, headache, focal weakness  MUSCULOSKELETAL: endorses hip pain (chronic), denies weakness  LYMPHATICS: denies extremity swelling  PSYCHIATRIC: denies recent changes in anxiety, depression        Vital Signs Last 24 Hrs  T(C): 37.1 (23 Oct 2020 04:43), Max: 37.1 (22 Oct 2020 21:06)  T(F): 98.8 (23 Oct 2020 04:43), Max: 98.8 (23 Oct 2020 04:43)  HR: 65 (23 Oct 2020 06:10) (56 - 69)  BP: 149/70 (23 Oct 2020 04:43) (146/71 - 162/86)  BP(mean): 111 (22 Oct 2020 21:06) (111 - 111)  RR: 17 (23 Oct 2020 04:43) (17 - 17)  SpO2: 98% (23 Oct 2020 04:43) (98% - 98%)    PHYSICAL EXAM:  GENERAL: NAD, pleasant  HEENT: EOMI, PERRL, hearing normal, conjunctiva and sclera clear  Chest: CTA bilaterally, no wheezing, rales, or rhonchi  CV: S1S2, RRR, no murmurs, rubs, or gallops  GI: slightly distended, soft, +BS, nontender  Musculoskeletal: no edema, no weakness  Psychiatric: affect nL, mood nL  Skin: warm and dry      LABS:                        12.3   10.31 )-----------( 244      ( 23 Oct 2020 08:19 )             38.8     10-23    145  |  116<H>  |  12  ----------------------------<  118<H>  3.7   |  22  |  1.00    Ca    9.0      23 Oct 2020 08:19  Phos  2.9     10-22  Mg     2.2     10-22    TPro  6.9  /  Alb  3.4  /  TBili  0.4  /  DBili  x   /  AST  22  /  ALT  37  /  AlkPhos  90  10-23    PT/INR - ( 22 Oct 2020 11:54 )   PT: 12.2 sec;   INR: 1.04 ratio         PTT - ( 22 Oct 2020 11:54 )  PTT:30.3 sec      Assessment and Plan:  65y Female with HTN, Bipolar disorder, presents with abdominal pain, epigastric and RUQ, mild elev LFTs, abd sono with 17 mm dilated CBD, and small GB sludge, now s/p sludge removal during ERCP.    # R/O Common biliary duct obstruction  - RUQ pain likely Choledocholithiasis  - s/p MRCP (10/19): Small sludge or gallstones in the gallbladder. No evidence for thickened gallbladder wall, or pericholecystic fluid. Dilated common bile duct. Possible small sludge or gallstones in the very distal common bile duct.  - s/p ERCP (10/20): extension of sphincterotomy; large amount of sludge removed for bile duct  - c/w rocephin and flagyl  - Pt endorses improving diarrhea, likely 2/2 antibiotics, will determine need for abx post cholecystectomy  - Pt endorses dark stool overnight; f/u FOBT  - c/w Bacid   - Going for laparoscopic cholecystectomy today(10/23); patient NPO since midnight  - Per cardiology: patient has no modifiable active cardiac risk factors; optimized for low risk cholecystectomy  - GI and Surgery following    RCRI score 0, class I risk. Patient has no absolute medical contraindications. She is considered low risk for a low risk laparoscopic procedure. She has no prior complications with anesthesia and reports being able to complete at least 4 METs of activity.    #Bloating  - Pt endorses gas and bloating, abd slightly distended  - C/w simethicone  - Maalox 30 mL PRN q6h for dyspepsia    #Bipolar disorder  - Cont lithium, metoprolol, lamictal, topiramate, ziprasidone.   - Ambien dose changed to 10mg at bedtime    #DVT prophylaxis   - Hold chem dvt ppx for plan for angélica tomorrow, restart pending surgery recs     CHIEF COMPLAINT/INTERVAL HISTORY: Patient seen and examined at bedside this morning. No acute events overnight. Patient slept better but is still endorsing gas and bloating. Pt reports dark stool last night, but states her diarrhea has been less frequent. Patient is afebrile and has been NPO since midnight; going for cholecystectomy this afternoon. Denies weakness, palpitations, SOB, or dysuria.    REVIEW OF SYSTEMS:  CONSTITUTIONAL: denies fever, chills, fatigue, weakness  HEENT: denies blurred vision, sore throat  CARDIOVASCULAR: denies chest pain, chest pressure, palpitations  RESPIRATORY: denies SOB, difficulty breathing  GASTROINTESTINAL: endorses bloating and diarrhea, denies n/v, abd pain  GENITOURINARY: denies dysuria  NEUROLOGICAL: denies numbness, headache, focal weakness  MUSCULOSKELETAL: endorses hip pain (chronic), denies weakness  LYMPHATICS: denies extremity swelling  PSYCHIATRIC: denies recent changes in anxiety, depression        Vital Signs Last 24 Hrs  T(C): 37.1 (23 Oct 2020 04:43), Max: 37.1 (22 Oct 2020 21:06)  T(F): 98.8 (23 Oct 2020 04:43), Max: 98.8 (23 Oct 2020 04:43)  HR: 65 (23 Oct 2020 06:10) (56 - 69)  BP: 149/70 (23 Oct 2020 04:43) (146/71 - 162/86)  BP(mean): 111 (22 Oct 2020 21:06) (111 - 111)  RR: 17 (23 Oct 2020 04:43) (17 - 17)  SpO2: 98% (23 Oct 2020 04:43) (98% - 98%)    PHYSICAL EXAM:  GENERAL: NAD, pleasant  HEENT: EOMI, PERRL, hearing normal, conjunctiva and sclera clear  Chest: CTA bilaterally, no wheezing, rales, or rhonchi  CV: S1S2, RRR, no murmurs, rubs, or gallops  GI: slightly distended, soft, +BS, nontender  Musculoskeletal: no edema, no weakness  Psychiatric: affect nL, mood nL  Skin: warm and dry      LABS:                        12.3   10.31 )-----------( 244      ( 23 Oct 2020 08:19 )             38.8     10-23    145  |  116<H>  |  12  ----------------------------<  118<H>  3.7   |  22  |  1.00    Ca    9.0      23 Oct 2020 08:19  Phos  2.9     10-22  Mg     2.2     10-22    TPro  6.9  /  Alb  3.4  /  TBili  0.4  /  DBili  x   /  AST  22  /  ALT  37  /  AlkPhos  90  10-23    PT/INR - ( 22 Oct 2020 11:54 )   PT: 12.2 sec;   INR: 1.04 ratio         PTT - ( 22 Oct 2020 11:54 )  PTT:30.3 sec      Assessment and Plan:  65y Female with HTN, Bipolar disorder, presents with abdominal pain, epigastric and RUQ, mild elev LFTs, abd sono with 17 mm dilated CBD, and small GB sludge, now s/p sludge removal during ERCP with plan for prophylactic lap angélica today.    # R/O Common biliary duct obstruction  - RUQ pain likely Choledocholithiasis  - s/p MRCP (10/19): Small sludge or gallstones in the gallbladder. No evidence for thickened gallbladder wall, or pericholecystic fluid. Dilated common bile duct. Possible small sludge or gallstones in the very distal common bile duct.  - s/p ERCP (10/20): extension of sphincterotomy; large amount of sludge removed for bile duct  - c/w rocephin and flagyl  - Pt endorses improving diarrhea, likely 2/2 antibiotics, will determine need for abx post cholecystectomy  - Pt endorses dark stool overnight; f/u FOBT  - c/w Bacid   - Going for laparoscopic cholecystectomy today(10/23); patient NPO since midnight  - Per cardiology: patient has no modifiable active cardiac risk factors; optimized for low risk cholecystectomy  - GI and Surgery following    RCRI score 0, class I risk. Patient has no absolute medical contraindications. She is considered low risk for a low risk laparoscopic procedure. She has no prior complications with anesthesia and reports being able to complete at least 4 METs of activity.    #Bloating  - Pt endorses gas and bloating, abd slightly distended  - C/w simethicone  - Maalox 30 mL PRN q6h for dyspepsia    #HTN  - Chronic, stable  - Takes metoprolol tartrate 25mg once daily at home, continue while inpatient with hold parameters  - Monitor routine hemodynamics    #Bipolar disorder  - Cont lithium, metoprolol, lamictal, topiramate, ziprasidone.   - Ambien dose changed to 10mg at bedtime    #DVT prophylaxis   - Hold chem dvt ppx for plan for angélica tomorrow, restart pending surgery recs

## 2020-10-24 LAB
ALBUMIN SERPL ELPH-MCNC: 2.9 G/DL — LOW (ref 3.3–5)
ALP SERPL-CCNC: 74 U/L — SIGNIFICANT CHANGE UP (ref 40–120)
ALT FLD-CCNC: 59 U/L — SIGNIFICANT CHANGE UP (ref 12–78)
ANION GAP SERPL CALC-SCNC: 6 MMOL/L — SIGNIFICANT CHANGE UP (ref 5–17)
AST SERPL-CCNC: 54 U/L — HIGH (ref 15–37)
BILIRUB SERPL-MCNC: 0.5 MG/DL — SIGNIFICANT CHANGE UP (ref 0.2–1.2)
BUN SERPL-MCNC: 11 MG/DL — SIGNIFICANT CHANGE UP (ref 7–23)
CALCIUM SERPL-MCNC: 8.4 MG/DL — LOW (ref 8.5–10.1)
CHLORIDE SERPL-SCNC: 116 MMOL/L — HIGH (ref 96–108)
CO2 SERPL-SCNC: 22 MMOL/L — SIGNIFICANT CHANGE UP (ref 22–31)
CREAT SERPL-MCNC: 0.89 MG/DL — SIGNIFICANT CHANGE UP (ref 0.5–1.3)
GLUCOSE SERPL-MCNC: 104 MG/DL — HIGH (ref 70–99)
HCT VFR BLD CALC: 32.7 % — LOW (ref 34.5–45)
HGB BLD-MCNC: 10.7 G/DL — LOW (ref 11.5–15.5)
MCHC RBC-ENTMCNC: 30 PG — SIGNIFICANT CHANGE UP (ref 27–34)
MCHC RBC-ENTMCNC: 32.7 GM/DL — SIGNIFICANT CHANGE UP (ref 32–36)
MCV RBC AUTO: 91.6 FL — SIGNIFICANT CHANGE UP (ref 80–100)
NRBC # BLD: 0 /100 WBCS — SIGNIFICANT CHANGE UP (ref 0–0)
PLATELET # BLD AUTO: 228 K/UL — SIGNIFICANT CHANGE UP (ref 150–400)
POTASSIUM SERPL-MCNC: 3.8 MMOL/L — SIGNIFICANT CHANGE UP (ref 3.5–5.3)
POTASSIUM SERPL-SCNC: 3.8 MMOL/L — SIGNIFICANT CHANGE UP (ref 3.5–5.3)
PROT SERPL-MCNC: 6.1 G/DL — SIGNIFICANT CHANGE UP (ref 6–8.3)
RBC # BLD: 3.57 M/UL — LOW (ref 3.8–5.2)
RBC # FLD: 13.5 % — SIGNIFICANT CHANGE UP (ref 10.3–14.5)
SODIUM SERPL-SCNC: 144 MMOL/L — SIGNIFICANT CHANGE UP (ref 135–145)
WBC # BLD: 13.46 K/UL — HIGH (ref 3.8–10.5)
WBC # FLD AUTO: 13.46 K/UL — HIGH (ref 3.8–10.5)

## 2020-10-24 PROCEDURE — 99233 SBSQ HOSP IP/OBS HIGH 50: CPT

## 2020-10-24 PROCEDURE — 99232 SBSQ HOSP IP/OBS MODERATE 35: CPT

## 2020-10-24 RX ORDER — ENOXAPARIN SODIUM 100 MG/ML
40 INJECTION SUBCUTANEOUS DAILY
Refills: 0 | Status: DISCONTINUED | OUTPATIENT
Start: 2020-10-24 | End: 2020-10-25

## 2020-10-24 RX ORDER — OXYCODONE AND ACETAMINOPHEN 5; 325 MG/1; MG/1
1 TABLET ORAL EVERY 6 HOURS
Refills: 0 | Status: DISCONTINUED | OUTPATIENT
Start: 2020-10-24 | End: 2020-10-25

## 2020-10-24 RX ORDER — OXYCODONE AND ACETAMINOPHEN 5; 325 MG/1; MG/1
2 TABLET ORAL ONCE
Refills: 0 | Status: DISCONTINUED | OUTPATIENT
Start: 2020-10-24 | End: 2020-10-24

## 2020-10-24 RX ORDER — OXYCODONE AND ACETAMINOPHEN 5; 325 MG/1; MG/1
2 TABLET ORAL EVERY 6 HOURS
Refills: 0 | Status: DISCONTINUED | OUTPATIENT
Start: 2020-10-24 | End: 2020-10-25

## 2020-10-24 RX ORDER — ZOLPIDEM TARTRATE 10 MG/1
10 TABLET ORAL AT BEDTIME
Refills: 0 | Status: DISCONTINUED | OUTPATIENT
Start: 2020-10-24 | End: 2020-10-25

## 2020-10-24 RX ADMIN — ZOLPIDEM TARTRATE 10 MILLIGRAM(S): 10 TABLET ORAL at 22:34

## 2020-10-24 RX ADMIN — LITHIUM CARBONATE 300 MILLIGRAM(S): 300 TABLET, EXTENDED RELEASE ORAL at 17:49

## 2020-10-24 RX ADMIN — OXYCODONE AND ACETAMINOPHEN 1 TABLET(S): 5; 325 TABLET ORAL at 17:56

## 2020-10-24 RX ADMIN — OXYCODONE AND ACETAMINOPHEN 2 TABLET(S): 5; 325 TABLET ORAL at 12:07

## 2020-10-24 RX ADMIN — ZIPRASIDONE HYDROCHLORIDE 80 MILLIGRAM(S): 20 CAPSULE ORAL at 08:38

## 2020-10-24 RX ADMIN — Medication 100 MILLIGRAM(S): at 05:32

## 2020-10-24 RX ADMIN — SIMETHICONE 80 MILLIGRAM(S): 80 TABLET, CHEWABLE ORAL at 14:17

## 2020-10-24 RX ADMIN — OXYCODONE AND ACETAMINOPHEN 2 TABLET(S): 5; 325 TABLET ORAL at 07:18

## 2020-10-24 RX ADMIN — MORPHINE SULFATE 2 MILLIGRAM(S): 50 CAPSULE, EXTENDED RELEASE ORAL at 01:56

## 2020-10-24 RX ADMIN — MORPHINE SULFATE 2 MILLIGRAM(S): 50 CAPSULE, EXTENDED RELEASE ORAL at 02:11

## 2020-10-24 RX ADMIN — SIMETHICONE 80 MILLIGRAM(S): 80 TABLET, CHEWABLE ORAL at 05:32

## 2020-10-24 RX ADMIN — LAMOTRIGINE 200 MILLIGRAM(S): 25 TABLET, ORALLY DISINTEGRATING ORAL at 17:49

## 2020-10-24 RX ADMIN — OXYCODONE AND ACETAMINOPHEN 2 TABLET(S): 5; 325 TABLET ORAL at 12:43

## 2020-10-24 RX ADMIN — ZOLPIDEM TARTRATE 10 MILLIGRAM(S): 10 TABLET ORAL at 00:12

## 2020-10-24 RX ADMIN — LAMOTRIGINE 200 MILLIGRAM(S): 25 TABLET, ORALLY DISINTEGRATING ORAL at 05:32

## 2020-10-24 RX ADMIN — Medication 100 MILLIGRAM(S): at 14:16

## 2020-10-24 RX ADMIN — Medication 100 MILLIGRAM(S): at 17:49

## 2020-10-24 RX ADMIN — Medication 1 TABLET(S): at 17:49

## 2020-10-24 RX ADMIN — Medication 100 MILLIGRAM(S): at 21:35

## 2020-10-24 RX ADMIN — Medication 100 MILLIGRAM(S): at 06:22

## 2020-10-24 RX ADMIN — ENOXAPARIN SODIUM 40 MILLIGRAM(S): 100 INJECTION SUBCUTANEOUS at 17:49

## 2020-10-24 RX ADMIN — SIMETHICONE 80 MILLIGRAM(S): 80 TABLET, CHEWABLE ORAL at 21:35

## 2020-10-24 RX ADMIN — Medication 1 TABLET(S): at 06:18

## 2020-10-24 RX ADMIN — Medication 100 MILLIGRAM(S): at 00:03

## 2020-10-24 RX ADMIN — OXYCODONE AND ACETAMINOPHEN 2 TABLET(S): 5; 325 TABLET ORAL at 06:18

## 2020-10-24 RX ADMIN — OXYCODONE AND ACETAMINOPHEN 1 TABLET(S): 5; 325 TABLET ORAL at 18:30

## 2020-10-24 RX ADMIN — LITHIUM CARBONATE 300 MILLIGRAM(S): 300 TABLET, EXTENDED RELEASE ORAL at 05:32

## 2020-10-24 RX ADMIN — FAMOTIDINE 20 MILLIGRAM(S): 10 INJECTION INTRAVENOUS at 11:15

## 2020-10-24 RX ADMIN — CEFTRIAXONE 100 MILLIGRAM(S): 500 INJECTION, POWDER, FOR SOLUTION INTRAMUSCULAR; INTRAVENOUS at 15:48

## 2020-10-24 RX ADMIN — Medication 25 MILLIGRAM(S): at 05:31

## 2020-10-24 NOTE — DIETITIAN INITIAL EVALUATION ADULT. - OTHER INFO
As per chart pt is a 65 year old female with a PMH of HTN, Bipolar disorder, presents with abdominal pain, epigastric and RUQ, mild elev LFTs, abd sono with 17 mm dilated CBD, and small GB sludge, now s/p sludge removal during ERCP with plan for prophylactic lap angélica. S/P (10/23) lap angélica.     Pt seen at bedside. Pt currently on Clear liquid diet, reports that she is tolerating it well. Admission weight 160.1lbs, current weight per chart (10/21) 149.9lbs. Pt reports current weight and UBW of 149lbs, pt appears closer to this weight. Denies any chewing/ swallowing difficulties, denies any nausea/ vomiting/ diarrhea/ constipation, last BM 10/22.     No pressure injuries noted at this time.

## 2020-10-24 NOTE — PROGRESS NOTE ADULT - ASSESSMENT
IMPRESSION pt with post op pain, stable H/H  PLAN restart DVT prevention           advance diet           d/c IV fluids

## 2020-10-24 NOTE — PROGRESS NOTE ADULT - SUBJECTIVE AND OBJECTIVE BOX
Subjective: Pt c/o severe RUQ pain. Denies nausea or vomiting. Tolerating clear liquids.    Objective:  Vital Signs Last 24 Hrs  T(C): 37.2 (24 Oct 2020 05:12), Max: 37.2 (23 Oct 2020 18:20)  T(F): 98.9 (24 Oct 2020 05:12), Max: 99 (23 Oct 2020 18:20)  HR: 81 (24 Oct 2020 05:12) (76 - 89)  BP: 155/84 (24 Oct 2020 05:12) (133/76 - 168/76)  BP(mean): --  RR: 17 (24 Oct 2020 05:12) (12 - 17)  SpO2: 98% (24 Oct 2020 05:12) (96% - 100%)    Heent: DENA, FREOM  Pul: clear  Cor: RSR, without murmurs  Abdomen: normal bowel sounds, without distension, with right upper quadrant tenderness  Incisions clean and closed  FANNY serosanguinous   Extremities: without edema, motor/sensory intact, without calf pain, Homans sign negative.                        10.7   13.46 )-----------( 228      ( 24 Oct 2020 09:22 )             32.7       10-24    144  |  116<H>  |  11  ----------------------------<  104<H>  3.8   |  22  |  0.89    Ca    8.4<L>      24 Oct 2020 09:22    TPro  6.1  /  Alb  2.9<L>  /  TBili  0.5  /  DBili  x   /  AST  54<H>  /  ALT  59  /  AlkPhos  74  10-24        10-23 @ 07:01  -  10-24 @ 07:00  --------------------------------------------------------  IN:    Lactated Ringers: 975 mL    Oral Fluid: 480 mL  Total IN: 1455 mL    OUT:    Bulb (mL): 17 mL    Voided (mL): 400 mL  Total OUT: 417 mL    Total NET: 1038 mL      10-24 @ 07:01  -  10-24 @ 12:12  --------------------------------------------------------  IN:    Oral Fluid: 480 mL  Total IN: 480 mL    OUT:    Voided (mL): 200 mL  Total OUT: 200 mL    Total NET: 280 mL

## 2020-10-24 NOTE — PROGRESS NOTE ADULT - PROBLEM SELECTOR PLAN 1
resolved  s/p ercp  10/23 sp OR for ccy  f/u am labs  diet as per sx; f/u recs  cont antibiotics   cont pepcid  cont probiotic  pain control   monitor exam/gi fxn

## 2020-10-24 NOTE — DIETITIAN INITIAL EVALUATION ADULT. - NSPROEDAREADYLEARN_GEN_A_NUR
none/Pt provided with oral and written education on low fat diet, discussed foods that are high in fat and should be limited, meal planning/ prep

## 2020-10-24 NOTE — PROGRESS NOTE ADULT - ASSESSMENT
65y Female with HTN, Bipolar disorder, presents to the ED with c/o abdominal pain, epigastric and RUQ admitted with choledocholithiasis and consistent gallstone pancreatitis. For cholecystectomy tomorrow, 10/23    Abn ekg  - known lbbb, unchanged from prior ekgs  - history of a cardiomyopathy that has resolved on last echo in 2019  - no sign of acute ischemia or volume overload.    Gallstone pancreatitis  - Patient with RUQ pain likely Choledocholithiasis  -S/P lap angélica for gallstone pancreatitis w/o cardiac complications    HTN  - controlled   - continue metoprolol  - Monitor routine hemodynamics    - Monitor and replete lytes, keep K>4, Mg>2.  - Other cardiovascular workup will depend on clinical course. All other workup per primary team.  - Will follow.  Braydon Fraser DNP, ANP-c  Cardiology   Spectra #[phone]

## 2020-10-24 NOTE — PROGRESS NOTE ADULT - NSTELEHEALTH_GEN_ALL_CORE
73ym s/p fall with a ct of the brain positive for a left temporal parietal sdh and interhemispheric subdural hematoma.   Pt had packing this am.  When patient seen later in the day packing had been discontinued and small amount of drainage was noted in the right nasal passage.   Pt has been cleared to be transferred to the floor pt will need to be evaluated by physical therapy.     Pt seen and then re seen with Dr Verduzco.
No

## 2020-10-24 NOTE — DIETITIAN INITIAL EVALUATION ADULT. - ORAL INTAKE PTA/DIET HISTORY
Pt reports good appetite and PO intake PTA. Pt denies following any dietary restrictions PTA. Supplement use PTA includes MVI. NKFA.

## 2020-10-24 NOTE — PROGRESS NOTE ADULT - SUBJECTIVE AND OBJECTIVE BOX
INTERVAL HPI/OVERNIGHT EVENTS:  pt seen and examined  so OR yesterday  c/o abd pain, not relieved by morphine  denies n/v/d/melena    MEDICATIONS  (STANDING):  cefTRIAXone   IVPB 1000 milliGRAM(s) IV Intermittent every 24 hours  enoxaparin Injectable 40 milliGRAM(s) SubCutaneous daily  famotidine    Tablet 20 milliGRAM(s) Oral daily  lactated ringers. 1000 milliLiter(s) (70 mL/Hr) IV Continuous <Continuous>  lamoTRIgine 200 milliGRAM(s) Oral two times a day  lithium 300 milliGRAM(s) Oral two times a day  metoprolol tartrate 25 milliGRAM(s) Oral daily  metroNIDAZOLE  IVPB 500 milliGRAM(s) IV Intermittent every 8 hours  topiramate 100 milliGRAM(s) Oral two times a day  ziprasidone 80 milliGRAM(s) Oral <User Schedule>    MEDICATIONS  (PRN):  acetaminophen   Tablet .. 650 milliGRAM(s) Oral every 6 hours PRN Temp greater or equal to 38C (100.4F), Mild Pain (1 - 3), Moderate Pain (4 - 6)  aluminum hydroxide/magnesium hydroxide/simethicone Suspension 30 milliLiter(s) Oral every 6 hours PRN Dyspepsia  morphine  - Injectable 2 milliGRAM(s) IV Push every 4 hours PRN Severe Pain (7 - 10)  ondansetron Injectable 4 milliGRAM(s) IV Push every 8 hours PRN Nausea and/or Vomiting  SUMAtriptan 100 milliGRAM(s) Oral daily PRN Headache/migraine  zolpidem 5 milliGRAM(s) Oral at bedtime PRN Insomnia  zolpidem 5 milliGRAM(s) Oral at bedtime PRN Insomnia      Allergies    penicillins (Rash)  sulfa drugs (Rash)    Intolerances        Review of Systems:    General:  No wt loss, fevers, chills, night sweats, fatigue   Eyes:  Good vision, no reported pain  ENT:  No sore throat, pain, runny nose, dysphagia  CV:  No pain, palpitations, hypo/hypertension  Resp:  No dyspnea, cough, tachypnea, wheezing  GI:  see above  :  No pain, bleeding, incontinence, nocturia  Muscle:  No pain, weakness  Neuro:  No weakness, tingling, memory problems  Psych:  No fatigue, insomnia, mood problems, depression  Endocrine:  No polyuria, polydypsia, cold/heat intolerance  Heme:  No petechiae, ecchymosis, easy bruisability  Skin:  No rash, tattoos, scars, edema      Vital Signs Last 24 Hrs  T(C): 37.2 (19 Oct 2020 04:33), Max: 37.2 (19 Oct 2020 04:33)  T(F): 98.9 (19 Oct 2020 04:33), Max: 98.9 (19 Oct 2020 04:33)  HR: 68 (19 Oct 2020 04:33) (59 - 68)  BP: 134/68 (19 Oct 2020 04:33) (132/69 - 156/83)  BP(mean): --  RR: 19 (19 Oct 2020 04:33) (18 - 19)  SpO2: 96% (19 Oct 2020 04:33) (95% - 96%)    PHYSICAL EXAM:    Constitutional: lying in bed  HEENT: ncat  Gastrointestinal: soft ttp ruq mild dt +mirza appears light serosang  Extremities: No peripheral edema  Vascular: 2+ peripheral pulses  Neurological: Awake alert appropriate     LABS:                        12.2   8.67  )-----------( 206      ( 18 Oct 2020 08:57 )             37.6     10-18    145  |  115<H>  |  13  ----------------------------<  97  3.8   |  21<L>  |  0.92    Ca    8.7      18 Oct 2020 08:57    TPro  6.9  /  Alb  3.4  /  TBili  0.7  /  DBili  .20  /  AST  21  /  ALT  29  /  AlkPhos  94  10-18          RADIOLOGY & ADDITIONAL TESTS:

## 2020-10-24 NOTE — PROGRESS NOTE ADULT - SUBJECTIVE AND OBJECTIVE BOX
[facility] -- Mili March, Pop, Nuris, Alfa Lyle Savella  Office # [phone]      Follow Up:    NICM  Subjective/Observations:   No events overnight resting comfortably in bed.  No complaints of chest pain, dyspnea, or palpitations reported. No signs of orthopnea or PND.     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  Pt also self mutilate last was &quot;many yrs ago maybe 20 yrs ago&quot;.    Hypertension    Bipolar 1 disorder    History of tonsillectomy  at 18 yrs old    S/P tonsillectomy        MEDICATIONS  (STANDING):  cefTRIAXone   IVPB 1000 milliGRAM(s) IV Intermittent every 24 hours  famotidine    Tablet 20 milliGRAM(s) Oral daily  lactated ringers. 1000 milliLiter(s) (75 mL/Hr) IV Continuous <Continuous>  lactobacillus acidophilus 1 Tablet(s) Oral two times a day  lamoTRIgine 200 milliGRAM(s) Oral two times a day  lithium 300 milliGRAM(s) Oral two times a day  metoprolol tartrate 25 milliGRAM(s) Oral daily  metroNIDAZOLE  IVPB 500 milliGRAM(s) IV Intermittent every 8 hours  simethicone 80 milliGRAM(s) Chew three times a day  topiramate 100 milliGRAM(s) Oral two times a day  ziprasidone 80 milliGRAM(s) Oral <User Schedule>    MEDICATIONS  (PRN):  acetaminophen   Tablet .. 650 milliGRAM(s) Oral every 6 hours PRN Temp greater or equal to 38C (100.4F), Mild Pain (1 - 3), Moderate Pain (4 - 6)  aluminum hydroxide/magnesium hydroxide/simethicone Suspension 30 milliLiter(s) Oral every 6 hours PRN Dyspepsia  ondansetron Injectable 4 milliGRAM(s) IV Push every 8 hours PRN Nausea and/or Vomiting  oxycodone    5 mG/acetaminophen 325 mG 1 Tablet(s) Oral every 6 hours PRN Moderate Pain (4 - 6)  oxycodone    5 mG/acetaminophen 325 mG 2 Tablet(s) Oral every 6 hours PRN Severe Pain (7 - 10)  SUMAtriptan 100 milliGRAM(s) Oral daily PRN Headache/migraine  zolpidem 10 milliGRAM(s) Oral at bedtime PRN Insomnia      Allergies    penicillins (Rash)  sulfa drugs (Rash)    Intolerances        Vital Signs Last 24 Hrs  T(C): 37.2 (24 Oct 2020 05:12), Max: 37.2 (23 Oct 2020 18:20)  T(F): 98.9 (24 Oct 2020 05:12), Max: 99 (23 Oct 2020 18:20)  HR: 81 (24 Oct 2020 05:12) (63 - 89)  BP: 155/84 (24 Oct 2020 05:12) (133/76 - 172/80)  BP(mean): --  RR: 17 (24 Oct 2020 05:12) (12 - 17)  SpO2: 98% (24 Oct 2020 05:12) (96% - 100%)    I&O's Summary    23 Oct 2020 07:01  -  24 Oct 2020 07:00  --------------------------------------------------------  IN: 1455 mL / OUT: 417 mL / NET: 1038 mL    24 Oct 2020 07:01  -  24 Oct 2020 11:31  --------------------------------------------------------  IN: 480 mL / OUT: 200 mL / NET: 280 mL      Weight (kg): 72.6 (10-23 @ 11:33)    PHYSICAL EXAM:  TELE: Off tele   Constitutional: NAD, awake and alert, well-developed  HEENT: Moist Mucous Membranes, Anicteric  Pulmonary: Non-labored, breath sounds are clear bilaterally, No wheezing, crackles or rhonchi  Cardiovascular: Regular, S1 and S2 nl, No murmurs, rubs, gallops or clicks  Gastrointestinal: Bowel Sounds present, soft, nontender.   Lymph: No lymphadenopathy. No peripheral edema.  Skin: No visible rashes or ulcers.  Psych:  Mood & affect appropriate    LABS: All Labs Reviewed:                        10.7   13.46 )-----------( 228      ( 24 Oct 2020 09:22 )             32.7                         12.3   10.31 )-----------( 244      ( 23 Oct 2020 08:19 )             38.8                         12.2   9.55  )-----------( 234      ( 22 Oct 2020 08:53 )             37.0     24 Oct 2020 09:22    144    |  116    |  11     ----------------------------<  104    3.8     |  22     |  0.89   23 Oct 2020 08:19    145    |  116    |  12     ----------------------------<  118    3.7     |  22     |  1.00   22 Oct 2020 08:53    144    |  115    |  11     ----------------------------<  113    3.8     |  22     |  1.00     Ca    8.4        24 Oct 2020 09:22  Ca    9.0        23 Oct 2020 08:19  Ca    9.0        22 Oct 2020 08:53  Phos  2.9       22 Oct 2020 08:53  Mg     2.2       22 Oct 2020 08:53    TPro  6.1    /  Alb  2.9    /  TBili  0.5    /  DBili  x      /  AST  54     /  ALT  59     /  AlkPhos  74     24 Oct 2020 09:22  TPro  6.9    /  Alb  3.4    /  TBili  0.4    /  DBili  x      /  AST  22     /  ALT  37     /  AlkPhos  90     23 Oct 2020 08:19  TPro  6.8    /  Alb  3.5    /  TBili  0.3    /  DBili  x      /  AST  41     /  ALT  43     /  AlkPhos  88     22 Oct 2020 08:53    PT/INR - ( 22 Oct 2020 11:54 )   PT: 12.2 sec;   INR: 1.04 ratio         PTT - ( 22 Oct 2020 11:54 )  PTT:30.3 sec

## 2020-10-24 NOTE — PROGRESS NOTE ADULT - SUBJECTIVE AND OBJECTIVE BOX
SUBJECTIVE:  Patient seen and examined at bedside. POD1 s/p lap angélica. Patient c/o abdominal pain at this time, states she just took Percocet for pain. Has not yet received a clear liquid diet tray at this time. Denies flatus. +OOB. +Void. Patient denies any fevers, chills, chest pain, shortness of breath, nausea, vomiting or diarrhea.    Vital Signs Last 24 Hrs  T(C): 37.2 (24 Oct 2020 05:12), Max: 37.2 (23 Oct 2020 18:20)  T(F): 98.9 (24 Oct 2020 05:12), Max: 99 (23 Oct 2020 18:20)  HR: 81 (24 Oct 2020 05:12) (63 - 89)  BP: 155/84 (24 Oct 2020 05:12) (133/76 - 172/80)  BP(mean): --  RR: 17 (24 Oct 2020 05:12) (12 - 17)  SpO2: 98% (24 Oct 2020 05:12) (96% - 100%)    PHYSICAL EXAM:  GENERAL: NAD, resting in bed  HEAD:  Atraumatic, Normocephalic  ABDOMEN: Steri strips to incisions dry and intact. FANNY drain in place with dressing clean, dry and intact. Bulb with minimal serosanguinous output. Abdomen soft, nondistended. Tender to palpation epigastric and RUQ. +BS  NEUROLOGY: A&O x 3    LABS: 10/24 AM labs pending     ASSESSMENT  65y Female POD1 s/p lap angélica, c/o abdominal pain     PLAN  - Continue current care as per medicine  - pain control, supportive care, OOB  - Clear liquid diet, possible advancement if tolerates this AM  - continue abx  - SCDs, HOLD DVT PPX  - Monitor FANNY drain outputs  - F/U AM labs  - Possible d/c planning for later today vs tomorrow AM if pain well controlled and tolerating PO. To be discharged with FANNY drain with outpatient f/u with Dr. Sharma on Tues (10/27) or Wed (10/28)   - Will discuss with Dr. Sandy (covering for Dr. Sharma today)    Surgical Team Contact Information  Spectralink: Ext: [phone] or [phone]  Pager: [phone]

## 2020-10-24 NOTE — DIETITIAN INITIAL EVALUATION ADULT. - ADD RECOMMEND
1) When medically feasible recommend to advance diet to Low fat, low sodium diet, 2) Encourage adequate PO intake, 3) Monitor pt's PO intake, weight, skin, edema, GI distress

## 2020-10-25 ENCOUNTER — TRANSCRIPTION ENCOUNTER (OUTPATIENT)
Age: 65
End: 2020-10-25

## 2020-10-25 VITALS
RESPIRATION RATE: 18 BRPM | DIASTOLIC BLOOD PRESSURE: 72 MMHG | SYSTOLIC BLOOD PRESSURE: 146 MMHG | OXYGEN SATURATION: 97 % | TEMPERATURE: 98 F | HEART RATE: 67 BPM

## 2020-10-25 LAB
ALBUMIN SERPL ELPH-MCNC: 3.1 G/DL — LOW (ref 3.3–5)
ALP SERPL-CCNC: 85 U/L — SIGNIFICANT CHANGE UP (ref 40–120)
ALT FLD-CCNC: 58 U/L — SIGNIFICANT CHANGE UP (ref 12–78)
ANION GAP SERPL CALC-SCNC: 7 MMOL/L — SIGNIFICANT CHANGE UP (ref 5–17)
AST SERPL-CCNC: 31 U/L — SIGNIFICANT CHANGE UP (ref 15–37)
BASOPHILS # BLD AUTO: 0.05 K/UL — SIGNIFICANT CHANGE UP (ref 0–0.2)
BASOPHILS NFR BLD AUTO: 0.4 % — SIGNIFICANT CHANGE UP (ref 0–2)
BILIRUB SERPL-MCNC: 0.5 MG/DL — SIGNIFICANT CHANGE UP (ref 0.2–1.2)
BUN SERPL-MCNC: 12 MG/DL — SIGNIFICANT CHANGE UP (ref 7–23)
CALCIUM SERPL-MCNC: 8.6 MG/DL — SIGNIFICANT CHANGE UP (ref 8.5–10.1)
CHLORIDE SERPL-SCNC: 116 MMOL/L — HIGH (ref 96–108)
CO2 SERPL-SCNC: 21 MMOL/L — LOW (ref 22–31)
CREAT SERPL-MCNC: 0.96 MG/DL — SIGNIFICANT CHANGE UP (ref 0.5–1.3)
EOSINOPHIL # BLD AUTO: 0.19 K/UL — SIGNIFICANT CHANGE UP (ref 0–0.5)
EOSINOPHIL NFR BLD AUTO: 1.6 % — SIGNIFICANT CHANGE UP (ref 0–6)
GLUCOSE SERPL-MCNC: 154 MG/DL — HIGH (ref 70–99)
HCT VFR BLD CALC: 36.7 % — SIGNIFICANT CHANGE UP (ref 34.5–45)
HGB BLD-MCNC: 11.6 G/DL — SIGNIFICANT CHANGE UP (ref 11.5–15.5)
IMM GRANULOCYTES NFR BLD AUTO: 0.5 % — SIGNIFICANT CHANGE UP (ref 0–1.5)
LYMPHOCYTES # BLD AUTO: 1.62 K/UL — SIGNIFICANT CHANGE UP (ref 1–3.3)
LYMPHOCYTES # BLD AUTO: 13.5 % — SIGNIFICANT CHANGE UP (ref 13–44)
MAGNESIUM SERPL-MCNC: 2.3 MG/DL — SIGNIFICANT CHANGE UP (ref 1.6–2.6)
MCHC RBC-ENTMCNC: 29.4 PG — SIGNIFICANT CHANGE UP (ref 27–34)
MCHC RBC-ENTMCNC: 31.6 GM/DL — LOW (ref 32–36)
MCV RBC AUTO: 92.9 FL — SIGNIFICANT CHANGE UP (ref 80–100)
MONOCYTES # BLD AUTO: 0.86 K/UL — SIGNIFICANT CHANGE UP (ref 0–0.9)
MONOCYTES NFR BLD AUTO: 7.1 % — SIGNIFICANT CHANGE UP (ref 2–14)
NEUTROPHILS # BLD AUTO: 9.26 K/UL — HIGH (ref 1.8–7.4)
NEUTROPHILS NFR BLD AUTO: 76.9 % — SIGNIFICANT CHANGE UP (ref 43–77)
NRBC # BLD: 0 /100 WBCS — SIGNIFICANT CHANGE UP (ref 0–0)
PLATELET # BLD AUTO: 249 K/UL — SIGNIFICANT CHANGE UP (ref 150–400)
POTASSIUM SERPL-MCNC: 3.7 MMOL/L — SIGNIFICANT CHANGE UP (ref 3.5–5.3)
POTASSIUM SERPL-SCNC: 3.7 MMOL/L — SIGNIFICANT CHANGE UP (ref 3.5–5.3)
PROT SERPL-MCNC: 6.8 G/DL — SIGNIFICANT CHANGE UP (ref 6–8.3)
RBC # BLD: 3.95 M/UL — SIGNIFICANT CHANGE UP (ref 3.8–5.2)
RBC # FLD: 13.5 % — SIGNIFICANT CHANGE UP (ref 10.3–14.5)
SODIUM SERPL-SCNC: 144 MMOL/L — SIGNIFICANT CHANGE UP (ref 135–145)
WBC # BLD: 12.04 K/UL — HIGH (ref 3.8–10.5)
WBC # FLD AUTO: 12.04 K/UL — HIGH (ref 3.8–10.5)

## 2020-10-25 PROCEDURE — 99285 EMERGENCY DEPT VISIT HI MDM: CPT | Mod: 25

## 2020-10-25 PROCEDURE — 96375 TX/PRO/DX INJ NEW DRUG ADDON: CPT

## 2020-10-25 PROCEDURE — 85730 THROMBOPLASTIN TIME PARTIAL: CPT

## 2020-10-25 PROCEDURE — 83690 ASSAY OF LIPASE: CPT

## 2020-10-25 PROCEDURE — 96374 THER/PROPH/DIAG INJ IV PUSH: CPT

## 2020-10-25 PROCEDURE — 85027 COMPLETE CBC AUTOMATED: CPT

## 2020-10-25 PROCEDURE — 86803 HEPATITIS C AB TEST: CPT

## 2020-10-25 PROCEDURE — 76700 US EXAM ABDOM COMPLETE: CPT

## 2020-10-25 PROCEDURE — 86769 SARS-COV-2 COVID-19 ANTIBODY: CPT

## 2020-10-25 PROCEDURE — 93005 ELECTROCARDIOGRAM TRACING: CPT

## 2020-10-25 PROCEDURE — 71046 X-RAY EXAM CHEST 2 VIEWS: CPT

## 2020-10-25 PROCEDURE — 76000 FLUOROSCOPY <1 HR PHYS/QHP: CPT

## 2020-10-25 PROCEDURE — 86850 RBC ANTIBODY SCREEN: CPT

## 2020-10-25 PROCEDURE — 85610 PROTHROMBIN TIME: CPT

## 2020-10-25 PROCEDURE — 85025 COMPLETE CBC W/AUTO DIFF WBC: CPT

## 2020-10-25 PROCEDURE — 99053 MED SERV 10PM-8AM 24 HR FAC: CPT

## 2020-10-25 PROCEDURE — 86901 BLOOD TYPING SEROLOGIC RH(D): CPT

## 2020-10-25 PROCEDURE — 86900 BLOOD TYPING SEROLOGIC ABO: CPT

## 2020-10-25 PROCEDURE — 80178 ASSAY OF LITHIUM: CPT

## 2020-10-25 PROCEDURE — 82248 BILIRUBIN DIRECT: CPT

## 2020-10-25 PROCEDURE — 81001 URINALYSIS AUTO W/SCOPE: CPT

## 2020-10-25 PROCEDURE — U0003: CPT

## 2020-10-25 PROCEDURE — 84100 ASSAY OF PHOSPHORUS: CPT

## 2020-10-25 PROCEDURE — 88304 TISSUE EXAM BY PATHOLOGIST: CPT

## 2020-10-25 PROCEDURE — 74176 CT ABD & PELVIS W/O CONTRAST: CPT

## 2020-10-25 PROCEDURE — 99232 SBSQ HOSP IP/OBS MODERATE 35: CPT

## 2020-10-25 PROCEDURE — 36415 COLL VENOUS BLD VENIPUNCTURE: CPT

## 2020-10-25 PROCEDURE — 87086 URINE CULTURE/COLONY COUNT: CPT

## 2020-10-25 PROCEDURE — 99239 HOSP IP/OBS DSCHRG MGMT >30: CPT

## 2020-10-25 PROCEDURE — C1889: CPT

## 2020-10-25 PROCEDURE — 80053 COMPREHEN METABOLIC PANEL: CPT

## 2020-10-25 PROCEDURE — C1769: CPT

## 2020-10-25 PROCEDURE — 83735 ASSAY OF MAGNESIUM: CPT

## 2020-10-25 PROCEDURE — 74181 MRI ABDOMEN W/O CONTRAST: CPT

## 2020-10-25 RX ORDER — METRONIDAZOLE 500 MG
1 TABLET ORAL
Qty: 15 | Refills: 0
Start: 2020-10-25

## 2020-10-25 RX ORDER — ACETAMINOPHEN 500 MG
2 TABLET ORAL
Qty: 0 | Refills: 0 | DISCHARGE
Start: 2020-10-25

## 2020-10-25 RX ORDER — CEFPODOXIME PROXETIL 100 MG
1 TABLET ORAL
Qty: 10 | Refills: 0
Start: 2020-10-25

## 2020-10-25 RX ORDER — OXYCODONE AND ACETAMINOPHEN 5; 325 MG/1; MG/1
1 TABLET ORAL
Qty: 30 | Refills: 0
Start: 2020-10-25

## 2020-10-25 RX ORDER — SIMETHICONE 80 MG/1
1 TABLET, CHEWABLE ORAL
Qty: 21 | Refills: 0
Start: 2020-10-25

## 2020-10-25 RX ORDER — LACTOBACILLUS ACIDOPHILUS 100MM CELL
1 CAPSULE ORAL
Qty: 0 | Refills: 0 | DISCHARGE
Start: 2020-10-25

## 2020-10-25 RX ADMIN — OXYCODONE AND ACETAMINOPHEN 2 TABLET(S): 5; 325 TABLET ORAL at 08:15

## 2020-10-25 RX ADMIN — OXYCODONE AND ACETAMINOPHEN 2 TABLET(S): 5; 325 TABLET ORAL at 13:38

## 2020-10-25 RX ADMIN — OXYCODONE AND ACETAMINOPHEN 2 TABLET(S): 5; 325 TABLET ORAL at 02:11

## 2020-10-25 RX ADMIN — SIMETHICONE 80 MILLIGRAM(S): 80 TABLET, CHEWABLE ORAL at 05:04

## 2020-10-25 RX ADMIN — OXYCODONE AND ACETAMINOPHEN 2 TABLET(S): 5; 325 TABLET ORAL at 07:36

## 2020-10-25 RX ADMIN — OXYCODONE AND ACETAMINOPHEN 2 TABLET(S): 5; 325 TABLET ORAL at 01:11

## 2020-10-25 RX ADMIN — Medication 1 TABLET(S): at 05:04

## 2020-10-25 RX ADMIN — Medication 100 MILLIGRAM(S): at 05:03

## 2020-10-25 RX ADMIN — SIMETHICONE 80 MILLIGRAM(S): 80 TABLET, CHEWABLE ORAL at 13:38

## 2020-10-25 RX ADMIN — Medication 100 MILLIGRAM(S): at 05:04

## 2020-10-25 RX ADMIN — LAMOTRIGINE 200 MILLIGRAM(S): 25 TABLET, ORALLY DISINTEGRATING ORAL at 05:04

## 2020-10-25 RX ADMIN — LITHIUM CARBONATE 300 MILLIGRAM(S): 300 TABLET, EXTENDED RELEASE ORAL at 05:04

## 2020-10-25 RX ADMIN — Medication 25 MILLIGRAM(S): at 05:04

## 2020-10-25 RX ADMIN — OXYCODONE AND ACETAMINOPHEN 2 TABLET(S): 5; 325 TABLET ORAL at 14:19

## 2020-10-25 RX ADMIN — FAMOTIDINE 20 MILLIGRAM(S): 10 INJECTION INTRAVENOUS at 12:04

## 2020-10-25 RX ADMIN — ZIPRASIDONE HYDROCHLORIDE 80 MILLIGRAM(S): 20 CAPSULE ORAL at 08:26

## 2020-10-25 NOTE — PROGRESS NOTE ADULT - PROBLEM SELECTOR PROBLEM 1
Gallstone pancreatitis
R/O Common biliary duct obstruction

## 2020-10-25 NOTE — PROGRESS NOTE ADULT - PROBLEM SELECTOR PLAN 1
resolved  s/p ercp  10/23 sp OR for ccy  f/u am labs  diet as per sx; f/u recs  cont antibiotics   cont pepcid  cont probiotic  pain control   monitor exam/gi fxn  oob as tolerated

## 2020-10-25 NOTE — DISCHARGE NOTE NURSING/CASE MANAGEMENT/SOCIAL WORK - PATIENT PORTAL LINK FT
You can access the FollowMyHealth Patient Portal offered by [facility] by registering at the following website: [email or web address]. By joining [facility]’s FollowMyHealth portal, you will also be able to view your health information using other applications (apps) compatible with our system.

## 2020-10-25 NOTE — PROGRESS NOTE ADULT - SUBJECTIVE AND OBJECTIVE BOX
CHIEF COMPLAINT/INTERVAL HISTORY:  Pt. seen and evaluated for choledocholithiasis.  POD#2 s/p lap angélica.  Pt. is in no distress.  Reports abdominal pain is better controlled with percocet.  Tolerating IV antibiotics.  +flatus and tolerating diet.     REVIEW OF SYSTEMS:  No fever, CP, or SOB.     Vital Signs Last 24 Hrs  T(C): 37 (25 Oct 2020 04:43), Max: 37 (24 Oct 2020 20:49)  T(F): 98.6 (25 Oct 2020 04:43), Max: 98.6 (24 Oct 2020 20:49)  HR: 72 (25 Oct 2020 04:43) (64 - 72)  BP: 161/85 (25 Oct 2020 04:43) (147/66 - 170/76)  BP(mean): --  RR: 17 (25 Oct 2020 04:43) (16 - 17)  SpO2: 96% (25 Oct 2020 04:43) (96% - 98%)    PHYSICAL EXAM:  GENERAL: NAD  HEENT: EOMI, hearing normal, conjunctiva and sclera clear  Chest: CTA bilaterally, no wheezing  CV: S1S2, RRR,   GI: soft, +BS, slight distension, right sided abdominal tenderness (improved), +FANNY drain  Musculoskeletal: no edema  Psychiatric: affect nL, mood nL  Skin: warm and dry    LABS:                        11.6   12.04 )-----------( 249      ( 25 Oct 2020 09:13 )             36.7     10-25    144  |  116<H>  |  12  ----------------------------<  154<H>  3.7   |  21<L>  |  0.96    Ca    8.6      25 Oct 2020 09:13  Mg     2.3     10-25    TPro  6.8  /  Alb  3.1<L>  /  TBili  0.5  /  DBili  x   /  AST  31  /  ALT  58  /  AlkPhos  85  10-25    Assessment and Plan:  -Choledocholithiasis:  s/p ERCP with extension of sphincterotomy and removal of sludge from bile duct.  POD#2 s/p lap angélica.  Continue Ceftriaxone and Flagyl. Continue Lactobacillus 1 tab PO BID, Simethicone 80mg PO TID, Maalox PRN, Zofran PRN, and Percocet PRN.  Continue low fat diet.  GI and Surgery f/u  -Bipolar d/o:  continue Geodon 80mg PO daily, Lithium 300mg PO BID, and Lamictal 200mg PO BID  -Migraine HA:  continue Topamax 100mg PO BID and Imitrex 100mg PO daily PRN  -VTE ppx:  SCD

## 2020-10-25 NOTE — PROGRESS NOTE ADULT - PROBLEM SELECTOR PLAN 2
pain control prn
- Cont lithium, metoprolol, lamictal, topiramate, ziprasidone

## 2020-10-25 NOTE — PROGRESS NOTE ADULT - ATTENDING COMMENTS
The patient was personally seen and examined, in addition to being examined and evaluated by NP.  All elements of the note were edited where appropriate.
The patient was personally seen and examined, in addition to being examined and evaluated by NP.  All elements of the note were edited where appropriate.
Pt. seen and evaluated for choledocholithiasis.  Pt. is in no distress.  Reports diarrhea improving.  Abdominal pain appears to be overall improved.  Tolerating IV antibiotics.  Physical examination as above.     Plan:  -Choledocholithiasis:  s/p ERCP with extension of sphincterotomy and removal of sludge from bile duct.  Continue Ceftriaxone and Flagyl.  Tolerating DASH diet.  Continue Lactobacillus 1 tab PO BID, Simethicone 80mg PO TID, Maalox PRN, Zofran PRN, and Morphine PRN.  Pt. is without s/s of ACS or decompensated CHF.  She can achieve METS>4.  No absolute contraindication from medical perspective to proceed with planned lap angélica tomorrow.  GI and Surgery f/u  -Bipolar d/o:  continue Geodon 80mg PO daily, Lithium 300mg PO BID, and Lamictal 200mg PO BID  -Migraine HA:  continue Topamax 100mg PO BID and Imitrex 100mg PO daily PRN  -VTE ppx:  SCD
Pt. seen and evaluated for abdominal pain.  s/p ERCP yesterday with with removal of large amount of sludge.  Pt. is in no distress.  Report abdominal discomfort but overall improved compared to admission.  Physical examination as above.      Plan:  -Choledocholithiasis:  s/p ERCP with extension of sphincterotomy and removal of sludge from bile duct.  Continue Ceftriaxone and Flagyl.  Advanced to DASH diet.  Continue Lactobacillus 1 tab PO BID, Simethicone 80mg PO TID, Zofran PRN, and Morphine PRN.  GI and Surgery f/u  -Bipolar d/o:  continue Geodon 80mg PO daily, Lithium 300mg PO BID, and Lamictal 200mg PO BID  -Migraine HA:  continue Topamax 100mg PO BID and Imitrex 100mg PO daily PRN  -VTE ppx:  Lovenox 40mg SQ daily
Advanced care planning was discussed with patient and family.  Advanced care planning forms were reviewed and discussed.  Risks, benefits and alternatives of gastroenterologic procedures were discussed in detail and all questions were answered.    30 minutes spent.
Pt. seen and evaluated for choledocholithiasis.  Pt. is in no distress.  Reports loose stools are improving.  Stated she had a very dark BM.  Physical examination as above.      Plan:  -Choledocholithiasis:  s/p ERCP with extension of sphincterotomy and removal of sludge from bile duct.  Continue Ceftriaxone and Flagyl.  NPO for lap angélica today. Continue Lactobacillus 1 tab PO BID, Simethicone 80mg PO TID, Maalox PRN, Zofran PRN, and Morphine PRN.  Pt. is without s/s of ACS or decompensated CHF.  She can achieve METS>4.  No absolute contraindication from medical perspective to proceed with planned lap angélica today.  GI and Surgery f/u  -Bipolar d/o:  continue Geodon 80mg PO daily, Lithium 300mg PO BID, and Lamictal 200mg PO BID  -Migraine HA:  continue Topamax 100mg PO BID and Imitrex 100mg PO daily PRN  -VTE ppx:  SCD
Patient seen and examined at bedside. Case discussed during rounds Reports nausea and epigastric discomfort    FOr MRCP today; +++ distlal CBD stones.    plan:  ERCP tomorrow  Surgery followup for cholecystecomy after ERCP
pt seen and examine today see above plan-- RUQ pain likely Choledocholithiasis- Small sludge or gallstones in the gallbladder No evidence for thickened gallbladder wall, or pericholecystic fluid.  - NPO since midnight, going for ERCP today , iv abx Rocephin , flagyl .

## 2020-10-25 NOTE — PROGRESS NOTE ADULT - SUBJECTIVE AND OBJECTIVE BOX
INTERVAL HPI/OVERNIGHT EVENTS:  pt seen and examined  still c/o post op abd pain, no change  denies n/v  no bm but passing gas  able to chetan diet and getting oob  received percocet x1 overnight per rn    MEDICATIONS  (STANDING):  cefTRIAXone   IVPB 1000 milliGRAM(s) IV Intermittent every 24 hours  enoxaparin Injectable 40 milliGRAM(s) SubCutaneous daily  famotidine    Tablet 20 milliGRAM(s) Oral daily  lactated ringers. 1000 milliLiter(s) (70 mL/Hr) IV Continuous <Continuous>  lamoTRIgine 200 milliGRAM(s) Oral two times a day  lithium 300 milliGRAM(s) Oral two times a day  metoprolol tartrate 25 milliGRAM(s) Oral daily  metroNIDAZOLE  IVPB 500 milliGRAM(s) IV Intermittent every 8 hours  topiramate 100 milliGRAM(s) Oral two times a day  ziprasidone 80 milliGRAM(s) Oral <User Schedule>    MEDICATIONS  (PRN):  acetaminophen   Tablet .. 650 milliGRAM(s) Oral every 6 hours PRN Temp greater or equal to 38C (100.4F), Mild Pain (1 - 3), Moderate Pain (4 - 6)  aluminum hydroxide/magnesium hydroxide/simethicone Suspension 30 milliLiter(s) Oral every 6 hours PRN Dyspepsia  morphine  - Injectable 2 milliGRAM(s) IV Push every 4 hours PRN Severe Pain (7 - 10)  ondansetron Injectable 4 milliGRAM(s) IV Push every 8 hours PRN Nausea and/or Vomiting  SUMAtriptan 100 milliGRAM(s) Oral daily PRN Headache/migraine  zolpidem 5 milliGRAM(s) Oral at bedtime PRN Insomnia  zolpidem 5 milliGRAM(s) Oral at bedtime PRN Insomnia      Allergies    penicillins (Rash)  sulfa drugs (Rash)    Intolerances        Review of Systems:    General:  No wt loss, fevers, chills, night sweats, fatigue   Eyes:  Good vision, no reported pain  ENT:  No sore throat, pain, runny nose, dysphagia  CV:  No pain, palpitations, hypo/hypertension  Resp:  No dyspnea, cough, tachypnea, wheezing  GI:  see above  :  No pain, bleeding, incontinence, nocturia  Muscle:  No pain, weakness  Neuro:  No weakness, tingling, memory problems  Psych:  No fatigue, insomnia, mood problems, depression  Endocrine:  No polyuria, polydypsia, cold/heat intolerance  Heme:  No petechiae, ecchymosis, easy bruisability  Skin:  No rash, tattoos, scars, edema      Vital Signs Last 24 Hrs  T(C): 37.2 (19 Oct 2020 04:33), Max: 37.2 (19 Oct 2020 04:33)  T(F): 98.9 (19 Oct 2020 04:33), Max: 98.9 (19 Oct 2020 04:33)  HR: 68 (19 Oct 2020 04:33) (59 - 68)  BP: 134/68 (19 Oct 2020 04:33) (132/69 - 156/83)  BP(mean): --  RR: 19 (19 Oct 2020 04:33) (18 - 19)  SpO2: 96% (19 Oct 2020 04:33) (95% - 96%)    PHYSICAL EXAM:    Constitutional: lying in bed  HEENT: ncat  Gastrointestinal: soft ttp ruq mild dt +mirza light serosang  Extremities: No peripheral edema  Vascular: 2+ peripheral pulses  Neurological: Awake alert appropriate     LABS:                        12.2   8.67  )-----------( 206      ( 18 Oct 2020 08:57 )             37.6     10-18    145  |  115<H>  |  13  ----------------------------<  97  3.8   |  21<L>  |  0.92    Ca    8.7      18 Oct 2020 08:57    TPro  6.9  /  Alb  3.4  /  TBili  0.7  /  DBili  .20  /  AST  21  /  ALT  29  /  AlkPhos  94  10-18          RADIOLOGY & ADDITIONAL TESTS:

## 2020-10-25 NOTE — PROGRESS NOTE ADULT - SUBJECTIVE AND OBJECTIVE BOX
[facility] -- Mili March, Pop, Nuris, Alfa Lyle Savella  Office # [phone]      Follow Up:    htn , lbbb, nicm    Subjective/Observations:     No events overnight resting comfortably in bed.  No complaints of chest pain, dyspnea, or palpitations reported. No signs of orthopnea or PND.  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  Pt also self mutilate last was &quot;many yrs ago maybe 20 yrs ago&quot;.    Hypertension    Bipolar 1 disorder    History of tonsillectomy  at 18 yrs old    S/P tonsillectomy        MEDICATIONS  (STANDING):  cefTRIAXone   IVPB 1000 milliGRAM(s) IV Intermittent every 24 hours  enoxaparin Injectable 40 milliGRAM(s) SubCutaneous daily  famotidine    Tablet 20 milliGRAM(s) Oral daily  lactobacillus acidophilus 1 Tablet(s) Oral two times a day  lamoTRIgine 200 milliGRAM(s) Oral two times a day  lithium 300 milliGRAM(s) Oral two times a day  metoprolol tartrate 25 milliGRAM(s) Oral daily  metroNIDAZOLE  IVPB 500 milliGRAM(s) IV Intermittent every 8 hours  simethicone 80 milliGRAM(s) Chew three times a day  topiramate 100 milliGRAM(s) Oral two times a day  ziprasidone 80 milliGRAM(s) Oral <User Schedule>    MEDICATIONS  (PRN):  acetaminophen   Tablet .. 650 milliGRAM(s) Oral every 6 hours PRN Temp greater or equal to 38C (100.4F), Mild Pain (1 - 3), Moderate Pain (4 - 6)  aluminum hydroxide/magnesium hydroxide/simethicone Suspension 30 milliLiter(s) Oral every 6 hours PRN Dyspepsia  ondansetron Injectable 4 milliGRAM(s) IV Push every 8 hours PRN Nausea and/or Vomiting  oxycodone    5 mG/acetaminophen 325 mG 1 Tablet(s) Oral every 6 hours PRN Moderate Pain (4 - 6)  oxycodone    5 mG/acetaminophen 325 mG 2 Tablet(s) Oral every 6 hours PRN Severe Pain (7 - 10)  SUMAtriptan 100 milliGRAM(s) Oral daily PRN Headache/migraine  zolpidem 10 milliGRAM(s) Oral at bedtime PRN Insomnia      Allergies    penicillins (Rash)  sulfa drugs (Rash)    Intolerances        Vital Signs Last 24 Hrs  T(C): 37 (25 Oct 2020 04:43), Max: 37 (24 Oct 2020 20:49)  T(F): 98.6 (25 Oct 2020 04:43), Max: 98.6 (24 Oct 2020 20:49)  HR: 72 (25 Oct 2020 04:43) (64 - 72)  BP: 161/85 (25 Oct 2020 04:43) (147/66 - 170/76)  BP(mean): --  RR: 17 (25 Oct 2020 04:43) (16 - 17)  SpO2: 96% (25 Oct 2020 04:43) (96% - 98%)    I&O's Summary    24 Oct 2020 07:01  -  25 Oct 2020 07:00  --------------------------------------------------------  IN: 2205 mL / OUT: 640 mL / NET: 1565 mL    25 Oct 2020 07:01  -  25 Oct 2020 10:06  --------------------------------------------------------  IN: 240 mL / OUT: 0 mL / NET: 240 mL          PHYSICAL EXAM:  TELE: not on tele   Constitutional: NAD, awake and alert, well-developed  HEENT: Moist Mucous Membranes, Anicteric  Pulmonary: Non-labored, breath sounds are clear bilaterally, No wheezing, crackles or rhonchi  Cardiovascular: Regular, S1 and S2 nl, No murmurs, rubs, gallops or clicks  Gastrointestinal: Bowel Sounds present, soft, nontender.   Lymph: No lymphadenopathy. No peripheral edema.  Skin: No visible rashes or ulcers.  Psych:  Mood & affect appropriate    LABS: All Labs Reviewed:                        11.6   12.04 )-----------( 249      ( 25 Oct 2020 09:13 )             36.7                         10.7   13.46 )-----------( 228      ( 24 Oct 2020 09:22 )             32.7                         12.3   10.31 )-----------( 244      ( 23 Oct 2020 08:19 )             38.8     25 Oct 2020 09:13    144    |  116    |  12     ----------------------------<  154    3.7     |  21     |  0.96   24 Oct 2020 09:22    144    |  116    |  11     ----------------------------<  104    3.8     |  22     |  0.89   23 Oct 2020 08:19    145    |  116    |  12     ----------------------------<  118    3.7     |  22     |  1.00     Ca    8.6        25 Oct 2020 09:13  Ca    8.4        24 Oct 2020 09:22  Ca    9.0        23 Oct 2020 08:19    TPro  6.8    /  Alb  3.1    /  TBili  0.5    /  DBili  x      /  AST  31     /  ALT  58     /  AlkPhos  85     25 Oct 2020 09:13  TPro  6.1    /  Alb  2.9    /  TBili  0.5    /  DBili  x      /  AST  54     /  ALT  59     /  AlkPhos  74     24 Oct 2020 09:22  TPro  6.9    /  Alb  3.4    /  TBili  0.4    /  DBili  x      /  AST  22     /  ALT  37     /  AlkPhos  90     23 Oct 2020 08:19             ECG  < from: 12 Lead ECG (10.17.20 @ 08:27) >    Ventricular Rate 59 BPM    Atrial Rate 59 BPM    P-R Interval 214 ms    QRS Duration 154 ms    Q-T Interval 458 ms    QTC Calculation(Bazett) 453 ms    P Axis 65 degrees    R Axis -32 degrees    T Axis 112 degrees    Diagnosis Line Sinus bradycardia with 1st degree AV block  Left axis deviation  Left bundle branch block    <

## 2020-10-25 NOTE — PROGRESS NOTE ADULT - ASSESSMENT
65y Female with HTN, Bipolar disorder, presents to the ED with c/o abdominal pain, epigastric and RUQ admitted with choledocholithiasis and consistent gallstone pancreatitis. For cholecystectomy tomorrow, 10/23    Abn ekg  - known lbbb, unchanged from prior ekgs  - history of a cardiomyopathy that has resolved on last echo in 2019  - no sign of acute ischemia or volume overload.    Gallstone pancreatitis  -S/P lap angélica for gallstone pancreatitis w/o cardiac complications    HTN  - elevated earlier 161/85, likely in setting of pain, repeat now  - continue metoprolol  - Monitor routine hemodynamics    dc pending   - Monitor and replete lytes, keep K>4, Mg>2.  - Other cardiovascular workup will depend on clinical course. All other workup per primary team.  - Will follow.  Jillian Jensen FNP-C  Cardiology NP  [id number] [phone]

## 2020-10-25 NOTE — PROGRESS NOTE ADULT - SUBJECTIVE AND OBJECTIVE BOX
STATUS POST:   laparoscopic cholecystectomy    POST OPERATIVE DAY #:  2    SUBJECTIVE:  Patient seen and examined at bedside, reports continued 9/10 abdominal pain, just received 2 percocet.  Tolerating low fat diet, admits passing flatus.  Patient denies any fevers, chills, chest pain, shortness of breath, nausea or vomiting.    Vital Signs Last 24 Hrs  T(C): 37 (25 Oct 2020 04:43), Max: 37 (24 Oct 2020 20:49)  T(F): 98.6 (25 Oct 2020 04:43), Max: 98.6 (24 Oct 2020 20:49)  HR: 72 (25 Oct 2020 04:43) (64 - 72)  BP: 161/85 (25 Oct 2020 04:43) (147/66 - 170/76)  RR: 17 (25 Oct 2020 04:43) (16 - 17)  SpO2: 96% (25 Oct 2020 04:43) (96% - 98%)    PHYSICAL EXAM  GENERAL:  Well-nourished, well-developed female lying comfortably in bed in NAD  HEENT:  Sclera white. Mucous membranes moist.  ABDOMEN:  Soft, nondistended, +severe TTP in epigastrica region and RUQ; Trocar sites dressed with steri strips, clean and dry.  Right-sided FANNY in place with approximately 5cc serosanguinous fluid; drain sponge with mild bloody staining, replaced.  BS appreciated on auscultation.  No rebound tenderness or guarding.  SKIN:  No jaundice, pallor, or cyanosis  NEURO:  A&O x 3    I&O's Summary    24 Oct 2020 07:01  -  25 Oct 2020 07:00  --------------------------------------------------------  IN: 2205 mL / OUT: 640 mL / NET: 1565 mL    I&O's Detail    24 Oct 2020 07:01  -  25 Oct 2020 07:00  --------------------------------------------------------  IN:    IV PiggyBack: 100 mL    IV PiggyBack: 50 mL    Lactated Ringers: 375 mL    Oral Fluid: 1680 mL  Total IN: 2205 mL    OUT:    Bulb (mL): 40 mL    Voided (mL): 600 mL  Total OUT: 640 mL    Total NET: 1565 mL    MEDICATIONS  (STANDING):  cefTRIAXone   IVPB 1000 milliGRAM(s) IV Intermittent every 24 hours  enoxaparin Injectable 40 milliGRAM(s) SubCutaneous daily  famotidine    Tablet 20 milliGRAM(s) Oral daily  lactobacillus acidophilus 1 Tablet(s) Oral two times a day  lamoTRIgine 200 milliGRAM(s) Oral two times a day  lithium 300 milliGRAM(s) Oral two times a day  metoprolol tartrate 25 milliGRAM(s) Oral daily  metroNIDAZOLE  IVPB 500 milliGRAM(s) IV Intermittent every 8 hours  simethicone 80 milliGRAM(s) Chew three times a day  topiramate 100 milliGRAM(s) Oral two times a day  ziprasidone 80 milliGRAM(s) Oral <User Schedule>    MEDICATIONS  (PRN):  acetaminophen   Tablet .. 650 milliGRAM(s) Oral every 6 hours PRN Temp greater or equal to 38C (100.4F), Mild Pain (1 - 3), Moderate Pain (4 - 6)  aluminum hydroxide/magnesium hydroxide/simethicone Suspension 30 milliLiter(s) Oral every 6 hours PRN Dyspepsia  ondansetron Injectable 4 milliGRAM(s) IV Push every 8 hours PRN Nausea and/or Vomiting  oxycodone    5 mG/acetaminophen 325 mG 1 Tablet(s) Oral every 6 hours PRN Moderate Pain (4 - 6)  oxycodone    5 mG/acetaminophen 325 mG 2 Tablet(s) Oral every 6 hours PRN Severe Pain (7 - 10)  SUMAtriptan 100 milliGRAM(s) Oral daily PRN Headache/migraine  zolpidem 10 milliGRAM(s) Oral at bedtime PRN Insomnia    LABS:                        10.7   13.46 )-----------( 228      ( 24 Oct 2020 09:22 )             32.7     10-24    144  |  116<H>  |  11  ----------------------------<  104<H>  3.8   |  22  |  0.89    Ca    8.4<L>      24 Oct 2020 09:22    TPro  6.1  /  Alb  2.9<L>  /  TBili  0.5  /  DBili  x   /  AST  54<H>  /  ALT  59  /  AlkPhos  74  10-24    ASSESSMENT & PLAN  65 year old female with gallstone pancreatitis, POD#2 s/p laparoscopic cholecystectomy, still with complaints of severe postoperative abdominal pain.  FANNY with 40cc/24hrs.    - Continue low fat diet  - Continue ceftriaxone, flagyl  - Serial abdominal exams  - Drain monitoring  - DVT prophylaxis with lovenox  - OOB, pain control  - Follow up AM labs  - Case to be discussed with Dr. Sandy (covering Dr. Sharma)

## 2020-10-25 NOTE — PROGRESS NOTE ADULT - SUBJECTIVE AND OBJECTIVE BOX
Subjective: pt OOB, still with pain but improving.    Objective:  Vital Signs Last 24 Hrs  T(C): 37 (25 Oct 2020 04:43), Max: 37 (24 Oct 2020 20:49)  T(F): 98.6 (25 Oct 2020 04:43), Max: 98.6 (24 Oct 2020 20:49)  HR: 72 (25 Oct 2020 04:43) (64 - 72)  BP: 161/85 (25 Oct 2020 04:43) (147/66 - 170/76)  BP(mean): --  RR: 17 (25 Oct 2020 04:43) (16 - 17)  SpO2: 96% (25 Oct 2020 04:43) (96% - 98%)    Heent: DENA, FREOM  Pul: clear  Cor: RSR, without murmurs  Abdomen: normal bowel sounds, without distension  Incisions clean and closed  FANNY serosanguinous drainaged  Extremities: without edema, motor/sensory intact, without calf pain, Homans sign negative.                        11.6   12.04 )-----------( 249      ( 25 Oct 2020 09:13 )             36.7       10-25    144  |  116<H>  |  12  ----------------------------<  154<H>  3.7   |  21<L>  |  0.96    Ca    8.6      25 Oct 2020 09:13    TPro  6.8  /  Alb  3.1<L>  /  TBili  0.5  /  DBili  x   /  AST  31  /  ALT  58  /  AlkPhos  85  10-25        10-24 @ 07:01  -  10-25 @ 07:00  --------------------------------------------------------  IN:    IV PiggyBack: 100 mL    IV PiggyBack: 50 mL    Lactated Ringers: 375 mL    Oral Fluid: 1680 mL  Total IN: 2205 mL    OUT:    Bulb (mL): 40 mL    Voided (mL): 600 mL  Total OUT: 640 mL    Total NET: 1565 mL      10-25 @ 07:01  -  10-25 @ 10:15  --------------------------------------------------------  IN:    Oral Fluid: 240 mL  Total IN: 240 mL    OUT:  Total OUT: 0 mL    Total NET: 240 mL

## 2020-10-25 NOTE — PROGRESS NOTE ADULT - ASSESSMENT
IMPRESSION pt improving  PLAN agree with discharge           follow up with Dr Sharma for drain removal.

## 2020-10-25 NOTE — PROGRESS NOTE ADULT - REASON FOR ADMISSION
abdominal pain - epigastric, RUQ

## 2020-10-27 ENCOUNTER — APPOINTMENT (OUTPATIENT)
Dept: SURGERY | Facility: CLINIC | Age: 65
End: 2020-10-27
Payer: MEDICARE

## 2020-10-27 VITALS
WEIGHT: 140.65 LBS | BODY MASS INDEX: 24.92 KG/M2 | DIASTOLIC BLOOD PRESSURE: 98 MMHG | OXYGEN SATURATION: 99 % | HEIGHT: 63 IN | SYSTOLIC BLOOD PRESSURE: 140 MMHG | HEART RATE: 100 BPM | TEMPERATURE: 97.9 F

## 2020-10-27 LAB — SURGICAL PATHOLOGY STUDY: SIGNIFICANT CHANGE UP

## 2020-10-27 PROCEDURE — 99024 POSTOP FOLLOW-UP VISIT: CPT

## 2020-10-27 RX ORDER — ONDANSETRON 8 MG/1
8 TABLET, ORALLY DISINTEGRATING ORAL
Qty: 60 | Refills: 0 | Status: COMPLETED | COMMUNITY
Start: 2018-03-15 | End: 2020-10-27

## 2020-10-27 RX ORDER — KETOCONAZOLE 20 MG/G
2 CREAM TOPICAL DAILY
Qty: 1 | Refills: 0 | Status: COMPLETED | COMMUNITY
Start: 2019-02-15 | End: 2020-10-27

## 2020-10-27 RX ORDER — SCOPALAMINE 1 MG/3D
1 PATCH, EXTENDED RELEASE TRANSDERMAL
Qty: 1 | Refills: 0 | Status: COMPLETED | COMMUNITY
Start: 2018-07-11 | End: 2020-10-27

## 2020-10-27 RX ORDER — CHLORHEXIDINE GLUCONATE, 0.12% ORAL RINSE 1.2 MG/ML
0.12 SOLUTION DENTAL
Qty: 473 | Refills: 0 | Status: COMPLETED | COMMUNITY
Start: 2017-08-31 | End: 2020-10-27

## 2020-10-27 NOTE — PHYSICAL EXAM
[Respiratory Effort] : normal respiratory effort [Normal Rate and Rhythm] : normal rate and rhythm [No HSM] : no hepatosplenomegaly [Tender] : was nontender [Enlarged] : not enlarged [No Rash or Lesion] : No rash or lesion [Alert] : alert [Oriented to Person] : oriented to person [Oriented to Place] : oriented to place [Oriented to Time] : oriented to time [Calm] : calm [de-identified] : Appears well, no acute distress, ambulates easily into office and assumes examination table without need of assistance. [de-identified] : Normocephalic and atraumatic. [de-identified] : Supple with full range of motion. [FreeTextEntry1] : No cervical, supraclavicular, axillary or inguinal adenopathy. [de-identified] : Deferred. [de-identified] : RUQ free of visible masses.\par Upper abdomen free of palpable collections.\par Entire abdomen free of guarding or rebound or referred pain.\par Incisions all clean and dry and intact without odor or drainage.\par FANNY secure and in place with light and thin serosanguineous drainage. [de-identified] : Deferred. [de-identified] : Deferred. [de-identified] : Grossly symmetric and within normal limits without any obvious motor or sensory deficits.

## 2020-10-27 NOTE — REVIEW OF SYSTEMS
[Feeling Poorly] : not feeling poorly [Feeling Tired] : feeling tired [As Noted in HPI] : as noted in HPI [Anxiety] : anxiety [Depression] : depression [Negative] : Heme/Lymph [FreeTextEntry9] : "back pain..." [de-identified] : though as per her usual/ baseline...

## 2020-10-27 NOTE — DATA REVIEWED
[FreeTextEntry1] : DEEPTHI FISH                        2\par Surgical Final Report\par Final Diagnosis\par Gallbladder, laparoscopic cholecystectomy: Acalculous follicular chronic cholecystitis.\par Verified by: Froilan Arce MD  (Electronic Signature)\par Reported on: 10/27/20 13:45 EDT, [facility], [address] [place] [address]\par Phone: [phone]   Fax: [phone]\par _________________________________________________________________\par \par

## 2020-10-27 NOTE — HISTORY OF PRESENT ILLNESS
[de-identified] : Ms. Flores arrives in the office today pleased with her overall progress.\par She reports acute on chronic back pain- though this is typical for her per report.\par She reports only minimal incisional discomfort- especially so at drain site.  She denies any diffuse abdominal pain.  She does report some fatigue, but denies malaise.  \par Ms. Flores is happy with the appearance of her operative sites and the contour of her abdominal wall.\par She is eager to have the drain out, but denies any issues with the FANNY with MINIMAL serosanguineous output of 10 to 20 cc per day...

## 2020-10-27 NOTE — PLAN
[FreeTextEntry1] : S/P uneventful general anesthesia with uncomplicated laparoscopic cholecystectomy.\par Clinically well by this history.\par Surgically stable by this examination.\par No evidence by history or examination to suggest complication.\par FANNY removed today without issue.\par Cleared to increase casual activities with own comfort as guide.\par To refrain from maximal exertions for another month.\par Timing and technique of scar massage reviewed in detail.\par Pathology report and it's benign nature consistent with expectations and operative findings reviewed in detail.\par Encouraged to call with questions or concerns.  Otherwise, RTO~ 2 weeks for formal post-op visit.\par Ms. Flores is pleased and agrees, leaving in good spirits verbalize plan as outlined above.\par

## 2020-10-28 ENCOUNTER — NON-APPOINTMENT (OUTPATIENT)
Age: 65
End: 2020-10-28

## 2020-11-10 ENCOUNTER — APPOINTMENT (OUTPATIENT)
Dept: SURGERY | Facility: CLINIC | Age: 65
End: 2020-11-10
Payer: MEDICARE

## 2020-11-10 VITALS
TEMPERATURE: 97.4 F | DIASTOLIC BLOOD PRESSURE: 90 MMHG | SYSTOLIC BLOOD PRESSURE: 160 MMHG | OXYGEN SATURATION: 100 % | HEIGHT: 63 IN | WEIGHT: 137.79 LBS | BODY MASS INDEX: 24.41 KG/M2 | HEART RATE: 99 BPM

## 2020-11-10 DIAGNOSIS — R10.11 RIGHT UPPER QUADRANT PAIN: ICD-10-CM

## 2020-11-10 DIAGNOSIS — Z90.49 ACQUIRED ABSENCE OF OTHER SPECIFIED PARTS OF DIGESTIVE TRACT: ICD-10-CM

## 2020-11-10 PROCEDURE — 99024 POSTOP FOLLOW-UP VISIT: CPT

## 2020-11-10 NOTE — REVIEW OF SYSTEMS
[Feeling Poorly] : feeling poorly [Feeling Tired] : feeling tired [As Noted in HPI] : as noted in HPI [Abdominal Pain] : abdominal pain [Vomiting] : no vomiting [Anxiety] : anxiety [Depression] : depression [Negative] : Heme/Lymph [FreeTextEntry7] : loss of appetite [FreeTextEntry9] : "back pain..." [de-identified] : per her usual/ baseline as she reports today...

## 2020-11-10 NOTE — HISTORY OF PRESENT ILLNESS
[de-identified] : Ms. Flores arrives in the office today pleased with her overall progress.\par She reports acute on chronic back pain- though this is typical for her per report.\par She reports only minimal incisional discomfort- especially so at drain site.  She denies any diffuse abdominal pain.  She does report some fatigue, but denies malaise.  \par Ms. Flores is happy with the appearance of her operative sites and the contour of her abdominal wall.\par She is eager to have the drain out, but denies any issues with the FANNY with MINIMAL serosanguineous output of 10 to 20 cc per day... [de-identified] : Ms. Flores returns today somewhat frustrated by what she feels is a lack of progress.\par After some initial progress, she reports being anxious today about what she feels is recurrent and persistent RUQ pain.\par She reports fatigue and anxiety, but perhaps not jacoby malaise.\par She reports no fever, but a sensation of frequent "chills."\par She reports no nausea or vomiting or lower GI distress, but a feeling a anorexia or loss of appetite and now reports a loss of 7 or 8 lbs of weight.\par While she is pleased with the appearance of the incision sites and the overall contour of her abdominal wall, she is insistent that she cannot lay on right side due to pain with compression.\par "Something is just not right..."

## 2020-11-10 NOTE — PHYSICAL EXAM
[Respiratory Effort] : normal respiratory effort [Normal Rate and Rhythm] : normal rate and rhythm [No HSM] : no hepatosplenomegaly [Tender] : was nontender [Enlarged] : not enlarged [No Rash or Lesion] : No rash or lesion [Alert] : alert [Oriented to Person] : oriented to person [Oriented to Place] : oriented to place [Oriented to Time] : oriented to time [Anxious] : anxious [de-identified] : Appears well and in no acute distress, ambulates easily into office and assumes examination table without need of assistance from me. [de-identified] : Remains normocephalic and atraumatic. [de-identified] : Still supple with full range of motion. [FreeTextEntry1] : No cervical, supraclavicular, axillary or inguinal adenopathy on this exam. [de-identified] : Deferred. [de-identified] : RUQ free of visible masses.\par RUQ free of free of palpable collections.\par Entire abdomen free of guarding, rebound or referred pain though perhaps MILD DISCOMFORT to DEEP PALPATION ONLY of RUQ.\par Incisions all clean, dry and intact without odor or any drainage.\par  [de-identified] : Deferred. [de-identified] : Deferred. [de-identified] : Grossly symmetric and within normal limits without motor or sensory deficits.

## 2020-11-10 NOTE — DATA REVIEWED
[FreeTextEntry1] : DEEPTHI FISH                        2\par Surgical Final Report\par Final Diagnosis\par Gallbladder, laparoscopic cholecystectomy: Acalculous follicular chronic cholecystitis.\par Verified by: Froilan Arce MD  (Electronic Signature)\par Reported on: 10/27/20 13:45 EDT, [facility], [address] [place] [address]\par Phone: [phone]   Fax: [phone]\par _________________________________________________________________\par \par

## 2020-11-10 NOTE — PLAN
[FreeTextEntry1] : S/P uneventful general anesthesia for laparoscopic cholecystectomy after ERCP.\par Concerning history today with complaint of "Feeling unwell..." and failure to fully resolve her abdominal pain.\par Surgically stable by this examination though there is some localizing tenderness to RUQ with deep palpation, though no jacoby signs of peritonitis.\par In summary, she is adamant that "something is not right... I don't feel right."\par I have reassured her that the vitals are non-suggestive and her abdominal examination is benign.\par Yet, given her recent ERCP and Lap Cholecystectomy, there is a risk of complication.\par I have directed her to ER in order to facilitate the quickest resolution to this given her anxiety, complaints of pain today as well as systemic complaints of weight loss and GI complaint of anorexia.\par She is not unpleasant, but is adamant that she will not go to ER "I am not that sick" and she will not expose herself to an ER setting in the light of present Covid Pandemic.\par She understands my recommendation for ER evaluation, though given her refusal and generally reassuring clinical picture, I will move forward with new plan for lab today or tomorrow and CT scan of abdomen and pelvis.  If these are reassuring, then follow-up with GI or PMD seems appropriate.  If these are suggestive, then a more focused plan for evaluation and treatment can proceed.  As always, she has been encouraged to call with questions or concerns or interval changes.  Ms. Flores is pleased and agrees, leaving in improved spirits and verbalize issues with plan as outlined above.\par

## 2020-11-11 LAB
ALBUMIN SERPL ELPH-MCNC: 4.5 G/DL
ALP BLD-CCNC: 95 U/L
ALT SERPL-CCNC: 13 U/L
ANION GAP SERPL CALC-SCNC: 16 MMOL/L
AST SERPL-CCNC: 19 U/L
BASOPHILS # BLD AUTO: 0.09 K/UL
BASOPHILS NFR BLD AUTO: 1.1 %
BILIRUB DIRECT SERPL-MCNC: 0.1 MG/DL
BILIRUB INDIRECT SERPL-MCNC: 0.2 MG/DL
BILIRUB SERPL-MCNC: 0.2 MG/DL
BUN SERPL-MCNC: 15 MG/DL
CALCIUM SERPL-MCNC: 9.5 MG/DL
CHLORIDE SERPL-SCNC: 107 MMOL/L
CO2 SERPL-SCNC: 18 MMOL/L
CREAT SERPL-MCNC: 0.96 MG/DL
EOSINOPHIL # BLD AUTO: 0.21 K/UL
EOSINOPHIL NFR BLD AUTO: 2.5 %
GLUCOSE SERPL-MCNC: 83 MG/DL
HCT VFR BLD CALC: 39.8 %
HGB BLD-MCNC: 11.7 G/DL
IMM GRANULOCYTES NFR BLD AUTO: 0.2 %
LPL SERPL-CCNC: 44 U/L
LYMPHOCYTES # BLD AUTO: 2.01 K/UL
LYMPHOCYTES NFR BLD AUTO: 23.8 %
MAN DIFF?: NORMAL
MCHC RBC-ENTMCNC: 29.4 GM/DL
MCHC RBC-ENTMCNC: 29.4 PG
MCV RBC AUTO: 100 FL
MONOCYTES # BLD AUTO: 0.49 K/UL
MONOCYTES NFR BLD AUTO: 5.8 %
NEUTROPHILS # BLD AUTO: 5.63 K/UL
NEUTROPHILS NFR BLD AUTO: 66.6 %
PLATELET # BLD AUTO: 294 K/UL
POTASSIUM SERPL-SCNC: 4.5 MMOL/L
PROT SERPL-MCNC: 6.6 G/DL
RBC # BLD: 3.98 M/UL
RBC # FLD: 13.5 %
SODIUM SERPL-SCNC: 142 MMOL/L
WBC # FLD AUTO: 8.45 K/UL

## 2020-11-12 ENCOUNTER — OUTPATIENT (OUTPATIENT)
Dept: OUTPATIENT SERVICES | Facility: HOSPITAL | Age: 65
LOS: 1 days | End: 2020-11-12
Payer: MEDICARE

## 2020-11-12 ENCOUNTER — RESULT REVIEW (OUTPATIENT)
Age: 65
End: 2020-11-12

## 2020-11-12 ENCOUNTER — APPOINTMENT (OUTPATIENT)
Dept: CT IMAGING | Facility: CLINIC | Age: 65
End: 2020-11-12
Payer: MEDICARE

## 2020-11-12 DIAGNOSIS — R10.11 RIGHT UPPER QUADRANT PAIN: ICD-10-CM

## 2020-11-12 DIAGNOSIS — Z98.89 OTHER SPECIFIED POSTPROCEDURAL STATES: Chronic | ICD-10-CM

## 2020-11-12 PROCEDURE — 74176 CT ABD & PELVIS W/O CONTRAST: CPT | Mod: 26

## 2020-11-12 PROCEDURE — 74176 CT ABD & PELVIS W/O CONTRAST: CPT

## 2020-11-19 ENCOUNTER — APPOINTMENT (OUTPATIENT)
Dept: INTERNAL MEDICINE | Facility: CLINIC | Age: 65
End: 2020-11-19
Payer: MEDICARE

## 2020-11-19 VITALS
TEMPERATURE: 96.5 F | WEIGHT: 138 LBS | HEIGHT: 63 IN | RESPIRATION RATE: 14 BRPM | HEART RATE: 65 BPM | OXYGEN SATURATION: 97 % | BODY MASS INDEX: 24.45 KG/M2 | SYSTOLIC BLOOD PRESSURE: 144 MMHG | DIASTOLIC BLOOD PRESSURE: 78 MMHG

## 2020-11-19 PROCEDURE — 99214 OFFICE O/P EST MOD 30 MIN: CPT

## 2020-11-19 NOTE — REVIEW OF SYSTEMS
[Abdominal Pain] : abdominal pain [Diarrhea] : diarrhea [Joint Pain] : joint pain [Anxiety] : anxiety [Negative] : Heme/Lymph

## 2020-11-19 NOTE — HISTORY OF PRESENT ILLNESS
[FreeTextEntry8] : cc: abdominal pain\par \par Pt went to hospital 4-5 weeks ago with upper abdominal pain. She was found to have a dilated CBD and had cholecystectomy. Ever since leaving the hospital she just doesn't feel well. Not eating much. She has lost 15 lbs. Her recent ct scan did not show source of pain. It is now a dull pain which doesn't go away. At the hospital it was a severe pain. \par She is a poor sleeper. It is hard for her to get comfortable. It feels like a dull, gnawing pain.

## 2020-12-05 NOTE — DIETITIAN INITIAL EVALUATION ADULT. - HEIGHT FOR BMI (CENTIMETERS)
[facility] ESSENTIAL TEST[facility]      .CDC Testing and Quarantine Guidelines for patients with exposure to a known-positive COVID-19 person:  o A 'close exposure' is defined as anyone who has had an exposure (masked or unmasked) to a known COVID -19 positive person within 6 ft for longer than 15 minutes. If your exposure meets this definition you are required by CDC guidelines to quarantine for at least 7-10 days from time of exposure. The CDC states that a test can be performed for an asymptomatic patient (someone who does not have any symptoms) after a close exposure, and that a test should be done if you develop symptoms after a close exposure as described above.  Specifically, you can test at day 5 or later if asymptomatic, in order to get released from quarantine on day 7 or later.  If you develop symptoms sooner, you should test when your symptoms start.  o If you meet the definition of a close exposure, it will not matter whether you are experiencing symptoms- a quarantine for at least 7-10 days after a close exposure is required by CDC guidelines.  o Please note, if you decide to test as an asymptomatic during your quarantine and you are positive, you will be restarting your quarantine and moving from a possible 10 day quarantine (if you do not test), to a 11 day or greater quarantine.  o The CDC also suggests people still monitor for symptoms for a full 14 days and remember that the shorter quarantine options do not replace initial CDC guidance.  The CDC continues to recommend quarantining for 14 days as the best way to reduce risk for spreading COVID-19 - however, this is only a recommendation.  o If your exposure does not meet the above definition, you can return to your normal daily activities to include social distancing, wearing a mask and frequent handwashing.      NEGATIVE COVID TEST  o You have tested negative for COVID-19 today.  If you did not have a close exposure (as defined below) you can  "return to your normal daily activities to include social distancing, wearing a mask and frequent handwashing.  o A "close exposure" is defined as anyone who has had an exposure (masked or unmasked) to a known COVID -19 positive person within 6 ft for longer than 15 minutes. If your exposure meets this definition, you are required by CDC guidelines to quarantine for at least 7-10 days from time of exposure.  o The CDC states that a test can be performed for an asymptomatic patient (someone who does not have any symptoms) after a close exposure, and that a test should be done if you develop symptoms after a close exposure as described above.  o Specifically, you can test at day 5 or later if asymptomatic in order to get released from quarantine on day 7 or later.  If you develop symptoms sooner, you should test when your symptoms start.  o If you developed symptoms since the exposure, and your test was negative today and less than 5 days from your exposure, you still have to quarantine for 7-10 days from the date of the exposure.  o The 7-10 day quarantine begins from the day you were exposed, not the day of your test.  For example, if your exposure was on a Monday, and you waited until Friday of the same week to get tested and it was negative, your 7-10 day quarantine begins from that Monday, not the Friday you tested negative.  o Please note, if you decide to test as an asymptomatic during your quarantine and you are positive, you will be restarting your quarantine and moving from a possible 10 day quarantine (if you do not test), to a 11 day or greater quarantine.           POSITIVE COVID TEST  o You have tested positive for COVID-19 today.  Please note that patients who test positive for COVID-19 are required by the CDC to undergo isolation for 10 days after their symptoms first began.  o This isolation starts from the day you first developed symptoms, not the day of your positive test. For example, if your " symptoms began on a Monday but tested positive on the following Wednesday, your 10-day isolation begins from that Monday, not the Wednesday you tested positive.  o However, if you are asymptomatic (a person who does not have any symptoms) and COVID-19 positive, your 10-day isolation begins on the day you tested positive, regardless of exposure date.  o Also, per the CDC guidelines, once your 10 days have passed, and you have not had fever greater than 100.4F in the last 24 hours without taking any fever reducers such as Tylenol (Acetaminophen) or Motrin (Ibuprofen), you may return to your normal activities including social distancing, wearing masks, and frequent handwashing - YOU DO NOT NEED ANOTHER TEST IN ORDER TO END YOUR QUARANTINE.       160.02

## 2020-12-07 ENCOUNTER — NON-APPOINTMENT (OUTPATIENT)
Age: 65
End: 2020-12-07

## 2020-12-08 ENCOUNTER — APPOINTMENT (OUTPATIENT)
Dept: SURGERY | Facility: CLINIC | Age: 65
End: 2020-12-08

## 2021-01-11 ENCOUNTER — RX RENEWAL (OUTPATIENT)
Age: 66
End: 2021-01-11

## 2021-01-13 ENCOUNTER — RX RENEWAL (OUTPATIENT)
Age: 66
End: 2021-01-13

## 2021-02-05 ENCOUNTER — RX RENEWAL (OUTPATIENT)
Age: 66
End: 2021-02-05

## 2021-02-19 ENCOUNTER — RX RENEWAL (OUTPATIENT)
Age: 66
End: 2021-02-19

## 2021-03-22 NOTE — ED PROVIDER NOTE - EKG ADDITIONAL QUESTION - PERFORMED INDEPENDENT VISUALIZATION
[FreeTextEntry1] : Nas Keene is a 77y/o man with no significant past medical history, former long distance/marathon runner, who presents today for initial evaluation for possible PPM. \par \par Impression:\par \par 1. First degree AV delay: EKG performed today to assess for evidence of conduction disease and reveals sinus bradycardia with first degree AV delay. Review of recent Holter with evidence of bradycardia to the 30s during sleeping hours, as per patient he awakes at 10am. No evidence of advanced heart block. Given able to exercise without symptoms and no syncope or near syncope, dizziness only with positional changes, pacemaker may not be necessary at this time. If any new symptoms or unable to tolerate exercise due to symptoms such as feeling dizzy, lightheaded, unusual fatigue or syncope, will consider re evaluation at that time. \par \par Will continue f/u with Cardiologist and may RTO as needed or if any new or worsening symptoms or findings occur.  Yes

## 2021-04-02 NOTE — PROGRESS NOTE ADULT - SUBJECTIVE AND OBJECTIVE BOX
CHIEF COMPLAINT/INTERVAL HISTORY:  Pt. seen and evaluated for choledocholithiasis.  Pt. is POD# s/p lap angélica.  Reports having some abdominal pain improved with Percocet.  No BM or flatus so far.   On clear liquid diet.  Tolerating IV antibiotics.     REVIEW OF SYSTEMS:  No fever, CP, or SOB    Vital Signs Last 24 Hrs  T(C): 37.2 (24 Oct 2020 05:12), Max: 37.2 (23 Oct 2020 18:20)  T(F): 98.9 (24 Oct 2020 05:12), Max: 99 (23 Oct 2020 18:20)  HR: 81 (24 Oct 2020 05:12) (76 - 89)  BP: 155/84 (24 Oct 2020 05:12) (133/76 - 168/76)  BP(mean): --  RR: 17 (24 Oct 2020 05:12) (12 - 17)  SpO2: 98% (24 Oct 2020 05:12) (96% - 100%)    PHYSICAL EXAM:  GENERAL: NAD  HEENT: EOMI, hearing normal, conjunctiva and sclera clear  Chest: CTA bilaterally, no wheezing  CV: S1S2, RRR,   GI: soft, slight distension, right sided abdominal pain, +FANNY drain  Musculoskeletal: no edema  Psychiatric: affect nL, mood nL  Skin: warm and dry    LABS:                        10.7   13.46 )-----------( 228      ( 24 Oct 2020 09:22 )             32.7     10-24    144  |  116<H>  |  11  ----------------------------<  104<H>  3.8   |  22  |  0.89    Ca    8.4<L>      24 Oct 2020 09:22    TPro  6.1  /  Alb  2.9<L>  /  TBili  0.5  /  DBili  x   /  AST  54<H>  /  ALT  59  /  AlkPhos  74  10-24    PT/INR - ( 22 Oct 2020 11:54 )   PT: 12.2 sec;   INR: 1.04 ratio         PTT - ( 22 Oct 2020 11:54 )  PTT:30.3 sec      Assessment and Plan:  -Choledocholithiasis:  s/p ERCP with extension of sphincterotomy and removal of sludge from bile duct.  POD#1 s/p lap angélica.  Continue Ceftriaxone and Flagyl. Continue Lactobacillus 1 tab PO BID, Simethicone 80mg PO TID, Maalox PRN, Zofran PRN, and Percocet PRN.  Continue clear liquid diet and LR@75cc/hr.  GI and Surgery f/u  -Bipolar d/o:  continue Geodon 80mg PO daily, Lithium 300mg PO BID, and Lamictal 200mg PO BID  -Migraine HA:  continue Topamax 100mg PO BID and Imitrex 100mg PO daily PRN  -VTE ppx:  [facility] [employer]

## 2021-05-14 ENCOUNTER — RX RENEWAL (OUTPATIENT)
Age: 66
End: 2021-05-14

## 2021-09-01 ENCOUNTER — RX RENEWAL (OUTPATIENT)
Age: 66
End: 2021-09-01

## 2021-09-28 ENCOUNTER — RX RENEWAL (OUTPATIENT)
Age: 66
End: 2021-09-28

## 2021-12-23 ENCOUNTER — RX RENEWAL (OUTPATIENT)
Age: 66
End: 2021-12-23

## 2022-02-15 ENCOUNTER — APPOINTMENT (OUTPATIENT)
Dept: PAIN MANAGEMENT | Facility: CLINIC | Age: 67
End: 2022-02-15
Payer: MEDICARE

## 2022-02-15 VITALS
WEIGHT: 138 LBS | DIASTOLIC BLOOD PRESSURE: 79 MMHG | OXYGEN SATURATION: 98 % | HEIGHT: 63 IN | TEMPERATURE: 98.7 F | BODY MASS INDEX: 24.45 KG/M2 | SYSTOLIC BLOOD PRESSURE: 157 MMHG

## 2022-02-15 PROCEDURE — 99204 OFFICE O/P NEW MOD 45 MIN: CPT

## 2022-02-18 NOTE — ASSESSMENT
[FreeTextEntry1] : 66 F PMH of multiple years of heavy steroid use p/w acute worsening of chronic L hip and thigh pain s/p L hip replacement (2015) with a normal motor examination and no upper motor neuron signs, consistent with post-procedural and arthritic L hip pain. \par \par Plan:\par - Start Celebrex 100 mg BID PRN for hip pain. \par - Patient to send in L hip MRI from 6 months ago for review.\par - Consider continuing hydrocodone following urine drug screen results. \par - Contact patient following urine drug screen results for f/u appointment.

## 2022-02-18 NOTE — HISTORY OF PRESENT ILLNESS
[FreeTextEntry1] : 66 F PMH bipolar disorder, HTN, and multiple years of heavy steroid use p/w 1 week of acute worsening chronic left hip pain and thigh pain. Patient had hip replacement in 2015 due to avascular necrosis. Left hip pain has persisted for about 5 years, with worsening in the last week without identified trigger. Patient described the pain as sharp and starting in the left hip and moving into the entirety of the left thigh, stopping above the knee. The pain is worse in the morning at 8-9/10 and improves with movement in the afternoon to 5/10 pain. Patient has been on hydrocodone for about 5 years and has been weaning the dose. Patient has been taking 5 mg BID for several weeks and plans to decrease dose to 5 mg q day tomorrow due to lack of available prescription. The patient also endorses mild left LE weakness in the last week and states she has trouble getting out of bed and climbing stairs due to the combination of pain and weakness. The patient has tried ibuprofen and acetaminophen with minimal pain relief. \par The patient endorses intermittent infrequent lumbar pain that can become severe. Patient denied sensory changes and urinary incontinence. \par \par Patient states she had an orthopedic evaluation of her hip a few years ago and a surgical revision was recommended. Multiple subsequent orthopedic evaluations indicated that revision was recommended.

## 2022-02-18 NOTE — REVIEW OF SYSTEMS
[Leg Weakness] : leg weakness [As Noted in HPI] : as noted in HPI [Joint Pain] : joint pain [Joint Stiffness] : joint stiffness [Negative] : Respiratory [Poor Coordination] : good coordination [Numbness] : no numbness [Tingling] : no tingling [Abnormal Sensation] : no abnormal sensation [Dysuria] : no dysuria [Incontinence] : no incontinence [de-identified] : Bipolar disorder, on lithium and lamotrigine. [FreeTextEntry9] : Left hip pain and stiffness. "Frozen" left shoulder.

## 2022-02-18 NOTE — PHYSICAL EXAM
[General Appearance - Alert] : alert [Oriented To Time, Place, And Person] : oriented to person, place, and time [Impaired Insight] : insight and judgment were intact [Affect] : the affect was normal [Mood] : the mood was normal [Memory Recent] : recent memory was not impaired [Memory Remote] : remote memory was not impaired [Person] : oriented to person [Place] : oriented to place [Time] : oriented to time [Span Intact] : the attention span was normal [Concentration Intact] : normal concentrating ability [Naming Objects] : no difficulty naming common objects [Repeating Phrases] : no difficulty repeating a phrase [Writing A Sentence] : no difficulty writing a sentence [Comprehension] : comprehension intact [Past History] : adequate knowledge of personal past history [Motor Tone] : muscle tone was normal in all four extremities [Motor Strength] : muscle strength was normal in all four extremities [Abnormal Walk] : normal gait [Tremor] : a tremor present [2+] : Ankle jerk left 2+ [Motor Strength Upper Extremities Bilaterally] : strength was normal in both upper extremities [Motor Strength Lower Extremities Bilaterally] : strength was normal in both lower extremities [Plantar Reflex Right Only] : normal on the right [Plantar Reflex Left Only] : normal on the left [___] : absent on the right [___] : absent on the left [FreeTextEntry6] : Tardive dyskinesia.\par Straight leg raise test negative b/l. [FreeTextEntry7] : Light touch and pinprick diminished in left lateral leg. Sensory changes isolated to this area. [FreeTextEntry1] : Pain to palpation over lumbar spine with no radiating pain.

## 2022-04-18 ENCOUNTER — RESULT REVIEW (OUTPATIENT)
Age: 67
End: 2022-04-18

## 2022-04-20 ENCOUNTER — APPOINTMENT (OUTPATIENT)
Dept: INTERNAL MEDICINE | Facility: CLINIC | Age: 67
End: 2022-04-20
Payer: MEDICARE

## 2022-04-20 VITALS
BODY MASS INDEX: 23.04 KG/M2 | TEMPERATURE: 96.7 F | HEIGHT: 63 IN | OXYGEN SATURATION: 97 % | DIASTOLIC BLOOD PRESSURE: 74 MMHG | WEIGHT: 130 LBS | SYSTOLIC BLOOD PRESSURE: 120 MMHG | HEART RATE: 97 BPM

## 2022-04-20 DIAGNOSIS — J30.9 ALLERGIC RHINITIS, UNSPECIFIED: ICD-10-CM

## 2022-04-20 PROCEDURE — 99214 OFFICE O/P EST MOD 30 MIN: CPT

## 2022-04-20 RX ORDER — HYDROCODONE BITARTRATE AND ACETAMINOPHEN 5; 325 MG/1; MG/1
5-325 TABLET ORAL TWICE DAILY
Qty: 30 | Refills: 0 | Status: DISCONTINUED | COMMUNITY
Start: 2018-11-01 | End: 2022-04-20

## 2022-04-20 NOTE — HISTORY OF PRESENT ILLNESS
[FreeTextEntry1] : BP check and f/u [de-identified] : Pt is here for f/u. Has chronic pain lateral left leg. \par She stopped her anti psychotic, vraylar, because it was too expensive.\par c/o feeling rundown. \par She stopped atorvastatin because she ran out. \par Had covid vaccine x 4. \par She has osteonecrosis right hip. \par Seeing Dr Ferreira for pain management.

## 2022-04-20 NOTE — HEALTH RISK ASSESSMENT
[Never] : Never [No] : No [0] : 2) Feeling down, depressed, or hopeless: Not at all (0) [PHQ-2 Negative - No further assessment needed] : PHQ-2 Negative - No further assessment needed [[id number]] : 0

## 2022-04-22 LAB
25(OH)D3 SERPL-MCNC: 18 NG/ML
ALBUMIN SERPL ELPH-MCNC: 4.3 G/DL
ALP BLD-CCNC: 108 U/L
ALT SERPL-CCNC: 11 U/L
ANION GAP SERPL CALC-SCNC: 13 MMOL/L
AST SERPL-CCNC: 12 U/L
BASOPHILS # BLD AUTO: 0.06 K/UL
BASOPHILS NFR BLD AUTO: 0.9 %
BILIRUB SERPL-MCNC: <0.2 MG/DL
BUN SERPL-MCNC: 25 MG/DL
CALCIUM SERPL-MCNC: 9.5 MG/DL
CHLORIDE SERPL-SCNC: 111 MMOL/L
CHOLEST SERPL-MCNC: 242 MG/DL
CO2 SERPL-SCNC: 18 MMOL/L
CREAT SERPL-MCNC: 1 MG/DL
EGFR: 62 ML/MIN/1.73M2
EOSINOPHIL # BLD AUTO: 0.27 K/UL
EOSINOPHIL NFR BLD AUTO: 3.9 %
ESTIMATED AVERAGE GLUCOSE: 108 MG/DL
FOLATE SERPL-MCNC: 4.1 NG/ML
GLUCOSE SERPL-MCNC: 108 MG/DL
HBA1C MFR BLD HPLC: 5.4 %
HCT VFR BLD CALC: 40.3 %
HDLC SERPL-MCNC: 70 MG/DL
HGB BLD-MCNC: 12.4 G/DL
IMM GRANULOCYTES NFR BLD AUTO: 1 %
LDLC SERPL CALC-MCNC: 96 MG/DL
LYMPHOCYTES # BLD AUTO: 1.79 K/UL
LYMPHOCYTES NFR BLD AUTO: 25.6 %
MAN DIFF?: NORMAL
MCHC RBC-ENTMCNC: 28.9 PG
MCHC RBC-ENTMCNC: 30.8 GM/DL
MCV RBC AUTO: 93.9 FL
MONOCYTES # BLD AUTO: 0.5 K/UL
MONOCYTES NFR BLD AUTO: 7.1 %
NEUTROPHILS # BLD AUTO: 4.31 K/UL
NEUTROPHILS NFR BLD AUTO: 61.5 %
NONHDLC SERPL-MCNC: 172 MG/DL
PLATELET # BLD AUTO: 240 K/UL
POTASSIUM SERPL-SCNC: 4.3 MMOL/L
PROT SERPL-MCNC: 6.5 G/DL
RBC # BLD: 4.29 M/UL
RBC # FLD: 13.5 %
SODIUM SERPL-SCNC: 141 MMOL/L
TRIGL SERPL-MCNC: 380 MG/DL
TSH SERPL-ACNC: 1.97 UIU/ML
URATE SERPL-MCNC: 3.1 MG/DL
VIT B12 SERPL-MCNC: 518 PG/ML
WBC # FLD AUTO: 7 K/UL

## 2022-05-11 ENCOUNTER — TRANSCRIPTION ENCOUNTER (OUTPATIENT)
Age: 67
End: 2022-05-11

## 2022-05-11 ENCOUNTER — APPOINTMENT (OUTPATIENT)
Dept: ORTHOPEDIC SURGERY | Facility: CLINIC | Age: 67
End: 2022-05-11

## 2022-05-13 ENCOUNTER — APPOINTMENT (OUTPATIENT)
Dept: DISASTER EMERGENCY | Facility: HOSPITAL | Age: 67
End: 2022-05-13

## 2022-05-13 ENCOUNTER — OUTPATIENT (OUTPATIENT)
Dept: OUTPATIENT SERVICES | Facility: HOSPITAL | Age: 67
LOS: 1 days | End: 2022-05-13

## 2022-05-13 DIAGNOSIS — Z98.89 OTHER SPECIFIED POSTPROCEDURAL STATES: Chronic | ICD-10-CM

## 2022-05-13 DIAGNOSIS — U07.1 COVID-19: ICD-10-CM

## 2022-09-06 ENCOUNTER — APPOINTMENT (OUTPATIENT)
Dept: CARDIOLOGY | Facility: CLINIC | Age: 67
End: 2022-09-06

## 2022-10-15 ENCOUNTER — NON-APPOINTMENT (OUTPATIENT)
Age: 67
End: 2022-10-15

## 2022-10-15 ENCOUNTER — APPOINTMENT (OUTPATIENT)
Dept: ORTHOPEDIC SURGERY | Facility: CLINIC | Age: 67
End: 2022-10-15
Payer: MEDICARE

## 2022-10-15 VITALS
WEIGHT: 136 LBS | HEIGHT: 63 IN | BODY MASS INDEX: 24.1 KG/M2 | SYSTOLIC BLOOD PRESSURE: 166 MMHG | DIASTOLIC BLOOD PRESSURE: 84 MMHG | HEART RATE: 68 BPM

## 2022-10-15 PROCEDURE — 99203 OFFICE O/P NEW LOW 30 MIN: CPT

## 2022-10-15 PROCEDURE — 72170 X-RAY EXAM OF PELVIS: CPT | Mod: 59,LT

## 2022-10-15 PROCEDURE — 73501 X-RAY EXAM HIP UNI 1 VIEW: CPT

## 2022-10-21 ENCOUNTER — RX RENEWAL (OUTPATIENT)
Age: 67
End: 2022-10-21

## 2022-10-27 ENCOUNTER — APPOINTMENT (OUTPATIENT)
Dept: INTERNAL MEDICINE | Facility: CLINIC | Age: 67
End: 2022-10-27
Payer: MEDICARE

## 2022-10-27 VITALS
HEART RATE: 72 BPM | WEIGHT: 135 LBS | BODY MASS INDEX: 23.92 KG/M2 | HEIGHT: 63 IN | OXYGEN SATURATION: 98 % | DIASTOLIC BLOOD PRESSURE: 86 MMHG | SYSTOLIC BLOOD PRESSURE: 138 MMHG | TEMPERATURE: 97.4 F | RESPIRATION RATE: 14 BRPM

## 2022-10-27 DIAGNOSIS — E04.1 NONTOXIC SINGLE THYROID NODULE: ICD-10-CM

## 2022-10-27 PROCEDURE — 99214 OFFICE O/P EST MOD 30 MIN: CPT

## 2022-10-27 NOTE — HISTORY OF PRESENT ILLNESS
[FreeTextEntry1] : several concerns [de-identified] : Pt stopped metoprolol on her own and now BP has been high. \par \par Has a problem with her eyes. Feels off balance and saw neurologist and told needs vestibular P.T. c/o feeling like her eyes are not doing what they should be doing. Lasts brief time. Sometimes nystagmus. \par \par Has hip pain and Dr Bryant told her bursitis and steroid injection may help.\par \par She notices that dizziness started after she started gabapentin for leg akathesia.  \par \par Also has fatigue. C/o allergies out of control. She is going to see an allergist. \par

## 2022-11-12 LAB
25(OH)D3 SERPL-MCNC: 35.7 NG/ML
ALBUMIN SERPL ELPH-MCNC: 4.6 G/DL
ALP BLD-CCNC: 107 U/L
ALT SERPL-CCNC: 9 U/L
ANION GAP SERPL CALC-SCNC: 14 MMOL/L
AST SERPL-CCNC: 12 U/L
BASOPHILS # BLD AUTO: 0.07 K/UL
BASOPHILS NFR BLD AUTO: 0.9 %
BILIRUB SERPL-MCNC: <0.2 MG/DL
BUN SERPL-MCNC: 24 MG/DL
CALCIUM SERPL-MCNC: 9.5 MG/DL
CHLORIDE SERPL-SCNC: 109 MMOL/L
CHOLEST SERPL-MCNC: 187 MG/DL
CO2 SERPL-SCNC: 16 MMOL/L
CREAT SERPL-MCNC: 1.2 MG/DL
EGFR: 49 ML/MIN/1.73M2
EOSINOPHIL # BLD AUTO: 0.31 K/UL
EOSINOPHIL NFR BLD AUTO: 3.8 %
ESTIMATED AVERAGE GLUCOSE: 108 MG/DL
FOLATE SERPL-MCNC: 5.4 NG/ML
GLUCOSE SERPL-MCNC: 122 MG/DL
HBA1C MFR BLD HPLC: 5.4 %
HCT VFR BLD CALC: 41.5 %
HDLC SERPL-MCNC: 60 MG/DL
HGB BLD-MCNC: 12.7 G/DL
IMM GRANULOCYTES NFR BLD AUTO: 0.5 %
LDLC SERPL CALC-MCNC: 65 MG/DL
LYMPHOCYTES # BLD AUTO: 2.08 K/UL
LYMPHOCYTES NFR BLD AUTO: 25.8 %
MAN DIFF?: NORMAL
MCHC RBC-ENTMCNC: 28.2 PG
MCHC RBC-ENTMCNC: 30.6 GM/DL
MCV RBC AUTO: 92 FL
MONOCYTES # BLD AUTO: 0.56 K/UL
MONOCYTES NFR BLD AUTO: 6.9 %
NEUTROPHILS # BLD AUTO: 5.01 K/UL
NEUTROPHILS NFR BLD AUTO: 62.1 %
NONHDLC SERPL-MCNC: 127 MG/DL
PLATELET # BLD AUTO: 267 K/UL
POTASSIUM SERPL-SCNC: 4.4 MMOL/L
PROT SERPL-MCNC: 6.7 G/DL
RBC # BLD: 4.51 M/UL
RBC # FLD: 13.8 %
SODIUM SERPL-SCNC: 139 MMOL/L
TRIGL SERPL-MCNC: 310 MG/DL
TSH SERPL-ACNC: 1.8 UIU/ML
VIT B12 SERPL-MCNC: 540 PG/ML
WBC # FLD AUTO: 8.07 K/UL

## 2022-12-07 ENCOUNTER — APPOINTMENT (OUTPATIENT)
Dept: ULTRASOUND IMAGING | Facility: CLINIC | Age: 67
End: 2022-12-07

## 2022-12-07 ENCOUNTER — OUTPATIENT (OUTPATIENT)
Dept: OUTPATIENT SERVICES | Facility: HOSPITAL | Age: 67
LOS: 1 days | End: 2022-12-07
Payer: MEDICARE

## 2022-12-07 DIAGNOSIS — M70.72 OTHER BURSITIS OF HIP, LEFT HIP: ICD-10-CM

## 2022-12-07 DIAGNOSIS — Z96.642 PRESENCE OF LEFT ARTIFICIAL HIP JOINT: ICD-10-CM

## 2022-12-07 DIAGNOSIS — M25.552 PAIN IN LEFT HIP: ICD-10-CM

## 2022-12-07 PROCEDURE — 20611 DRAIN/INJ JOINT/BURSA W/US: CPT

## 2022-12-07 PROCEDURE — 76942 ECHO GUIDE FOR BIOPSY: CPT

## 2022-12-07 PROCEDURE — 76942 ECHO GUIDE FOR BIOPSY: CPT | Mod: 26

## 2023-01-10 ENCOUNTER — APPOINTMENT (OUTPATIENT)
Dept: INTERNAL MEDICINE | Facility: CLINIC | Age: 68
End: 2023-01-10
Payer: MEDICARE

## 2023-01-10 VITALS
BODY MASS INDEX: 23.04 KG/M2 | SYSTOLIC BLOOD PRESSURE: 140 MMHG | HEART RATE: 82 BPM | OXYGEN SATURATION: 99 % | HEIGHT: 63 IN | WEIGHT: 130 LBS | DIASTOLIC BLOOD PRESSURE: 80 MMHG | TEMPERATURE: 97.6 F

## 2023-01-10 DIAGNOSIS — S09.90XS UNSPECIFIED INJURY OF HEAD, SEQUELA: ICD-10-CM

## 2023-01-10 PROCEDURE — 99213 OFFICE O/P EST LOW 20 MIN: CPT

## 2023-01-10 NOTE — PHYSICAL EXAM
[PERRL] : pupils equal round and reactive to light [EOMI] : extraocular movements intact [Coordination Grossly Intact] : coordination grossly intact [No Focal Deficits] : no focal deficits [Normal Gait] : normal gait [Deep Tendon Reflexes (DTR)] : deep tendon reflexes were 2+ and symmetric [Normal] : affect was normal and insight and judgment were intact [de-identified] : bruising noted on left temple, left ocular region [de-identified] : bruising noted on left anterior thigh

## 2023-01-10 NOTE — PLAN
[FreeTextEntry1] : S/p head trauma, now with worsening severe headache and associated nausea. She has no acute neurologic deficits on exam; however given acute worsening of symptoms, I have advised her to go to the emergency room to be evaluated and get a CT scan of her head to rule out acute pathology. She understands this plan. I offered an ambulance for transport but she states she her father will drive her, he is in the waiting room waiting for her. Pt states she will go to [facility]. I called [facility] ER triage to make them aware.

## 2023-01-10 NOTE — HISTORY OF PRESENT ILLNESS
[FreeTextEntry8] : Patient is a 67 year old female who presents today with complaint of severe headache and nausea s/p fall on 1/8/23. She states that on that day she had an episode of vertigo and had gotten up to go to the bathroom when she lost her balance and fell forwards on the concrete floor. She hit her head and left knee. She states that it took her some time to get up. Since then she has had a headache, but she feels that the headache is now worsening and she also has associated nausea, no vomiting. She denies any visual disturbance or somnolence. She is able to ambulate without difficulty.

## 2023-01-11 ENCOUNTER — NON-APPOINTMENT (OUTPATIENT)
Age: 68
End: 2023-01-11

## 2023-02-01 ENCOUNTER — APPOINTMENT (OUTPATIENT)
Dept: INTERNAL MEDICINE | Facility: CLINIC | Age: 68
End: 2023-02-01
Payer: MEDICARE

## 2023-02-01 VITALS
RESPIRATION RATE: 14 BRPM | HEART RATE: 72 BPM | HEIGHT: 63 IN | WEIGHT: 134 LBS | SYSTOLIC BLOOD PRESSURE: 132 MMHG | OXYGEN SATURATION: 98 % | TEMPERATURE: 97.9 F | BODY MASS INDEX: 23.74 KG/M2 | DIASTOLIC BLOOD PRESSURE: 76 MMHG

## 2023-02-01 DIAGNOSIS — R53.83 OTHER FATIGUE: ICD-10-CM

## 2023-02-01 PROCEDURE — 99214 OFFICE O/P EST MOD 30 MIN: CPT

## 2023-02-01 NOTE — HISTORY OF PRESENT ILLNESS
[FreeTextEntry1] : vertigo [de-identified] : Pt had severe episode of vertigo and fell flat on her face with bruising of face and black eye. Happened in the morning on 1/8/23. She was seen on 1/10 and advised ER evaluation. She did not go. She no longer has vertigo, but doesn't feel right. It is hard to read and concentrate. Just wiped out and tired. Denies nausea. The headache is still pretty bad. Has chronic headaches, but this is worse. Has it on top and back of head. Has neck pain from hitting the floor. No LOC. When it happened was in bed afterwards for 1.5 days. She lives with her 90 y/o father. Mother passed away 4 yrs ago. \par \par She takes gabapentin when her legs ache. She is taking less. Plans to make a new neurologist appt. \par \par Her hip is so much better since cortisone injection. \par Saw podiatrist yesterday.

## 2023-02-01 NOTE — REVIEW OF SYSTEMS
[Fatigue] : fatigue [Headache] : headache [Dizziness] : dizziness [Anxiety] : anxiety [Negative] : Heme/Lymph

## 2023-02-01 NOTE — HEALTH RISK ASSESSMENT
[No] : No [0] : 2) Feeling down, depressed, or hopeless: Not at all (0) [PHQ-2 Negative - No further assessment needed] : PHQ-2 Negative - No further assessment needed [[id number]] : 0

## 2023-02-02 LAB
ALBUMIN SERPL ELPH-MCNC: 4.2 G/DL
ALP BLD-CCNC: 109 U/L
ALT SERPL-CCNC: 10 U/L
ANION GAP SERPL CALC-SCNC: 12 MMOL/L
AST SERPL-CCNC: 13 U/L
BASOPHILS # BLD AUTO: 0.07 K/UL
BASOPHILS NFR BLD AUTO: 0.8 %
BILIRUB SERPL-MCNC: 0.2 MG/DL
BUN SERPL-MCNC: 22 MG/DL
CALCIUM SERPL-MCNC: 9.5 MG/DL
CHLORIDE SERPL-SCNC: 109 MMOL/L
CHOLEST SERPL-MCNC: 183 MG/DL
CO2 SERPL-SCNC: 18 MMOL/L
CREAT SERPL-MCNC: 1.18 MG/DL
EGFR: 50 ML/MIN/1.73M2
EOSINOPHIL # BLD AUTO: 0.19 K/UL
EOSINOPHIL NFR BLD AUTO: 2.3 %
ESTIMATED AVERAGE GLUCOSE: 108 MG/DL
FOLATE SERPL-MCNC: 5.7 NG/ML
GLUCOSE SERPL-MCNC: 112 MG/DL
HBA1C MFR BLD HPLC: 5.4 %
HCT VFR BLD CALC: 42.5 %
HDLC SERPL-MCNC: 57 MG/DL
HGB BLD-MCNC: 13.2 G/DL
IMM GRANULOCYTES NFR BLD AUTO: 0.4 %
LDLC SERPL CALC-MCNC: 84 MG/DL
LITHIUM SERPL-SCNC: 1.01 MMOL/L
LYMPHOCYTES # BLD AUTO: 1.97 K/UL
LYMPHOCYTES NFR BLD AUTO: 23.8 %
MAN DIFF?: NORMAL
MCHC RBC-ENTMCNC: 28.7 PG
MCHC RBC-ENTMCNC: 31.1 GM/DL
MCV RBC AUTO: 92.4 FL
MONOCYTES # BLD AUTO: 0.6 K/UL
MONOCYTES NFR BLD AUTO: 7.3 %
NEUTROPHILS # BLD AUTO: 5.41 K/UL
NEUTROPHILS NFR BLD AUTO: 65.4 %
NONHDLC SERPL-MCNC: 126 MG/DL
PLATELET # BLD AUTO: 247 K/UL
POTASSIUM SERPL-SCNC: 4.6 MMOL/L
PROT SERPL-MCNC: 6.5 G/DL
RBC # BLD: 4.6 M/UL
RBC # FLD: 13.9 %
SODIUM SERPL-SCNC: 139 MMOL/L
TRIGL SERPL-MCNC: 209 MG/DL
TSH SERPL-ACNC: 2.11 UIU/ML
VIT B12 SERPL-MCNC: 422 PG/ML
WBC # FLD AUTO: 8.27 K/UL

## 2023-02-21 ENCOUNTER — APPOINTMENT (OUTPATIENT)
Dept: CARDIOLOGY | Facility: CLINIC | Age: 68
End: 2023-02-21
Payer: MEDICARE

## 2023-02-21 ENCOUNTER — NON-APPOINTMENT (OUTPATIENT)
Age: 68
End: 2023-02-21

## 2023-02-21 VITALS — BODY MASS INDEX: 23.91 KG/M2 | HEART RATE: 72 BPM | OXYGEN SATURATION: 99 % | WEIGHT: 135 LBS

## 2023-02-21 VITALS — SYSTOLIC BLOOD PRESSURE: 160 MMHG | DIASTOLIC BLOOD PRESSURE: 84 MMHG

## 2023-02-21 VITALS — DIASTOLIC BLOOD PRESSURE: 80 MMHG | SYSTOLIC BLOOD PRESSURE: 150 MMHG

## 2023-02-21 DIAGNOSIS — R01.1 CARDIAC MURMUR, UNSPECIFIED: ICD-10-CM

## 2023-02-21 DIAGNOSIS — R00.2 PALPITATIONS: ICD-10-CM

## 2023-02-21 DIAGNOSIS — R03.0 ELEVATED BLOOD-PRESSURE READING, W/OUT DIAGNOSIS OF HYPERTENSION: ICD-10-CM

## 2023-02-21 PROCEDURE — 99214 OFFICE O/P EST MOD 30 MIN: CPT

## 2023-02-21 PROCEDURE — 93000 ELECTROCARDIOGRAM COMPLETE: CPT

## 2023-02-21 NOTE — PHYSICAL EXAM
[No Acute Distress] : no acute distress [Clear Lung Fields] : clear lung fields [Normal Gait] : normal gait [No Edema] : no edema [Alert and Oriented] : alert and oriented [de-identified] :   Appears her stated age [de-identified] :  extraocular muscles intact [de-identified] :  wearing a facemask [de-identified] :  mild bradycardia, II VI systolic murmur

## 2023-02-21 NOTE — DISCUSSION/SUMMARY
[With Me] : with me [___ Year(s)] : in [unfilled] year(s) [FreeTextEntry1] : \par Left bundle branch block / 1st degree AV block; chronic and similar to previous tracings;  I recommend continued observation.\par \par Sinus bradycardia:  Chronic and asymptomatic; continue  low-dose metoprolol which has been used for the management of palpitations (well controlled).\par \par Hyperlipidemia:   Recent blood work revealed a well-controlled LDL but mildly elevated triglycerides; continue atorvastatin.\par \par  Elevated blood pressure: Blood pressure markedly elevated today–much higher than other measurements in her EMR; patient describes anxiety  and says her blood pressure is "always fine" -   She does not want to return for reevaluation of blood pressure.\par \par   Cardiac murmur: I recommend echocardiography.

## 2023-02-21 NOTE — REVIEW OF SYSTEMS
[Weight Loss (___ Lbs)] : [unfilled] ~Ulb weight loss [SOB] : no shortness of breath [Chest Discomfort] : no chest discomfort [Palpitations] : no palpitations [Dizziness] : dizziness [Anxiety] : anxiety [Under Stress] : under stress

## 2023-02-21 NOTE — HISTORY OF PRESENT ILLNESS
[FreeTextEntry1] : Samantha Flores is a 67-year-old woman with a history of remote cardiomyopathy (at age 52) with recovery/normalization of LV systolic function, left bundle branch block, hypertension, hypertriglyceridemia, and psychiatric illness (bipolar affective, obsessive-compulsive disorder) who returns for cardiac examination after a long absence (last seen > 2 years ago).  She missed an earlier appointment to see me on September 6, 2022.\par \par She has no new cardiovascular symptoms but describes intermittent dizziness and blurry vision–seeing a neurologist.  She has not been experiencing angina or dyspnea.

## 2023-03-02 NOTE — ED ADULT NURSE NOTE - HIV OFFER
RE: Plan of Care    Dear Dr. Jh Chow MD    Thank you for referring Zena Rader. The following information reflects my assessment and plan of care.           Plan of Care 23   Effective from: 3/2/2023  Effective to: 2023    Plan ID: [id number]            Participants as of 3/2/2023    Name Type Comments Contact Info    Jh Chow MD PCP - General  [phone]    Jessika Ann, SLP Speech Language Pathologist             Zena Rader MRN:[id number] (:1998 24 year old M)             Evaluation     Author: BIANKA Yuen Status: Addendum Last edited: 3/2/2023 10:15 AM       Speech-Language Pathology Progress Note    Visit Type: Daily Treatment Note -  Progress Note  Visit: 26  Referring Provider: Jh Chow MD  Medical Diagnosis (from order): Diagnosis Information    Diagnosis  299.01 (ICD-9-CM) - F84.0 (ICD-10-CM) - Autistic disorder, residual state  784.51 (ICD-9-CM) - R47.1 (ICD-10-CM) - Dysarthria        SUBJECTIVE                                                                                                             Pt seen for OP speech Tx.  Had several sessions cancelled for varying reasons (including weather and time off). Pt excited for his [birth year] next month.    Functional Change: Conversation initiation   Pain / Symptoms:  - patient reports pain is not an issue/concern      OBJECTIVE                                                                                                                               Communication/Cognition  Expression-Verbal:     - Used prompt cards to elicit 2-5 responses to stimulate conversational topics.  Pt able to generate responses independently 2-5 each and select his personal favorite and explain why.    Problem Solving:      - Situational sequencing: intact (>90% accuracy)    - Sequenced sets of 4 photos independently and self corrected 1 of sets as he was explaining it.                  ASSESSMENT                                                                                                           Pt is working to improve his functional communication skills and pragmatics including his conversational skills for home, work, and social situations.  Mother wants to continue sessions every other week to provide ongoing support for his ability to communicate functionally in his environments.    Based on assessment above, to date the patient has made gains as expected  Education:   - Results of above outlined education: Verbalizes understanding    PLAN                                                                                                                         The following skilled interventions to be implemented to achieve goals listed below:  Treatment of Speech Language (29108)    Updates to plan of care: continue current plan of care    Frequency / Duration: 2 times per month tapering as patient progresses for additional 3 months  Patient and patient's parent(s) involved in and agreed to plan of care and goals.  Suggestions for next session as indicated: Progress per plan of care      GOALS                                                                                                                           Long Term Goals: to be met by end of plan of care  1. Patient will follow 3-4 step commands with 90% accuracy and no cues. With cues and repetition can follow simple commands to complete tasks.     2. Patient will comprehend complex auditory stimuli for IADL's at 90% accuracy to maintain safety and participate socially in functional living environment. Path finding and functional tasks introduced  Needs moderate cueing and support.   3. Patient will develop verbal self expression and utilize compensatory strategies to communicate wants and needs effectively to different conversational partners, maintain safety, and participate socially in functional living environment at 90%  accuracy. Improving Pt with increased awareness of need to use verbal expression skills to convey feelings and concerns rather than acting out.      4. Pt will engage in functional conversation with use of appropriate pragmatics (eg, turn taking, eye contact,etc), topic selection, and attention to details with  Min - mod cues with  75% success.  Showing some slow improvements with increased awareness of importance of using words to communicate ideas, feelings and frustration. Pt encouraged to bring a conversation topic to each session.             Therapy procedure time and total treatment time can be found documented on the Time Entry flowsheet         Current Participants as of 3/2/2023    Name Type Comments Contact Info    Jh Chow MD PCP - General  [phone]    Signature pending    Jessika Ann, SLP Speech Language Pathologist      Signature pending            Please complete the attached form to indicate your approval of the plan of care upon receipt and fax signed form to the fax number below.  Insurance compliance requires your approval be on file.  Should you have any questions, feel free to contact me.     Jessika Ann, SLP  [facility]  [address] [place] [id number]  Phone: [phone]  Fax: [phone]                RE: Plan of Care for Zena Rader, YOB: 1998     I certify the need for these services, furnished under this plan of treatment and while under my care.  I agree with the plan of care as stated and request that therapy proceed.        __________________________________________________________________________________  MD Signature         Date   Time Opt out

## 2023-03-15 ENCOUNTER — APPOINTMENT (OUTPATIENT)
Dept: NEUROLOGY | Facility: CLINIC | Age: 68
End: 2023-03-15
Payer: MEDICARE

## 2023-03-15 VITALS
TEMPERATURE: 97.8 F | BODY MASS INDEX: 24.8 KG/M2 | DIASTOLIC BLOOD PRESSURE: 78 MMHG | SYSTOLIC BLOOD PRESSURE: 152 MMHG | HEART RATE: 67 BPM | WEIGHT: 140 LBS | HEIGHT: 63 IN

## 2023-03-15 PROCEDURE — 99204 OFFICE O/P NEW MOD 45 MIN: CPT

## 2023-03-15 RX ORDER — CELECOXIB 100 MG/1
100 CAPSULE ORAL
Qty: 60 | Refills: 0 | Status: DISCONTINUED | COMMUNITY
Start: 2022-02-15 | End: 2023-03-15

## 2023-03-15 RX ORDER — BUTALBITAL, ACETAMINOPHEN AND CAFFEINE 325; 50; 40 MG/1; MG/1; MG/1
50-325-40 TABLET ORAL
Qty: 40 | Refills: 0 | Status: DISCONTINUED | COMMUNITY
Start: 2017-10-02 | End: 2023-03-15

## 2023-03-15 NOTE — DISCUSSION/SUMMARY
[FreeTextEntry1] : Ms. Flores is a 66 yo woman with a history of bipolar disorder, chronic migraines and dizziness.\par She has had some chronic dizziness and had an intense episode of dizziness which caused a fall.\par \par Dizziness\par -Cause of dizziness includes possible peripheral vestibular disturbance vs vestibular migraine\par -Persistent dizziness may also be partially related to a postconcussive syndrome\par -I recommend a trial of vestibular rehab.\par She did fall and hit her head after her episode of severe dizziness.  She may also have some postconcussion syndrome.  Vestibular therapy can also be helpful for this.\par -Can try meclizine 12.5 mg po TID prn. Advised that this may cause drowsiness.\par -If vestibular therapy is not effective, can consider a diagnosis of vestibular migraine and consider changing preventative medications for migraine.\par \par Chronic Migraine\par -She has chronic migraines\par -Continue gabapentin 300 mg BID\par -Continue Topamax 100 mg BID\par -Can consider future trial of a low dose TCA if she is still having frequent headaches and there is no psychiatric contraindication. A CGRP inhibitor such as Emgality or Ajovy may also be a good option.\par -She has been using sumatriptan to abort migraines.  I explained that a medication such as Ubrelvy or Nurtec may be a safer option.  I am giving her samples of each.  I explained that she could use 1 or the other but not both together.  She should take 1 pill as needed for migraine.  If she finds these to be effective, a prescription can be sent to her pharmacy.

## 2023-03-15 NOTE — DATA REVIEWED
[de-identified] : MRI brain non-contrast 3/3/23:\par Unremarkable MRI of the brain. Mild microvascular ischemic changes are noted.

## 2023-03-15 NOTE — PHYSICAL EXAM
[FreeTextEntry1] : Examination:\par Constitutional: normal, no apparent distress\par Eyes: normal conjunctiva b/l, no ptosis, visual fields full\par Respiratory: no respiratory distress, normal effort, normal auscultation\par Cardiovascular: normal rate, rhythm, no murmurs\par Neck: supple, no masses\par Vascular: carotids normal\par Skin: normal color, no rashes\par Psych: normal mood, affect\par \par Neurological:\par Memory: normal memory, oriented to person, place, time\par Language intact/no aphasia\par Cranial Nerves: II-XII intact, Pupils equally round and reactive to light, ocular muscles/movements intact, no ptosis, no facial weakness, tongue protrudes normally in the midline, \par Motor: normal tone, no pronator drift, full strength in all four extremities in the proximal and distal muscle groups\par Coordination: Fine motor movements intact, rapid alternating movements intact, finger to nose intact bilaterally\par Sensory: intact to light touch, vibration, joint position sense\par DTRs: symmetric, 1+ in b/l triceps, 1+ in b/l biceps, 1+ in b/l brachioradialis, 1+ in bilateral patellars, absent in bilateral Achilles, Babinskis negative bilaterally\par Gait: narrow based, steady, able to walk on heels, toes, tandem gait\par \par

## 2023-03-15 NOTE — HISTORY OF PRESENT ILLNESS
[FreeTextEntry1] : Ms. Flores is here today for neurology evaluation.\par She reports that she has dizziness.\par About two months ago she had a sudden onset of dizziness, described as dizziness.\par She got so dizzy that she fell on her face.\par She could not get out of her bed for about 1.5 days. She was dry heaving. \par \par She had an episode of likely vertigo in the past as well. She was with her father at a medical test and had extreme dizziness and could not walk.\par \par She reports feeling dizzy when she looks to the side, mostly when she looks to the left.\par Motion makes her dizziness worse.\par \par She denies a feeling of fullness in her ears of tinnitus.\par She denies double vision.\par Nausea has resolved.\par \par She has been to see her ophthalmologist. She has cataracts and dry eyes. Her cardiologist did not feel that her eyes were the cause of her dizziness.\par \par She has chronic headaches.\par She has headaches fairly often but says that "acute migraines" are rare.\par She takes gabapentin 300 mg BID.\par She also takes Topamax 100 mg BID.\par She uses sumatriptan to abort migraines.\par \par She saw Dr. Sanders in [place], who she has been seeing for many years for migraines.\par An MRI brain was ordered which was unremarkable.\par \par She saw ENT, Dr. Contreras, last week.\par Other than an examination, additional testing was not done.

## 2023-04-23 ENCOUNTER — RX RENEWAL (OUTPATIENT)
Age: 68
End: 2023-04-23

## 2023-05-02 ENCOUNTER — APPOINTMENT (OUTPATIENT)
Dept: ORTHOPEDIC SURGERY | Facility: CLINIC | Age: 68
End: 2023-05-02
Payer: MEDICARE

## 2023-05-02 VITALS — DIASTOLIC BLOOD PRESSURE: 84 MMHG | HEART RATE: 54 BPM | SYSTOLIC BLOOD PRESSURE: 151 MMHG

## 2023-05-02 PROCEDURE — 99213 OFFICE O/P EST LOW 20 MIN: CPT

## 2023-07-17 ENCOUNTER — NON-APPOINTMENT (OUTPATIENT)
Age: 68
End: 2023-07-17

## 2023-07-26 ENCOUNTER — APPOINTMENT (OUTPATIENT)
Dept: ULTRASOUND IMAGING | Facility: CLINIC | Age: 68
End: 2023-07-26
Payer: MEDICARE

## 2023-07-26 ENCOUNTER — RESULT REVIEW (OUTPATIENT)
Age: 68
End: 2023-07-26

## 2023-07-26 ENCOUNTER — OUTPATIENT (OUTPATIENT)
Dept: OUTPATIENT SERVICES | Facility: HOSPITAL | Age: 68
LOS: 1 days | End: 2023-07-26
Payer: MEDICARE

## 2023-07-26 DIAGNOSIS — Z98.89 OTHER SPECIFIED POSTPROCEDURAL STATES: Chronic | ICD-10-CM

## 2023-07-26 DIAGNOSIS — R10.32 LEFT LOWER QUADRANT PAIN: ICD-10-CM

## 2023-07-26 DIAGNOSIS — M70.72 OTHER BURSITIS OF HIP, LEFT HIP: ICD-10-CM

## 2023-07-26 DIAGNOSIS — M25.552 PAIN IN LEFT HIP: ICD-10-CM

## 2023-07-26 DIAGNOSIS — Z96.642 PRESENCE OF LEFT ARTIFICIAL HIP JOINT: ICD-10-CM

## 2023-07-26 PROCEDURE — 20611 DRAIN/INJ JOINT/BURSA W/US: CPT

## 2023-07-26 PROCEDURE — 20611 DRAIN/INJ JOINT/BURSA W/US: CPT | Mod: LT

## 2023-08-14 ENCOUNTER — APPOINTMENT (OUTPATIENT)
Dept: INTERNAL MEDICINE | Facility: CLINIC | Age: 68
End: 2023-08-14
Payer: MEDICARE

## 2023-08-14 VITALS
OXYGEN SATURATION: 98 % | TEMPERATURE: 99.4 F | BODY MASS INDEX: 25.16 KG/M2 | DIASTOLIC BLOOD PRESSURE: 72 MMHG | HEIGHT: 63 IN | RESPIRATION RATE: 14 BRPM | HEART RATE: 64 BPM | SYSTOLIC BLOOD PRESSURE: 124 MMHG | WEIGHT: 142 LBS

## 2023-08-14 PROCEDURE — 99214 OFFICE O/P EST MOD 30 MIN: CPT

## 2023-08-14 NOTE — HEALTH RISK ASSESSMENT
[No] : No [Little interest or pleasure doing things] : 1) Little interest or pleasure doing things [Feeling down, depressed, or hopeless] : 2) Feeling down, depressed, or hopeless [0] : 2) Feeling down, depressed, or hopeless: Not at all (0) [PHQ-2 Negative - No further assessment needed] : PHQ-2 Negative - No further assessment needed [[id number]] : 0 [Never] : Never

## 2023-08-14 NOTE — ASSESSMENT
[FreeTextEntry1] : gerd with wheezing rx for pantoprazole  diarrhea she will make GI appt has it for over a year  bipolar depression sees psychiatry continue lamictal and geodon  HTN continue metoprolol  insomnia continue zolpidem
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
## 2023-09-13 ENCOUNTER — APPOINTMENT (OUTPATIENT)
Dept: GASTROENTEROLOGY | Facility: CLINIC | Age: 68
End: 2023-09-13
Payer: MEDICARE

## 2023-09-13 VITALS
TEMPERATURE: 98.4 F | HEART RATE: 71 BPM | OXYGEN SATURATION: 95 % | SYSTOLIC BLOOD PRESSURE: 134 MMHG | WEIGHT: 140 LBS | BODY MASS INDEX: 24.8 KG/M2 | HEIGHT: 63 IN | DIASTOLIC BLOOD PRESSURE: 78 MMHG

## 2023-09-13 DIAGNOSIS — R93.2 ABNORMAL FINDINGS ON DIAGNOSTIC IMAGING OF LIVER AND BILIARY TRACT: ICD-10-CM

## 2023-09-13 DIAGNOSIS — R13.10 DYSPHAGIA, UNSPECIFIED: ICD-10-CM

## 2023-09-13 DIAGNOSIS — R15.2 FECAL URGENCY: ICD-10-CM

## 2023-09-13 DIAGNOSIS — L20.9 ATOPIC DERMATITIS, UNSPECIFIED: ICD-10-CM

## 2023-09-13 DIAGNOSIS — R06.2 WHEEZING: ICD-10-CM

## 2023-09-13 DIAGNOSIS — K57.32 DIVERTICULITIS OF LARGE INTESTINE W/OUT PERFORATION OR ABSCESS W/OUT BLEEDING: ICD-10-CM

## 2023-09-13 DIAGNOSIS — R11.10 VOMITING, UNSPECIFIED: ICD-10-CM

## 2023-09-13 DIAGNOSIS — R09.82 POSTNASAL DRIP: ICD-10-CM

## 2023-09-13 DIAGNOSIS — M87.052 IDIOPATHIC ASEPTIC NECROSIS OF LEFT FEMUR: ICD-10-CM

## 2023-09-13 DIAGNOSIS — R19.7 DIARRHEA, UNSPECIFIED: ICD-10-CM

## 2023-09-13 DIAGNOSIS — K21.9 GASTRO-ESOPHAGEAL REFLUX DISEASE W/OUT ESOPHAGITIS: ICD-10-CM

## 2023-09-13 PROCEDURE — 99204 OFFICE O/P NEW MOD 45 MIN: CPT

## 2023-09-13 RX ORDER — IPRATROPIUM BROMIDE 42 UG/1
0.06 SPRAY NASAL 3 TIMES DAILY
Qty: 1 | Refills: 2 | Status: DISCONTINUED | COMMUNITY
Start: 2022-04-20 | End: 2023-09-13

## 2023-09-28 ENCOUNTER — APPOINTMENT (OUTPATIENT)
Dept: ORTHOPEDIC SURGERY | Facility: CLINIC | Age: 68
End: 2023-09-28
Payer: MEDICARE

## 2023-09-28 VITALS
HEART RATE: 59 BPM | BODY MASS INDEX: 24.8 KG/M2 | SYSTOLIC BLOOD PRESSURE: 118 MMHG | HEIGHT: 63 IN | WEIGHT: 140 LBS | DIASTOLIC BLOOD PRESSURE: 71 MMHG

## 2023-09-28 DIAGNOSIS — T84.84XA PAIN DUE TO INTERNAL ORTHOPEDIC PROSTHETIC DEVICES, IMPLANTS AND GRAFTS, INITIAL ENCOUNTER: ICD-10-CM

## 2023-09-28 DIAGNOSIS — M70.72 OTHER BURSITIS OF HIP, LEFT HIP: ICD-10-CM

## 2023-09-28 DIAGNOSIS — Z96.649 PAIN DUE TO INTERNAL ORTHOPEDIC PROSTHETIC DEVICES, IMPLANTS AND GRAFTS, INITIAL ENCOUNTER: ICD-10-CM

## 2023-09-28 DIAGNOSIS — Z96.642 PRESENCE OF LEFT ARTIFICIAL HIP JOINT: ICD-10-CM

## 2023-09-28 PROCEDURE — 99213 OFFICE O/P EST LOW 20 MIN: CPT

## 2023-10-06 ENCOUNTER — APPOINTMENT (OUTPATIENT)
Dept: INTERNAL MEDICINE | Facility: CLINIC | Age: 68
End: 2023-10-06
Payer: MEDICARE

## 2023-10-06 ENCOUNTER — NON-APPOINTMENT (OUTPATIENT)
Age: 68
End: 2023-10-06

## 2023-10-06 VITALS
OXYGEN SATURATION: 98 % | DIASTOLIC BLOOD PRESSURE: 90 MMHG | WEIGHT: 141 LBS | SYSTOLIC BLOOD PRESSURE: 140 MMHG | BODY MASS INDEX: 24.98 KG/M2 | HEART RATE: 58 BPM | RESPIRATION RATE: 14 BRPM | HEIGHT: 63 IN | TEMPERATURE: 97.7 F

## 2023-10-06 DIAGNOSIS — Z01.818 ENCOUNTER FOR OTHER PREPROCEDURAL EXAMINATION: ICD-10-CM

## 2023-10-06 DIAGNOSIS — I10 ESSENTIAL (PRIMARY) HYPERTENSION: ICD-10-CM

## 2023-10-06 PROCEDURE — 99213 OFFICE O/P EST LOW 20 MIN: CPT | Mod: 25

## 2023-10-06 PROCEDURE — 93000 ELECTROCARDIOGRAM COMPLETE: CPT

## 2023-10-06 RX ORDER — ESCITALOPRAM OXALATE 10 MG/1
10 TABLET, FILM COATED ORAL
Qty: 90 | Refills: 2 | Status: ACTIVE | COMMUNITY
Start: 2023-10-06

## 2023-10-11 ENCOUNTER — TRANSCRIPTION ENCOUNTER (OUTPATIENT)
Age: 68
End: 2023-10-11

## 2023-10-12 ENCOUNTER — RESULT REVIEW (OUTPATIENT)
Age: 68
End: 2023-10-12

## 2023-10-12 ENCOUNTER — APPOINTMENT (OUTPATIENT)
Dept: GASTROENTEROLOGY | Facility: HOSPITAL | Age: 68
End: 2023-10-12

## 2023-10-12 ENCOUNTER — OUTPATIENT (OUTPATIENT)
Dept: OUTPATIENT SERVICES | Facility: HOSPITAL | Age: 68
LOS: 1 days | End: 2023-10-12
Payer: MEDICARE

## 2023-10-12 DIAGNOSIS — K21.9 GASTRO-ESOPHAGEAL REFLUX DISEASE WITHOUT ESOPHAGITIS: ICD-10-CM

## 2023-10-12 DIAGNOSIS — Z98.89 OTHER SPECIFIED POSTPROCEDURAL STATES: Chronic | ICD-10-CM

## 2023-10-12 DIAGNOSIS — Z12.11 ENCOUNTER FOR SCREENING FOR MALIGNANT NEOPLASM OF COLON: ICD-10-CM

## 2023-10-12 PROCEDURE — 88313 SPECIAL STAINS GROUP 2: CPT | Mod: 26

## 2023-10-12 PROCEDURE — 88312 SPECIAL STAINS GROUP 1: CPT | Mod: 26

## 2023-10-12 PROCEDURE — C1726: CPT

## 2023-10-12 PROCEDURE — 88312 SPECIAL STAINS GROUP 1: CPT

## 2023-10-12 PROCEDURE — 45385 COLONOSCOPY W/LESION REMOVAL: CPT

## 2023-10-12 PROCEDURE — 43233 EGD BALLOON DIL ESOPH30 MM/>: CPT

## 2023-10-12 PROCEDURE — 43239 EGD BIOPSY SINGLE/MULTIPLE: CPT | Mod: 59

## 2023-10-12 PROCEDURE — 88305 TISSUE EXAM BY PATHOLOGIST: CPT

## 2023-10-12 PROCEDURE — 43249 ESOPH EGD DILATION <30 MM: CPT

## 2023-10-12 PROCEDURE — 88305 TISSUE EXAM BY PATHOLOGIST: CPT | Mod: 26

## 2023-10-12 PROCEDURE — 43239 EGD BIOPSY SINGLE/MULTIPLE: CPT | Mod: XS

## 2023-10-12 PROCEDURE — 45380 COLONOSCOPY AND BIOPSY: CPT | Mod: XS

## 2023-10-12 PROCEDURE — 88313 SPECIAL STAINS GROUP 2: CPT

## 2023-10-12 DEVICE — ESOPHAGEAL BALLOON CATH CRE FIXED WIRE 18-19-20MM: Type: IMPLANTABLE DEVICE | Status: FUNCTIONAL

## 2023-10-13 LAB — SURGICAL PATHOLOGY STUDY: SIGNIFICANT CHANGE UP

## 2023-10-19 ENCOUNTER — APPOINTMENT (OUTPATIENT)
Dept: OBGYN | Facility: CLINIC | Age: 68
End: 2023-10-19
Payer: MEDICARE

## 2023-10-19 VITALS
SYSTOLIC BLOOD PRESSURE: 122 MMHG | HEIGHT: 63 IN | WEIGHT: 139 LBS | DIASTOLIC BLOOD PRESSURE: 62 MMHG | BODY MASS INDEX: 24.63 KG/M2

## 2023-10-19 DIAGNOSIS — Z01.419 ENCOUNTER FOR GYNECOLOGICAL EXAMINATION (GENERAL) (ROUTINE) W/OUT ABNORMAL FINDINGS: ICD-10-CM

## 2023-10-19 DIAGNOSIS — R10.2 PELVIC AND PERINEAL PAIN: ICD-10-CM

## 2023-10-19 LAB
BILIRUB UR QL STRIP: NORMAL
CLARITY UR: CLEAR
COLLECTION METHOD: NORMAL
GLUCOSE UR-MCNC: NORMAL
HCG UR QL: 0.2 EU/DL
HGB UR QL STRIP.AUTO: NORMAL
KETONES UR-MCNC: NORMAL
LEUKOCYTE ESTERASE UR QL STRIP: NORMAL
NITRITE UR QL STRIP: NORMAL
PH UR STRIP: 7
PROT UR STRIP-MCNC: NORMAL
SP GR UR STRIP: 1.01

## 2023-10-19 PROCEDURE — 99387 INIT PM E/M NEW PAT 65+ YRS: CPT | Mod: GY

## 2023-10-19 PROCEDURE — 81003 URINALYSIS AUTO W/O SCOPE: CPT | Mod: QW

## 2023-10-20 ENCOUNTER — APPOINTMENT (OUTPATIENT)
Dept: MRI IMAGING | Facility: CLINIC | Age: 68
End: 2023-10-20
Payer: MEDICARE

## 2023-10-20 ENCOUNTER — OUTPATIENT (OUTPATIENT)
Dept: OUTPATIENT SERVICES | Facility: HOSPITAL | Age: 68
LOS: 1 days | End: 2023-10-20
Payer: MEDICARE

## 2023-10-20 ENCOUNTER — RESULT REVIEW (OUTPATIENT)
Age: 68
End: 2023-10-20

## 2023-10-20 ENCOUNTER — APPOINTMENT (OUTPATIENT)
Dept: RADIOLOGY | Facility: CLINIC | Age: 68
End: 2023-10-20
Payer: MEDICARE

## 2023-10-20 DIAGNOSIS — M70.72 OTHER BURSITIS OF HIP, LEFT HIP: ICD-10-CM

## 2023-10-20 DIAGNOSIS — Z98.89 OTHER SPECIFIED POSTPROCEDURAL STATES: Chronic | ICD-10-CM

## 2023-10-20 LAB — HPV HIGH+LOW RISK DNA PNL CVX: NOT DETECTED

## 2023-10-20 PROCEDURE — 74018 RADEX ABDOMEN 1 VIEW: CPT | Mod: 26

## 2023-10-20 PROCEDURE — 71045 X-RAY EXAM CHEST 1 VIEW: CPT | Mod: 26

## 2023-10-20 PROCEDURE — 73721 MRI JNT OF LWR EXTRE W/O DYE: CPT

## 2023-10-20 PROCEDURE — 73721 MRI JNT OF LWR EXTRE W/O DYE: CPT | Mod: 26,LT,MH

## 2023-10-20 PROCEDURE — 74018 RADEX ABDOMEN 1 VIEW: CPT

## 2023-10-20 PROCEDURE — 71045 X-RAY EXAM CHEST 1 VIEW: CPT

## 2023-10-24 LAB — CYTOLOGY CVX/VAG DOC THIN PREP: ABNORMAL

## 2023-10-25 ENCOUNTER — APPOINTMENT (OUTPATIENT)
Dept: GASTROENTEROLOGY | Facility: CLINIC | Age: 68
End: 2023-10-25
Payer: MEDICARE

## 2023-10-25 DIAGNOSIS — R10.13 EPIGASTRIC PAIN: ICD-10-CM

## 2023-10-25 DIAGNOSIS — R14.0 ABDOMINAL DISTENSION (GASEOUS): ICD-10-CM

## 2023-10-25 DIAGNOSIS — Z86.018 PERSONAL HISTORY OF OTHER BENIGN NEOPLASM: ICD-10-CM

## 2023-10-25 DIAGNOSIS — K29.70 GASTRITIS, UNSPECIFIED, W/OUT BLEEDING: ICD-10-CM

## 2023-10-25 PROCEDURE — 99214 OFFICE O/P EST MOD 30 MIN: CPT | Mod: 25,95

## 2023-10-31 ENCOUNTER — APPOINTMENT (OUTPATIENT)
Dept: ULTRASOUND IMAGING | Facility: CLINIC | Age: 68
End: 2023-10-31

## 2023-11-14 ENCOUNTER — RESULT REVIEW (OUTPATIENT)
Age: 68
End: 2023-11-14

## 2023-11-14 ENCOUNTER — APPOINTMENT (OUTPATIENT)
Dept: MAMMOGRAPHY | Facility: CLINIC | Age: 68
End: 2023-11-14
Payer: MEDICARE

## 2023-11-14 ENCOUNTER — OUTPATIENT (OUTPATIENT)
Dept: OUTPATIENT SERVICES | Facility: HOSPITAL | Age: 68
LOS: 1 days | End: 2023-11-14
Payer: MEDICARE

## 2023-11-14 ENCOUNTER — APPOINTMENT (OUTPATIENT)
Dept: ULTRASOUND IMAGING | Facility: CLINIC | Age: 68
End: 2023-11-14
Payer: MEDICARE

## 2023-11-14 DIAGNOSIS — Z12.39 ENCOUNTER FOR OTHER SCREENING FOR MALIGNANT NEOPLASM OF BREAST: ICD-10-CM

## 2023-11-14 DIAGNOSIS — Z98.89 OTHER SPECIFIED POSTPROCEDURAL STATES: Chronic | ICD-10-CM

## 2023-11-14 DIAGNOSIS — R10.2 PELVIC AND PERINEAL PAIN: ICD-10-CM

## 2023-11-14 PROCEDURE — 77067 SCR MAMMO BI INCL CAD: CPT

## 2023-11-14 PROCEDURE — 77063 BREAST TOMOSYNTHESIS BI: CPT | Mod: 26

## 2023-11-14 PROCEDURE — 77067 SCR MAMMO BI INCL CAD: CPT | Mod: 26

## 2023-11-14 PROCEDURE — 76830 TRANSVAGINAL US NON-OB: CPT | Mod: 26

## 2023-11-14 PROCEDURE — 76856 US EXAM PELVIC COMPLETE: CPT | Mod: 26

## 2023-11-14 PROCEDURE — 76856 US EXAM PELVIC COMPLETE: CPT

## 2023-11-14 PROCEDURE — 77063 BREAST TOMOSYNTHESIS BI: CPT

## 2023-11-14 PROCEDURE — 76830 TRANSVAGINAL US NON-OB: CPT

## 2023-11-28 ENCOUNTER — APPOINTMENT (OUTPATIENT)
Dept: NEUROLOGY | Facility: CLINIC | Age: 68
End: 2023-11-28
Payer: MEDICARE

## 2023-11-28 VITALS
TEMPERATURE: 98.2 F | SYSTOLIC BLOOD PRESSURE: 142 MMHG | BODY MASS INDEX: 24.27 KG/M2 | HEIGHT: 63 IN | HEART RATE: 58 BPM | WEIGHT: 137 LBS | DIASTOLIC BLOOD PRESSURE: 75 MMHG

## 2023-11-28 DIAGNOSIS — R42 DIZZINESS AND GIDDINESS: ICD-10-CM

## 2023-11-28 DIAGNOSIS — R26.89 OTHER ABNORMALITIES OF GAIT AND MOBILITY: ICD-10-CM

## 2023-11-28 DIAGNOSIS — F07.81 POSTCONCUSSIONAL SYNDROME: ICD-10-CM

## 2023-11-28 PROCEDURE — 99214 OFFICE O/P EST MOD 30 MIN: CPT

## 2023-11-28 RX ORDER — GABAPENTIN 300 MG/1
300 CAPSULE ORAL
Qty: 180 | Refills: 3 | Status: ACTIVE | COMMUNITY
Start: 2022-06-01 | End: 1900-01-01

## 2023-12-04 ENCOUNTER — APPOINTMENT (OUTPATIENT)
Dept: GASTROENTEROLOGY | Facility: CLINIC | Age: 68
End: 2023-12-04
Payer: MEDICARE

## 2023-12-04 VITALS
HEIGHT: 63 IN | WEIGHT: 138 LBS | BODY MASS INDEX: 24.45 KG/M2 | SYSTOLIC BLOOD PRESSURE: 139 MMHG | DIASTOLIC BLOOD PRESSURE: 84 MMHG | RESPIRATION RATE: 18 BRPM | HEART RATE: 81 BPM | OXYGEN SATURATION: 99 %

## 2023-12-04 DIAGNOSIS — R14.3 FLATULENCE: ICD-10-CM

## 2023-12-04 DIAGNOSIS — K59.00 CONSTIPATION, UNSPECIFIED: ICD-10-CM

## 2023-12-04 PROCEDURE — 99215 OFFICE O/P EST HI 40 MIN: CPT

## 2023-12-18 ENCOUNTER — APPOINTMENT (OUTPATIENT)
Dept: GASTROENTEROLOGY | Facility: CLINIC | Age: 68
End: 2023-12-18

## 2023-12-22 ENCOUNTER — NON-APPOINTMENT (OUTPATIENT)
Age: 68
End: 2023-12-22

## 2024-01-05 ENCOUNTER — APPOINTMENT (OUTPATIENT)
Dept: INTERNAL MEDICINE | Facility: CLINIC | Age: 69
End: 2024-01-05
Payer: MEDICARE

## 2024-01-05 VITALS
TEMPERATURE: 98.2 F | DIASTOLIC BLOOD PRESSURE: 88 MMHG | SYSTOLIC BLOOD PRESSURE: 144 MMHG | HEART RATE: 78 BPM | WEIGHT: 131 LBS | HEIGHT: 63 IN | OXYGEN SATURATION: 98 % | BODY MASS INDEX: 23.21 KG/M2

## 2024-01-05 DIAGNOSIS — F31.9 BIPOLAR DISORDER, UNSPECIFIED: ICD-10-CM

## 2024-01-05 PROCEDURE — 99214 OFFICE O/P EST MOD 30 MIN: CPT

## 2024-01-05 PROCEDURE — G2211 COMPLEX E/M VISIT ADD ON: CPT

## 2024-01-05 RX ORDER — ZIPRASIDONE HYDROCHLORIDE 80 MG/1
80 CAPSULE ORAL
Refills: 0 | Status: DISCONTINUED | COMMUNITY
Start: 2017-04-05 | End: 2024-01-05

## 2024-01-05 NOTE — ASSESSMENT
[FreeTextEntry1] : shaking, anxiety she will try again to discuss with a psychiatrist check labs   hyperlipidemia continue atorvastatin  HTN continue metoprolol - just restarted  insomnia has zolpidem

## 2024-01-05 NOTE — HISTORY OF PRESENT ILLNESS
[FreeTextEntry8] : cc: feeling sick  Started mid December with a migraine that went on for 2 weeks. Called neurologist who gave her steroids which got rid of the headache and then when she stopped she felt like she couldn't see straight, shaking, or walk. Was off balance. She stopped all her meds. She confused her lithium and gabapentin by taking lithium thinking it was gabapentin. Restarted gabapentin and lamictal. Her psychiatrist advised her to increase gabapentin and she was accidentally taking lithium instead. The covering psychiatrist advised her to restart lithium again through his . She has nausea and no appetite. No vomiting. She has had diarrhea.   She lives with her 93 y/o father who drove her here today.

## 2024-01-05 NOTE — REVIEW OF SYSTEMS
[Fatigue] : fatigue [Suicidal] : not suicidal [Insomnia] : insomnia [Anxiety] : anxiety [Negative] : Heme/Lymph

## 2024-01-06 LAB
25(OH)D3 SERPL-MCNC: 35 NG/ML
ALBUMIN SERPL ELPH-MCNC: 4.9 G/DL
ALP BLD-CCNC: 166 U/L
ALT SERPL-CCNC: 18 U/L
ANION GAP SERPL CALC-SCNC: 14 MMOL/L
AST SERPL-CCNC: 18 U/L
BASOPHILS # BLD AUTO: 0.08 K/UL
BASOPHILS NFR BLD AUTO: 0.6 %
BILIRUB SERPL-MCNC: 0.3 MG/DL
BUN SERPL-MCNC: 14 MG/DL
CALCIUM SERPL-MCNC: 10.2 MG/DL
CHLORIDE SERPL-SCNC: 102 MMOL/L
CHOLEST SERPL-MCNC: 183 MG/DL
CO2 SERPL-SCNC: 18 MMOL/L
CREAT SERPL-MCNC: 1.18 MG/DL
EGFR: 50 ML/MIN/1.73M2
EOSINOPHIL # BLD AUTO: 0.11 K/UL
EOSINOPHIL NFR BLD AUTO: 0.8 %
ESTIMATED AVERAGE GLUCOSE: 108 MG/DL
FOLATE SERPL-MCNC: 6.8 NG/ML
GLUCOSE SERPL-MCNC: 145 MG/DL
HBA1C MFR BLD HPLC: 5.4 %
HCT VFR BLD CALC: 48.1 %
HDLC SERPL-MCNC: 78 MG/DL
HGB BLD-MCNC: 15.2 G/DL
IMM GRANULOCYTES NFR BLD AUTO: 0.7 %
LDLC SERPL CALC-MCNC: 80 MG/DL
LITHIUM SERPL-SCNC: 0.65 MMOL/L
LYMPHOCYTES # BLD AUTO: 1.58 K/UL
LYMPHOCYTES NFR BLD AUTO: 11.9 %
MAGNESIUM SERPL-MCNC: 2.3 MG/DL
MAN DIFF?: NORMAL
MCHC RBC-ENTMCNC: 29.6 PG
MCHC RBC-ENTMCNC: 31.6 GM/DL
MCV RBC AUTO: 93.6 FL
MONOCYTES # BLD AUTO: 0.74 K/UL
MONOCYTES NFR BLD AUTO: 5.6 %
NEUTROPHILS # BLD AUTO: 10.69 K/UL
NEUTROPHILS NFR BLD AUTO: 80.4 %
NONHDLC SERPL-MCNC: 105 MG/DL
PLATELET # BLD AUTO: 263 K/UL
POTASSIUM SERPL-SCNC: 5.1 MMOL/L
PROT SERPL-MCNC: 7.8 G/DL
RBC # BLD: 5.14 M/UL
RBC # FLD: 13.9 %
SODIUM SERPL-SCNC: 134 MMOL/L
TRIGL SERPL-MCNC: 145 MG/DL
TSH SERPL-ACNC: 1.64 UIU/ML
URATE SERPL-MCNC: 4.9 MG/DL
VIT B12 SERPL-MCNC: 470 PG/ML
WBC # FLD AUTO: 13.29 K/UL

## 2024-01-11 ENCOUNTER — INPATIENT (INPATIENT)
Facility: HOSPITAL | Age: 69
LOS: 5 days | Discharge: HOME | DRG: 69 | End: 2024-01-17
Attending: STUDENT IN AN ORGANIZED HEALTH CARE EDUCATION/TRAINING PROGRAM | Admitting: STUDENT IN AN ORGANIZED HEALTH CARE EDUCATION/TRAINING PROGRAM
Payer: MEDICARE

## 2024-01-11 VITALS
HEART RATE: 85 BPM | OXYGEN SATURATION: 97 % | SYSTOLIC BLOOD PRESSURE: 168 MMHG | WEIGHT: 139.99 LBS | RESPIRATION RATE: 19 BRPM | TEMPERATURE: 98 F | DIASTOLIC BLOOD PRESSURE: 99 MMHG

## 2024-01-11 DIAGNOSIS — G43.909 MIGRAINE, UNSPECIFIED, NOT INTRACTABLE, WITHOUT STATUS MIGRAINOSUS: ICD-10-CM

## 2024-01-11 DIAGNOSIS — R93.89 ABNORMAL FINDINGS ON DIAGNOSTIC IMAGING OF OTHER SPECIFIED BODY STRUCTURES: ICD-10-CM

## 2024-01-11 DIAGNOSIS — Z98.89 OTHER SPECIFIED POSTPROCEDURAL STATES: Chronic | ICD-10-CM

## 2024-01-11 DIAGNOSIS — E78.5 HYPERLIPIDEMIA, UNSPECIFIED: ICD-10-CM

## 2024-01-11 DIAGNOSIS — Z29.9 ENCOUNTER FOR PROPHYLACTIC MEASURES, UNSPECIFIED: ICD-10-CM

## 2024-01-11 DIAGNOSIS — F31.9 BIPOLAR DISORDER, UNSPECIFIED: ICD-10-CM

## 2024-01-11 DIAGNOSIS — R47.01 APHASIA: ICD-10-CM

## 2024-01-11 DIAGNOSIS — I10 ESSENTIAL (PRIMARY) HYPERTENSION: ICD-10-CM

## 2024-01-11 DIAGNOSIS — I63.9 CEREBRAL INFARCTION, UNSPECIFIED: ICD-10-CM

## 2024-01-11 DIAGNOSIS — K21.9 GASTRO-ESOPHAGEAL REFLUX DISEASE WITHOUT ESOPHAGITIS: ICD-10-CM

## 2024-01-11 LAB
ALBUMIN SERPL ELPH-MCNC: 3.7 G/DL — SIGNIFICANT CHANGE UP (ref 3.3–5)
ALBUMIN SERPL ELPH-MCNC: 3.7 G/DL — SIGNIFICANT CHANGE UP (ref 3.3–5)
ALP SERPL-CCNC: 133 U/L — HIGH (ref 40–120)
ALP SERPL-CCNC: 133 U/L — HIGH (ref 40–120)
ALT FLD-CCNC: 21 U/L — SIGNIFICANT CHANGE UP (ref 12–78)
ALT FLD-CCNC: 21 U/L — SIGNIFICANT CHANGE UP (ref 12–78)
ANION GAP SERPL CALC-SCNC: 2 MMOL/L — LOW (ref 5–17)
ANION GAP SERPL CALC-SCNC: 2 MMOL/L — LOW (ref 5–17)
APTT BLD: 22.8 SEC — LOW (ref 24.5–35.6)
APTT BLD: 22.8 SEC — LOW (ref 24.5–35.6)
AST SERPL-CCNC: 23 U/L — SIGNIFICANT CHANGE UP (ref 15–37)
AST SERPL-CCNC: 23 U/L — SIGNIFICANT CHANGE UP (ref 15–37)
BASOPHILS # BLD AUTO: 0.08 K/UL — SIGNIFICANT CHANGE UP (ref 0–0.2)
BASOPHILS # BLD AUTO: 0.08 K/UL — SIGNIFICANT CHANGE UP (ref 0–0.2)
BASOPHILS NFR BLD AUTO: 0.9 % — SIGNIFICANT CHANGE UP (ref 0–2)
BASOPHILS NFR BLD AUTO: 0.9 % — SIGNIFICANT CHANGE UP (ref 0–2)
BILIRUB SERPL-MCNC: 0.3 MG/DL — SIGNIFICANT CHANGE UP (ref 0.2–1.2)
BILIRUB SERPL-MCNC: 0.3 MG/DL — SIGNIFICANT CHANGE UP (ref 0.2–1.2)
BUN SERPL-MCNC: 17 MG/DL — SIGNIFICANT CHANGE UP (ref 7–23)
BUN SERPL-MCNC: 17 MG/DL — SIGNIFICANT CHANGE UP (ref 7–23)
CALCIUM SERPL-MCNC: 9.6 MG/DL — SIGNIFICANT CHANGE UP (ref 8.5–10.1)
CALCIUM SERPL-MCNC: 9.6 MG/DL — SIGNIFICANT CHANGE UP (ref 8.5–10.1)
CHLORIDE SERPL-SCNC: 113 MMOL/L — HIGH (ref 96–108)
CHLORIDE SERPL-SCNC: 113 MMOL/L — HIGH (ref 96–108)
CO2 SERPL-SCNC: 25 MMOL/L — SIGNIFICANT CHANGE UP (ref 22–31)
CO2 SERPL-SCNC: 25 MMOL/L — SIGNIFICANT CHANGE UP (ref 22–31)
CREAT SERPL-MCNC: 1.2 MG/DL — SIGNIFICANT CHANGE UP (ref 0.5–1.3)
CREAT SERPL-MCNC: 1.2 MG/DL — SIGNIFICANT CHANGE UP (ref 0.5–1.3)
EGFR: 49 ML/MIN/1.73M2 — LOW
EGFR: 49 ML/MIN/1.73M2 — LOW
EOSINOPHIL # BLD AUTO: 0.22 K/UL — SIGNIFICANT CHANGE UP (ref 0–0.5)
EOSINOPHIL # BLD AUTO: 0.22 K/UL — SIGNIFICANT CHANGE UP (ref 0–0.5)
EOSINOPHIL NFR BLD AUTO: 2.4 % — SIGNIFICANT CHANGE UP (ref 0–6)
EOSINOPHIL NFR BLD AUTO: 2.4 % — SIGNIFICANT CHANGE UP (ref 0–6)
GLUCOSE SERPL-MCNC: 111 MG/DL — HIGH (ref 70–99)
GLUCOSE SERPL-MCNC: 111 MG/DL — HIGH (ref 70–99)
HCT VFR BLD CALC: 43.1 % — SIGNIFICANT CHANGE UP (ref 34.5–45)
HCT VFR BLD CALC: 43.1 % — SIGNIFICANT CHANGE UP (ref 34.5–45)
HGB BLD-MCNC: 13.6 G/DL — SIGNIFICANT CHANGE UP (ref 11.5–15.5)
HGB BLD-MCNC: 13.6 G/DL — SIGNIFICANT CHANGE UP (ref 11.5–15.5)
IMM GRANULOCYTES NFR BLD AUTO: 0.9 % — SIGNIFICANT CHANGE UP (ref 0–0.9)
IMM GRANULOCYTES NFR BLD AUTO: 0.9 % — SIGNIFICANT CHANGE UP (ref 0–0.9)
INR BLD: 0.89 RATIO — SIGNIFICANT CHANGE UP (ref 0.85–1.18)
INR BLD: 0.89 RATIO — SIGNIFICANT CHANGE UP (ref 0.85–1.18)
LYMPHOCYTES # BLD AUTO: 1.97 K/UL — SIGNIFICANT CHANGE UP (ref 1–3.3)
LYMPHOCYTES # BLD AUTO: 1.97 K/UL — SIGNIFICANT CHANGE UP (ref 1–3.3)
LYMPHOCYTES # BLD AUTO: 21.7 % — SIGNIFICANT CHANGE UP (ref 13–44)
LYMPHOCYTES # BLD AUTO: 21.7 % — SIGNIFICANT CHANGE UP (ref 13–44)
MCHC RBC-ENTMCNC: 29.2 PG — SIGNIFICANT CHANGE UP (ref 27–34)
MCHC RBC-ENTMCNC: 29.2 PG — SIGNIFICANT CHANGE UP (ref 27–34)
MCHC RBC-ENTMCNC: 31.6 GM/DL — LOW (ref 32–36)
MCHC RBC-ENTMCNC: 31.6 GM/DL — LOW (ref 32–36)
MCV RBC AUTO: 92.7 FL — SIGNIFICANT CHANGE UP (ref 80–100)
MCV RBC AUTO: 92.7 FL — SIGNIFICANT CHANGE UP (ref 80–100)
MONOCYTES # BLD AUTO: 0.64 K/UL — SIGNIFICANT CHANGE UP (ref 0–0.9)
MONOCYTES # BLD AUTO: 0.64 K/UL — SIGNIFICANT CHANGE UP (ref 0–0.9)
MONOCYTES NFR BLD AUTO: 7 % — SIGNIFICANT CHANGE UP (ref 2–14)
MONOCYTES NFR BLD AUTO: 7 % — SIGNIFICANT CHANGE UP (ref 2–14)
NEUTROPHILS # BLD AUTO: 6.1 K/UL — SIGNIFICANT CHANGE UP (ref 1.8–7.4)
NEUTROPHILS # BLD AUTO: 6.1 K/UL — SIGNIFICANT CHANGE UP (ref 1.8–7.4)
NEUTROPHILS NFR BLD AUTO: 67.1 % — SIGNIFICANT CHANGE UP (ref 43–77)
NEUTROPHILS NFR BLD AUTO: 67.1 % — SIGNIFICANT CHANGE UP (ref 43–77)
NRBC # BLD: 0 /100 WBCS — SIGNIFICANT CHANGE UP (ref 0–0)
NRBC # BLD: 0 /100 WBCS — SIGNIFICANT CHANGE UP (ref 0–0)
PLATELET # BLD AUTO: 275 K/UL — SIGNIFICANT CHANGE UP (ref 150–400)
PLATELET # BLD AUTO: 275 K/UL — SIGNIFICANT CHANGE UP (ref 150–400)
POTASSIUM SERPL-MCNC: 4.5 MMOL/L — SIGNIFICANT CHANGE UP (ref 3.5–5.3)
POTASSIUM SERPL-MCNC: 4.5 MMOL/L — SIGNIFICANT CHANGE UP (ref 3.5–5.3)
POTASSIUM SERPL-SCNC: 4.5 MMOL/L — SIGNIFICANT CHANGE UP (ref 3.5–5.3)
POTASSIUM SERPL-SCNC: 4.5 MMOL/L — SIGNIFICANT CHANGE UP (ref 3.5–5.3)
PROT SERPL-MCNC: 7.4 G/DL — SIGNIFICANT CHANGE UP (ref 6–8.3)
PROT SERPL-MCNC: 7.4 G/DL — SIGNIFICANT CHANGE UP (ref 6–8.3)
PROTHROM AB SERPL-ACNC: 10.4 SEC — SIGNIFICANT CHANGE UP (ref 9.5–13)
PROTHROM AB SERPL-ACNC: 10.4 SEC — SIGNIFICANT CHANGE UP (ref 9.5–13)
RBC # BLD: 4.65 M/UL — SIGNIFICANT CHANGE UP (ref 3.8–5.2)
RBC # BLD: 4.65 M/UL — SIGNIFICANT CHANGE UP (ref 3.8–5.2)
RBC # FLD: 14.1 % — SIGNIFICANT CHANGE UP (ref 10.3–14.5)
RBC # FLD: 14.1 % — SIGNIFICANT CHANGE UP (ref 10.3–14.5)
SODIUM SERPL-SCNC: 140 MMOL/L — SIGNIFICANT CHANGE UP (ref 135–145)
SODIUM SERPL-SCNC: 140 MMOL/L — SIGNIFICANT CHANGE UP (ref 135–145)
TROPONIN I, HIGH SENSITIVITY RESULT: 8.2 NG/L — SIGNIFICANT CHANGE UP
TROPONIN I, HIGH SENSITIVITY RESULT: 8.2 NG/L — SIGNIFICANT CHANGE UP
WBC # BLD: 9.09 K/UL — SIGNIFICANT CHANGE UP (ref 3.8–10.5)
WBC # BLD: 9.09 K/UL — SIGNIFICANT CHANGE UP (ref 3.8–10.5)
WBC # FLD AUTO: 9.09 K/UL — SIGNIFICANT CHANGE UP (ref 3.8–10.5)
WBC # FLD AUTO: 9.09 K/UL — SIGNIFICANT CHANGE UP (ref 3.8–10.5)

## 2024-01-11 PROCEDURE — 99223 1ST HOSP IP/OBS HIGH 75: CPT | Mod: GC

## 2024-01-11 PROCEDURE — 93010 ELECTROCARDIOGRAM REPORT: CPT

## 2024-01-11 PROCEDURE — 99285 EMERGENCY DEPT VISIT HI MDM: CPT

## 2024-01-11 PROCEDURE — 70496 CT ANGIOGRAPHY HEAD: CPT | Mod: 26,MA

## 2024-01-11 PROCEDURE — 70498 CT ANGIOGRAPHY NECK: CPT | Mod: 26,MA

## 2024-01-11 PROCEDURE — 70450 CT HEAD/BRAIN W/O DYE: CPT | Mod: 26,MA,XU

## 2024-01-11 PROCEDURE — 71045 X-RAY EXAM CHEST 1 VIEW: CPT | Mod: 26

## 2024-01-11 RX ORDER — ZOLPIDEM TARTRATE 10 MG/1
5 TABLET ORAL AT BEDTIME
Refills: 0 | Status: DISCONTINUED | OUTPATIENT
Start: 2024-01-11 | End: 2024-01-17

## 2024-01-11 RX ORDER — TOPIRAMATE 25 MG
100 TABLET ORAL
Refills: 0 | Status: DISCONTINUED | OUTPATIENT
Start: 2024-01-11 | End: 2024-01-17

## 2024-01-11 RX ORDER — GABAPENTIN 400 MG/1
300 CAPSULE ORAL
Refills: 0 | Status: DISCONTINUED | OUTPATIENT
Start: 2024-01-11 | End: 2024-01-17

## 2024-01-11 RX ORDER — ASPIRIN/CALCIUM CARB/MAGNESIUM 324 MG
81 TABLET ORAL DAILY
Refills: 0 | Status: DISCONTINUED | OUTPATIENT
Start: 2024-01-11 | End: 2024-01-17

## 2024-01-11 RX ORDER — PANTOPRAZOLE SODIUM 20 MG/1
40 TABLET, DELAYED RELEASE ORAL
Refills: 0 | Status: DISCONTINUED | OUTPATIENT
Start: 2024-01-11 | End: 2024-01-17

## 2024-01-11 RX ORDER — ATORVASTATIN CALCIUM 80 MG/1
10 TABLET, FILM COATED ORAL AT BEDTIME
Refills: 0 | Status: DISCONTINUED | OUTPATIENT
Start: 2024-01-11 | End: 2024-01-11

## 2024-01-11 RX ORDER — SUMATRIPTAN SUCCINATE 4 MG/.5ML
100 INJECTION, SOLUTION SUBCUTANEOUS DAILY
Refills: 0 | Status: DISCONTINUED | OUTPATIENT
Start: 2024-01-11 | End: 2024-01-17

## 2024-01-11 RX ORDER — METOPROLOL TARTRATE 50 MG
25 TABLET ORAL DAILY
Refills: 0 | Status: DISCONTINUED | OUTPATIENT
Start: 2024-01-11 | End: 2024-01-12

## 2024-01-11 RX ORDER — ZIPRASIDONE HYDROCHLORIDE 20 MG/1
80 CAPSULE ORAL
Refills: 0 | Status: DISCONTINUED | OUTPATIENT
Start: 2024-01-11 | End: 2024-01-12

## 2024-01-11 RX ORDER — ESCITALOPRAM OXALATE 10 MG/1
10 TABLET, FILM COATED ORAL DAILY
Refills: 0 | Status: DISCONTINUED | OUTPATIENT
Start: 2024-01-11 | End: 2024-01-17

## 2024-01-11 RX ORDER — LITHIUM CARBONATE 300 MG/1
300 TABLET, EXTENDED RELEASE ORAL
Refills: 0 | Status: DISCONTINUED | OUTPATIENT
Start: 2024-01-11 | End: 2024-01-17

## 2024-01-11 RX ORDER — LAMOTRIGINE 25 MG/1
200 TABLET, ORALLY DISINTEGRATING ORAL
Refills: 0 | Status: DISCONTINUED | OUTPATIENT
Start: 2024-01-11 | End: 2024-01-12

## 2024-01-11 RX ORDER — ATORVASTATIN CALCIUM 80 MG/1
40 TABLET, FILM COATED ORAL AT BEDTIME
Refills: 0 | Status: DISCONTINUED | OUTPATIENT
Start: 2024-01-11 | End: 2024-01-17

## 2024-01-11 RX ORDER — ENOXAPARIN SODIUM 100 MG/ML
40 INJECTION SUBCUTANEOUS EVERY 24 HOURS
Refills: 0 | Status: DISCONTINUED | OUTPATIENT
Start: 2024-01-11 | End: 2024-01-17

## 2024-01-11 RX ORDER — ACETAMINOPHEN 500 MG
1000 TABLET ORAL ONCE
Refills: 0 | Status: COMPLETED | OUTPATIENT
Start: 2024-01-11 | End: 2024-01-11

## 2024-01-11 RX ADMIN — Medication 1000 MILLIGRAM(S): at 21:53

## 2024-01-11 RX ADMIN — Medication 1000 MILLIGRAM(S): at 20:30

## 2024-01-11 RX ADMIN — Medication 25 MILLIGRAM(S): at 22:15

## 2024-01-11 RX ADMIN — ENOXAPARIN SODIUM 40 MILLIGRAM(S): 100 INJECTION SUBCUTANEOUS at 21:54

## 2024-01-11 RX ADMIN — Medication 400 MILLIGRAM(S): at 19:40

## 2024-01-11 RX ADMIN — ZOLPIDEM TARTRATE 5 MILLIGRAM(S): 10 TABLET ORAL at 22:15

## 2024-01-11 NOTE — H&P ADULT - NSHPSOCIALHISTORY_GEN_ALL_CORE
Tobacco: denies  EtOH: denies  Recreational drug use: denies  Lives with: father at home  Ambulates: independent   ADLs: independent

## 2024-01-11 NOTE — ED ADULT NURSE NOTE - OBJECTIVE STATEMENT
pt AOx3, unable to tell the year and month, flat affect and verbalizes that couldn't see on her left eye started two weeks ago. States that she has a history of HTN

## 2024-01-11 NOTE — H&P ADULT - HISTORY OF PRESENT ILLNESS
68 year old female PMHx Hypertension, Bipolar Affective Disorder, Migraines, Tardive Dyskinesia presents to ED from home c/o neurological symptoms x two weeks. Weeks ago, pt had a severe migraine for which she saw her Neuro Dr. Lora and was prescribed a course of steroids which she completed. After completing the steroids, two weeks ago, she began to have word finding difficulties, double vision in her L eye, gain instability resulting in her walking into things. She admits to dizziness and trouble with her memory as well. The symptoms have been persistent for two weeks and have not improved.    ED course:  Vitals: T 98.2F, HR 75, /75, RR 16, SpO2 100% on RA  Labs: Alk phos 133  EKG: sinus rhythm with 1st degree AV block, LBBB  CT head: negative  CT angio head/neck: no hemodynamically significant stenosis, multiple nodules in the b/l lobes of the thyroid compatible with a multinodular thyroid. Ultrasound of the thyroid may be helpful for further evaluation on a nonemergent basis  Given: IV Ofirmev x1, Percocet x1   68 year old female PMHx Hypertension, Bipolar Affective Disorder, Migraines, Tardive Dyskinesia presents to ED from home c/o neurological symptoms x two weeks. Weeks ago, pt had a severe migraine for which she saw her Neuro Dr. Lora and was prescribed a course of steroids which she completed. After completing the steroids, two weeks ago, she began to have word finding difficulties, double vision in her L eye, gait instability resulting in her walking into things. She admits to dizziness, facial asymmetry and trouble with her memory as well. The symptoms have been persistent for two weeks and have not improved. She denies slurred speech, changes in strength/sensation in extremities, bowel/urinary incontinence.     ED course:  Vitals: T 98.2F, HR 75, /75, RR 16, SpO2 100% on RA  Labs: Alk phos 133  EKG: sinus rhythm with 1st degree AV block, LBBB  CT head: negative  CT angio head/neck: no hemodynamically significant stenosis, multiple nodules in the b/l lobes of the thyroid compatible with a multinodular thyroid. Ultrasound of the thyroid may be helpful for further evaluation on a nonemergent basis  Given: IV Ofirmev x1, Percocet x1   68 year old female PMHx Hypertension, Bipolar Affective Disorder, Migraines, Tardive Dyskinesia presents to ED from home c/o neurological symptoms x two weeks. Weeks ago, pt had a severe migraine for which she saw her Neuro Dr. Lora and was prescribed a course of steroids which she completed. After completing the steroids, two weeks ago, she began to have word finding difficulties, double vision in her L eye, gait instability resulting in her walking into things. She admits to dizziness, facial asymmetry and trouble with her memory as well. The symptoms have been persistent for two weeks and have not improved. She denies slurred speech, changes in strength/sensation in extremities, bowel/urinary incontinence.   ED course:  Vitals: T 98.2F, HR 75, /75, RR 16, SpO2 100% on RA  Labs: Alk phos 133  EKG: sinus rhythm with 1st degree AV block, LBBB  CT head: negative  CT angio head/neck: no hemodynamically significant stenosis, multiple nodules in the b/l lobes of the thyroid compatible with a multinodular thyroid. Ultrasound of the thyroid may be helpful for further evaluation on a nonemergent basis  Given: IV Ofirmev x1, Percocet x1

## 2024-01-11 NOTE — H&P ADULT - PROBLEM SELECTOR PLAN 1
- CT head: negative  - CT angio: negative  - Continue ASA and statin  - F/u Lipid profile, A1C, TSH  - Neuro checks q4h  - Fall and aspiration precautions  - F/u TTE  - F/u MRI head  - Neurology (Dr. Samuels) consulted, f/u recs  - PT evaluation - facial droop, word finding difficulty, gait instability x 2 weeks    - CT head: negative  - CT angio: negative  - Continue ASA and increased statin dose   - F/u Lipid profile, A1C, TSH  - Neuro checks q4h  - Fall and aspiration precautions  - F/u TTE  - F/u MRI head  - Neurology (Dr. Samuels) consulted, f/u recs  - PT evaluation

## 2024-01-11 NOTE — H&P ADULT - NSHPPHYSICALEXAM_GEN_ALL_CORE
VITALS:   T(C): 36.8 (01-11-24 @ 21:53), Max: 36.8 (01-11-24 @ 19:39)  HR: 68 (01-11-24 @ 21:53) (67 - 85)  BP: 152/72 (01-11-24 @ 21:53) (152/72 - 190/75)  RR: 15 (01-11-24 @ 21:53) (15 - 19)  SpO2: 100% (01-11-24 @ 21:53) (97% - 100%)    GENERAL: NAD, lying in bed comfortably  HEAD:  L-sided facial droop. Atraumatic, Normocephalic  EYES: EOMI, PERRLA, conjunctiva and sclera clear  ENT: Moist mucous membranes, no pharyngeal exudates  NECK: Supple, No JVD  CHEST/LUNG: Clear to auscultation bilaterally; No rales, rhonchi, wheezing, or rubs. Unlabored respirations  HEART: Regular rate and rhythm; No murmurs, rubs, or gallops  ABDOMEN: Soft, nontender, nondistended  EXTREMITIES: No clubbing, cyanosis, or edema  NERVOUS SYSTEM: Difficulty with memory and word finding, strength and sensations intact and equal in all extremities   SKIN: No rashes or lesions VITALS:   T(C): 36.8 (01-11-24 @ 21:53), Max: 36.8 (01-11-24 @ 19:39)  HR: 68 (01-11-24 @ 21:53) (67 - 85)  BP: 152/72 (01-11-24 @ 21:53) (152/72 - 190/75)  RR: 15 (01-11-24 @ 21:53) (15 - 19)  SpO2: 100% (01-11-24 @ 21:53) (97% - 100%)  GENERAL: NAD, lying in bed comfortably  HEAD:  L-sided facial droop. Atraumatic, Normocephalic  EYES: EOMI, PERRLA, conjunctiva and sclera clear  ENT: Moist mucous membranes, no pharyngeal exudates  NECK: Supple, No JVD  CHEST/LUNG: Clear to auscultation bilaterally; No rales, rhonchi, wheezing, or rubs. Unlabored respirations  HEART: Regular rate and rhythm; No murmurs, rubs, or gallops  ABDOMEN: Soft, nontender, nondistended  EXTREMITIES: No clubbing, cyanosis, or edema  NERVOUS SYSTEM: Difficulty with memory and word finding, strength and sensations intact and equal in all extremities   SKIN: No rashes or lesions

## 2024-01-11 NOTE — ED PROVIDER NOTE - CHIEF COMPLAINT
"CRRT STATUS NOTE    DATA:  Time:  6:49 AM  Pressures WNL: YES  Filter Status: WDL    Problems Reported/Alarms Noted: none after adjusting kinked lumen and applying new dressing last evening.    Supplies Present:  YES    ASSESSMENT:  Patient Net Fluid Balance:  -1.7 L since midnight  Vital Signs: SB 53, /73(100), 100% on vent  Labs: WBC 16.4, HgB 8.2, PLT 42  Goals of Therapy: Net neg 2-3L per day as MAPs tolerate    INTERVENTIONS:   Restarted yesterday evening after \"communication error malfunction\" alarm would not allow continued therapy. Adjusted catheter for better pressures.    PLAN:  CVVHDF for clearance and volume removal. Call [phone] with amy needs.     " The patient is a 68y Female complaining of eye vision change.

## 2024-01-11 NOTE — H&P ADULT - ASSESSMENT
68 year old female PMHx Hypertension, Bipolar Affective Disorder, Migraines, Tardive Dyskinesia presents to ED from home c/o neurological symptoms x two weeks admitted for r/o CVA.

## 2024-01-11 NOTE — ED PROVIDER NOTE - CLINICAL SUMMARY MEDICAL DECISION MAKING FREE TEXT BOX
68y Female with HTN, Bipolar disorder, pw 2 weeks of blurry vision in left eye, difficulty speaking and difficlty walking. prior to 2 weeks ago has been wearing glasses, walks on her own but she has been having difficulty walking now. unsure if its due to weakness, diff seeing or dizziness. also feels like she cant express what she is trying to say. has not been seen by her drs for this. prior to 2 weeks ago she had a terrible headache for which she saw her pcp and was treated for with steroids and other meds. the headache is now resolved   concer for cva? labs, Cts, neuro TBA

## 2024-01-11 NOTE — ED PROVIDER NOTE - NIH STROKE SCALE: 6B. MOTOR LEG, RIGHT, QM
PATIENT REFILL REQUEST    Name of Medication(s): tramadol  Dosage of Medication: 50mg  Date last dispensed: 9/26/19  Date of last office visit: 10/10/19  Date of next office visit: 12/9/19  Any telephone encounters since the patient's last visit: No   Date of last UDS: 9/26/19  Additional Information/Comments:     PDMP CHECKED:  Criteria met. Forwarded to Dr. Kennedy.      
Pt requesting refill of tramadol. She would like it available for the 25th as she is going on vacation on the 26th. She uses the [facility] in [place].  
(0) No drift; leg holds 30 degree position for full 5 secs

## 2024-01-11 NOTE — H&P ADULT - NSHPREVIEWOFSYSTEMS_GEN_ALL_CORE
General: no fever, chills, weight gain or weight loss  HEENT: no nasal congestion, cough, rhinorrhea, sore throat, headache, changes in vision  Cardio: no palpitations, chest pain or discomfort  Pulm: no shortness of breath  GI: no vomiting, diarrhea, abdominal pain, constipation   /Renal: no dysuria, foul smelling urine, increased frequency, flank pain  Neuro: admits to headache, difficulty with memory and word finding, admits to facial asymmetry x 2 weeks  MSK: admits to gait instability. no back or extremity pain, no edema, joint pain or swelling  Heme: no bruising or abnormal bleeding  Skin: no rash or itching General: no fever, chills, weight gain or weight loss  HEENT: admits to headache and L eye double vision. no nasal congestion, cough, rhinorrhea, sore throat  Cardio: no palpitations, chest pain or discomfort  Pulm: no shortness of breath or wheezing   GI: no vomiting, diarrhea, abdominal pain, constipation   /Renal: no dysuria, foul smelling urine, increased frequency, flank pain  Neuro: admits to headache, difficulty with memory and word finding, facial asymmetry x 2 weeks  MSK: admits to gait instability. no back or extremity pain, no edema, joint pain or swelling  Heme: no bruising or abnormal bleeding  Skin: no rash or itching

## 2024-01-11 NOTE — H&P ADULT - PROBLEM SELECTOR PLAN 2
- Continue home Lamictal, Topamax, Lexapro, Lithium, Ziprasidone, Ambien - Continue home Lamictal, Topamax, Lexapro, Lithium, Ziprasidone, Ambien  - fu lithium level

## 2024-01-11 NOTE — H&P ADULT - PROBLEM SELECTOR PLAN 6
Multiple nodules in the b/l lobes of the thyroid compatible with a multinodular thyroid found on CT. Ultrasound of the thyroid may be helpful for further evaluation on a nonemergent basis.   - Outpatient follow up Multiple nodules in the b/l lobes of the thyroid compatible with a multinodular thyroid found on CT. Ultrasound of the thyroid may be helpful for further evaluation on a nonemergent basis.   - F/u TSH  - Outpatient follow up

## 2024-01-11 NOTE — ED ADULT NURSE NOTE - CAS TRG GEN SKIN COLOR
Impression: Acute atopic conjunctivitis, bilateral: H10.13. OU. Viral conjunctivitis compounded by suspected allergy to sulfa. Plan: d/c sulfacetamide. Begin Besivance QID OU. ATs prn x lubrication. Lumify BID OU after 3-5 days. RTC 3-5 days x follow up. Normal for race

## 2024-01-11 NOTE — ED PROVIDER NOTE - PHYSICAL EXAMINATION
Gen: Well appearing in NAD  Head: NC/AT  Neck: trachea midline  cv: rrr  Resp:  No distress, lungs clear   Ext: no deformities  Neuro:  A&O appears non focal  Skin:  Warm and dry as visualized  Psych:  Normal affect and mood Gen: Well appearing in NAD  Head: NC/AT  Neck: trachea midline  cv: rrr  Resp:  No distress, lungs clear   Ext: no deformities  Neuro:  see nih scale  visual acuity corrected right eye : 20/60  left 20/80  Skin:  Warm and dry as visualized  Psych:  Normal affect and mood

## 2024-01-11 NOTE — ED ADULT NURSE NOTE - NSFALLRISKINTERV_ED_ALL_ED
Assistance OOB with selected safe patient handling equipment if applicable/Assistance with ambulation/Communicate fall risk and risk factors to all staff, patient, and family/Monitor gait and stability/Provide patient with walking aids/Provide visual cue: yellow wristband, yellow gown, etc/Reinforce activity limits and safety measures with patient and family/Call bell, personal items and telephone in reach/Instruct patient to call for assistance before getting out of bed/chair/stretcher/Non-slip footwear applied when patient is off stretcher/[place] to call system/Physically safe environment - no spills, clutter or unnecessary equipment/Purposeful Proactive Rounding/Room/bathroom lighting operational, light cord in reach Assistance OOB with selected safe patient handling equipment if applicable/Assistance with ambulation/Communicate fall risk and risk factors to all staff, patient, and family/Monitor gait and stability/Provide patient with walking aids/Provide visual cue: yellow wristband, yellow gown, etc/Reinforce activity limits and safety measures with patient and family/Call bell, personal items and telephone in reach/Instruct patient to call for assistance before getting out of bed/chair/stretcher/Non-slip footwear applied when patient is off stretcher/[place] to call system/Physically safe environment - no spills, clutter or unnecessary equipment/Purposeful Proactive Rounding/Room/bathroom lighting operational, light cord in reach

## 2024-01-11 NOTE — H&P ADULT - ATTENDING COMMENTS
68 year old female PMHx Hypertension, Bipolar Affective Disorder, Migraines, Tardive Dyskinesia presents to ED from home c/o neurological symptoms x two weeks admitted for r/o CVA.    Agree with above. Edited where appropriate

## 2024-01-11 NOTE — ED ADULT TRIAGE NOTE - CHIEF COMPLAINT QUOTE
"I cant see out of my left eye for a while now." As per father, pt. has been talking about head pain and visual issues for "at least 2 weeks."

## 2024-01-11 NOTE — H&P ADULT - PROBLEM SELECTOR PLAN 5
- Continue home statin - Increase home Atorvastatin from 10mg to 40mg qhs  - Start ASA 81mg qd  - F/u lipid panel

## 2024-01-12 ENCOUNTER — NON-APPOINTMENT (OUTPATIENT)
Age: 69
End: 2024-01-12

## 2024-01-12 LAB
A1C WITH ESTIMATED AVERAGE GLUCOSE RESULT: 5.5 % — SIGNIFICANT CHANGE UP (ref 4–5.6)
A1C WITH ESTIMATED AVERAGE GLUCOSE RESULT: 5.5 % — SIGNIFICANT CHANGE UP (ref 4–5.6)
ALBUMIN SERPL ELPH-MCNC: 3.4 G/DL — SIGNIFICANT CHANGE UP (ref 3.3–5)
ALBUMIN SERPL ELPH-MCNC: 3.4 G/DL — SIGNIFICANT CHANGE UP (ref 3.3–5)
ALP SERPL-CCNC: 120 U/L — SIGNIFICANT CHANGE UP (ref 40–120)
ALP SERPL-CCNC: 120 U/L — SIGNIFICANT CHANGE UP (ref 40–120)
ALT FLD-CCNC: 29 U/L — SIGNIFICANT CHANGE UP (ref 12–78)
ALT FLD-CCNC: 29 U/L — SIGNIFICANT CHANGE UP (ref 12–78)
ANION GAP SERPL CALC-SCNC: 4 MMOL/L — LOW (ref 5–17)
ANION GAP SERPL CALC-SCNC: 4 MMOL/L — LOW (ref 5–17)
AST SERPL-CCNC: 31 U/L — SIGNIFICANT CHANGE UP (ref 15–37)
AST SERPL-CCNC: 31 U/L — SIGNIFICANT CHANGE UP (ref 15–37)
BASOPHILS # BLD AUTO: 0.07 K/UL — SIGNIFICANT CHANGE UP (ref 0–0.2)
BASOPHILS # BLD AUTO: 0.07 K/UL — SIGNIFICANT CHANGE UP (ref 0–0.2)
BASOPHILS NFR BLD AUTO: 0.7 % — SIGNIFICANT CHANGE UP (ref 0–2)
BASOPHILS NFR BLD AUTO: 0.7 % — SIGNIFICANT CHANGE UP (ref 0–2)
BILIRUB SERPL-MCNC: 0.3 MG/DL — SIGNIFICANT CHANGE UP (ref 0.2–1.2)
BILIRUB SERPL-MCNC: 0.3 MG/DL — SIGNIFICANT CHANGE UP (ref 0.2–1.2)
BUN SERPL-MCNC: 16 MG/DL — SIGNIFICANT CHANGE UP (ref 7–23)
BUN SERPL-MCNC: 16 MG/DL — SIGNIFICANT CHANGE UP (ref 7–23)
CALCIUM SERPL-MCNC: 8.9 MG/DL — SIGNIFICANT CHANGE UP (ref 8.5–10.1)
CALCIUM SERPL-MCNC: 8.9 MG/DL — SIGNIFICANT CHANGE UP (ref 8.5–10.1)
CHLORIDE SERPL-SCNC: 112 MMOL/L — HIGH (ref 96–108)
CHLORIDE SERPL-SCNC: 112 MMOL/L — HIGH (ref 96–108)
CHOLEST SERPL-MCNC: 153 MG/DL — SIGNIFICANT CHANGE UP
CHOLEST SERPL-MCNC: 153 MG/DL — SIGNIFICANT CHANGE UP
CO2 SERPL-SCNC: 26 MMOL/L — SIGNIFICANT CHANGE UP (ref 22–31)
CO2 SERPL-SCNC: 26 MMOL/L — SIGNIFICANT CHANGE UP (ref 22–31)
CREAT SERPL-MCNC: 1.3 MG/DL — SIGNIFICANT CHANGE UP (ref 0.5–1.3)
CREAT SERPL-MCNC: 1.3 MG/DL — SIGNIFICANT CHANGE UP (ref 0.5–1.3)
EGFR: 45 ML/MIN/1.73M2 — LOW
EGFR: 45 ML/MIN/1.73M2 — LOW
EOSINOPHIL # BLD AUTO: 0.18 K/UL — SIGNIFICANT CHANGE UP (ref 0–0.5)
EOSINOPHIL # BLD AUTO: 0.18 K/UL — SIGNIFICANT CHANGE UP (ref 0–0.5)
EOSINOPHIL NFR BLD AUTO: 1.8 % — SIGNIFICANT CHANGE UP (ref 0–6)
EOSINOPHIL NFR BLD AUTO: 1.8 % — SIGNIFICANT CHANGE UP (ref 0–6)
ESTIMATED AVERAGE GLUCOSE: 111 MG/DL — SIGNIFICANT CHANGE UP (ref 68–114)
ESTIMATED AVERAGE GLUCOSE: 111 MG/DL — SIGNIFICANT CHANGE UP (ref 68–114)
GLUCOSE SERPL-MCNC: 113 MG/DL — HIGH (ref 70–99)
GLUCOSE SERPL-MCNC: 113 MG/DL — HIGH (ref 70–99)
HCT VFR BLD CALC: 41 % — SIGNIFICANT CHANGE UP (ref 34.5–45)
HCT VFR BLD CALC: 41 % — SIGNIFICANT CHANGE UP (ref 34.5–45)
HDLC SERPL-MCNC: 60 MG/DL — SIGNIFICANT CHANGE UP
HDLC SERPL-MCNC: 60 MG/DL — SIGNIFICANT CHANGE UP
HGB BLD-MCNC: 12.7 G/DL — SIGNIFICANT CHANGE UP (ref 11.5–15.5)
HGB BLD-MCNC: 12.7 G/DL — SIGNIFICANT CHANGE UP (ref 11.5–15.5)
IMM GRANULOCYTES NFR BLD AUTO: 0.6 % — SIGNIFICANT CHANGE UP (ref 0–0.9)
IMM GRANULOCYTES NFR BLD AUTO: 0.6 % — SIGNIFICANT CHANGE UP (ref 0–0.9)
LIPID PNL WITH DIRECT LDL SERPL: 60 MG/DL — SIGNIFICANT CHANGE UP
LIPID PNL WITH DIRECT LDL SERPL: 60 MG/DL — SIGNIFICANT CHANGE UP
LITHIUM SERPL-MCNC: <0.2 MMOL/L — LOW (ref 0.6–1.2)
LITHIUM SERPL-MCNC: <0.2 MMOL/L — LOW (ref 0.6–1.2)
LYMPHOCYTES # BLD AUTO: 2.28 K/UL — SIGNIFICANT CHANGE UP (ref 1–3.3)
LYMPHOCYTES # BLD AUTO: 2.28 K/UL — SIGNIFICANT CHANGE UP (ref 1–3.3)
LYMPHOCYTES # BLD AUTO: 23.2 % — SIGNIFICANT CHANGE UP (ref 13–44)
LYMPHOCYTES # BLD AUTO: 23.2 % — SIGNIFICANT CHANGE UP (ref 13–44)
MCHC RBC-ENTMCNC: 29.2 PG — SIGNIFICANT CHANGE UP (ref 27–34)
MCHC RBC-ENTMCNC: 29.2 PG — SIGNIFICANT CHANGE UP (ref 27–34)
MCHC RBC-ENTMCNC: 31 GM/DL — LOW (ref 32–36)
MCHC RBC-ENTMCNC: 31 GM/DL — LOW (ref 32–36)
MCV RBC AUTO: 94.3 FL — SIGNIFICANT CHANGE UP (ref 80–100)
MCV RBC AUTO: 94.3 FL — SIGNIFICANT CHANGE UP (ref 80–100)
MONOCYTES # BLD AUTO: 0.72 K/UL — SIGNIFICANT CHANGE UP (ref 0–0.9)
MONOCYTES # BLD AUTO: 0.72 K/UL — SIGNIFICANT CHANGE UP (ref 0–0.9)
MONOCYTES NFR BLD AUTO: 7.3 % — SIGNIFICANT CHANGE UP (ref 2–14)
MONOCYTES NFR BLD AUTO: 7.3 % — SIGNIFICANT CHANGE UP (ref 2–14)
NEUTROPHILS # BLD AUTO: 6.52 K/UL — SIGNIFICANT CHANGE UP (ref 1.8–7.4)
NEUTROPHILS # BLD AUTO: 6.52 K/UL — SIGNIFICANT CHANGE UP (ref 1.8–7.4)
NEUTROPHILS NFR BLD AUTO: 66.4 % — SIGNIFICANT CHANGE UP (ref 43–77)
NEUTROPHILS NFR BLD AUTO: 66.4 % — SIGNIFICANT CHANGE UP (ref 43–77)
NON HDL CHOLESTEROL: 93 MG/DL — SIGNIFICANT CHANGE UP
NON HDL CHOLESTEROL: 93 MG/DL — SIGNIFICANT CHANGE UP
NRBC # BLD: 0 /100 WBCS — SIGNIFICANT CHANGE UP (ref 0–0)
NRBC # BLD: 0 /100 WBCS — SIGNIFICANT CHANGE UP (ref 0–0)
PLATELET # BLD AUTO: 246 K/UL — SIGNIFICANT CHANGE UP (ref 150–400)
PLATELET # BLD AUTO: 246 K/UL — SIGNIFICANT CHANGE UP (ref 150–400)
POTASSIUM SERPL-MCNC: 3.6 MMOL/L — SIGNIFICANT CHANGE UP (ref 3.5–5.3)
POTASSIUM SERPL-MCNC: 3.6 MMOL/L — SIGNIFICANT CHANGE UP (ref 3.5–5.3)
POTASSIUM SERPL-SCNC: 3.6 MMOL/L — SIGNIFICANT CHANGE UP (ref 3.5–5.3)
POTASSIUM SERPL-SCNC: 3.6 MMOL/L — SIGNIFICANT CHANGE UP (ref 3.5–5.3)
PROT SERPL-MCNC: 6.9 G/DL — SIGNIFICANT CHANGE UP (ref 6–8.3)
PROT SERPL-MCNC: 6.9 G/DL — SIGNIFICANT CHANGE UP (ref 6–8.3)
RBC # BLD: 4.35 M/UL — SIGNIFICANT CHANGE UP (ref 3.8–5.2)
RBC # BLD: 4.35 M/UL — SIGNIFICANT CHANGE UP (ref 3.8–5.2)
RBC # FLD: 14.1 % — SIGNIFICANT CHANGE UP (ref 10.3–14.5)
RBC # FLD: 14.1 % — SIGNIFICANT CHANGE UP (ref 10.3–14.5)
SODIUM SERPL-SCNC: 142 MMOL/L — SIGNIFICANT CHANGE UP (ref 135–145)
SODIUM SERPL-SCNC: 142 MMOL/L — SIGNIFICANT CHANGE UP (ref 135–145)
TRIGL SERPL-MCNC: 205 MG/DL — HIGH
TRIGL SERPL-MCNC: 205 MG/DL — HIGH
TSH SERPL-MCNC: 3.74 UIU/ML — SIGNIFICANT CHANGE UP (ref 0.36–3.74)
TSH SERPL-MCNC: 3.74 UIU/ML — SIGNIFICANT CHANGE UP (ref 0.36–3.74)
WBC # BLD: 9.83 K/UL — SIGNIFICANT CHANGE UP (ref 3.8–10.5)
WBC # BLD: 9.83 K/UL — SIGNIFICANT CHANGE UP (ref 3.8–10.5)
WBC # FLD AUTO: 9.83 K/UL — SIGNIFICANT CHANGE UP (ref 3.8–10.5)
WBC # FLD AUTO: 9.83 K/UL — SIGNIFICANT CHANGE UP (ref 3.8–10.5)

## 2024-01-12 PROCEDURE — 99223 1ST HOSP IP/OBS HIGH 75: CPT

## 2024-01-12 PROCEDURE — 93306 TTE W/DOPPLER COMPLETE: CPT | Mod: 26

## 2024-01-12 PROCEDURE — 70551 MRI BRAIN STEM W/O DYE: CPT | Mod: 26

## 2024-01-12 RX ORDER — METOPROLOL TARTRATE 50 MG
25 TABLET ORAL DAILY
Refills: 0 | Status: DISCONTINUED | OUTPATIENT
Start: 2024-01-13 | End: 2024-01-17

## 2024-01-12 RX ORDER — ZIPRASIDONE HYDROCHLORIDE 20 MG/1
80 CAPSULE ORAL
Refills: 0 | Status: DISCONTINUED | OUTPATIENT
Start: 2024-01-13 | End: 2024-01-17

## 2024-01-12 RX ORDER — LAMOTRIGINE 25 MG/1
100 TABLET, ORALLY DISINTEGRATING ORAL AT BEDTIME
Refills: 0 | Status: DISCONTINUED | OUTPATIENT
Start: 2024-01-12 | End: 2024-01-17

## 2024-01-12 RX ORDER — LAMOTRIGINE 25 MG/1
200 TABLET, ORALLY DISINTEGRATING ORAL DAILY
Refills: 0 | Status: DISCONTINUED | OUTPATIENT
Start: 2024-01-13 | End: 2024-01-17

## 2024-01-12 RX ADMIN — ESCITALOPRAM OXALATE 10 MILLIGRAM(S): 10 TABLET, FILM COATED ORAL at 21:03

## 2024-01-12 RX ADMIN — LAMOTRIGINE 200 MILLIGRAM(S): 25 TABLET, ORALLY DISINTEGRATING ORAL at 06:22

## 2024-01-12 RX ADMIN — PANTOPRAZOLE SODIUM 40 MILLIGRAM(S): 20 TABLET, DELAYED RELEASE ORAL at 06:22

## 2024-01-12 RX ADMIN — Medication 81 MILLIGRAM(S): at 21:03

## 2024-01-12 RX ADMIN — ENOXAPARIN SODIUM 40 MILLIGRAM(S): 100 INJECTION SUBCUTANEOUS at 21:04

## 2024-01-12 RX ADMIN — Medication 100 MILLIGRAM(S): at 18:37

## 2024-01-12 RX ADMIN — Medication 100 MILLIGRAM(S): at 06:23

## 2024-01-12 RX ADMIN — ATORVASTATIN CALCIUM 40 MILLIGRAM(S): 80 TABLET, FILM COATED ORAL at 21:03

## 2024-01-12 RX ADMIN — SUMATRIPTAN SUCCINATE 100 MILLIGRAM(S): 4 INJECTION, SOLUTION SUBCUTANEOUS at 02:28

## 2024-01-12 RX ADMIN — LAMOTRIGINE 100 MILLIGRAM(S): 25 TABLET, ORALLY DISINTEGRATING ORAL at 21:03

## 2024-01-12 RX ADMIN — ZIPRASIDONE HYDROCHLORIDE 80 MILLIGRAM(S): 20 CAPSULE ORAL at 06:22

## 2024-01-12 RX ADMIN — LITHIUM CARBONATE 300 MILLIGRAM(S): 300 TABLET, EXTENDED RELEASE ORAL at 06:22

## 2024-01-12 RX ADMIN — GABAPENTIN 300 MILLIGRAM(S): 400 CAPSULE ORAL at 18:37

## 2024-01-12 RX ADMIN — GABAPENTIN 300 MILLIGRAM(S): 400 CAPSULE ORAL at 06:29

## 2024-01-12 RX ADMIN — LITHIUM CARBONATE 300 MILLIGRAM(S): 300 TABLET, EXTENDED RELEASE ORAL at 18:37

## 2024-01-12 NOTE — CARE COORDINATION ASSESSMENT. - OTHER PERTINENT REFERRAL INFORMATION
CLARIBEL spoke to pts father via telephone as pt is lethargic from medication. Per father pt alert and oriented X4 at baseline. Pt lives with her father in a home with 3 JAVAN.  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  Father very supportive but is 94 YO.

## 2024-01-12 NOTE — PHARMACOTHERAPY INTERVENTION NOTE - COMMENTS
68 year old female presenting to ED from home c/o neurological symptoms x two weeks admitted for r/o CVA. Rapid response called for AMS. Increased somnolence 2/2 mistimed medications.     - Per outpatient fill history, patient takes ziprasidone 80 mg with dinner. Administered at 6 AM.  - Per outpatient fills, patient takes lamotrigine 200 mg in the morning and 100 mg in the evening, currently ordered for lamotrigine 200 mg twice daily.   - Patient ordered for metoprolol tartrate 25 mg daily. Per outpatient fills, patient is on metoprolol succinate 25 mg daily.     Orders adjusted to routine outpatient regimen per discussion with Dr. Dickson.      68 year old female presenting to ED from home c/o neurological symptoms x two weeks admitted for r/o CVA. Rapid response called for AMS. Increased somnolence 2/2 mistimed medications.     - Per outpatient fill history, patient takes ziprasidone 80 mg with dinner. Administered at 6 AM.  - Per outpatient fills, patient takes lamotrigine 200 mg in the morning and 100 mg in the evening, currently ordered for lamotrigine 200 mg twice daily.   - Patient ordered for metoprolol tartrate 25 mg daily. Per outpatient fills, patient is on metoprolol succinate 25 mg daily.     OMR updated to reflect these changed. Orders adjusted to routine outpatient regimen per discussion with Dr. Dickson.

## 2024-01-12 NOTE — CHART NOTE - NSCHARTNOTEFT_GEN_A_CORE
Rapid Response Note:    Rapid response was called at 10:27 for AMS.     Events leading up to Rapid Response: Patient admitted overnight for facial droop, word finding difficulty, gait instability x 2 weeks. CT head: negative. CT angio: negative. Neurology following. MRI pending.   RRT called as nurse felt as though pt ws less responsive than earlier in the day.     Patient was seen and examined at the bedside by the rapid response team. ICU NP at bedside.       Rapid Response Vital Signs:   BP: 119/63  HR: 66  RR: 16  SpO2: 97% on RA    Temp: 97.1  FS: 105    T(C): 36.2 (01-12-24 @ 10:36), Max: 36.9 (01-12-24 @ 00:21)  HR: 66 (01-12-24 @ 10:36) (53 - 85)  BP: 119/63 (01-12-24 @ 10:36) (119/63 - 190/75)  RR: 16 (01-12-24 @ 10:36) (14 - 19)  SpO2: 99% (01-12-24 @ 10:36) (97% - 100%)    Physical Exam:   Gen: somnolent, NAD   HEENT: NCAT, PEERLA b/l  Cardio: regular rate and rhythm, +s1s2, no murmurs, rubs, or gallops   Pulm: CTA b/l, no wheezes, rales or rhonchi   Abdomen: soft, nontender, nondistended,   Extremities: no cyanosis or edema  Neuro: AAOx3, CNII-XII intact, reactive to painful stimuli   Skin: warm and dry       Assessment/Plan: 68 year old female PMHx Hypertension, Bipolar Affective Disorder, Migraines, Tardive Dyskinesia presents to ED from home c/o neurological symptoms x two weeks admitted for r/o CVA. Rapid response called for AMS.     - Increased somnolence likely 2/2 mis-timed medication; pt received home ziprasidone at 6am   - Hemodynamically stable   - Pt reactive to painful stimuli, A&Ox3   - CT Head Negative   - f/u MRI   - Neuro following   - RN to call if any changes.   - Family updated by Dr. Tariq Dickson Rapid Response Note:    Rapid response was called at 10:27 for AMS.     Events leading up to Rapid Response: Patient admitted overnight for facial droop, word finding difficulty, gait instability x 2 weeks. CT head: negative. CT angio: negative. Neurology following. MRI pending.   RRT called as nurse felt as though pt ws less responsive than earlier in the day.     Patient was seen and examined at the bedside by the rapid response team. ICU NP at bedside.       Rapid Response Vital Signs:   BP: 119/63  HR: 66  RR: 16  SpO2: 97% on RA    Temp: 97.1  FS: 105    T(C): 36.2 (01-12-24 @ 10:36), Max: 36.9 (01-12-24 @ 00:21)  HR: 66 (01-12-24 @ 10:36) (53 - 85)  BP: 119/63 (01-12-24 @ 10:36) (119/63 - 190/75)  RR: 16 (01-12-24 @ 10:36) (14 - 19)  SpO2: 99% (01-12-24 @ 10:36) (97% - 100%)    Physical Exam:   Gen: somnolent, NAD   HEENT: NCAT, PEERLA b/l  Cardio: regular rate and rhythm, +s1s2, no murmurs, rubs, or gallops   Pulm: CTA b/l, no wheezes, rales or rhonchi   Abdomen: soft, nontender, nondistended,   Extremities: no cyanosis or edema  Neuro: AAOx3, CNII-XII intact, reactive to painful stimuli   Skin: warm and dry       Assessment/Plan: 68 year old female PMHx Hypertension, Bipolar Affective Disorder, Migraines, Tardive Dyskinesia presents to ED from home c/o neurological symptoms x two weeks admitted for r/o CVA. Rapid response called for AMS.     - Increased somnolence likely 2/2 mis-timed medication; pt received home ziprasidone at 6am   - Hemodynamically stable   - Pt reactive to painful stimuli, A&Ox3   - CT Head Negative   - f/u MRI   - Neuro following   - RN to call if any changes.   - Family updated by Dr. Tariq Dickson    ADDENDUM:    Pt seen and re-examined by rapid response team two hours after rapid response called.  On exam, pt remains somnolent, minimally responsive to deep sternal rub, but appears in no acute distress    Physical Exam:   Gen: somnolent, NAD   HEENT: NCAT, PEERLA b/l  Cardio: regular rate and rhythm, +s1s2, no murmurs, rubs, or gallops   Pulm: CTA b/l, no wheezes, rales or rhonchi   Abdomen: soft, nontender, nondistended  Extremities: no cyanosis or edema  Neuro: minimally reactive to painful stimuli   Skin: warm and dry     Plan:  - AM labs grossly normal  - F/u MRI  - Remainder of care per primary team  - RN to call with any additional changes or concerns

## 2024-01-12 NOTE — PROGRESS NOTE ADULT - PROBLEM SELECTOR PLAN 1
- facial droop, word finding difficulty, gait instability x 2 weeks    - CT head: negative  - CT angio: negative  - Continue ASA and increased statin dose   - F/u Lipid profile, A1C, TSH  - Neuro checks q4h  - Fall and aspiration precautions  - F/u TTE  - F/u MRI head  - Neurology (Dr. Samuels) consulted, f/u recs  - PT evaluation

## 2024-01-12 NOTE — CARE COORDINATION ASSESSMENT. - NSCAREPROVIDERS_GEN_ALL_CORE_FT
CARE PROVIDERS:  Accepting Physician: Lucinda Figueroa  Administration: Cyril Douglas  Administration: Carlene Taylor  Administration: Ha Francisco  Admitting: Lucinda Figueroa  Attending: Lucinda Figueroa  Cardiology Technician: Saadia Maldonado  Consultant: Rafael Samuels  Covering Team: Rafael Samuels  Covering Team: Caron Perez  ED Attending: Manuela Britt  ED Attending2: Brad Collier  ED Nurse: Erick Thapa  Emergency Medicine: Brad Collier  Nurse: Berenice Guerrier  Nurse: Erick Thapa  Nurse: Felicitas Martin  Nurse: Bella Nam  Ordered: Tariq Dickson  Ordered: Doctor, Unknown  Ordered: ADM, User  Override: Bella Nam  Physical Therapy: Korina Bryan  Physical Therapy: Michael Gabriel  Primary Team: Syed Burton  Primary Team: Katey Perez  Primary Team: Prosper Paniagua  Primary Team: Tariq Dickson  Quality Review: Kary Aguilar  Radiology Technician: Arley Yo  Respiratory Therapy: Chaz Guo  : Yuni Vinson  Team: [facility]  Hospitalists, Team  UR// Supp. Assoc.: Katelyn Marks

## 2024-01-12 NOTE — ED ADULT NURSE REASSESSMENT NOTE - NS ED NURSE REASSESS COMMENT FT1
Pt received lying on stretcher, asleep, rouses to fully awake. Pt is a/o x 4. skin w/d, resps nonlabored. Neuro exam normal, PO denies pain or discomfort at this time, cm sinus bradycardia.

## 2024-01-12 NOTE — PROGRESS NOTE ADULT - SUBJECTIVE AND OBJECTIVE BOX
neuro cons dict  seen for dizziness/diplopia/slurred speech   r/o cva  for brain mri  Echo  ap/statin  PT

## 2024-01-12 NOTE — CARE COORDINATION ASSESSMENT. - NSDCPLANSERVICES_GEN_ALL_CORE
Anticipated Needs Unclear at Present SW/CM to follow for safe discharge plan and support./Anticipated Needs Unclear at Present

## 2024-01-12 NOTE — ED ADULT NURSE REASSESSMENT NOTE - GENERAL PATIENT STATE
Clinic Care Coordination Contact  Care Coordination Transition Communication    Referral Source: IP Report - patient has had 2 hospitalizations and 3 ED visits within the past 90 days and an elevated CHIQUITA.     Clinical Data: Patient was seen in the ED on 02/19/2020 with diagnosis of malfunctioning Jejunostomy Tube.     Transition to Facility:              Facility Name: [facility].               Contact name and phone number/fax: Left message for CHANELLE Cobb at TCU with call back information and requested a return call back regarding confirmation of return to TCU, discharge planning and outstanding care coordination needs.     Plan: RN/SW Care Coordinator will await notification from facility staff informing RN/SW Care Coordinator of patient's discharge plans/needs. RN/SW Care Coordinator will review chart and outreach to facility staff every 4 weeks and as needed.     Ebony Nguyen RN Care Coordinator  [facility]  Email: [email or web address]  Phone: [phone]         comfortable appearance

## 2024-01-12 NOTE — PATIENT PROFILE ADULT - FALL HARM RISK - HARM RISK INTERVENTIONS
Assistance with ambulation/Assistance OOB with selected safe patient handling equipment/Communicate Risk of Fall with Harm to all staff/Reinforce activity limits and safety measures with patient and family/Tailored Fall Risk Interventions/Visual Cue: Yellow wristband and red socks/Bed in lowest position, wheels locked, appropriate side rails in place/Call bell, personal items and telephone in reach/Instruct patient to call for assistance before getting out of bed or chair/Non-slip footwear when patient is out of bed/[place] to call system/Physically safe environment - no spills, clutter or unnecessary equipment/Purposeful Proactive Rounding/Room/bathroom lighting operational, light cord in reach Assistance with ambulation/Assistance OOB with selected safe patient handling equipment/Communicate Risk of Fall with Harm to all staff/Reinforce activity limits and safety measures with patient and family/Tailored Fall Risk Interventions/Visual Cue: Yellow wristband and red socks/Bed in lowest position, wheels locked, appropriate side rails in place/Call bell, personal items and telephone in reach/Instruct patient to call for assistance before getting out of bed or chair/Non-slip footwear when patient is out of bed/[place] to call system/Physically safe environment - no spills, clutter or unnecessary equipment/Purposeful Proactive Rounding/Room/bathroom lighting operational, light cord in reach

## 2024-01-12 NOTE — PROGRESS NOTE ADULT - SUBJECTIVE AND OBJECTIVE BOX
Patient is a 68y old  Female who presents with a chief complaint of r/o CVA (11 Jan 2024 20:58)      INTERVAL HPI/OVERNIGHT EVENTS: Patient seen and examined at bedside. Rapid response this AM. Denies CP, dizziness, headache.     MEDICATIONS  (STANDING):  aspirin enteric coated 81 milliGRAM(s) Oral daily  atorvastatin 40 milliGRAM(s) Oral at bedtime  enoxaparin Injectable 40 milliGRAM(s) SubCutaneous every 24 hours  escitalopram 10 milliGRAM(s) Oral daily  gabapentin 300 milliGRAM(s) Oral two times a day  lamoTRIgine 100 milliGRAM(s) Oral at bedtime  lithium 300 milliGRAM(s) Oral two times a day  pantoprazole    Tablet 40 milliGRAM(s) Oral before breakfast  topiramate 100 milliGRAM(s) Oral two times a day    MEDICATIONS  (PRN):  SUMAtriptan 100 milliGRAM(s) Oral daily PRN Migraine  zolpidem 5 milliGRAM(s) Oral at bedtime PRN Insomnia      Allergies    penicillins (Rash)  sulfa drugs (Rash)    Intolerances        REVIEW OF SYSTEMS:  CONSTITUTIONAL: No fever or chills  HEENT:  No headache, no sore throat  RESPIRATORY: No cough, wheezing, or shortness of breath  CARDIOVASCULAR: No chest pain, palpitations  GASTROINTESTINAL: No abd pain, nausea, vomiting, or diarrhea  GENITOURINARY: No dysuria, frequency, or hematuria  NEUROLOGICAL: no focal weakness or dizziness  MUSCULOSKELETAL: no myalgias     Vital Signs Last 24 Hrs  T(C): 36.2 (12 Jan 2024 10:36), Max: 36.9 (12 Jan 2024 00:21)  T(F): 97.1 (12 Jan 2024 10:36), Max: 98.5 (12 Jan 2024 00:21)  HR: 66 (12 Jan 2024 10:36) (53 - 85)  BP: 119/63 (12 Jan 2024 10:36) (119/63 - 190/75)  BP(mean): --  RR: 16 (12 Jan 2024 10:36) (14 - 19)  SpO2: 99% (12 Jan 2024 10:36) (97% - 100%)    Parameters below as of 12 Jan 2024 09:11  Patient On (Oxygen Delivery Method): room air        PHYSICAL EXAM:  GENERAL: NAD  HEENT:  anicteric, moist mucous membranes  CHEST/LUNG:  CTA b/l, no rales, wheezes, or rhonchi  HEART:  RRR, S1, S2  ABDOMEN:  BS+, soft, nontender, nondistended  EXTREMITIES: no edema, cyanosis, or calf tenderness  NERVOUS SYSTEM: answers questions and follows commands appropriately    LABS:                        12.7   9.83  )-----------( 246      ( 12 Jan 2024 11:00 )             41.0     CBC Full  -  ( 12 Jan 2024 11:00 )  WBC Count : 9.83 K/uL  Hemoglobin : 12.7 g/dL  Hematocrit : 41.0 %  Platelet Count - Automated : 246 K/uL  Mean Cell Volume : 94.3 fl  Mean Cell Hemoglobin : 29.2 pg  Mean Cell Hemoglobin Concentration : 31.0 gm/dL  Auto Neutrophil # : 6.52 K/uL  Auto Lymphocyte # : 2.28 K/uL  Auto Monocyte # : 0.72 K/uL  Auto Eosinophil # : 0.18 K/uL  Auto Basophil # : 0.07 K/uL  Auto Neutrophil % : 66.4 %  Auto Lymphocyte % : 23.2 %  Auto Monocyte % : 7.3 %  Auto Eosinophil % : 1.8 %  Auto Basophil % : 0.7 %    12 Jan 2024 11:00    142    |  112    |  16     ----------------------------<  113    3.6     |  26     |  1.30     Ca    8.9        12 Jan 2024 11:00    TPro  6.9    /  Alb  3.4    /  TBili  0.3    /  DBili  x      /  AST  31     /  ALT  29     /  AlkPhos  120    12 Jan 2024 11:00    PT/INR - ( 11 Jan 2024 15:20 )   PT: 10.4 sec;   INR: 0.89 ratio         PTT - ( 11 Jan 2024 15:20 )  PTT:22.8 sec  Urinalysis Basic - ( 12 Jan 2024 11:00 )    Color: x / Appearance: x / SG: x / pH: x  Gluc: 113 mg/dL / Ketone: x  / Bili: x / Urobili: x   Blood: x / Protein: x / Nitrite: x   Leuk Esterase: x / RBC: x / WBC x   Sq Epi: x / Non Sq Epi: x / Bacteria: x      CAPILLARY BLOOD GLUCOSE      POCT Blood Glucose.: 105 mg/dL (12 Jan 2024 10:36)          RADIOLOGY & ADDITIONAL TESTS:    Personally reviewed.     Consultant(s) Notes Reviewed:  [x] YES  [ ] NO

## 2024-01-12 NOTE — CARE COORDINATION ASSESSMENT. - NSPASTMEDSURGHISTORY_GEN_ALL_CORE_FT
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  Pt also self mutilate last was "many yrs ago maybe 20 yrs ago".      History of tonsillectomy  at 18 yrs old

## 2024-01-12 NOTE — ED ADULT NURSE REASSESSMENT NOTE - NS ED NURSE REASSESS COMMENT FT1
0715:  received patient.  she is alert / orineted x4.  denies any complaints of pain, chest pain, or sob.  states her only complaint is "I feel like I am having trouble speaking".  she is able to speak, but is slow in doing so.  she is aware of the plan of care (admission, etc) and complies with such.  taken via stretcher to ECHO, and will be placed into bed 13 upon return to hold rn.  Report given.  vitals recorded.  jenn dow. 0715:  received patient.  she is alert / oriented x4.  denies any complaints of pain, chest pain, or sob.  states her only complaint is "I feel like I am having trouble speaking".  she is able to speak, but is slow in doing so.  she is aware of the plan of care (admission, etc) and complies with such.  taken via stretcher to ECHO, and will be placed into bed 13 upon return to hold rn.  Report given.  vitals recorded.  jenn dow.

## 2024-01-13 LAB
ALBUMIN SERPL ELPH-MCNC: 3.2 G/DL — LOW (ref 3.3–5)
ALBUMIN SERPL ELPH-MCNC: 3.2 G/DL — LOW (ref 3.3–5)
ALP SERPL-CCNC: 105 U/L — SIGNIFICANT CHANGE UP (ref 40–120)
ALP SERPL-CCNC: 105 U/L — SIGNIFICANT CHANGE UP (ref 40–120)
ALT FLD-CCNC: 25 U/L — SIGNIFICANT CHANGE UP (ref 12–78)
ALT FLD-CCNC: 25 U/L — SIGNIFICANT CHANGE UP (ref 12–78)
ANION GAP SERPL CALC-SCNC: 4 MMOL/L — LOW (ref 5–17)
ANION GAP SERPL CALC-SCNC: 4 MMOL/L — LOW (ref 5–17)
AST SERPL-CCNC: 19 U/L — SIGNIFICANT CHANGE UP (ref 15–37)
AST SERPL-CCNC: 19 U/L — SIGNIFICANT CHANGE UP (ref 15–37)
BASOPHILS # BLD AUTO: 0.05 K/UL — SIGNIFICANT CHANGE UP (ref 0–0.2)
BASOPHILS # BLD AUTO: 0.05 K/UL — SIGNIFICANT CHANGE UP (ref 0–0.2)
BASOPHILS NFR BLD AUTO: 0.6 % — SIGNIFICANT CHANGE UP (ref 0–2)
BASOPHILS NFR BLD AUTO: 0.6 % — SIGNIFICANT CHANGE UP (ref 0–2)
BILIRUB SERPL-MCNC: 0.7 MG/DL — SIGNIFICANT CHANGE UP (ref 0.2–1.2)
BILIRUB SERPL-MCNC: 0.7 MG/DL — SIGNIFICANT CHANGE UP (ref 0.2–1.2)
BUN SERPL-MCNC: 14 MG/DL — SIGNIFICANT CHANGE UP (ref 7–23)
BUN SERPL-MCNC: 14 MG/DL — SIGNIFICANT CHANGE UP (ref 7–23)
CALCIUM SERPL-MCNC: 9.1 MG/DL — SIGNIFICANT CHANGE UP (ref 8.5–10.1)
CALCIUM SERPL-MCNC: 9.1 MG/DL — SIGNIFICANT CHANGE UP (ref 8.5–10.1)
CHLORIDE SERPL-SCNC: 111 MMOL/L — HIGH (ref 96–108)
CHLORIDE SERPL-SCNC: 111 MMOL/L — HIGH (ref 96–108)
CO2 SERPL-SCNC: 23 MMOL/L — SIGNIFICANT CHANGE UP (ref 22–31)
CO2 SERPL-SCNC: 23 MMOL/L — SIGNIFICANT CHANGE UP (ref 22–31)
CREAT SERPL-MCNC: 1.1 MG/DL — SIGNIFICANT CHANGE UP (ref 0.5–1.3)
CREAT SERPL-MCNC: 1.1 MG/DL — SIGNIFICANT CHANGE UP (ref 0.5–1.3)
EGFR: 54 ML/MIN/1.73M2 — LOW
EGFR: 54 ML/MIN/1.73M2 — LOW
EOSINOPHIL # BLD AUTO: 0.19 K/UL — SIGNIFICANT CHANGE UP (ref 0–0.5)
EOSINOPHIL # BLD AUTO: 0.19 K/UL — SIGNIFICANT CHANGE UP (ref 0–0.5)
EOSINOPHIL NFR BLD AUTO: 2.5 % — SIGNIFICANT CHANGE UP (ref 0–6)
EOSINOPHIL NFR BLD AUTO: 2.5 % — SIGNIFICANT CHANGE UP (ref 0–6)
GLUCOSE SERPL-MCNC: 99 MG/DL — SIGNIFICANT CHANGE UP (ref 70–99)
GLUCOSE SERPL-MCNC: 99 MG/DL — SIGNIFICANT CHANGE UP (ref 70–99)
HCT VFR BLD CALC: 40.9 % — SIGNIFICANT CHANGE UP (ref 34.5–45)
HCT VFR BLD CALC: 40.9 % — SIGNIFICANT CHANGE UP (ref 34.5–45)
HGB BLD-MCNC: 12.7 G/DL — SIGNIFICANT CHANGE UP (ref 11.5–15.5)
HGB BLD-MCNC: 12.7 G/DL — SIGNIFICANT CHANGE UP (ref 11.5–15.5)
IMM GRANULOCYTES NFR BLD AUTO: 0.6 % — SIGNIFICANT CHANGE UP (ref 0–0.9)
IMM GRANULOCYTES NFR BLD AUTO: 0.6 % — SIGNIFICANT CHANGE UP (ref 0–0.9)
LYMPHOCYTES # BLD AUTO: 1.53 K/UL — SIGNIFICANT CHANGE UP (ref 1–3.3)
LYMPHOCYTES # BLD AUTO: 1.53 K/UL — SIGNIFICANT CHANGE UP (ref 1–3.3)
LYMPHOCYTES # BLD AUTO: 19.7 % — SIGNIFICANT CHANGE UP (ref 13–44)
LYMPHOCYTES # BLD AUTO: 19.7 % — SIGNIFICANT CHANGE UP (ref 13–44)
MCHC RBC-ENTMCNC: 29.2 PG — SIGNIFICANT CHANGE UP (ref 27–34)
MCHC RBC-ENTMCNC: 29.2 PG — SIGNIFICANT CHANGE UP (ref 27–34)
MCHC RBC-ENTMCNC: 31.1 GM/DL — LOW (ref 32–36)
MCHC RBC-ENTMCNC: 31.1 GM/DL — LOW (ref 32–36)
MCV RBC AUTO: 94 FL — SIGNIFICANT CHANGE UP (ref 80–100)
MCV RBC AUTO: 94 FL — SIGNIFICANT CHANGE UP (ref 80–100)
MONOCYTES # BLD AUTO: 0.65 K/UL — SIGNIFICANT CHANGE UP (ref 0–0.9)
MONOCYTES # BLD AUTO: 0.65 K/UL — SIGNIFICANT CHANGE UP (ref 0–0.9)
MONOCYTES NFR BLD AUTO: 8.4 % — SIGNIFICANT CHANGE UP (ref 2–14)
MONOCYTES NFR BLD AUTO: 8.4 % — SIGNIFICANT CHANGE UP (ref 2–14)
NEUTROPHILS # BLD AUTO: 5.28 K/UL — SIGNIFICANT CHANGE UP (ref 1.8–7.4)
NEUTROPHILS # BLD AUTO: 5.28 K/UL — SIGNIFICANT CHANGE UP (ref 1.8–7.4)
NEUTROPHILS NFR BLD AUTO: 68.2 % — SIGNIFICANT CHANGE UP (ref 43–77)
NEUTROPHILS NFR BLD AUTO: 68.2 % — SIGNIFICANT CHANGE UP (ref 43–77)
NRBC # BLD: 0 /100 WBCS — SIGNIFICANT CHANGE UP (ref 0–0)
NRBC # BLD: 0 /100 WBCS — SIGNIFICANT CHANGE UP (ref 0–0)
PLATELET # BLD AUTO: 251 K/UL — SIGNIFICANT CHANGE UP (ref 150–400)
PLATELET # BLD AUTO: 251 K/UL — SIGNIFICANT CHANGE UP (ref 150–400)
POTASSIUM SERPL-MCNC: 3.2 MMOL/L — LOW (ref 3.5–5.3)
POTASSIUM SERPL-MCNC: 3.2 MMOL/L — LOW (ref 3.5–5.3)
POTASSIUM SERPL-SCNC: 3.2 MMOL/L — LOW (ref 3.5–5.3)
POTASSIUM SERPL-SCNC: 3.2 MMOL/L — LOW (ref 3.5–5.3)
PROT SERPL-MCNC: 6.5 G/DL — SIGNIFICANT CHANGE UP (ref 6–8.3)
PROT SERPL-MCNC: 6.5 G/DL — SIGNIFICANT CHANGE UP (ref 6–8.3)
RBC # BLD: 4.35 M/UL — SIGNIFICANT CHANGE UP (ref 3.8–5.2)
RBC # BLD: 4.35 M/UL — SIGNIFICANT CHANGE UP (ref 3.8–5.2)
RBC # FLD: 14 % — SIGNIFICANT CHANGE UP (ref 10.3–14.5)
RBC # FLD: 14 % — SIGNIFICANT CHANGE UP (ref 10.3–14.5)
SODIUM SERPL-SCNC: 138 MMOL/L — SIGNIFICANT CHANGE UP (ref 135–145)
SODIUM SERPL-SCNC: 138 MMOL/L — SIGNIFICANT CHANGE UP (ref 135–145)
WBC # BLD: 7.75 K/UL — SIGNIFICANT CHANGE UP (ref 3.8–10.5)
WBC # BLD: 7.75 K/UL — SIGNIFICANT CHANGE UP (ref 3.8–10.5)
WBC # FLD AUTO: 7.75 K/UL — SIGNIFICANT CHANGE UP (ref 3.8–10.5)
WBC # FLD AUTO: 7.75 K/UL — SIGNIFICANT CHANGE UP (ref 3.8–10.5)

## 2024-01-13 PROCEDURE — 99232 SBSQ HOSP IP/OBS MODERATE 35: CPT

## 2024-01-13 RX ORDER — POTASSIUM CHLORIDE 20 MEQ
40 PACKET (EA) ORAL EVERY 4 HOURS
Refills: 0 | Status: COMPLETED | OUTPATIENT
Start: 2024-01-13 | End: 2024-01-13

## 2024-01-13 RX ADMIN — ESCITALOPRAM OXALATE 10 MILLIGRAM(S): 10 TABLET, FILM COATED ORAL at 11:01

## 2024-01-13 RX ADMIN — LAMOTRIGINE 200 MILLIGRAM(S): 25 TABLET, ORALLY DISINTEGRATING ORAL at 11:01

## 2024-01-13 RX ADMIN — ZOLPIDEM TARTRATE 5 MILLIGRAM(S): 10 TABLET ORAL at 22:18

## 2024-01-13 RX ADMIN — ZIPRASIDONE HYDROCHLORIDE 80 MILLIGRAM(S): 20 CAPSULE ORAL at 20:57

## 2024-01-13 RX ADMIN — Medication 25 MILLIGRAM(S): at 06:08

## 2024-01-13 RX ADMIN — Medication 100 MILLIGRAM(S): at 17:29

## 2024-01-13 RX ADMIN — ENOXAPARIN SODIUM 40 MILLIGRAM(S): 100 INJECTION SUBCUTANEOUS at 20:57

## 2024-01-13 RX ADMIN — Medication 81 MILLIGRAM(S): at 11:01

## 2024-01-13 RX ADMIN — LITHIUM CARBONATE 300 MILLIGRAM(S): 300 TABLET, EXTENDED RELEASE ORAL at 17:29

## 2024-01-13 RX ADMIN — ATORVASTATIN CALCIUM 40 MILLIGRAM(S): 80 TABLET, FILM COATED ORAL at 20:57

## 2024-01-13 RX ADMIN — GABAPENTIN 300 MILLIGRAM(S): 400 CAPSULE ORAL at 06:07

## 2024-01-13 RX ADMIN — Medication 100 MILLIGRAM(S): at 06:08

## 2024-01-13 RX ADMIN — PANTOPRAZOLE SODIUM 40 MILLIGRAM(S): 20 TABLET, DELAYED RELEASE ORAL at 06:08

## 2024-01-13 RX ADMIN — LAMOTRIGINE 100 MILLIGRAM(S): 25 TABLET, ORALLY DISINTEGRATING ORAL at 20:57

## 2024-01-13 RX ADMIN — SUMATRIPTAN SUCCINATE 100 MILLIGRAM(S): 4 INJECTION, SOLUTION SUBCUTANEOUS at 01:05

## 2024-01-13 RX ADMIN — LITHIUM CARBONATE 300 MILLIGRAM(S): 300 TABLET, EXTENDED RELEASE ORAL at 06:08

## 2024-01-13 RX ADMIN — GABAPENTIN 300 MILLIGRAM(S): 400 CAPSULE ORAL at 17:28

## 2024-01-13 RX ADMIN — SUMATRIPTAN SUCCINATE 100 MILLIGRAM(S): 4 INJECTION, SOLUTION SUBCUTANEOUS at 00:16

## 2024-01-13 RX ADMIN — Medication 40 MILLIEQUIVALENT(S): at 12:21

## 2024-01-13 RX ADMIN — Medication 40 MILLIEQUIVALENT(S): at 17:28

## 2024-01-13 NOTE — PROGRESS NOTE ADULT - PROBLEM SELECTOR PLAN 1
- facial droop, word finding difficulty, gait instability x 2 weeks    - CT head: negative  - CT angio: negative  - Continue ASA and increased statin dose   - F/u Lipid profile, A1C, TSH  - Neuro checks q4h  - Fall and aspiration precautions  - TTE ejection fraction visually estimated at 55 %.  - MRI head negative  - Neurology (Dr. Samuels) consulted, recs appreciated  - PT evaluation - home PT  - F/u speech and swallow eval

## 2024-01-13 NOTE — PROGRESS NOTE ADULT - SUBJECTIVE AND OBJECTIVE BOX
Patient is a 68y old  Female who presents with a chief complaint of r/o CVA (12 Jan 2024 17:32)      INTERVAL HPI/OVERNIGHT EVENTS: Patient seen and examined at bedside. No overnight events. Denies fever, chills, chest pain, shortness of breath, abdominal pain, nausea/vomiting, headache.    MEDICATIONS  (STANDING):  aspirin enteric coated 81 milliGRAM(s) Oral daily  atorvastatin 40 milliGRAM(s) Oral at bedtime  enoxaparin Injectable 40 milliGRAM(s) SubCutaneous every 24 hours  escitalopram 10 milliGRAM(s) Oral daily  gabapentin 300 milliGRAM(s) Oral two times a day  lamoTRIgine 200 milliGRAM(s) Oral daily  lamoTRIgine 100 milliGRAM(s) Oral at bedtime  lithium 300 milliGRAM(s) Oral two times a day  metoprolol succinate ER 25 milliGRAM(s) Oral daily  pantoprazole    Tablet 40 milliGRAM(s) Oral before breakfast  topiramate 100 milliGRAM(s) Oral two times a day  ziprasidone 80 milliGRAM(s) Oral <User Schedule>    MEDICATIONS  (PRN):  SUMAtriptan 100 milliGRAM(s) Oral daily PRN Migraine  zolpidem 5 milliGRAM(s) Oral at bedtime PRN Insomnia      Allergies    penicillins (Rash)  sulfa drugs (Rash)    Intolerances        REVIEW OF SYSTEMS:  CONSTITUTIONAL: No fever or chills  HEENT:  No headache, no sore throat  RESPIRATORY: No cough, wheezing, or shortness of breath  CARDIOVASCULAR: No chest pain, palpitations  GASTROINTESTINAL: No abd pain, nausea, vomiting, or diarrhea  GENITOURINARY: No dysuria, frequency, or hematuria  NEUROLOGICAL: no focal weakness or dizziness  MUSCULOSKELETAL: no myalgias     Vital Signs Last 24 Hrs  T(C): 36.7 (13 Jan 2024 19:58), Max: 36.7 (13 Jan 2024 04:45)  T(F): 98.1 (13 Jan 2024 19:58), Max: 98.1 (13 Jan 2024 04:45)  HR: 58 (13 Jan 2024 19:58) (58 - 96)  BP: 122/70 (13 Jan 2024 19:58) (122/70 - 153/94)  BP(mean): --  RR: 18 (13 Jan 2024 19:58) (16 - 18)  SpO2: 93% (13 Jan 2024 19:58) (93% - 97%)    Parameters below as of 13 Jan 2024 19:58  Patient On (Oxygen Delivery Method): room air        PHYSICAL EXAM:  GENERAL: NAD  HEENT:  anicteric, moist mucous membranes  CHEST/LUNG:  CTA b/l, no rales, wheezes, or rhonchi  HEART:  RRR, S1, S2  ABDOMEN:  BS+, soft, nontender, nondistended  EXTREMITIES: no edema, cyanosis, or calf tenderness  NERVOUS SYSTEM: answers questions and follows commands appropriately    LABS:                        12.7   7.75  )-----------( 251      ( 13 Jan 2024 07:55 )             40.9     CBC Full  -  ( 13 Jan 2024 07:55 )  WBC Count : 7.75 K/uL  Hemoglobin : 12.7 g/dL  Hematocrit : 40.9 %  Platelet Count - Automated : 251 K/uL  Mean Cell Volume : 94.0 fl  Mean Cell Hemoglobin : 29.2 pg  Mean Cell Hemoglobin Concentration : 31.1 gm/dL  Auto Neutrophil # : 5.28 K/uL  Auto Lymphocyte # : 1.53 K/uL  Auto Monocyte # : 0.65 K/uL  Auto Eosinophil # : 0.19 K/uL  Auto Basophil # : 0.05 K/uL  Auto Neutrophil % : 68.2 %  Auto Lymphocyte % : 19.7 %  Auto Monocyte % : 8.4 %  Auto Eosinophil % : 2.5 %  Auto Basophil % : 0.6 %    13 Jan 2024 07:55    138    |  111    |  14     ----------------------------<  99     3.2     |  23     |  1.10     Ca    9.1        13 Jan 2024 07:55    TPro  6.5    /  Alb  3.2    /  TBili  0.7    /  DBili  x      /  AST  19     /  ALT  25     /  AlkPhos  105    13 Jan 2024 07:55      Urinalysis Basic - ( 13 Jan 2024 07:55 )    Color: x / Appearance: x / SG: x / pH: x  Gluc: 99 mg/dL / Ketone: x  / Bili: x / Urobili: x   Blood: x / Protein: x / Nitrite: x   Leuk Esterase: x / RBC: x / WBC x   Sq Epi: x / Non Sq Epi: x / Bacteria: x      CAPILLARY BLOOD GLUCOSE              RADIOLOGY & ADDITIONAL TESTS:    Personally reviewed.     Consultant(s) Notes Reviewed:  [x] YES  [ ] NO

## 2024-01-13 NOTE — PHYSICAL THERAPY INITIAL EVALUATION ADULT - NSPTDISCHREC_GEN_A_CORE
December 7, 2023      Tawanna Devine  [address] [place] [phone]        We are concerned about your health care.  We recently provided you with a medication refill.  Many medications require routine follow-up with your Doctor.      At this time we ask that: You schedule an appointment for your annual physical. Call the clinic at [phone] Option 1 to schedule.     Your prescription:    Patient overdue for visit- last visit 5/2022 1 month supply was given         Thank you,       Brenda León, NP/ Care Team          
Home PT

## 2024-01-13 NOTE — PHYSICAL THERAPY INITIAL EVALUATION ADULT - ADDITIONAL COMMENTS
patient reports that she lives with her father in a private house, patient cares for her father. independent with ADLs/ambulation PTA. [address], bedroom downstairs.

## 2024-01-14 LAB
ANION GAP SERPL CALC-SCNC: 6 MMOL/L — SIGNIFICANT CHANGE UP (ref 5–17)
ANION GAP SERPL CALC-SCNC: 6 MMOL/L — SIGNIFICANT CHANGE UP (ref 5–17)
BUN SERPL-MCNC: 12 MG/DL — SIGNIFICANT CHANGE UP (ref 7–23)
BUN SERPL-MCNC: 12 MG/DL — SIGNIFICANT CHANGE UP (ref 7–23)
CALCIUM SERPL-MCNC: 8.7 MG/DL — SIGNIFICANT CHANGE UP (ref 8.5–10.1)
CALCIUM SERPL-MCNC: 8.7 MG/DL — SIGNIFICANT CHANGE UP (ref 8.5–10.1)
CHLORIDE SERPL-SCNC: 114 MMOL/L — HIGH (ref 96–108)
CHLORIDE SERPL-SCNC: 114 MMOL/L — HIGH (ref 96–108)
CO2 SERPL-SCNC: 18 MMOL/L — LOW (ref 22–31)
CO2 SERPL-SCNC: 18 MMOL/L — LOW (ref 22–31)
CREAT SERPL-MCNC: 1.6 MG/DL — HIGH (ref 0.5–1.3)
CREAT SERPL-MCNC: 1.6 MG/DL — HIGH (ref 0.5–1.3)
EGFR: 35 ML/MIN/1.73M2 — LOW
EGFR: 35 ML/MIN/1.73M2 — LOW
GLUCOSE SERPL-MCNC: 222 MG/DL — HIGH (ref 70–99)
GLUCOSE SERPL-MCNC: 222 MG/DL — HIGH (ref 70–99)
HCT VFR BLD CALC: 39.5 % — SIGNIFICANT CHANGE UP (ref 34.5–45)
HCT VFR BLD CALC: 39.5 % — SIGNIFICANT CHANGE UP (ref 34.5–45)
HGB BLD-MCNC: 12.3 G/DL — SIGNIFICANT CHANGE UP (ref 11.5–15.5)
HGB BLD-MCNC: 12.3 G/DL — SIGNIFICANT CHANGE UP (ref 11.5–15.5)
MCHC RBC-ENTMCNC: 29.4 PG — SIGNIFICANT CHANGE UP (ref 27–34)
MCHC RBC-ENTMCNC: 29.4 PG — SIGNIFICANT CHANGE UP (ref 27–34)
MCHC RBC-ENTMCNC: 31.1 GM/DL — LOW (ref 32–36)
MCHC RBC-ENTMCNC: 31.1 GM/DL — LOW (ref 32–36)
MCV RBC AUTO: 94.3 FL — SIGNIFICANT CHANGE UP (ref 80–100)
MCV RBC AUTO: 94.3 FL — SIGNIFICANT CHANGE UP (ref 80–100)
NRBC # BLD: 0 /100 WBCS — SIGNIFICANT CHANGE UP (ref 0–0)
NRBC # BLD: 0 /100 WBCS — SIGNIFICANT CHANGE UP (ref 0–0)
PLATELET # BLD AUTO: 203 K/UL — SIGNIFICANT CHANGE UP (ref 150–400)
PLATELET # BLD AUTO: 203 K/UL — SIGNIFICANT CHANGE UP (ref 150–400)
POTASSIUM SERPL-MCNC: 4.1 MMOL/L — SIGNIFICANT CHANGE UP (ref 3.5–5.3)
POTASSIUM SERPL-MCNC: 4.1 MMOL/L — SIGNIFICANT CHANGE UP (ref 3.5–5.3)
POTASSIUM SERPL-SCNC: 4.1 MMOL/L — SIGNIFICANT CHANGE UP (ref 3.5–5.3)
POTASSIUM SERPL-SCNC: 4.1 MMOL/L — SIGNIFICANT CHANGE UP (ref 3.5–5.3)
RBC # BLD: 4.19 M/UL — SIGNIFICANT CHANGE UP (ref 3.8–5.2)
RBC # BLD: 4.19 M/UL — SIGNIFICANT CHANGE UP (ref 3.8–5.2)
RBC # FLD: 13.7 % — SIGNIFICANT CHANGE UP (ref 10.3–14.5)
RBC # FLD: 13.7 % — SIGNIFICANT CHANGE UP (ref 10.3–14.5)
SODIUM SERPL-SCNC: 138 MMOL/L — SIGNIFICANT CHANGE UP (ref 135–145)
SODIUM SERPL-SCNC: 138 MMOL/L — SIGNIFICANT CHANGE UP (ref 135–145)
WBC # BLD: 6.62 K/UL — SIGNIFICANT CHANGE UP (ref 3.8–10.5)
WBC # BLD: 6.62 K/UL — SIGNIFICANT CHANGE UP (ref 3.8–10.5)
WBC # FLD AUTO: 6.62 K/UL — SIGNIFICANT CHANGE UP (ref 3.8–10.5)
WBC # FLD AUTO: 6.62 K/UL — SIGNIFICANT CHANGE UP (ref 3.8–10.5)

## 2024-01-14 PROCEDURE — 99232 SBSQ HOSP IP/OBS MODERATE 35: CPT

## 2024-01-14 RX ORDER — DIPHENOXYLATE HCL/ATROPINE 2.5-.025MG
1 TABLET ORAL
Refills: 0 | Status: DISCONTINUED | OUTPATIENT
Start: 2024-01-14 | End: 2024-01-17

## 2024-01-14 RX ORDER — SODIUM CHLORIDE 9 MG/ML
1000 INJECTION INTRAMUSCULAR; INTRAVENOUS; SUBCUTANEOUS
Refills: 0 | Status: DISCONTINUED | OUTPATIENT
Start: 2024-01-14 | End: 2024-01-17

## 2024-01-14 RX ADMIN — ENOXAPARIN SODIUM 40 MILLIGRAM(S): 100 INJECTION SUBCUTANEOUS at 20:46

## 2024-01-14 RX ADMIN — LAMOTRIGINE 200 MILLIGRAM(S): 25 TABLET, ORALLY DISINTEGRATING ORAL at 11:16

## 2024-01-14 RX ADMIN — SODIUM CHLORIDE 50 MILLILITER(S): 9 INJECTION INTRAMUSCULAR; INTRAVENOUS; SUBCUTANEOUS at 12:05

## 2024-01-14 RX ADMIN — GABAPENTIN 300 MILLIGRAM(S): 400 CAPSULE ORAL at 17:13

## 2024-01-14 RX ADMIN — Medication 100 MILLIGRAM(S): at 08:07

## 2024-01-14 RX ADMIN — Medication 81 MILLIGRAM(S): at 11:16

## 2024-01-14 RX ADMIN — ZIPRASIDONE HYDROCHLORIDE 80 MILLIGRAM(S): 20 CAPSULE ORAL at 20:46

## 2024-01-14 RX ADMIN — ESCITALOPRAM OXALATE 10 MILLIGRAM(S): 10 TABLET, FILM COATED ORAL at 11:16

## 2024-01-14 RX ADMIN — LITHIUM CARBONATE 300 MILLIGRAM(S): 300 TABLET, EXTENDED RELEASE ORAL at 08:07

## 2024-01-14 RX ADMIN — ATORVASTATIN CALCIUM 40 MILLIGRAM(S): 80 TABLET, FILM COATED ORAL at 20:46

## 2024-01-14 RX ADMIN — Medication 1 TABLET(S): at 17:13

## 2024-01-14 RX ADMIN — Medication 100 MILLIGRAM(S): at 17:13

## 2024-01-14 RX ADMIN — LITHIUM CARBONATE 300 MILLIGRAM(S): 300 TABLET, EXTENDED RELEASE ORAL at 17:13

## 2024-01-14 RX ADMIN — ZOLPIDEM TARTRATE 5 MILLIGRAM(S): 10 TABLET ORAL at 22:01

## 2024-01-14 RX ADMIN — GABAPENTIN 300 MILLIGRAM(S): 400 CAPSULE ORAL at 08:07

## 2024-01-14 RX ADMIN — LAMOTRIGINE 100 MILLIGRAM(S): 25 TABLET, ORALLY DISINTEGRATING ORAL at 20:46

## 2024-01-14 NOTE — OCCUPATIONAL THERAPY INITIAL EVALUATION ADULT - ADL RETRAINING, OT EVAL
Pt will complete grooming activity while standing at the sink with modified independence by 1-2 sessions.

## 2024-01-14 NOTE — PROGRESS NOTE ADULT - PROBLEM SELECTOR PLAN 1
- facial droop, word finding difficulty, gait instability x 2 weeks    - CT head: negative  - CT angio: negative  - Continue ASA and increased statin dose   - Neuro checks q4h  - Fall and aspiration precautions  - TTE ejection fraction visually estimated at 55 %.  - MRI head negative  - Neurology (Dr. Samuels) consulted, recs appreciated  - PT evaluation - home PT  - F/u speech and swallow eval

## 2024-01-14 NOTE — PROGRESS NOTE ADULT - SUBJECTIVE AND OBJECTIVE BOX
Patient is a 68y old  Female who presents with a chief complaint of r/o CVA (13 Jan 2024 21:04)      INTERVAL HPI/OVERNIGHT EVENTS: Patient seen and examined at bedside. No overnight events. Pt continues to endorse difficulty swallowing. Denies fever, chills, chest pain, shortness of breath, abdominal pain, nausea/vomiting, headache.      MEDICATIONS  (STANDING):  aspirin enteric coated 81 milliGRAM(s) Oral daily  atorvastatin 40 milliGRAM(s) Oral at bedtime  enoxaparin Injectable 40 milliGRAM(s) SubCutaneous every 24 hours  escitalopram 10 milliGRAM(s) Oral daily  gabapentin 300 milliGRAM(s) Oral two times a day  lamoTRIgine 200 milliGRAM(s) Oral daily  lamoTRIgine 100 milliGRAM(s) Oral at bedtime  lithium 300 milliGRAM(s) Oral two times a day  metoprolol succinate ER 25 milliGRAM(s) Oral daily  pantoprazole    Tablet 40 milliGRAM(s) Oral before breakfast  topiramate 100 milliGRAM(s) Oral two times a day  ziprasidone 80 milliGRAM(s) Oral <User Schedule>    MEDICATIONS  (PRN):  SUMAtriptan 100 milliGRAM(s) Oral daily PRN Migraine  zolpidem 5 milliGRAM(s) Oral at bedtime PRN Insomnia      Allergies    penicillins (Rash)  sulfa drugs (Rash)    Intolerances        REVIEW OF SYSTEMS:  CONSTITUTIONAL: No fever or chills  HEENT:  No headache, no sore throat  RESPIRATORY: No cough, wheezing, or shortness of breath  CARDIOVASCULAR: No chest pain, palpitations  GASTROINTESTINAL: No abd pain, nausea, vomiting, or diarrhea  GENITOURINARY: No dysuria, frequency, or hematuria  NEUROLOGICAL: no focal weakness or dizziness  MUSCULOSKELETAL: no myalgias     Vital Signs Last 24 Hrs  T(C): 36.8 (14 Jan 2024 04:56), Max: 36.8 (14 Jan 2024 04:56)  T(F): 98.2 (14 Jan 2024 04:56), Max: 98.2 (14 Jan 2024 04:56)  HR: 62 (14 Jan 2024 04:56) (58 - 65)  BP: 105/66 (14 Jan 2024 04:56) (105/66 - 125/77)  BP(mean): --  RR: 18 (14 Jan 2024 04:56) (18 - 18)  SpO2: 89% (14 Jan 2024 04:56) (89% - 93%)    Parameters below as of 14 Jan 2024 04:56  Patient On (Oxygen Delivery Method): room air        PHYSICAL EXAM:  GENERAL: NAD  HEENT:  anicteric, moist mucous membranes  CHEST/LUNG:  CTA b/l, no rales, wheezes, or rhonchi  HEART:  RRR, S1, S2  ABDOMEN:  BS+, soft, nontender, nondistended  EXTREMITIES: no edema, cyanosis, or calf tenderness  NERVOUS SYSTEM: answers questions and follows commands appropriately    LABS:                        12.3   6.62  )-----------( 203      ( 14 Jan 2024 09:00 )             39.5     CBC Full  -  ( 14 Jan 2024 09:00 )  WBC Count : 6.62 K/uL  Hemoglobin : 12.3 g/dL  Hematocrit : 39.5 %  Platelet Count - Automated : 203 K/uL  Mean Cell Volume : 94.3 fl  Mean Cell Hemoglobin : 29.4 pg  Mean Cell Hemoglobin Concentration : 31.1 gm/dL  Auto Neutrophil # : x  Auto Lymphocyte # : x  Auto Monocyte # : x  Auto Eosinophil # : x  Auto Basophil # : x  Auto Neutrophil % : x  Auto Lymphocyte % : x  Auto Monocyte % : x  Auto Eosinophil % : x  Auto Basophil % : x      Ca    9.1        13 Jan 2024 07:55        Urinalysis Basic - ( 13 Jan 2024 07:55 )    Color: x / Appearance: x / SG: x / pH: x  Gluc: 99 mg/dL / Ketone: x  / Bili: x / Urobili: x   Blood: x / Protein: x / Nitrite: x   Leuk Esterase: x / RBC: x / WBC x   Sq Epi: x / Non Sq Epi: x / Bacteria: x      CAPILLARY BLOOD GLUCOSE              RADIOLOGY & ADDITIONAL TESTS:    Personally reviewed.     Consultant(s) Notes Reviewed:  [x] YES  [ ] NO

## 2024-01-14 NOTE — OCCUPATIONAL THERAPY INITIAL EVALUATION ADULT - NSOTDISCHREC_GEN_A_CORE
Recommend supervision with functional activities which pt reports family will provide. Pt in agreement with d/c plans./No skilled OT needs

## 2024-01-14 NOTE — OCCUPATIONAL THERAPY INITIAL EVALUATION ADULT - PERTINENT HX OF CURRENT PROBLEM, REHAB EVAL
As per H&P: "68 year old female PMHx Hypertension, Bipolar Affective Disorder, Migraines, Tardive Dyskinesia presents to ED from home c/o neurological symptoms x two weeks. Weeks ago, pt had a severe migraine for which she saw her Neuro Dr. Lora and was prescribed a course of steroids which she completed. After completing the steroids, two weeks ago, she began to have word finding difficulties, double vision in her L eye, gait instability resulting in her walking into things. She admits to dizziness, facial asymmetry and trouble with her memory as well. The symptoms have been persistent for two weeks and have not improved. She denies slurred speech, changes in strength/sensation in extremities, bowel/urinary incontinence. "  Admit diagnosis: Aphasia   MRI Head: Negative  CT Head: Negative

## 2024-01-14 NOTE — OCCUPATIONAL THERAPY INITIAL EVALUATION ADULT - DIAGNOSIS, OT EVAL
Pt with poor endurance and impaired balance impacting ability to complete ADLs, IADLs, functional mobility/transfers.

## 2024-01-14 NOTE — OCCUPATIONAL THERAPY INITIAL EVALUATION ADULT - ADDITIONAL COMMENTS
Pt lives in a private home with her father, whom she cares for. Pt has 3 JAVAN and a full flight to the basement where her bed/bath are location. Pt has a walk-in shower and reports she was independent with all ADLs PTA. Pt presents as aphasic upon initial evaluation and reports that she has been "having difficulty with word choice." Pt reports she was experiencing double vision in her L eye, but symptoms have since resolved. Pt was ambulating independently PTA. Pt completed functional mobility with [facility] on RUE. Pt lives in a private home with her father, whom she cares for. Pt has 3 JAVAN and a full flight to the basement where her bed/bath are location. Pt has a walk-in shower and reports she was independent with all ADLs PTA. Pt presents as aphasic upon initial evaluation and reports that she has been "having difficulty with word choice." Pt reports she was experiencing double vision in her L eye, but symptoms have since resolved. Pt was ambulating independently PTA. Pt completed functional mobility with [facility] on RUE.

## 2024-01-15 LAB
ALBUMIN SERPL ELPH-MCNC: 2.7 G/DL — LOW (ref 3.3–5)
ALBUMIN SERPL ELPH-MCNC: 2.7 G/DL — LOW (ref 3.3–5)
ALP SERPL-CCNC: 101 U/L — SIGNIFICANT CHANGE UP (ref 40–120)
ALP SERPL-CCNC: 101 U/L — SIGNIFICANT CHANGE UP (ref 40–120)
ALT FLD-CCNC: 22 U/L — SIGNIFICANT CHANGE UP (ref 12–78)
ALT FLD-CCNC: 22 U/L — SIGNIFICANT CHANGE UP (ref 12–78)
ANION GAP SERPL CALC-SCNC: 1 MMOL/L — LOW (ref 5–17)
ANION GAP SERPL CALC-SCNC: 1 MMOL/L — LOW (ref 5–17)
ANION GAP SERPL CALC-SCNC: 4 MMOL/L — LOW (ref 5–17)
ANION GAP SERPL CALC-SCNC: 4 MMOL/L — LOW (ref 5–17)
AST SERPL-CCNC: 14 U/L — LOW (ref 15–37)
AST SERPL-CCNC: 14 U/L — LOW (ref 15–37)
BILIRUB SERPL-MCNC: 0.2 MG/DL — SIGNIFICANT CHANGE UP (ref 0.2–1.2)
BILIRUB SERPL-MCNC: 0.2 MG/DL — SIGNIFICANT CHANGE UP (ref 0.2–1.2)
BUN SERPL-MCNC: 12 MG/DL — SIGNIFICANT CHANGE UP (ref 7–23)
BUN SERPL-MCNC: 12 MG/DL — SIGNIFICANT CHANGE UP (ref 7–23)
BUN SERPL-MCNC: 14 MG/DL — SIGNIFICANT CHANGE UP (ref 7–23)
BUN SERPL-MCNC: 14 MG/DL — SIGNIFICANT CHANGE UP (ref 7–23)
CALCIUM SERPL-MCNC: 7.8 MG/DL — LOW (ref 8.5–10.1)
CALCIUM SERPL-MCNC: 7.8 MG/DL — LOW (ref 8.5–10.1)
CALCIUM SERPL-MCNC: 8.4 MG/DL — LOW (ref 8.5–10.1)
CALCIUM SERPL-MCNC: 8.4 MG/DL — LOW (ref 8.5–10.1)
CHLORIDE SERPL-SCNC: 116 MMOL/L — HIGH (ref 96–108)
CK SERPL-CCNC: 46 U/L — SIGNIFICANT CHANGE UP (ref 26–192)
CK SERPL-CCNC: 46 U/L — SIGNIFICANT CHANGE UP (ref 26–192)
CO2 SERPL-SCNC: 22 MMOL/L — SIGNIFICANT CHANGE UP (ref 22–31)
CO2 SERPL-SCNC: 22 MMOL/L — SIGNIFICANT CHANGE UP (ref 22–31)
CO2 SERPL-SCNC: 25 MMOL/L — SIGNIFICANT CHANGE UP (ref 22–31)
CO2 SERPL-SCNC: 25 MMOL/L — SIGNIFICANT CHANGE UP (ref 22–31)
CREAT SERPL-MCNC: 1.3 MG/DL — SIGNIFICANT CHANGE UP (ref 0.5–1.3)
CREAT SERPL-MCNC: 1.3 MG/DL — SIGNIFICANT CHANGE UP (ref 0.5–1.3)
CREAT SERPL-MCNC: 5 MG/DL — HIGH (ref 0.5–1.3)
CREAT SERPL-MCNC: 5 MG/DL — HIGH (ref 0.5–1.3)
EGFR: 45 ML/MIN/1.73M2 — LOW
EGFR: 45 ML/MIN/1.73M2 — LOW
EGFR: 9 ML/MIN/1.73M2 — LOW
EGFR: 9 ML/MIN/1.73M2 — LOW
GI PCR PANEL: SIGNIFICANT CHANGE UP
GI PCR PANEL: SIGNIFICANT CHANGE UP
GLUCOSE SERPL-MCNC: 125 MG/DL — HIGH (ref 70–99)
GLUCOSE SERPL-MCNC: 125 MG/DL — HIGH (ref 70–99)
GLUCOSE SERPL-MCNC: 95 MG/DL — SIGNIFICANT CHANGE UP (ref 70–99)
GLUCOSE SERPL-MCNC: 95 MG/DL — SIGNIFICANT CHANGE UP (ref 70–99)
HCT VFR BLD CALC: 36.1 % — SIGNIFICANT CHANGE UP (ref 34.5–45)
HCT VFR BLD CALC: 36.1 % — SIGNIFICANT CHANGE UP (ref 34.5–45)
HGB BLD-MCNC: 11.3 G/DL — LOW (ref 11.5–15.5)
HGB BLD-MCNC: 11.3 G/DL — LOW (ref 11.5–15.5)
LAMOTRIGINE SERPL-MCNC: 10.6 UG/ML — SIGNIFICANT CHANGE UP (ref 2–20)
LITHIUM SERPL-MCNC: 0.8 MMOL/L — SIGNIFICANT CHANGE UP (ref 0.6–1.2)
MCHC RBC-ENTMCNC: 29.2 PG — SIGNIFICANT CHANGE UP (ref 27–34)
MCHC RBC-ENTMCNC: 29.2 PG — SIGNIFICANT CHANGE UP (ref 27–34)
MCHC RBC-ENTMCNC: 31.3 GM/DL — LOW (ref 32–36)
MCHC RBC-ENTMCNC: 31.3 GM/DL — LOW (ref 32–36)
MCV RBC AUTO: 93.3 FL — SIGNIFICANT CHANGE UP (ref 80–100)
MCV RBC AUTO: 93.3 FL — SIGNIFICANT CHANGE UP (ref 80–100)
NRBC # BLD: 0 /100 WBCS — SIGNIFICANT CHANGE UP (ref 0–0)
NRBC # BLD: 0 /100 WBCS — SIGNIFICANT CHANGE UP (ref 0–0)
PLATELET # BLD AUTO: 191 K/UL — SIGNIFICANT CHANGE UP (ref 150–400)
PLATELET # BLD AUTO: 191 K/UL — SIGNIFICANT CHANGE UP (ref 150–400)
POTASSIUM SERPL-MCNC: 3.8 MMOL/L — SIGNIFICANT CHANGE UP (ref 3.5–5.3)
POTASSIUM SERPL-MCNC: 3.8 MMOL/L — SIGNIFICANT CHANGE UP (ref 3.5–5.3)
POTASSIUM SERPL-MCNC: 4.1 MMOL/L — SIGNIFICANT CHANGE UP (ref 3.5–5.3)
POTASSIUM SERPL-MCNC: 4.1 MMOL/L — SIGNIFICANT CHANGE UP (ref 3.5–5.3)
POTASSIUM SERPL-SCNC: 3.8 MMOL/L — SIGNIFICANT CHANGE UP (ref 3.5–5.3)
POTASSIUM SERPL-SCNC: 3.8 MMOL/L — SIGNIFICANT CHANGE UP (ref 3.5–5.3)
POTASSIUM SERPL-SCNC: 4.1 MMOL/L — SIGNIFICANT CHANGE UP (ref 3.5–5.3)
POTASSIUM SERPL-SCNC: 4.1 MMOL/L — SIGNIFICANT CHANGE UP (ref 3.5–5.3)
PROT SERPL-MCNC: 5.8 G/DL — LOW (ref 6–8.3)
PROT SERPL-MCNC: 5.8 G/DL — LOW (ref 6–8.3)
RBC # BLD: 3.87 M/UL — SIGNIFICANT CHANGE UP (ref 3.8–5.2)
RBC # BLD: 3.87 M/UL — SIGNIFICANT CHANGE UP (ref 3.8–5.2)
RBC # FLD: 13.8 % — SIGNIFICANT CHANGE UP (ref 10.3–14.5)
RBC # FLD: 13.8 % — SIGNIFICANT CHANGE UP (ref 10.3–14.5)
SODIUM SERPL-SCNC: 142 MMOL/L — SIGNIFICANT CHANGE UP (ref 135–145)
TOPIRAMATE SERPL-MCNC: <1 MCG/ML — SIGNIFICANT CHANGE UP
WBC # BLD: 5.86 K/UL — SIGNIFICANT CHANGE UP (ref 3.8–10.5)
WBC # BLD: 5.86 K/UL — SIGNIFICANT CHANGE UP (ref 3.8–10.5)
WBC # FLD AUTO: 5.86 K/UL — SIGNIFICANT CHANGE UP (ref 3.8–10.5)
WBC # FLD AUTO: 5.86 K/UL — SIGNIFICANT CHANGE UP (ref 3.8–10.5)

## 2024-01-15 PROCEDURE — 76770 US EXAM ABDO BACK WALL COMP: CPT | Mod: 26

## 2024-01-15 PROCEDURE — 99232 SBSQ HOSP IP/OBS MODERATE 35: CPT

## 2024-01-15 RX ORDER — ACETAMINOPHEN 500 MG
650 TABLET ORAL EVERY 6 HOURS
Refills: 0 | Status: DISCONTINUED | OUTPATIENT
Start: 2024-01-15 | End: 2024-01-17

## 2024-01-15 RX ADMIN — Medication 650 MILLIGRAM(S): at 21:42

## 2024-01-15 RX ADMIN — GABAPENTIN 300 MILLIGRAM(S): 400 CAPSULE ORAL at 06:11

## 2024-01-15 RX ADMIN — ESCITALOPRAM OXALATE 10 MILLIGRAM(S): 10 TABLET, FILM COATED ORAL at 11:18

## 2024-01-15 RX ADMIN — ENOXAPARIN SODIUM 40 MILLIGRAM(S): 100 INJECTION SUBCUTANEOUS at 21:39

## 2024-01-15 RX ADMIN — LAMOTRIGINE 100 MILLIGRAM(S): 25 TABLET, ORALLY DISINTEGRATING ORAL at 20:43

## 2024-01-15 RX ADMIN — ZOLPIDEM TARTRATE 5 MILLIGRAM(S): 10 TABLET ORAL at 21:39

## 2024-01-15 RX ADMIN — ZIPRASIDONE HYDROCHLORIDE 80 MILLIGRAM(S): 20 CAPSULE ORAL at 20:42

## 2024-01-15 RX ADMIN — SUMATRIPTAN SUCCINATE 100 MILLIGRAM(S): 4 INJECTION, SOLUTION SUBCUTANEOUS at 17:59

## 2024-01-15 RX ADMIN — Medication 81 MILLIGRAM(S): at 11:18

## 2024-01-15 RX ADMIN — LAMOTRIGINE 200 MILLIGRAM(S): 25 TABLET, ORALLY DISINTEGRATING ORAL at 11:18

## 2024-01-15 RX ADMIN — Medication 100 MILLIGRAM(S): at 17:05

## 2024-01-15 RX ADMIN — Medication 100 MILLIGRAM(S): at 06:12

## 2024-01-15 RX ADMIN — LITHIUM CARBONATE 300 MILLIGRAM(S): 300 TABLET, EXTENDED RELEASE ORAL at 17:05

## 2024-01-15 RX ADMIN — Medication 650 MILLIGRAM(S): at 20:42

## 2024-01-15 RX ADMIN — Medication 1 TABLET(S): at 06:11

## 2024-01-15 RX ADMIN — LITHIUM CARBONATE 300 MILLIGRAM(S): 300 TABLET, EXTENDED RELEASE ORAL at 06:11

## 2024-01-15 RX ADMIN — PANTOPRAZOLE SODIUM 40 MILLIGRAM(S): 20 TABLET, DELAYED RELEASE ORAL at 06:12

## 2024-01-15 RX ADMIN — ATORVASTATIN CALCIUM 40 MILLIGRAM(S): 80 TABLET, FILM COATED ORAL at 20:43

## 2024-01-15 RX ADMIN — Medication 1 TABLET(S): at 17:04

## 2024-01-15 RX ADMIN — GABAPENTIN 300 MILLIGRAM(S): 400 CAPSULE ORAL at 17:04

## 2024-01-15 RX ADMIN — SUMATRIPTAN SUCCINATE 100 MILLIGRAM(S): 4 INJECTION, SOLUTION SUBCUTANEOUS at 18:34

## 2024-01-15 NOTE — SPEECH LANGUAGE PATHOLOGY EVALUATION - COMMENTS
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 Patient presents as awake, alert and cooperative. 1. This service to follow-up as schedule permits for speech therapy. 2. Patient may benefit from a Comprehensive Speech/Language/Cognitive evaluation if patient persists with symptoms pending discharge plans (e.g. rehab center vs home care vs outpatient at the [facility] [phone]). 1. This service to follow-up as schedule permits for speech therapy. 2. Patient may benefit from a Comprehensive Speech/Language/Cognitive evaluation if patient persists with symptoms pending discharge plans (e.g. rehab center vs home care vs outpatient at the [facility] [phone]). 1. This service to follow-up as schedule permits for speech therapy. 2. Patient may benefit from a Comprehensive Speech/Language/Cognitive evaluation if patient persists with symptoms pending discharge plans (e.g. rehab center vs home care vs outpatient at the [facility] [phone]).

## 2024-01-15 NOTE — SWALLOW BEDSIDE ASSESSMENT ADULT - ADDITIONAL RECOMMENDATIONS
3. Consider GI consult. 4.  This service to follow-up as schedule permits for diet advancement. 5. Medical team further advised to reconsult this department with any change in medical status and/or observed change in tolerance of recommended PO diet.

## 2024-01-15 NOTE — SWALLOW BEDSIDE ASSESSMENT ADULT - COMMENTS
As per Hospitalist note dated 1/14/24 "68 year old female PMHx Hypertension, Bipolar Affective Disorder, Migraines, Tardive Dyskinesia presents to ED from home c/o neurological symptoms x two weeks admitted for r/o CVA."    MRI Head 1/12/24 "IMPRESSION: No acute infarct or other acute abnormality."    CXR 1/11/24 "IMPRESSION: No acute finding or change."    Clinical swallow evaluation ordered and attempted. Per discussion with RN, patient is off the unit at ultrasound. This service to follow-up as schedule permits.
As per Hospitalist note dated 1/14/24 "68 year old female PMHx Hypertension, Bipolar Affective Disorder, Migraines, Tardive Dyskinesia presents to ED from home c/o neurological symptoms x two weeks admitted for r/o CVA."    MRI Head 1/12/24 "IMPRESSION: No acute infarct or other acute abnormality."    CXR 1/11/24 "IMPRESSION: No acute finding or change."    Patient visited at bedside for clinical swallow evaluation following earlier attempt. Patient presents as awake and alert, able to follow 1-step directives and make basic wants/needs known. Patient noted consuming lunch meal. Patient reports occasional "choking" with PO intake stating food gets "stuck", pointing to throat, and needing to "cough it up." Patient denies ever needing the Heimlich Maneuver. Per patient, recently saw GI a "few months ago" and they "expanded the esophagus." Patient further complained of word finding deficits (see Speech Language Evaluation for details).

## 2024-01-15 NOTE — CONSULT NOTE ADULT - ASSESSMENT
MANE: Unclear etiology: Doubt contrast nephropathy, ? Ischemic ATN. R/o lab error.   Hypertension  CVA  h/o Bipolar disorder    Will get kidney and bladder sonogram. Check CPK, Lithium level. IV hydration. Recheck labs. Avoid nephrotoxic meds as possible. Avoid ACEI, ARB, NSAIDs and IV contrast.   Strict I & O. Will follow electrolytes and renal function trend.     Further recommendations pending clinical course. Thank you for the courtesy of this referral.

## 2024-01-15 NOTE — PROGRESS NOTE ADULT - SUBJECTIVE AND OBJECTIVE BOX
Patient is a 68y old  Female who presents with a chief complaint of r/o CVA (15 Pacheco 2024 13:25)      INTERVAL HPI/OVERNIGHT EVENTS: Patient seen and examined at bedside. No overnight events. Tolerating diet. Denies fever, chills, chest pain, shortness of breath, abdominal pain, nausea/vomiting, headache.    MEDICATIONS  (STANDING):  aspirin enteric coated 81 milliGRAM(s) Oral daily  atorvastatin 40 milliGRAM(s) Oral at bedtime  diphenoxylate/atropine 1 Tablet(s) Oral two times a day  enoxaparin Injectable 40 milliGRAM(s) SubCutaneous every 24 hours  escitalopram 10 milliGRAM(s) Oral daily  gabapentin 300 milliGRAM(s) Oral two times a day  lamoTRIgine 100 milliGRAM(s) Oral at bedtime  lamoTRIgine 200 milliGRAM(s) Oral daily  lithium 300 milliGRAM(s) Oral two times a day  metoprolol succinate ER 25 milliGRAM(s) Oral daily  pantoprazole    Tablet 40 milliGRAM(s) Oral before breakfast  sodium chloride 0.9%. 1000 milliLiter(s) (50 mL/Hr) IV Continuous <Continuous>  topiramate 100 milliGRAM(s) Oral two times a day  ziprasidone 80 milliGRAM(s) Oral <User Schedule>    MEDICATIONS  (PRN):  SUMAtriptan 100 milliGRAM(s) Oral daily PRN Migraine  zolpidem 5 milliGRAM(s) Oral at bedtime PRN Insomnia      Allergies    penicillins (Rash)  sulfa drugs (Rash)    Intolerances        REVIEW OF SYSTEMS:  CONSTITUTIONAL: No fever or chills  HEENT:  No headache, no sore throat  RESPIRATORY: No cough, wheezing, or shortness of breath  CARDIOVASCULAR: No chest pain, palpitations  GASTROINTESTINAL: No abd pain, nausea, vomiting, or diarrhea  GENITOURINARY: No dysuria, frequency, or hematuria  NEUROLOGICAL: no focal weakness or dizziness  MUSCULOSKELETAL: no myalgias     Vital Signs Last 24 Hrs  T(C): 36.7 (15 Pacheco 2024 04:49), Max: 36.7 (15 Pacheco 2024 04:49)  T(F): 98 (15 Pacheco 2024 04:49), Max: 98 (15 Pacheco 2024 04:49)  HR: 78 (15 Pacheco 2024 09:00) (55 - 78)  BP: 149/74 (15 Pacheco 2024 09:00) (129/62 - 149/74)  BP(mean): --  RR: 20 (15 Pacheco 2024 09:00) (18 - 20)  SpO2: 96% (15 Pacheco 2024 09:00) (92% - 98%)    Parameters below as of 15 Pacheco 2024 09:00  Patient On (Oxygen Delivery Method): room air        PHYSICAL EXAM:  GENERAL: NAD  HEENT:  anicteric, moist mucous membranes  CHEST/LUNG:  CTA b/l, no rales, wheezes, or rhonchi  HEART:  RRR, S1, S2  ABDOMEN:  BS+, soft, nontender, nondistended  EXTREMITIES: no edema, cyanosis, or calf tenderness  NERVOUS SYSTEM: answers questions and follows commands appropriately    LABS:                        11.3   5.86  )-----------( 191      ( 15 Pacheco 2024 07:32 )             36.1     CBC Full  -  ( 15 Pacheco 2024 07:32 )  WBC Count : 5.86 K/uL  Hemoglobin : 11.3 g/dL  Hematocrit : 36.1 %  Platelet Count - Automated : 191 K/uL  Mean Cell Volume : 93.3 fl  Mean Cell Hemoglobin : 29.2 pg  Mean Cell Hemoglobin Concentration : 31.3 gm/dL  Auto Neutrophil # : x  Auto Lymphocyte # : x  Auto Monocyte # : x  Auto Eosinophil # : x  Auto Basophil # : x  Auto Neutrophil % : x  Auto Lymphocyte % : x  Auto Monocyte % : x  Auto Eosinophil % : x  Auto Basophil % : x    15 Pacheco 2024 11:28    142    |  116    |  12     ----------------------------<  95     4.1     |  25     |  1.30     Ca    8.4        15 Pacheco 2024 11:28    TPro  5.8    /  Alb  2.7    /  TBili  0.2    /  DBili  x      /  AST  14     /  ALT  22     /  AlkPhos  101    15 Pacheco 2024 07:32      Urinalysis Basic - ( 15 Pacheco 2024 11:28 )    Color: x / Appearance: x / SG: x / pH: x  Gluc: 95 mg/dL / Ketone: x  / Bili: x / Urobili: x   Blood: x / Protein: x / Nitrite: x   Leuk Esterase: x / RBC: x / WBC x   Sq Epi: x / Non Sq Epi: x / Bacteria: x      CAPILLARY BLOOD GLUCOSE              RADIOLOGY & ADDITIONAL TESTS:    Personally reviewed.     Consultant(s) Notes Reviewed:  [x] YES  [ ] NO

## 2024-01-15 NOTE — SPEECH LANGUAGE PATHOLOGY EVALUATION - SLP DIAGNOSIS
Patient presents with functional receptive and mild-moderate expressive language deficits characterized by adequate auditory comprehension, impaired naming skills at the conversational level, impaired repetition at the sentence level and non-fluent verbal output. Patient is noted with mild cognitive-linguistic deficits characterized by impairments in problem solving. Attention and reasoning are intact. Patient is noted with increased breakdown with higher level tasks. Patient presents with a mild motor speech deficits characterized by mildly imprecise articulation and slow rate of speech. Respiration, phonation, volume and prosody are intact.

## 2024-01-15 NOTE — PROGRESS NOTE ADULT - PROBLEM SELECTOR PLAN 1
- facial droop, word finding difficulty, gait instability x 2 weeks    - CT head: negative  - CT angio: negative  - Continue ASA and increased statin dose   - Neuro checks q4h  - Fall and aspiration precautions  - TTE ejection fraction visually estimated at 55 %.  - MRI head negative  - Neurology (Dr. Samuels) consulted, recs appreciated  - PT evaluation - home PT  - [facility] [place] tuesday - facial droop, word finding difficulty, gait instability x 2 weeks    - CT head: negative  - CT angio: negative  - Continue ASA and increased statin dose   - Neuro checks q4h  - Fall and aspiration precautions  - TTE ejection fraction visually estimated at 55 %.  - MRI head negative  - Neurology (Dr. Samuels) consulted, recs appreciated  - PT evaluation - home PT  - [facility] [place] tuesday - facial droop, word finding difficulty, gait instability x 2 weeks    - CT head: negative  - CT angio: negative  - Continue ASA and increased statin dose   - Neuro checks q4h  - Fall and aspiration precautions  - TTE ejection fraction visually estimated at 55 %.  - MRI head negative  - Neurology (Dr. Samuels) consulted, recs appreciated  - PT evaluation - home PT  - [facility] [place] tuesday

## 2024-01-15 NOTE — PROGRESS NOTE ADULT - SUBJECTIVE AND OBJECTIVE BOX
Neurology Follow up note    DEEPTHI [id number]    HPI:  68 year old female PMHx Hypertension, Bipolar Affective Disorder, Migraines, Tardive Dyskinesia presents to ED from home c/o neurological symptoms x two weeks. Weeks ago, pt had a severe migraine for which she saw her Neuro Dr. Lora and was prescribed a course of steroids which she completed. After completing the steroids, two weeks ago, she began to have word finding difficulties, double vision in her L eye, gait instability resulting in her walking into things. She admits to dizziness, facial asymmetry and trouble with her memory as well. The symptoms have been persistent for two weeks and have not improved. She denies slurred speech, changes in strength/sensation in extremities, bowel/urinary incontinence.   ED course:  Vitals: T 98.2F, HR 75, /75, RR 16, SpO2 100% on RA  Labs: Alk phos 133  EKG: sinus rhythm with 1st degree AV block, LBBB  CT head: negative  CT angio head/neck: no hemodynamically significant stenosis, multiple nodules in the b/l lobes of the thyroid compatible with a multinodular thyroid. Ultrasound of the thyroid may be helpful for further evaluation on a nonemergent basis  Given: IV Ofirmev x1, Percocet x1   (11 Jan 2024 20:58)      Interval History -reported occasional diplopia    Patient is seen, chart was reviewed and case was discussed with the treatment team.  Pt is not in any distress.   Lying on bed comfortably.   No events reported overnight.       Vital Signs Last 24 Hrs  T(C): 36.7 (15 Pacheco 2024 04:49), Max: 36.7 (15 Pacheco 2024 04:49)  T(F): 98 (15 Pacheco 2024 04:49), Max: 98 (15 Pacheco 2024 04:49)  HR: 78 (15 Pacheco 2024 09:00) (55 - 78)  BP: 149/74 (15 Pacheco 2024 09:00) (129/62 - 149/74)  BP(mean): --  RR: 20 (15 Pacheco 2024 09:00) (18 - 20)  SpO2: 96% (15 Pacheco 2024 09:00) (92% - 98%)    Parameters below as of 15 Pacheco 2024 09:00  Patient On (Oxygen Delivery Method): room air            REVIEW OF SYSTEMS:    Constitutional: No fever, weight loss or fatigue  Eyes: No eye pain, visual disturbances, or discharge  ENT:  No difficulty hearing, tinnitus, vertigo; No sinus or throat pain  Neck: No pain or stiffness  Respiratory: No cough, wheezing, chills or hemoptysis  Cardiovascular: No chest pain, palpitations, shortness of breath, dizziness or leg swelling  Gastrointestinal: No abdominal or epigastric pain. No nausea, vomiting or hematemesis;   Genitourinary: No dysuria, frequency, hematuria or incontinence  Neurological: No headaches, memory loss, loss of strength, numbness or tremors  Psychiatric: No depression, anxiety, mood swings or difficulty sleeping  Musculoskeletal: No joint pain or swelling; No muscle, back or extremity pain  Skin: No itching, burning, rashes or lesions   Lymph Nodes: No enlarged glands  Endocrine: No heat or cold intolerance; No hair loss,  Allergy and Immunologic: No hives or eczema    On Neurological Examination:    Mental Status - Pt is alert, awake, oriented X3.. Follows commands well and able to answer questions appropriately.Mood and affect  normal    Speech -  Normal.     Cranial Nerves - Pupils 3 mm equal and reactive to light, ophthalmoplegia   Pt has no visual field deficit.  Pt has no  facial asymmetry. Facial sensation is intact.Tongue - is in midline.    Muscle tone - is normal all over      Motor Exam - 4+/5 all over, No drift. No shaking or tremors.    Sensory Exam - P. Pt withdraws all extremities equally on stimulation. No asymmetry seen. No complaints of tingling, numbness.    coordination:    Finger to nose: normal      Deep tendon Reflexes - 2 plus all over.        Romberg - Negative.    Neck Supple -  Yes.     MEDICATIONS    aspirin enteric coated 81 milliGRAM(s) Oral daily  atorvastatin 40 milliGRAM(s) Oral at bedtime  diphenoxylate/atropine 1 Tablet(s) Oral two times a day  enoxaparin Injectable 40 milliGRAM(s) SubCutaneous every 24 hours  escitalopram 10 milliGRAM(s) Oral daily  gabapentin 300 milliGRAM(s) Oral two times a day  lamoTRIgine 100 milliGRAM(s) Oral at bedtime  lamoTRIgine 200 milliGRAM(s) Oral daily  lithium 300 milliGRAM(s) Oral two times a day  metoprolol succinate ER 25 milliGRAM(s) Oral daily  pantoprazole    Tablet 40 milliGRAM(s) Oral before breakfast  sodium chloride 0.9%. 1000 milliLiter(s) IV Continuous <Continuous>  SUMAtriptan 100 milliGRAM(s) Oral daily PRN  topiramate 100 milliGRAM(s) Oral two times a day  ziprasidone 80 milliGRAM(s) Oral <User Schedule>  zolpidem 5 milliGRAM(s) Oral at bedtime PRN      Allergies    penicillins (Rash)  sulfa drugs (Rash)    Intolerances        LABS:  CBC Full  -  ( 15 Pacheco 2024 07:32 )  WBC Count : 5.86 K/uL  RBC Count : 3.87 M/uL  Hemoglobin : 11.3 g/dL  Hematocrit : 36.1 %  Platelet Count - Automated : 191 K/uL  Mean Cell Volume : 93.3 fl  Mean Cell Hemoglobin : 29.2 pg  Mean Cell Hemoglobin Concentration : 31.3 gm/dL  Auto Neutrophil # : x  Auto Lymphocyte # : x  Auto Monocyte # : x  Auto Eosinophil # : x  Auto Basophil # : x  Auto Neutrophil % : x  Auto Lymphocyte % : x  Auto Monocyte % : x  Auto Eosinophil % : x  Auto Basophil % : x    Urinalysis Basic - ( 15 Pacheco 2024 11:28 )    Color: x / Appearance: x / SG: x / pH: x  Gluc: 95 mg/dL / Ketone: x  / Bili: x / Urobili: x   Blood: x / Protein: x / Nitrite: x   Leuk Esterase: x / RBC: x / WBC x   Sq Epi: x / Non Sq Epi: x / Bacteria: x      01-15    142  |  116<H>  |  12  ----------------------------<  95  4.1   |  25  |  1.30    Ca    8.4<L>      15 Pacheco 2024 11:28    TPro  5.8<L>  /  Alb  2.7<L>  /  TBili  0.2  /  DBili  x   /  AST  14<L>  /  ALT  22  /  AlkPhos  101  01-15    Hemoglobin A1C:     Vitamin B12     RADIOLOGY    ASSESSMENT AND PLAN:      seen for slurred speech/diplopia/trouble in walking   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  OOB to chair/ambulation with assistance only.  Pain is accessed and addressed.  Would continue to follow.               Neurology Follow up note    DEEPTHI [id number]    HPI:  68 year old female PMHx Hypertension, Bipolar Affective Disorder, Migraines, Tardive Dyskinesia presents to ED from home c/o neurological symptoms x two weeks. Weeks ago, pt had a severe migraine for which she saw her Neuro Dr. Lora and was prescribed a course of steroids which she completed. After completing the steroids, two weeks ago, she began to have word finding difficulties, double vision in her L eye, gait instability resulting in her walking into things. She admits to dizziness, facial asymmetry and trouble with her memory as well. The symptoms have been persistent for two weeks and have not improved. She denies slurred speech, changes in strength/sensation in extremities, bowel/urinary incontinence.   ED course:  Vitals: T 98.2F, HR 75, /75, RR 16, SpO2 100% on RA  Labs: Alk phos 133  EKG: sinus rhythm with 1st degree AV block, LBBB  CT head: negative  CT angio head/neck: no hemodynamically significant stenosis, multiple nodules in the b/l lobes of the thyroid compatible with a multinodular thyroid. Ultrasound of the thyroid may be helpful for further evaluation on a nonemergent basis  Given: IV Ofirmev x1, Percocet x1   (11 Jan 2024 20:58)      Interval History -reported occasional diplopia    Patient is seen, chart was reviewed and case was discussed with the treatment team.  Pt is not in any distress.   Lying on bed comfortably.   No events reported overnight.       Vital Signs Last 24 Hrs  T(C): 36.7 (15 Pacheco 2024 04:49), Max: 36.7 (15 Pacheco 2024 04:49)  T(F): 98 (15 Pacheco 2024 04:49), Max: 98 (15 Pacheco 2024 04:49)  HR: 78 (15 Pacheco 2024 09:00) (55 - 78)  BP: 149/74 (15 Pacheco 2024 09:00) (129/62 - 149/74)  BP(mean): --  RR: 20 (15 Pacheco 2024 09:00) (18 - 20)  SpO2: 96% (15 Pacheco 2024 09:00) (92% - 98%)    Parameters below as of 15 Pacheco 2024 09:00  Patient On (Oxygen Delivery Method): room air            REVIEW OF SYSTEMS:    Constitutional: No fever, weight loss or fatigue  Eyes: No eye pain, visual disturbances, or discharge  ENT:  No difficulty hearing, tinnitus, vertigo; No sinus or throat pain  Neck: No pain or stiffness  Respiratory: No cough, wheezing, chills or hemoptysis  Cardiovascular: No chest pain, palpitations, shortness of breath, dizziness or leg swelling  Gastrointestinal: No abdominal or epigastric pain. No nausea, vomiting or hematemesis;   Genitourinary: No dysuria, frequency, hematuria or incontinence  Neurological: No headaches, memory loss, loss of strength, numbness or tremors  Psychiatric: No depression, anxiety, mood swings or difficulty sleeping  Musculoskeletal: No joint pain or swelling; No muscle, back or extremity pain  Skin: No itching, burning, rashes or lesions   Lymph Nodes: No enlarged glands  Endocrine: No heat or cold intolerance; No hair loss,  Allergy and Immunologic: No hives or eczema    On Neurological Examination:    Mental Status - Pt is alert, awake, oriented X3.. Follows commands well and able to answer questions appropriately.Mood and affect  normal    Speech -  Normal.     Cranial Nerves - Pupils 3 mm equal and reactive to light, ophthalmoplegia   Pt has no visual field deficit.  Pt has no  facial asymmetry. Facial sensation is intact.Tongue - is in midline.    Muscle tone - is normal all over      Motor Exam - 4+/5 all over, No drift. No shaking or tremors.    Sensory Exam - P. Pt withdraws all extremities equally on stimulation. No asymmetry seen. No complaints of tingling, numbness.    coordination:    Finger to nose: normal      Deep tendon Reflexes - 2 plus all over.        Romberg - Negative.    Neck Supple -  Yes.     MEDICATIONS    aspirin enteric coated 81 milliGRAM(s) Oral daily  atorvastatin 40 milliGRAM(s) Oral at bedtime  diphenoxylate/atropine 1 Tablet(s) Oral two times a day  enoxaparin Injectable 40 milliGRAM(s) SubCutaneous every 24 hours  escitalopram 10 milliGRAM(s) Oral daily  gabapentin 300 milliGRAM(s) Oral two times a day  lamoTRIgine 100 milliGRAM(s) Oral at bedtime  lamoTRIgine 200 milliGRAM(s) Oral daily  lithium 300 milliGRAM(s) Oral two times a day  metoprolol succinate ER 25 milliGRAM(s) Oral daily  pantoprazole    Tablet 40 milliGRAM(s) Oral before breakfast  sodium chloride 0.9%. 1000 milliLiter(s) IV Continuous <Continuous>  SUMAtriptan 100 milliGRAM(s) Oral daily PRN  topiramate 100 milliGRAM(s) Oral two times a day  ziprasidone 80 milliGRAM(s) Oral <User Schedule>  zolpidem 5 milliGRAM(s) Oral at bedtime PRN      Allergies    penicillins (Rash)  sulfa drugs (Rash)    Intolerances        LABS:  CBC Full  -  ( 15 Pacheco 2024 07:32 )  WBC Count : 5.86 K/uL  RBC Count : 3.87 M/uL  Hemoglobin : 11.3 g/dL  Hematocrit : 36.1 %  Platelet Count - Automated : 191 K/uL  Mean Cell Volume : 93.3 fl  Mean Cell Hemoglobin : 29.2 pg  Mean Cell Hemoglobin Concentration : 31.3 gm/dL  Auto Neutrophil # : x  Auto Lymphocyte # : x  Auto Monocyte # : x  Auto Eosinophil # : x  Auto Basophil # : x  Auto Neutrophil % : x  Auto Lymphocyte % : x  Auto Monocyte % : x  Auto Eosinophil % : x  Auto Basophil % : x    Urinalysis Basic - ( 15 Pacheco 2024 11:28 )    Color: x / Appearance: x / SG: x / pH: x  Gluc: 95 mg/dL / Ketone: x  / Bili: x / Urobili: x   Blood: x / Protein: x / Nitrite: x   Leuk Esterase: x / RBC: x / WBC x   Sq Epi: x / Non Sq Epi: x / Bacteria: x      01-15    142  |  116<H>  |  12  ----------------------------<  95  4.1   |  25  |  1.30    Ca    8.4<L>      15 Pacheco 2024 11:28    TPro  5.8<L>  /  Alb  2.7<L>  /  TBili  0.2  /  DBili  x   /  AST  14<L>  /  ALT  22  /  AlkPhos  101  01-15    Hemoglobin A1C:     Vitamin B12     RADIOLOGY    ASSESSMENT AND PLAN:      seen for slurred speech/diplopia/trouble in walking   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  OOB to chair/ambulation with assistance only.  Pain is accessed and addressed.  Would continue to follow.               Neurology Follow up note    DEEPTHI [id number]    HPI:  68 year old female PMHx Hypertension, Bipolar Affective Disorder, Migraines, Tardive Dyskinesia presents to ED from home c/o neurological symptoms x two weeks. Weeks ago, pt had a severe migraine for which she saw her Neuro Dr. Lora and was prescribed a course of steroids which she completed. After completing the steroids, two weeks ago, she began to have word finding difficulties, double vision in her L eye, gait instability resulting in her walking into things. She admits to dizziness, facial asymmetry and trouble with her memory as well. The symptoms have been persistent for two weeks and have not improved. She denies slurred speech, changes in strength/sensation in extremities, bowel/urinary incontinence.   ED course:  Vitals: T 98.2F, HR 75, /75, RR 16, SpO2 100% on RA  Labs: Alk phos 133  EKG: sinus rhythm with 1st degree AV block, LBBB  CT head: negative  CT angio head/neck: no hemodynamically significant stenosis, multiple nodules in the b/l lobes of the thyroid compatible with a multinodular thyroid. Ultrasound of the thyroid may be helpful for further evaluation on a nonemergent basis  Given: IV Ofirmev x1, Percocet x1   (11 Jan 2024 20:58)      Interval History -reported occasional diplopia    Patient is seen, chart was reviewed and case was discussed with the treatment team.  Pt is not in any distress.   Lying on bed comfortably.   No events reported overnight.       Vital Signs Last 24 Hrs  T(C): 36.7 (15 Pacheco 2024 04:49), Max: 36.7 (15 Pacheco 2024 04:49)  T(F): 98 (15 Pacheco 2024 04:49), Max: 98 (15 Pacheco 2024 04:49)  HR: 78 (15 Pacheco 2024 09:00) (55 - 78)  BP: 149/74 (15 Pacheco 2024 09:00) (129/62 - 149/74)  BP(mean): --  RR: 20 (15 Pacheco 2024 09:00) (18 - 20)  SpO2: 96% (15 Pacheco 2024 09:00) (92% - 98%)    Parameters below as of 15 Pacheco 2024 09:00  Patient On (Oxygen Delivery Method): room air            REVIEW OF SYSTEMS:    Constitutional: No fever, weight loss or fatigue  Eyes: No eye pain, visual disturbances, or discharge  ENT:  No difficulty hearing, tinnitus, vertigo; No sinus or throat pain  Neck: No pain or stiffness  Respiratory: No cough, wheezing, chills or hemoptysis  Cardiovascular: No chest pain, palpitations, shortness of breath, dizziness or leg swelling  Gastrointestinal: No abdominal or epigastric pain. No nausea, vomiting or hematemesis;   Genitourinary: No dysuria, frequency, hematuria or incontinence  Neurological: No headaches, memory loss, loss of strength, numbness or tremors  Psychiatric: No depression, anxiety, mood swings or difficulty sleeping  Musculoskeletal: No joint pain or swelling; No muscle, back or extremity pain  Skin: No itching, burning, rashes or lesions   Lymph Nodes: No enlarged glands  Endocrine: No heat or cold intolerance; No hair loss,  Allergy and Immunologic: No hives or eczema    On Neurological Examination:    Mental Status - Pt is alert, awake, oriented X3.. Follows commands well and able to answer questions appropriately.Mood and affect  normal    Speech -  Normal.     Cranial Nerves - Pupils 3 mm equal and reactive to light, ophthalmoplegia   Pt has no visual field deficit.  Pt has no  facial asymmetry. Facial sensation is intact.Tongue - is in midline.    Muscle tone - is normal all over      Motor Exam - 4+/5 all over, No drift. No shaking or tremors.    Sensory Exam - P. Pt withdraws all extremities equally on stimulation. No asymmetry seen. No complaints of tingling, numbness.    coordination:    Finger to nose: normal      Deep tendon Reflexes - 2 plus all over.        Romberg - Negative.    Neck Supple -  Yes.     MEDICATIONS    aspirin enteric coated 81 milliGRAM(s) Oral daily  atorvastatin 40 milliGRAM(s) Oral at bedtime  diphenoxylate/atropine 1 Tablet(s) Oral two times a day  enoxaparin Injectable 40 milliGRAM(s) SubCutaneous every 24 hours  escitalopram 10 milliGRAM(s) Oral daily  gabapentin 300 milliGRAM(s) Oral two times a day  lamoTRIgine 100 milliGRAM(s) Oral at bedtime  lamoTRIgine 200 milliGRAM(s) Oral daily  lithium 300 milliGRAM(s) Oral two times a day  metoprolol succinate ER 25 milliGRAM(s) Oral daily  pantoprazole    Tablet 40 milliGRAM(s) Oral before breakfast  sodium chloride 0.9%. 1000 milliLiter(s) IV Continuous <Continuous>  SUMAtriptan 100 milliGRAM(s) Oral daily PRN  topiramate 100 milliGRAM(s) Oral two times a day  ziprasidone 80 milliGRAM(s) Oral <User Schedule>  zolpidem 5 milliGRAM(s) Oral at bedtime PRN      Allergies    penicillins (Rash)  sulfa drugs (Rash)    Intolerances        LABS:  CBC Full  -  ( 15 Pacheco 2024 07:32 )  WBC Count : 5.86 K/uL  RBC Count : 3.87 M/uL  Hemoglobin : 11.3 g/dL  Hematocrit : 36.1 %  Platelet Count - Automated : 191 K/uL  Mean Cell Volume : 93.3 fl  Mean Cell Hemoglobin : 29.2 pg  Mean Cell Hemoglobin Concentration : 31.3 gm/dL  Auto Neutrophil # : x  Auto Lymphocyte # : x  Auto Monocyte # : x  Auto Eosinophil # : x  Auto Basophil # : x  Auto Neutrophil % : x  Auto Lymphocyte % : x  Auto Monocyte % : x  Auto Eosinophil % : x  Auto Basophil % : x    Urinalysis Basic - ( 15 Pacheco 2024 11:28 )    Color: x / Appearance: x / SG: x / pH: x  Gluc: 95 mg/dL / Ketone: x  / Bili: x / Urobili: x   Blood: x / Protein: x / Nitrite: x   Leuk Esterase: x / RBC: x / WBC x   Sq Epi: x / Non Sq Epi: x / Bacteria: x      01-15    142  |  116<H>  |  12  ----------------------------<  95  4.1   |  25  |  1.30    Ca    8.4<L>      15 Pacheco 2024 11:28    TPro  5.8<L>  /  Alb  2.7<L>  /  TBili  0.2  /  DBili  x   /  AST  14<L>  /  ALT  22  /  AlkPhos  101  01-15    Hemoglobin A1C:     Vitamin B12     RADIOLOGY    ASSESSMENT AND PLAN:      seen for slurred speech/diplopia/trouble in walking   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  OOB to chair/ambulation with assistance only.  Pain is accessed and addressed.  Would continue to follow.

## 2024-01-15 NOTE — SWALLOW BEDSIDE ASSESSMENT ADULT - ASR SWALLOW RECOMMEND DIAG
2. Modified Barium Swallow study to objectively assess the swallow given patient complaint of stuck sensation in throat

## 2024-01-15 NOTE — CONSULT NOTE ADULT - SUBJECTIVE AND OBJECTIVE BOX
Patient is a 68y old  Female who presents with a chief complaint of r/o CVA (14 Jan 2024 09:35)    HPI:  68 year old female PMHx Hypertension, Bipolar Affective Disorder, Migraines, Tardive Dyskinesia presents to ED from home c/o neurological symptoms x two weeks. Weeks ago, pt had a severe migraine for which she saw her Neuro Dr. Lora and was prescribed a course of steroids which she completed. After completing the steroids, two weeks ago, she began to have word finding difficulties, double vision in her L eye, gait instability resulting in her walking into things. She admits to dizziness, facial asymmetry and trouble with her memory as well. The symptoms have been persistent for two weeks and have not improved. She denies slurred speech, changes in strength/sensation in extremities, bowel/urinary incontinence.   ED course:  Vitals: T 98.2F, HR 75, /75, RR 16, SpO2 100% on RA  Labs: Alk phos 133  EKG: sinus rhythm with 1st degree AV block, LBBB  CT head: negative  CT angio head/neck: no hemodynamically significant stenosis, multiple nodules in the b/l lobes of the thyroid compatible with a multinodular thyroid. Ultrasound of the thyroid may be helpful for further evaluation on a nonemergent basis  Given: IV Ofirmev x1, Percocet x1   (11 Jan 2024 20:58)    Renal consult called for MANE. History obtained from chart and patient.         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, bilateral    Migraines    Pill rolling tremors    Pneumonia        PAST SURGICAL HISTORY:  S/P tonsillectomy    History of tonsillectomy        FAMILY HISTORY:  Family history of hypertension (Father, Mother)  mother and father    Family history of depression (Mother, Sibling)  mother and 3 siblings    Family history of atrial fibrillation (Mother)  mother    Family history of prostate cancer in father (Father)    Family history of breast cancer in mother (Mother)        SOCIAL HISTORY: No smoking or alcohol use     Allergies    penicillins (Rash)  sulfa drugs (Rash)    Intolerances      Home Medications:  Ambien 10 mg oral tablet: 1.5 tab(s) orally once a day (at bedtime) as needed for  insomnia (12 Jan 2024 16:17)  atorvastatin 10 mg oral tablet: 1 tab(s) orally once a day (11 Jan 2024 21:24)  gabapentin 300 mg oral tablet: 1 tab(s) orally 2 times a day (11 Jan 2024 21:23)  LaMICtal 200 mg oral tablet: 1 tablet by mouth in the morning and 0.5 tablet by mouth in the evening (12 Jan 2024 16:49)  Lexapro 10 mg oral tablet: 1 tab(s) orally once a day (11 Jan 2024 21:23)  lithium 300 mg oral capsule: 1 cap(s) orally 2 times a day (11 Jan 2024 21:24)  meclizine 12.5 mg oral tablet: 1 tab(s) orally 3 times a day as needed for  dizziness (11 Jan 2024 21:28)  Metoprolol Succinate ER 25 mg oral tablet, extended release: 1 tab(s) orally once a day (12 Jan 2024 16:51)  Metoprolol Tartrate 25 mg oral tablet: 1 tab(s) orally once a day    *pt confirms taking once per day (11 Jan 2024 21:24)  pantoprazole 40 mg oral delayed release tablet: 1 tab(s) orally once a day (11 Jan 2024 21:35)  SUMAtriptan 100 mg oral tablet: 1 tab(s) orally once as needed for  headache take 1 tab at onset of headache and repeat after 4 hours as needed (11 Jan 2024 21:35)  topiramate 100 mg oral tablet: 1 tab(s) orally 2 times a day (11 Jan 2024 21:24)  Zavzpret 10 mg nasal spray: 1 inhaler(s) nasal as needed for  headache (11 Jan 2024 21:28)  ziprasidone 80 mg oral capsule: 1 capsule by mouth once daily in the evening (12 Jan 2024 16:50)    MEDICATIONS  (STANDING):  aspirin enteric coated 81 milliGRAM(s) Oral daily  atorvastatin 40 milliGRAM(s) Oral at bedtime  diphenoxylate/atropine 1 Tablet(s) Oral two times a day  enoxaparin Injectable 40 milliGRAM(s) SubCutaneous every 24 hours  escitalopram 10 milliGRAM(s) Oral daily  gabapentin 300 milliGRAM(s) Oral two times a day  lamoTRIgine 200 milliGRAM(s) Oral daily  lamoTRIgine 100 milliGRAM(s) Oral at bedtime  lithium 300 milliGRAM(s) Oral two times a day  metoprolol succinate ER 25 milliGRAM(s) Oral daily  pantoprazole    Tablet 40 milliGRAM(s) Oral before breakfast  sodium chloride 0.9%. 1000 milliLiter(s) (50 mL/Hr) IV Continuous <Continuous>  topiramate 100 milliGRAM(s) Oral two times a day  ziprasidone 80 milliGRAM(s) Oral <User Schedule>    MEDICATIONS  (PRN):  SUMAtriptan 100 milliGRAM(s) Oral daily PRN Migraine  zolpidem 5 milliGRAM(s) Oral at bedtime PRN Insomnia      REVIEW OF SYSTEMS:  General: no distress  Respiratory: No cough, SOB  Cardiovascular: No CP or Palpitations	  Gastrointestinal: No nausea, Vomiting. No diarrhea  Genitourinary: No urinary complaints	  Musculoskeletal: No new rash or lesions		  all other systems negative    T(F): 98 (01-15-24 @ 04:49), Max: 98 (01-15-24 @ 04:49)  HR: 78 (01-15-24 @ 09:00) (55 - 78)  BP: 149/74 (01-15-24 @ 09:00) (129/62 - 149/74)  RR: 20 (01-15-24 @ 09:00) (18 - 20)  SpO2: 96% (01-15-24 @ 09:00) (92% - 98%)  Wt(kg): --    PHYSICAL EXAM:  General: NAD  Respiratory: b/l air entry  Cardiovascular: S1 S2  Gastrointestinal: soft  Extremities: no edema        01-15    142  |  116<H>  |  14  ----------------------------<  125<H>  3.8   |  22  |  5.00<H>    Ca    7.8<L>      15 Pacheco 2024 07:32    TPro  5.8<L>  /  Alb  2.7<L>  /  TBili  0.2  /  DBili  x   /  AST  14<L>  /  ALT  22  /  AlkPhos  101  01-15                          11.3   5.86  )-----------( 191      ( 15 Pacheco 2024 07:32 )             36.1       Hemoglobin: 11.3 g/dL (01-15 @ 07:32)  Hematocrit: 36.1 % (01-15 @ 07:32)  Potassium: 3.8 mmol/L (01-15 @ 07:32)  Blood Urea Nitrogen: 14 mg/dL (01-15 @ 07:32)      Creatinine, Serum: 5.00 (01-15 @ 07:32)  Creatinine, Serum: 1.60 (01-14 @ 09:00)  Creatinine, Serum: 1.10 (01-13 @ 07:55)      Urinalysis Basic - ( 15 Pacheco 2024 07:32 )    Color: x / Appearance: x / SG: x / pH: x  Gluc: 125 mg/dL / Ketone: x  / Bili: x / Urobili: x   Blood: x / Protein: x / Nitrite: x   Leuk Esterase: x / RBC: x / WBC x   Sq Epi: x / Non Sq Epi: x / Bacteria: x      LIVER FUNCTIONS - ( 15 Pacheco 2024 07:32 )  Alb: 2.7 g/dL / Pro: 5.8 g/dL / ALK PHOS: 101 U/L / ALT: 22 U/L / AST: 14 U/L / GGT: x               < from: CT Angio Neck w/ IV Cont (01.11.24 @ 18:04) >    ACC: [id number] EXAM:  CT ANGIO BRAIN (W)AW IC   ORDERED BY: CAITLIN ERAZO     ACC: [id number] EXAM:  CT ANGIO NECK (W)AW IC   ORDERED BY: CAITLIN ERAZO     ACC: [id number] EXAM:  CT BRAIN   ORDERED BY: CAITLIN ERAZO     PROCEDURE DATE:  01/11/2024          INTERPRETATION:  Exam Date: 1/11/2024 6:04 PM    Three examinations were performed on this patient:  1. CT Angiography of the carotid arteries with and without IV contrast  2. CT Angiography of the intracranial circulation with and without IV   contrast      CLINICAL INFORMATION:  vision changes, diff walking    TECHNIQUE:   Preceding intravenous contrast contiguous axial 4 mm   sections were obtained through the head. CT angiography images were   acquired from the aortic arch to the vertex ofthe skull.   Images were   acquired during rapid bolus intravenous administration of 90 mL of   Omnipaque 350 contrast with 10 mL discarded.  3D, coronal, and sagittal   reformats were obtained.    COMPARISON: None    FINDINGS:    The brain demonstrates no acute cerebral cortical infarct is seen. No   acute hemorrhage is present.   No evidence of midline shift.  The   ventricles, sulci and basal cisterns appear unremarkable.    The paranasal sinuses and mastoid air cells are clear.    Intracranial vertebral arteries are intact without evidence of dissection   or hemodynamically significant stenosis. The basilar artery and posterior   cerebral arteries and are normal without hemodynamically significant   stenosis. Bilateral superior cerebellararteries are intact. The   intracranial internal carotid arteries, middle cerebral arteries, and   anterior cerebral arteries are intact without hemodynamically significant   stenosis.  There is no evidence of aneurysm or vascular malformation.    The carotid circulation is intact without hemodynamically significant   stenosis.   The vertebral arteries are patent.    There are multiple nodules in the bilateral lobes of the thyroid,   compatible with a multinodular thyroid.      IMPRESSION:    1.   Brain:  Unremarkable        2.   Intracranial circulation:  No hemodynamically significant   stenosis.        3.    Right carotid/vertebral artery system:  No hemodynamically   significant stenosis.        4.   Left carotid/vertebral artery system:  No hemodynamically   significant stenosis.    5. There are multiple nodules in the bilateral lobes of the thyroid,   compatible with a multinodular thyroid. Ultrasound of the thyroid may be   helpful for further evaluation on a nonemergent basis.    --- End of Report ---            JORGE LUIS TAFOYA MD; Attending Radiologist  This document has been electronically signed. Jan 11 2024  6:37PM    < end of copied text >         Patient is a 68y old  Female who presents with a chief complaint of r/o CVA (14 Jan 2024 09:35)    HPI:  68 year old female PMHx Hypertension, Bipolar Affective Disorder, Migraines, Tardive Dyskinesia presents to ED from home c/o neurological symptoms x two weeks. Weeks ago, pt had a severe migraine for which she saw her Neuro Dr. Lora and was prescribed a course of steroids which she completed. After completing the steroids, two weeks ago, she began to have word finding difficulties, double vision in her L eye, gait instability resulting in her walking into things. She admits to dizziness, facial asymmetry and trouble with her memory as well. The symptoms have been persistent for two weeks and have not improved. She denies slurred speech, changes in strength/sensation in extremities, bowel/urinary incontinence.   ED course:  Vitals: T 98.2F, HR 75, /75, RR 16, SpO2 100% on RA  Labs: Alk phos 133  EKG: sinus rhythm with 1st degree AV block, LBBB  CT head: negative  CT angio head/neck: no hemodynamically significant stenosis, multiple nodules in the b/l lobes of the thyroid compatible with a multinodular thyroid. Ultrasound of the thyroid may be helpful for further evaluation on a nonemergent basis  Given: IV Ofirmev x1, Percocet x1   (11 Jan 2024 20:58)    Renal consult called for MANE. History obtained from chart and patient.         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, bilateral    Migraines    Pill rolling tremors    Pneumonia        PAST SURGICAL HISTORY:  S/P tonsillectomy    History of tonsillectomy        FAMILY HISTORY:  Family history of hypertension (Father, Mother)  mother and father    Family history of depression (Mother, Sibling)  mother and 3 siblings    Family history of atrial fibrillation (Mother)  mother    Family history of prostate cancer in father (Father)    Family history of breast cancer in mother (Mother)        SOCIAL HISTORY: No smoking or alcohol use     Allergies    penicillins (Rash)  sulfa drugs (Rash)    Intolerances      Home Medications:  Ambien 10 mg oral tablet: 1.5 tab(s) orally once a day (at bedtime) as needed for  insomnia (12 Jan 2024 16:17)  atorvastatin 10 mg oral tablet: 1 tab(s) orally once a day (11 Jan 2024 21:24)  gabapentin 300 mg oral tablet: 1 tab(s) orally 2 times a day (11 Jan 2024 21:23)  LaMICtal 200 mg oral tablet: 1 tablet by mouth in the morning and 0.5 tablet by mouth in the evening (12 Jan 2024 16:49)  Lexapro 10 mg oral tablet: 1 tab(s) orally once a day (11 Jan 2024 21:23)  lithium 300 mg oral capsule: 1 cap(s) orally 2 times a day (11 Jan 2024 21:24)  meclizine 12.5 mg oral tablet: 1 tab(s) orally 3 times a day as needed for  dizziness (11 Jan 2024 21:28)  Metoprolol Succinate ER 25 mg oral tablet, extended release: 1 tab(s) orally once a day (12 Jan 2024 16:51)  Metoprolol Tartrate 25 mg oral tablet: 1 tab(s) orally once a day    *pt confirms taking once per day (11 Jan 2024 21:24)  pantoprazole 40 mg oral delayed release tablet: 1 tab(s) orally once a day (11 Jan 2024 21:35)  SUMAtriptan 100 mg oral tablet: 1 tab(s) orally once as needed for  headache take 1 tab at onset of headache and repeat after 4 hours as needed (11 Jan 2024 21:35)  topiramate 100 mg oral tablet: 1 tab(s) orally 2 times a day (11 Jan 2024 21:24)  Zavzpret 10 mg nasal spray: 1 inhaler(s) nasal as needed for  headache (11 Jan 2024 21:28)  ziprasidone 80 mg oral capsule: 1 capsule by mouth once daily in the evening (12 Jan 2024 16:50)    MEDICATIONS  (STANDING):  aspirin enteric coated 81 milliGRAM(s) Oral daily  atorvastatin 40 milliGRAM(s) Oral at bedtime  diphenoxylate/atropine 1 Tablet(s) Oral two times a day  enoxaparin Injectable 40 milliGRAM(s) SubCutaneous every 24 hours  escitalopram 10 milliGRAM(s) Oral daily  gabapentin 300 milliGRAM(s) Oral two times a day  lamoTRIgine 200 milliGRAM(s) Oral daily  lamoTRIgine 100 milliGRAM(s) Oral at bedtime  lithium 300 milliGRAM(s) Oral two times a day  metoprolol succinate ER 25 milliGRAM(s) Oral daily  pantoprazole    Tablet 40 milliGRAM(s) Oral before breakfast  sodium chloride 0.9%. 1000 milliLiter(s) (50 mL/Hr) IV Continuous <Continuous>  topiramate 100 milliGRAM(s) Oral two times a day  ziprasidone 80 milliGRAM(s) Oral <User Schedule>    MEDICATIONS  (PRN):  SUMAtriptan 100 milliGRAM(s) Oral daily PRN Migraine  zolpidem 5 milliGRAM(s) Oral at bedtime PRN Insomnia      REVIEW OF SYSTEMS:  General: no distress  Respiratory: No cough, SOB  Cardiovascular: No CP or Palpitations	  Gastrointestinal: No nausea, Vomiting. No diarrhea  Genitourinary: No urinary complaints	  Musculoskeletal: No new rash or lesions		  all other systems negative    T(F): 98 (01-15-24 @ 04:49), Max: 98 (01-15-24 @ 04:49)  HR: 78 (01-15-24 @ 09:00) (55 - 78)  BP: 149/74 (01-15-24 @ 09:00) (129/62 - 149/74)  RR: 20 (01-15-24 @ 09:00) (18 - 20)  SpO2: 96% (01-15-24 @ 09:00) (92% - 98%)  Wt(kg): --    PHYSICAL EXAM:  General: NAD  Respiratory: b/l air entry  Cardiovascular: S1 S2  Gastrointestinal: soft  Extremities: no edema        01-15    142  |  116<H>  |  14  ----------------------------<  125<H>  3.8   |  22  |  5.00<H>    Ca    7.8<L>      15 Pacheco 2024 07:32    TPro  5.8<L>  /  Alb  2.7<L>  /  TBili  0.2  /  DBili  x   /  AST  14<L>  /  ALT  22  /  AlkPhos  101  01-15                          11.3   5.86  )-----------( 191      ( 15 Pacheco 2024 07:32 )             36.1       Hemoglobin: 11.3 g/dL (01-15 @ 07:32)  Hematocrit: 36.1 % (01-15 @ 07:32)  Potassium: 3.8 mmol/L (01-15 @ 07:32)  Blood Urea Nitrogen: 14 mg/dL (01-15 @ 07:32)      Creatinine, Serum: 5.00 (01-15 @ 07:32)  Creatinine, Serum: 1.60 (01-14 @ 09:00)  Creatinine, Serum: 1.10 (01-13 @ 07:55)      Urinalysis Basic - ( 15 Pacheco 2024 07:32 )    Color: x / Appearance: x / SG: x / pH: x  Gluc: 125 mg/dL / Ketone: x  / Bili: x / Urobili: x   Blood: x / Protein: x / Nitrite: x   Leuk Esterase: x / RBC: x / WBC x   Sq Epi: x / Non Sq Epi: x / Bacteria: x      LIVER FUNCTIONS - ( 15 Pacheco 2024 07:32 )  Alb: 2.7 g/dL / Pro: 5.8 g/dL / ALK PHOS: 101 U/L / ALT: 22 U/L / AST: 14 U/L / GGT: x               < from: CT Angio Neck w/ IV Cont (01.11.24 @ 18:04) >    ACC: [id number] EXAM:  CT ANGIO BRAIN (W)AW IC   ORDERED BY: CAITLIN ERAZO     ACC: [id number] EXAM:  CT ANGIO NECK (W)AW IC   ORDERED BY: CAITLIN ERAZO     ACC: [id number] EXAM:  CT BRAIN   ORDERED BY: CAITLIN ERAZO     PROCEDURE DATE:  01/11/2024          INTERPRETATION:  Exam Date: 1/11/2024 6:04 PM    Three examinations were performed on this patient:  1. CT Angiography of the carotid arteries with and without IV contrast  2. CT Angiography of the intracranial circulation with and without IV   contrast      CLINICAL INFORMATION:  vision changes, diff walking    TECHNIQUE:   Preceding intravenous contrast contiguous axial 4 mm   sections were obtained through the head. CT angiography images were   acquired from the aortic arch to the vertex ofthe skull.   Images were   acquired during rapid bolus intravenous administration of 90 mL of   Omnipaque 350 contrast with 10 mL discarded.  3D, coronal, and sagittal   reformats were obtained.    COMPARISON: None    FINDINGS:    The brain demonstrates no acute cerebral cortical infarct is seen. No   acute hemorrhage is present.   No evidence of midline shift.  The   ventricles, sulci and basal cisterns appear unremarkable.    The paranasal sinuses and mastoid air cells are clear.    Intracranial vertebral arteries are intact without evidence of dissection   or hemodynamically significant stenosis. The basilar artery and posterior   cerebral arteries and are normal without hemodynamically significant   stenosis. Bilateral superior cerebellararteries are intact. The   intracranial internal carotid arteries, middle cerebral arteries, and   anterior cerebral arteries are intact without hemodynamically significant   stenosis.  There is no evidence of aneurysm or vascular malformation.    The carotid circulation is intact without hemodynamically significant   stenosis.   The vertebral arteries are patent.    There are multiple nodules in the bilateral lobes of the thyroid,   compatible with a multinodular thyroid.      IMPRESSION:    1.   Brain:  Unremarkable        2.   Intracranial circulation:  No hemodynamically significant   stenosis.        3.    Right carotid/vertebral artery system:  No hemodynamically   significant stenosis.        4.   Left carotid/vertebral artery system:  No hemodynamically   significant stenosis.    5. There are multiple nodules in the bilateral lobes of the thyroid,   compatible with a multinodular thyroid. Ultrasound of the thyroid may be   helpful for further evaluation on a nonemergent basis.    --- End of Report ---            JORGE LUIS TAFOYA MD; Attending Radiologist  This document has been electronically signed. Jan 11 2024  6:37PM    < end of copied text >         Patient is a 68y old  Female who presents with a chief complaint of r/o CVA (14 Jan 2024 09:35)    HPI:  68 year old female PMHx Hypertension, Bipolar Affective Disorder, Migraines, Tardive Dyskinesia presents to ED from home c/o neurological symptoms x two weeks. Weeks ago, pt had a severe migraine for which she saw her Neuro Dr. Lora and was prescribed a course of steroids which she completed. After completing the steroids, two weeks ago, she began to have word finding difficulties, double vision in her L eye, gait instability resulting in her walking into things. She admits to dizziness, facial asymmetry and trouble with her memory as well. The symptoms have been persistent for two weeks and have not improved. She denies slurred speech, changes in strength/sensation in extremities, bowel/urinary incontinence.   ED course:  Vitals: T 98.2F, HR 75, /75, RR 16, SpO2 100% on RA  Labs: Alk phos 133  EKG: sinus rhythm with 1st degree AV block, LBBB  CT head: negative  CT angio head/neck: no hemodynamically significant stenosis, multiple nodules in the b/l lobes of the thyroid compatible with a multinodular thyroid. Ultrasound of the thyroid may be helpful for further evaluation on a nonemergent basis  Given: IV Ofirmev x1, Percocet x1   (11 Jan 2024 20:58)    Renal consult called for MANE. History obtained from chart and patient.         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, bilateral    Migraines    Pill rolling tremors    Pneumonia        PAST SURGICAL HISTORY:  S/P tonsillectomy    History of tonsillectomy        FAMILY HISTORY:  Family history of hypertension (Father, Mother)  mother and father    Family history of depression (Mother, Sibling)  mother and 3 siblings    Family history of atrial fibrillation (Mother)  mother    Family history of prostate cancer in father (Father)    Family history of breast cancer in mother (Mother)        SOCIAL HISTORY: No smoking or alcohol use     Allergies    penicillins (Rash)  sulfa drugs (Rash)    Intolerances      Home Medications:  Ambien 10 mg oral tablet: 1.5 tab(s) orally once a day (at bedtime) as needed for  insomnia (12 Jan 2024 16:17)  atorvastatin 10 mg oral tablet: 1 tab(s) orally once a day (11 Jan 2024 21:24)  gabapentin 300 mg oral tablet: 1 tab(s) orally 2 times a day (11 Jan 2024 21:23)  LaMICtal 200 mg oral tablet: 1 tablet by mouth in the morning and 0.5 tablet by mouth in the evening (12 Jan 2024 16:49)  Lexapro 10 mg oral tablet: 1 tab(s) orally once a day (11 Jan 2024 21:23)  lithium 300 mg oral capsule: 1 cap(s) orally 2 times a day (11 Jan 2024 21:24)  meclizine 12.5 mg oral tablet: 1 tab(s) orally 3 times a day as needed for  dizziness (11 Jan 2024 21:28)  Metoprolol Succinate ER 25 mg oral tablet, extended release: 1 tab(s) orally once a day (12 Jan 2024 16:51)  Metoprolol Tartrate 25 mg oral tablet: 1 tab(s) orally once a day    *pt confirms taking once per day (11 Jan 2024 21:24)  pantoprazole 40 mg oral delayed release tablet: 1 tab(s) orally once a day (11 Jan 2024 21:35)  SUMAtriptan 100 mg oral tablet: 1 tab(s) orally once as needed for  headache take 1 tab at onset of headache and repeat after 4 hours as needed (11 Jan 2024 21:35)  topiramate 100 mg oral tablet: 1 tab(s) orally 2 times a day (11 Jan 2024 21:24)  Zavzpret 10 mg nasal spray: 1 inhaler(s) nasal as needed for  headache (11 Jan 2024 21:28)  ziprasidone 80 mg oral capsule: 1 capsule by mouth once daily in the evening (12 Jan 2024 16:50)    MEDICATIONS  (STANDING):  aspirin enteric coated 81 milliGRAM(s) Oral daily  atorvastatin 40 milliGRAM(s) Oral at bedtime  diphenoxylate/atropine 1 Tablet(s) Oral two times a day  enoxaparin Injectable 40 milliGRAM(s) SubCutaneous every 24 hours  escitalopram 10 milliGRAM(s) Oral daily  gabapentin 300 milliGRAM(s) Oral two times a day  lamoTRIgine 200 milliGRAM(s) Oral daily  lamoTRIgine 100 milliGRAM(s) Oral at bedtime  lithium 300 milliGRAM(s) Oral two times a day  metoprolol succinate ER 25 milliGRAM(s) Oral daily  pantoprazole    Tablet 40 milliGRAM(s) Oral before breakfast  sodium chloride 0.9%. 1000 milliLiter(s) (50 mL/Hr) IV Continuous <Continuous>  topiramate 100 milliGRAM(s) Oral two times a day  ziprasidone 80 milliGRAM(s) Oral <User Schedule>    MEDICATIONS  (PRN):  SUMAtriptan 100 milliGRAM(s) Oral daily PRN Migraine  zolpidem 5 milliGRAM(s) Oral at bedtime PRN Insomnia      REVIEW OF SYSTEMS:  General: no distress  Respiratory: No cough, SOB  Cardiovascular: No CP or Palpitations	  Gastrointestinal: No nausea, Vomiting. No diarrhea  Genitourinary: No urinary complaints	  Musculoskeletal: No new rash or lesions		  all other systems negative    T(F): 98 (01-15-24 @ 04:49), Max: 98 (01-15-24 @ 04:49)  HR: 78 (01-15-24 @ 09:00) (55 - 78)  BP: 149/74 (01-15-24 @ 09:00) (129/62 - 149/74)  RR: 20 (01-15-24 @ 09:00) (18 - 20)  SpO2: 96% (01-15-24 @ 09:00) (92% - 98%)  Wt(kg): --    PHYSICAL EXAM:  General: NAD  Respiratory: b/l air entry  Cardiovascular: S1 S2  Gastrointestinal: soft  Extremities: no edema        01-15    142  |  116<H>  |  14  ----------------------------<  125<H>  3.8   |  22  |  5.00<H>    Ca    7.8<L>      15 Pacheco 2024 07:32    TPro  5.8<L>  /  Alb  2.7<L>  /  TBili  0.2  /  DBili  x   /  AST  14<L>  /  ALT  22  /  AlkPhos  101  01-15                          11.3   5.86  )-----------( 191      ( 15 Pacheco 2024 07:32 )             36.1       Hemoglobin: 11.3 g/dL (01-15 @ 07:32)  Hematocrit: 36.1 % (01-15 @ 07:32)  Potassium: 3.8 mmol/L (01-15 @ 07:32)  Blood Urea Nitrogen: 14 mg/dL (01-15 @ 07:32)      Creatinine, Serum: 5.00 (01-15 @ 07:32)  Creatinine, Serum: 1.60 (01-14 @ 09:00)  Creatinine, Serum: 1.10 (01-13 @ 07:55)      Urinalysis Basic - ( 15 Pacheco 2024 07:32 )    Color: x / Appearance: x / SG: x / pH: x  Gluc: 125 mg/dL / Ketone: x  / Bili: x / Urobili: x   Blood: x / Protein: x / Nitrite: x   Leuk Esterase: x / RBC: x / WBC x   Sq Epi: x / Non Sq Epi: x / Bacteria: x      LIVER FUNCTIONS - ( 15 Pacheco 2024 07:32 )  Alb: 2.7 g/dL / Pro: 5.8 g/dL / ALK PHOS: 101 U/L / ALT: 22 U/L / AST: 14 U/L / GGT: x               < from: CT Angio Neck w/ IV Cont (01.11.24 @ 18:04) >    ACC: [id number] EXAM:  CT ANGIO BRAIN (W)AW IC   ORDERED BY: CAITLIN ERAZO     ACC: [id number] EXAM:  CT ANGIO NECK (W)AW IC   ORDERED BY: CAITLIN ERAZO     ACC: [id number] EXAM:  CT BRAIN   ORDERED BY: CAITLIN ERAZO     PROCEDURE DATE:  01/11/2024          INTERPRETATION:  Exam Date: 1/11/2024 6:04 PM    Three examinations were performed on this patient:  1. CT Angiography of the carotid arteries with and without IV contrast  2. CT Angiography of the intracranial circulation with and without IV   contrast      CLINICAL INFORMATION:  vision changes, diff walking    TECHNIQUE:   Preceding intravenous contrast contiguous axial 4 mm   sections were obtained through the head. CT angiography images were   acquired from the aortic arch to the vertex ofthe skull.   Images were   acquired during rapid bolus intravenous administration of 90 mL of   Omnipaque 350 contrast with 10 mL discarded.  3D, coronal, and sagittal   reformats were obtained.    COMPARISON: None    FINDINGS:    The brain demonstrates no acute cerebral cortical infarct is seen. No   acute hemorrhage is present.   No evidence of midline shift.  The   ventricles, sulci and basal cisterns appear unremarkable.    The paranasal sinuses and mastoid air cells are clear.    Intracranial vertebral arteries are intact without evidence of dissection   or hemodynamically significant stenosis. The basilar artery and posterior   cerebral arteries and are normal without hemodynamically significant   stenosis. Bilateral superior cerebellararteries are intact. The   intracranial internal carotid arteries, middle cerebral arteries, and   anterior cerebral arteries are intact without hemodynamically significant   stenosis.  There is no evidence of aneurysm or vascular malformation.    The carotid circulation is intact without hemodynamically significant   stenosis.   The vertebral arteries are patent.    There are multiple nodules in the bilateral lobes of the thyroid,   compatible with a multinodular thyroid.      IMPRESSION:    1.   Brain:  Unremarkable        2.   Intracranial circulation:  No hemodynamically significant   stenosis.        3.    Right carotid/vertebral artery system:  No hemodynamically   significant stenosis.        4.   Left carotid/vertebral artery system:  No hemodynamically   significant stenosis.    5. There are multiple nodules in the bilateral lobes of the thyroid,   compatible with a multinodular thyroid. Ultrasound of the thyroid may be   helpful for further evaluation on a nonemergent basis.    --- End of Report ---            JORGE LUIS TAFOYA MD; Attending Radiologist  This document has been electronically signed. Jan 11 2024  6:37PM    < end of copied text >

## 2024-01-16 DIAGNOSIS — R13.10 DYSPHAGIA, UNSPECIFIED: ICD-10-CM

## 2024-01-16 LAB
ALBUMIN SERPL ELPH-MCNC: 2.9 G/DL — LOW (ref 3.3–5)
ALP SERPL-CCNC: 350 U/L — HIGH (ref 40–120)
ALT FLD-CCNC: 23 U/L — SIGNIFICANT CHANGE UP (ref 12–78)
ANION GAP SERPL CALC-SCNC: 2 MMOL/L — LOW (ref 5–17)
APPEARANCE UR: CLEAR — SIGNIFICANT CHANGE UP
AST SERPL-CCNC: 16 U/L — SIGNIFICANT CHANGE UP (ref 15–37)
BILIRUB SERPL-MCNC: 0.2 MG/DL — SIGNIFICANT CHANGE UP (ref 0.2–1.2)
BILIRUB UR-MCNC: NEGATIVE — SIGNIFICANT CHANGE UP
BUN SERPL-MCNC: 11 MG/DL — SIGNIFICANT CHANGE UP (ref 7–23)
CALCIUM SERPL-MCNC: 8.6 MG/DL — SIGNIFICANT CHANGE UP (ref 8.5–10.1)
CHLORIDE SERPL-SCNC: 119 MMOL/L — HIGH (ref 96–108)
CO2 SERPL-SCNC: 23 MMOL/L — SIGNIFICANT CHANGE UP (ref 22–31)
COLOR SPEC: YELLOW — SIGNIFICANT CHANGE UP
CREAT SERPL-MCNC: 1.1 MG/DL — SIGNIFICANT CHANGE UP (ref 0.5–1.3)
CULTURE RESULTS: SIGNIFICANT CHANGE UP
CULTURE RESULTS: SIGNIFICANT CHANGE UP
DIFF PNL FLD: NEGATIVE — SIGNIFICANT CHANGE UP
EGFR: 54 ML/MIN/1.73M2 — LOW
GLUCOSE SERPL-MCNC: 112 MG/DL — HIGH (ref 70–99)
GLUCOSE UR QL: NEGATIVE MG/DL — SIGNIFICANT CHANGE UP
HCT VFR BLD CALC: 39.4 % — SIGNIFICANT CHANGE UP (ref 34.5–45)
HGB BLD-MCNC: 12.1 G/DL — SIGNIFICANT CHANGE UP (ref 11.5–15.5)
KETONES UR-MCNC: NEGATIVE MG/DL — SIGNIFICANT CHANGE UP
LEUKOCYTE ESTERASE UR-ACNC: NEGATIVE — SIGNIFICANT CHANGE UP
MCHC RBC-ENTMCNC: 29.2 PG — SIGNIFICANT CHANGE UP (ref 27–34)
MCHC RBC-ENTMCNC: 30.7 GM/DL — LOW (ref 32–36)
MCV RBC AUTO: 94.9 FL — SIGNIFICANT CHANGE UP (ref 80–100)
NITRITE UR-MCNC: NEGATIVE — SIGNIFICANT CHANGE UP
NRBC # BLD: 0 /100 WBCS — SIGNIFICANT CHANGE UP (ref 0–0)
PH UR: 7.5 — SIGNIFICANT CHANGE UP (ref 5–8)
PLATELET # BLD AUTO: 221 K/UL — SIGNIFICANT CHANGE UP (ref 150–400)
POTASSIUM SERPL-MCNC: 4.2 MMOL/L — SIGNIFICANT CHANGE UP (ref 3.5–5.3)
POTASSIUM SERPL-SCNC: 4.2 MMOL/L — SIGNIFICANT CHANGE UP (ref 3.5–5.3)
PROT SERPL-MCNC: 6.4 G/DL — SIGNIFICANT CHANGE UP (ref 6–8.3)
PROT UR-MCNC: NEGATIVE MG/DL — SIGNIFICANT CHANGE UP
RBC # BLD: 4.15 M/UL — SIGNIFICANT CHANGE UP (ref 3.8–5.2)
RBC # FLD: 14 % — SIGNIFICANT CHANGE UP (ref 10.3–14.5)
SODIUM SERPL-SCNC: 144 MMOL/L — SIGNIFICANT CHANGE UP (ref 135–145)
SP GR SPEC: 1 — SIGNIFICANT CHANGE UP (ref 1–1.03)
SPECIMEN SOURCE: SIGNIFICANT CHANGE UP
SPECIMEN SOURCE: SIGNIFICANT CHANGE UP
UROBILINOGEN FLD QL: 0.2 MG/DL — SIGNIFICANT CHANGE UP (ref 0.2–1)
WBC # BLD: 6.95 K/UL — SIGNIFICANT CHANGE UP (ref 3.8–10.5)
WBC # FLD AUTO: 6.95 K/UL — SIGNIFICANT CHANGE UP (ref 3.8–10.5)

## 2024-01-16 PROCEDURE — 99232 SBSQ HOSP IP/OBS MODERATE 35: CPT

## 2024-01-16 PROCEDURE — 74230 X-RAY XM SWLNG FUNCJ C+: CPT | Mod: 26

## 2024-01-16 RX ADMIN — ZOLPIDEM TARTRATE 5 MILLIGRAM(S): 10 TABLET ORAL at 21:20

## 2024-01-16 RX ADMIN — Medication 650 MILLIGRAM(S): at 21:50

## 2024-01-16 RX ADMIN — ZIPRASIDONE HYDROCHLORIDE 80 MILLIGRAM(S): 20 CAPSULE ORAL at 20:47

## 2024-01-16 RX ADMIN — LAMOTRIGINE 100 MILLIGRAM(S): 25 TABLET, ORALLY DISINTEGRATING ORAL at 21:21

## 2024-01-16 RX ADMIN — LITHIUM CARBONATE 300 MILLIGRAM(S): 300 TABLET, EXTENDED RELEASE ORAL at 06:11

## 2024-01-16 RX ADMIN — Medication 650 MILLIGRAM(S): at 21:20

## 2024-01-16 RX ADMIN — Medication 100 MILLIGRAM(S): at 17:54

## 2024-01-16 RX ADMIN — GABAPENTIN 300 MILLIGRAM(S): 400 CAPSULE ORAL at 06:11

## 2024-01-16 RX ADMIN — ENOXAPARIN SODIUM 40 MILLIGRAM(S): 100 INJECTION SUBCUTANEOUS at 21:21

## 2024-01-16 RX ADMIN — LITHIUM CARBONATE 300 MILLIGRAM(S): 300 TABLET, EXTENDED RELEASE ORAL at 17:54

## 2024-01-16 RX ADMIN — Medication 650 MILLIGRAM(S): at 13:29

## 2024-01-16 RX ADMIN — Medication 81 MILLIGRAM(S): at 11:19

## 2024-01-16 RX ADMIN — Medication 25 MILLIGRAM(S): at 06:11

## 2024-01-16 RX ADMIN — Medication 100 MILLIGRAM(S): at 06:12

## 2024-01-16 RX ADMIN — ESCITALOPRAM OXALATE 10 MILLIGRAM(S): 10 TABLET, FILM COATED ORAL at 11:19

## 2024-01-16 RX ADMIN — LAMOTRIGINE 200 MILLIGRAM(S): 25 TABLET, ORALLY DISINTEGRATING ORAL at 11:19

## 2024-01-16 RX ADMIN — Medication 1 TABLET(S): at 06:11

## 2024-01-16 RX ADMIN — GABAPENTIN 300 MILLIGRAM(S): 400 CAPSULE ORAL at 17:54

## 2024-01-16 RX ADMIN — ATORVASTATIN CALCIUM 40 MILLIGRAM(S): 80 TABLET, FILM COATED ORAL at 21:20

## 2024-01-16 RX ADMIN — Medication 650 MILLIGRAM(S): at 14:30

## 2024-01-16 RX ADMIN — PANTOPRAZOLE SODIUM 40 MILLIGRAM(S): 20 TABLET, DELAYED RELEASE ORAL at 06:12

## 2024-01-16 NOTE — PROGRESS NOTE ADULT - SUBJECTIVE AND OBJECTIVE BOX
Neurology Follow up note    DEEPTHI [id number]    HPI:  68 year old female PMHx Hypertension, Bipolar Affective Disorder, Migraines, Tardive Dyskinesia presents to ED from home c/o neurological symptoms x two weeks. Weeks ago, pt had a severe migraine for which she saw her Neuro Dr. Lora and was prescribed a course of steroids which she completed. After completing the steroids, two weeks ago, she began to have word finding difficulties, double vision in her L eye, gait instability resulting in her walking into things. She admits to dizziness, facial asymmetry and trouble with her memory as well. The symptoms have been persistent for two weeks and have not improved. She denies slurred speech, changes in strength/sensation in extremities, bowel/urinary incontinence.   ED course:  Vitals: T 98.2F, HR 75, /75, RR 16, SpO2 100% on RA  Labs: Alk phos 133  EKG: sinus rhythm with 1st degree AV block, LBBB  CT head: negative  CT angio head/neck: no hemodynamically significant stenosis, multiple nodules in the b/l lobes of the thyroid compatible with a multinodular thyroid. Ultrasound of the thyroid may be helpful for further evaluation on a nonemergent basis  Given: IV Ofirmev x1, Percocet x1   (11 Jan 2024 20:58)      Interval History -reported intermittent  diplopia    Patient is seen, chart was reviewed and case was discussed with the treatment team.  Pt is not in any distress.   Lying on bed comfortably.   No events reported overnight.       Vital Signs Last 24 Hrs  T(C): 36.9 (16 Jan 2024 11:50), Max: 36.9 (16 Jan 2024 11:50)  T(F): 98.5 (16 Jan 2024 11:50), Max: 98.5 (16 Jan 2024 11:50)  HR: 62 (16 Jan 2024 11:50) (62 - 63)  BP: 125/73 (16 Jan 2024 11:50) (118/68 - 141/84)  BP(mean): --  RR: 18 (16 Jan 2024 11:50) (18 - 19)  SpO2: 98% (16 Jan 2024 11:50) (95% - 98%)    Parameters below as of 16 Jan 2024 11:50  Patient On (Oxygen Delivery Method): room air                REVIEW OF SYSTEMS:    Constitutional: No fever, weight loss or fatigue  Eyes: No eye pain, visual disturbances, or discharge  ENT:  No difficulty hearing, tinnitus, vertigo; No sinus or throat pain  Neck: No pain or stiffness  Respiratory: No cough, wheezing, chills or hemoptysis  Cardiovascular: No chest pain, palpitations, shortness of breath, dizziness or leg swelling  Gastrointestinal: No abdominal or epigastric pain. No nausea, vomiting or hematemesis;   Genitourinary: No dysuria, frequency, hematuria or incontinence  Neurological: No headaches, memory loss, loss of strength, numbness or tremors  Psychiatric: No depression, anxiety, mood swings or difficulty sleeping  Musculoskeletal: No joint pain or swelling; No muscle, back or extremity pain  Skin: No itching, burning, rashes or lesions   Lymph Nodes: No enlarged glands  Endocrine: No heat or cold intolerance; No hair loss,  Allergy and Immunologic: No hives or eczema    On Neurological Examination:    Mental Status - Pt is alert, awake, oriented X3.. Follows commands well and able to answer questions appropriately.Mood and affect  normal    Speech -  Normal.     Cranial Nerves - Pupils 3 mm equal and reactive to light, ophthalmoplegia   Pt has no visual field deficit.  Pt has no  facial asymmetry. Facial sensation is intact.Tongue - is in midline.    Muscle tone - is normal all over      Motor Exam - 4+/5 all over, No drift. No shaking or tremors.    Sensory Exam - P. Pt withdraws all extremities equally on stimulation. No asymmetry seen. No complaints of tingling, numbness.    coordination:    Finger to nose: normal      Deep tendon Reflexes - 2 plus all over.        Romberg - Negative.    Neck Supple -  Yes.     MEDICATIONS  (STANDING):  aspirin enteric coated 81 milliGRAM(s) Oral daily  atorvastatin 40 milliGRAM(s) Oral at bedtime  diphenoxylate/atropine 1 Tablet(s) Oral two times a day  enoxaparin Injectable 40 milliGRAM(s) SubCutaneous every 24 hours  escitalopram 10 milliGRAM(s) Oral daily  gabapentin 300 milliGRAM(s) Oral two times a day  lamoTRIgine 200 milliGRAM(s) Oral daily  lamoTRIgine 100 milliGRAM(s) Oral at bedtime  lithium 300 milliGRAM(s) Oral two times a day  metoprolol succinate ER 25 milliGRAM(s) Oral daily  pantoprazole    Tablet 40 milliGRAM(s) Oral before breakfast  sodium chloride 0.9%. 1000 milliLiter(s) (50 mL/Hr) IV Continuous <Continuous>  topiramate 100 milliGRAM(s) Oral two times a day  ziprasidone 80 milliGRAM(s) Oral <User Schedule>    MEDICATIONS  (PRN):  acetaminophen     Tablet .. 650 milliGRAM(s) Oral every 6 hours PRN Mild Pain (1 - 3)  SUMAtriptan 100 milliGRAM(s) Oral daily PRN Migraine  zolpidem 5 milliGRAM(s) Oral at bedtime PRN Insomnia        Allergies    penicillins (Rash)  sulfa drugs (Rash)    Intolerances                            12.1   6.95  )-----------( 221      ( 16 Jan 2024 08:25 )             39.4       Hemoglobin A1C:     Vitamin B12     RADIOLOGY    ASSESSMENT AND PLAN:      seen for slurred speech/diplopia/trouble in walking   likely brain stem cva clinically  brain mri-negative for acute/subacute  cva    continue ap/statin  for MBS  speech eval  Physical therapy evaluation.  OOB to chair/ambulation with assistance only.  Pain is accessed and addressed.  Would continue to follow.

## 2024-01-16 NOTE — PROGRESS NOTE ADULT - PROBLEM SELECTOR PLAN 5
- Increase home Atorvastatin from 10mg to 40mg qhs  - Continue ASA 81mg qd
- Increased home Atorvastatin from 10mg to 40mg qhs  - Continue ASA 81mg qd
- Increase home Atorvastatin from 10mg to 40mg qhs  - Continue ASA 81mg qd
- Increased home Atorvastatin from 10mg to 40mg qhs  - Continue ASA 81mg qd
- Continue home Metoprolol with hold parameters, out of window for permissive hypertension

## 2024-01-16 NOTE — PROGRESS NOTE ADULT - PROBLEM SELECTOR PLAN 1
noted with globus sensation during swallowing  - Swallow eval noted below  - GI consulted (Dr. Traore) on teams per speech rec, pending recs  - diet advanced to regular with thin liquid

## 2024-01-16 NOTE — PROGRESS NOTE ADULT - PROBLEM SELECTOR PLAN 7
- Continue home Pantoprazole
Multiple nodules in the b/l lobes of the thyroid compatible with a multinodular thyroid found on CT. Ultrasound of the thyroid may be helpful for further evaluation on a nonemergent basis.   - Outpatient follow up
- Continue home Pantoprazole

## 2024-01-16 NOTE — SWALLOW VFSS/MBS ASSESSMENT ADULT - ROSENBEK'S PENETRATION ASPIRATION SCALE
(1) no aspiration, contrast does not enter airway 4 trials; 1= no aspiration, contrast does not enter the airway for 1 trial/(2) contrast enters airway, remains above the vocal cords, no residue remains (penetration)

## 2024-01-16 NOTE — PROGRESS NOTE ADULT - PROBLEM SELECTOR PLAN 6
- Increased home Atorvastatin from 10mg to 40mg qhs  - Continue ASA 81mg qd
Multiple nodules in the b/l lobes of the thyroid compatible with a multinodular thyroid found on CT. Ultrasound of the thyroid may be helpful for further evaluation on a nonemergent basis.   - Outpatient follow up
Multiple nodules in the b/l lobes of the thyroid compatible with a multinodular thyroid found on CT. Ultrasound of the thyroid may be helpful for further evaluation on a nonemergent basis.   - Outpatient follow up
Multiple nodules in the b/l lobes of the thyroid compatible with a multinodular thyroid found on CT. Ultrasound of the thyroid may be helpful for further evaluation on a nonemergent basis.   - F/u TSH  - Outpatient follow up
Multiple nodules in the b/l lobes of the thyroid compatible with a multinodular thyroid found on CT. Ultrasound of the thyroid may be helpful for further evaluation on a nonemergent basis.   - Outpatient follow up

## 2024-01-16 NOTE — SWALLOW VFSS/MBS ASSESSMENT ADULT - DIAGNOSTIC IMPRESSIONS
The pt presented with mild oral dysphagia for puree and regular solids marked by reduced lingual ROM/strength resulting in reduced bolus formation/coordination and posterior loss of the bolus to the oropharynx. Oral stage of the swallow was WFL for thin liquids.     The pt presented mild pharyngeal dysphagia for thin liquids marked by reduced hyolaryngeal excursion/elevation resulting in trace penetration during the swallow with retrieval for single cup sips and consecutive cup sips. No aspiration was appreciated with thin liquids. No penetration or aspiration viewed with puree solids or regular solids. No residue noted across trials administered.

## 2024-01-16 NOTE — PROGRESS NOTE ADULT - PROBLEM SELECTOR PROBLEM 2
Bipolar affective disorder
Bipolar affective disorder
CVA (cerebrovascular accident)
Bipolar affective disorder
Bipolar affective disorder

## 2024-01-16 NOTE — CASE MANAGEMENT PROGRESS NOTE - NSCMPROGRESSNOTE_GEN_ALL_CORE
Discussed patient on rounds this morning and chart reviewed. PT progress note from today- no skilled PT needs or DME recommended. Per MD note, discharge is pending GI eval and neuro clearance. CM remains available.

## 2024-01-16 NOTE — PROGRESS NOTE ADULT - PROBLEM SELECTOR PLAN 2
- Continue home Lamictal, Topamax, Lexapro, Lithium, Ziprasidone, Ambien  - fu lithium level
- Continue home Lamictal, Topamax, Lexapro, Lithium, Ziprasidone, Ambien
- Continue home Lamictal, Topamax, Lexapro, Lithium, Ziprasidone, Ambien
- facial droop, word finding difficulty, gait instability x 2 weeks    - CT head: negative  - CT angio: negative  - Continue ASA and increased statin dose   - Neuro checks q4h  - Fall and aspiration precautions  - TTE ejection fraction visually estimated at 55 %.  - MRI head negative  - Neurology (Dr. Samuels) consulted, recs appreciated  - PT evaluation - home PT  - s/p MBS - diet advanced
- Continue home Lamictal, Topamax, Lexapro, Lithium, Ziprasidone, Ambien

## 2024-01-16 NOTE — PROGRESS NOTE ADULT - SUBJECTIVE AND OBJECTIVE BOX
Patient is a 68y old  Female who presents with a chief complaint of r/o CVA (15 Pacheco 2024 14:23)    Patient seen in follow up for abnormal renal function.        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, bilateral    Migraines    Pill rolling tremors    Pneumonia      MEDICATIONS  (STANDING):  aspirin enteric coated 81 milliGRAM(s) Oral daily  atorvastatin 40 milliGRAM(s) Oral at bedtime  diphenoxylate/atropine 1 Tablet(s) Oral two times a day  enoxaparin Injectable 40 milliGRAM(s) SubCutaneous every 24 hours  escitalopram 10 milliGRAM(s) Oral daily  gabapentin 300 milliGRAM(s) Oral two times a day  lamoTRIgine 100 milliGRAM(s) Oral at bedtime  lamoTRIgine 200 milliGRAM(s) Oral daily  lithium 300 milliGRAM(s) Oral two times a day  metoprolol succinate ER 25 milliGRAM(s) Oral daily  pantoprazole    Tablet 40 milliGRAM(s) Oral before breakfast  sodium chloride 0.9%. 1000 milliLiter(s) (50 mL/Hr) IV Continuous <Continuous>  topiramate 100 milliGRAM(s) Oral two times a day  ziprasidone 80 milliGRAM(s) Oral <User Schedule>    MEDICATIONS  (PRN):  acetaminophen     Tablet .. 650 milliGRAM(s) Oral every 6 hours PRN Mild Pain (1 - 3)  SUMAtriptan 100 milliGRAM(s) Oral daily PRN Migraine  zolpidem 5 milliGRAM(s) Oral at bedtime PRN Insomnia    T(C): 36.5 (24 @ 05:44), Max: 36.7 (01-15-24 @ 04:49)  HR: 63 (24 @ 05:44) (55 - 78)  BP: 118/68 (24 @ 05:44) (118/68 - 149/74)  RR: 18 (24 @ 05:44) (18 - 20)  SpO2: 95% (24 @ 05:44) (92% - 98%)  Wt(kg): --  I&O's Detail      PHYSICAL EXAM:  General: No distress  Respiratory: b/l air entry  Cardiovascular: S1 S2  Gastrointestinal: soft  Extremities:  no edema                              12.1   6.95  )-----------( 221      ( 2024 08:25 )             39.4         144  |  119<H>  |  11  ----------------------------<  112<H>  4.2   |  23  |  1.10    Ca    8.6      2024 08:25    TPro  x   /  Alb  2.9<L>  /  TBili  x   /  DBili  x   /  AST  16  /  ALT  23  /  AlkPhos  x       CARDIAC MARKERS ( 15 Pacheco 2024 11:28 )  x     / x     / 46 U/L / x     / x          LIVER FUNCTIONS - ( 2024 08:25 )  Alb: 2.9 g/dL / Pro: x     / ALK PHOS: x     / ALT: 23 U/L / AST: 16 U/L / GGT: x           Urinalysis Basic - ( 2024 09:00 )    Color: Yellow / Appearance: Clear / S.005 / pH: x  Gluc: x / Ketone: Negative mg/dL  / Bili: Negative / Urobili: 0.2 mg/dL   Blood: x / Protein: Negative mg/dL / Nitrite: Negative   Leuk Esterase: Negative / RBC: x / WBC x   Sq Epi: x / Non Sq Epi: x / Bacteria: x        Sodium, Serum: 144 ( @ 08:25)  Sodium, Serum: 142 (01-15 @ 11:28)  Sodium, Serum: 142 (01-15 @ 07:32)  Sodium, Serum: 138 ( @ 09:00)    Creatinine, Serum: 1.10 ( @ 08:25)  Creatinine, Serum: 1.30 (01-15 @ 11:28)  Creatinine, Serum: 5.00 (01-15 @ 07:32)  Creatinine, Serum: 1.60 ( @ 09:00)  Creatinine, Serum: 1.10 ( @ 07:55)  Creatinine, Serum: 1.30 ( @ 11:00)    Potassium, Serum: 4.2 ( @ 08:25)  Potassium, Serum: 4.1 (01-15 @ 11:28)  Potassium, Serum: 3.8 (01-15 @ 07:32)  Potassium, Serum: 4.1 ( @ 09:00)    Hemoglobin: 12.1 ( @ 08:25)  Hemoglobin: 11.3 (01-15 @ 07:32)  Hemoglobin: 12.3 ( @ 09:00)  Hemoglobin: 12.7 ( @ 07:55)      < from: US Kidney and Bladder (01.15.24 @ 12:23) >    ACC: [id number] EXAM:  US KIDNEYS AND BLADDER   ORDERED BY: MONTANA MENON     PROCEDURE DATE:  01/15/2024          INTERPRETATION:  CLINICAL INFORMATION: Acute renal insufficiency.    COMPARISON: Abdominal CT dated 2020, abdominal MRI dated 10/19/2020   and renal ultrasound dated 10/17/2020    TECHNIQUE: Sonography of the kidneys and bladder.    FINDINGS:  Right kidney: 8.1 cm. No suspicious renal mass, hydronephrosis or   calculi. There is 8 mm simple appearing cyst in the midpole. Mild   increase cortical echogenicity.    Left kidney: 8.4 cm. No renal mass, hydronephrosis or calculi. Mild   increase cortical echogenicity.    Urinary bladder: Within normal limits. There is no significant postvoid   residual (15 cc)    IMPRESSION:  No hydronephrosis.    Mild increase renal cortical echogenicity suggestive of medical renal   disease.        --- End of Report ---            CHARISSE JHAVERI MD; Attending Radiologist  This document has been electronically signed. Pacheco 15 2024 12:26PM    < end of copied text >           Patient is a 68y old  Female who presents with a chief complaint of r/o CVA (15 Pacheco 2024 14:23)    Patient seen in follow up for abnormal renal function.        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, bilateral    Migraines    Pill rolling tremors    Pneumonia      MEDICATIONS  (STANDING):  aspirin enteric coated 81 milliGRAM(s) Oral daily  atorvastatin 40 milliGRAM(s) Oral at bedtime  diphenoxylate/atropine 1 Tablet(s) Oral two times a day  enoxaparin Injectable 40 milliGRAM(s) SubCutaneous every 24 hours  escitalopram 10 milliGRAM(s) Oral daily  gabapentin 300 milliGRAM(s) Oral two times a day  lamoTRIgine 100 milliGRAM(s) Oral at bedtime  lamoTRIgine 200 milliGRAM(s) Oral daily  lithium 300 milliGRAM(s) Oral two times a day  metoprolol succinate ER 25 milliGRAM(s) Oral daily  pantoprazole    Tablet 40 milliGRAM(s) Oral before breakfast  sodium chloride 0.9%. 1000 milliLiter(s) (50 mL/Hr) IV Continuous <Continuous>  topiramate 100 milliGRAM(s) Oral two times a day  ziprasidone 80 milliGRAM(s) Oral <User Schedule>    MEDICATIONS  (PRN):  acetaminophen     Tablet .. 650 milliGRAM(s) Oral every 6 hours PRN Mild Pain (1 - 3)  SUMAtriptan 100 milliGRAM(s) Oral daily PRN Migraine  zolpidem 5 milliGRAM(s) Oral at bedtime PRN Insomnia    T(C): 36.5 (24 @ 05:44), Max: 36.7 (01-15-24 @ 04:49)  HR: 63 (24 @ 05:44) (55 - 78)  BP: 118/68 (24 @ 05:44) (118/68 - 149/74)  RR: 18 (24 @ 05:44) (18 - 20)  SpO2: 95% (24 @ 05:44) (92% - 98%)  Wt(kg): --  I&O's Detail      PHYSICAL EXAM:  General: No distress  Respiratory: b/l air entry  Cardiovascular: S1 S2  Gastrointestinal: soft  Extremities:  no edema                              12.1   6.95  )-----------( 221      ( 2024 08:25 )             39.4         144  |  119<H>  |  11  ----------------------------<  112<H>  4.2   |  23  |  1.10    Ca    8.6      2024 08:25    TPro  x   /  Alb  2.9<L>  /  TBili  x   /  DBili  x   /  AST  16  /  ALT  23  /  AlkPhos  x       CARDIAC MARKERS ( 15 Pacheco 2024 11:28 )  x     / x     / 46 U/L / x     / x          LIVER FUNCTIONS - ( 2024 08:25 )  Alb: 2.9 g/dL / Pro: x     / ALK PHOS: x     / ALT: 23 U/L / AST: 16 U/L / GGT: x           Urinalysis Basic - ( 2024 09:00 )    Color: Yellow / Appearance: Clear / S.005 / pH: x  Gluc: x / Ketone: Negative mg/dL  / Bili: Negative / Urobili: 0.2 mg/dL   Blood: x / Protein: Negative mg/dL / Nitrite: Negative   Leuk Esterase: Negative / RBC: x / WBC x   Sq Epi: x / Non Sq Epi: x / Bacteria: x        Sodium, Serum: 144 ( @ 08:25)  Sodium, Serum: 142 (01-15 @ 11:28)  Sodium, Serum: 142 (01-15 @ 07:32)  Sodium, Serum: 138 ( @ 09:00)    Creatinine, Serum: 1.10 ( @ 08:25)  Creatinine, Serum: 1.30 (01-15 @ 11:28)  Creatinine, Serum: 5.00 (01-15 @ 07:32)  Creatinine, Serum: 1.60 ( @ 09:00)  Creatinine, Serum: 1.10 ( @ 07:55)  Creatinine, Serum: 1.30 ( @ 11:00)    Potassium, Serum: 4.2 ( @ 08:25)  Potassium, Serum: 4.1 (01-15 @ 11:28)  Potassium, Serum: 3.8 (01-15 @ 07:32)  Potassium, Serum: 4.1 ( @ 09:00)    Hemoglobin: 12.1 ( @ 08:25)  Hemoglobin: 11.3 (01-15 @ 07:32)  Hemoglobin: 12.3 ( @ 09:00)  Hemoglobin: 12.7 ( @ 07:55)      < from: US Kidney and Bladder (01.15.24 @ 12:23) >    ACC: [id number] EXAM:  US KIDNEYS AND BLADDER   ORDERED BY: MONTANA MENON     PROCEDURE DATE:  01/15/2024          INTERPRETATION:  CLINICAL INFORMATION: Acute renal insufficiency.    COMPARISON: Abdominal CT dated 2020, abdominal MRI dated 10/19/2020   and renal ultrasound dated 10/17/2020    TECHNIQUE: Sonography of the kidneys and bladder.    FINDINGS:  Right kidney: 8.1 cm. No suspicious renal mass, hydronephrosis or   calculi. There is 8 mm simple appearing cyst in the midpole. Mild   increase cortical echogenicity.    Left kidney: 8.4 cm. No renal mass, hydronephrosis or calculi. Mild   increase cortical echogenicity.    Urinary bladder: Within normal limits. There is no significant postvoid   residual (15 cc)    IMPRESSION:  No hydronephrosis.    Mild increase renal cortical echogenicity suggestive of medical renal   disease.        --- End of Report ---            CHARISSE JHAVERI MD; Attending Radiologist  This document has been electronically signed. Pacheco 15 2024 12:26PM    < end of copied text >

## 2024-01-16 NOTE — PROGRESS NOTE ADULT - PROBLEM SELECTOR PROBLEM 8
Need for prophylactic measure
GERD (gastroesophageal reflux disease)
Need for prophylactic measure

## 2024-01-16 NOTE — PROGRESS NOTE ADULT - PROBLEM SELECTOR PROBLEM 1
Dysphagia
CVA (cerebrovascular accident)

## 2024-01-16 NOTE — PROGRESS NOTE ADULT - PROBLEM SELECTOR PLAN 8
- DVT ppx: Lovenox
- Continue home Pantoprazole

## 2024-01-16 NOTE — PHARMACOTHERAPY INTERVENTION NOTE - COMMENTS
patient thinks her aphasia/neurologic symptoms was caused by lithium toxicity caused by short course corticosteroids

## 2024-01-16 NOTE — PROGRESS NOTE ADULT - SUBJECTIVE AND OBJECTIVE BOX
Guero Schneider M.D.    Patient is a 68y old  Female who presents with a chief complaint of r/o CVA (2024 10:12)      SUBJECTIVE / OVERNIGHT EVENTS: no concerns.     Patient denies chest pain, SOB, abd pain, N/V, fever, chills, dysuria or any other complaints. All remainder ROS negative.     MEDICATIONS  (STANDING):  aspirin enteric coated 81 milliGRAM(s) Oral daily  atorvastatin 40 milliGRAM(s) Oral at bedtime  diphenoxylate/atropine 1 Tablet(s) Oral two times a day  enoxaparin Injectable 40 milliGRAM(s) SubCutaneous every 24 hours  escitalopram 10 milliGRAM(s) Oral daily  gabapentin 300 milliGRAM(s) Oral two times a day  lamoTRIgine 200 milliGRAM(s) Oral daily  lamoTRIgine 100 milliGRAM(s) Oral at bedtime  lithium 300 milliGRAM(s) Oral two times a day  metoprolol succinate ER 25 milliGRAM(s) Oral daily  pantoprazole    Tablet 40 milliGRAM(s) Oral before breakfast  sodium chloride 0.9%. 1000 milliLiter(s) (50 mL/Hr) IV Continuous <Continuous>  topiramate 100 milliGRAM(s) Oral two times a day  ziprasidone 80 milliGRAM(s) Oral <User Schedule>    MEDICATIONS  (PRN):  acetaminophen     Tablet .. 650 milliGRAM(s) Oral every 6 hours PRN Mild Pain (1 - 3)  SUMAtriptan 100 milliGRAM(s) Oral daily PRN Migraine  zolpidem 5 milliGRAM(s) Oral at bedtime PRN Insomnia      I&O's Summary      PHYSICAL EXAM:  Vital Signs Last 24 Hrs  T(C): 36.9 (2024 11:50), Max: 36.9 (2024 11:50)  T(F): 98.5 (2024 11:50), Max: 98.5 (2024 11:50)  HR: 62 (2024 11:50) (62 - 63)  BP: 125/73 (2024 11:50) (118/68 - 141/84)  BP(mean): --  RR: 18 (2024 11:50) (18 - 19)  SpO2: 98% (2024 11:50) (95% - 98%)    Parameters below as of 2024 11:50  Patient On (Oxygen Delivery Method): room air        CONSTITUTIONAL: NAD, well-groomed  ENMT: Moist oral mucosa, no pharyngeal injection or exudates; normal dentition  RESPIRATORY: Normal respiratory effort; lungs are clear to auscultation bilaterally  CARDIOVASCULAR: Regular rate and rhythm; No lower extremity edema  ABDOMEN: Nontender to palpation, normoactive bowel sounds  PSYCH: A+O x3; affect appropriate  NEUROLOGY: CN 2-12 are intact and symmetric; no gross sensory deficits;   SKIN: No rashes; no palpable lesions    LABS:                        12.1   6.95  )-----------( 221      ( 2024 08:25 )             39.4     01-16    144  |  119<H>  |  11  ----------------------------<  112<H>  4.2   |  23  |  1.10    Ca    8.6      2024 08:25    TPro  6.4  /  Alb  2.9<L>  /  TBili  0.2  /  DBili  x   /  AST  16  /  ALT  23  /  AlkPhos  350<H>  -      CARDIAC MARKERS ( 15 Pacheco 2024 11:28 )  x     / x     / 46 U/L / x     / x          Urinalysis Basic - ( 2024 09:00 )    Color: Yellow / Appearance: Clear / S.005 / pH: x  Gluc: x / Ketone: Negative mg/dL  / Bili: Negative / Urobili: 0.2 mg/dL   Blood: x / Protein: Negative mg/dL / Nitrite: Negative   Leuk Esterase: Negative / RBC: x / WBC x   Sq Epi: x / Non Sq Epi: x / Bacteria: x        Culture - Stool (collected 2024 20:20)  Source: .Stool Feces  Preliminary Report (15 Pacheco 2024 18:28):    No enteric pathogens to date: Final culture pending      CAPILLARY BLOOD GLUCOSE          RADIOLOGY & ADDITIONAL TESTS:  Results Reviewed:   Imaging Personally Reviewed:  Electrocardiogram Personally Reviewed:

## 2024-01-16 NOTE — PHARMACOTHERAPY INTERVENTION NOTE - NSPHARMCOMMPTEDUFT
[facility] admission counseling of new and current meds , indications, side effects [facility] admission counseling of new and current meds , indications, side effects

## 2024-01-16 NOTE — PROGRESS NOTE ADULT - PROBLEM SELECTOR PROBLEM 7
GERD (gastroesophageal reflux disease)
Imaging abnormality

## 2024-01-16 NOTE — SWALLOW VFSS/MBS ASSESSMENT ADULT - COMMENTS
Patient was received sitting in the [facility] chair in the lateral plane. Patient was awake, alert, in NAD, on RA. The pt was able to provide pertinent medical hx. She was able to follow directions provided during the study. Following the study, she was awake, alert, and in NAD. Patient was received sitting in the [facility] Radiology chair in the lateral plane. Patient was awake, alert, in NAD, on RA. The pt was able to provide pertinent medical hx. She was able to follow directions provided during the study. Following the study, she was awake, alert, and in NAD. Patient was received sitting in the [facility] Radiology chair in the lateral plane. Patient was awake, alert, in NAD, on RA. The pt was able to provide pertinent medical hx. She reported an esophageal dilation in October of 2023. She was able to follow directions provided during the study. Following the study, she was awake, alert, and in NAD. Patient was received sitting in the [facility] Radiology chair in the lateral plane. Patient was awake, alert, in NAD, on RA. The pt was able to provide pertinent medical hx. She reported an esophageal dilation in October of 2023. She was able to follow directions provided during the study. Following the study, she was awake, alert, and in NAD. Patient was received sitting in the [facility] Radiology chair in the lateral plane. Patient was awake, alert, in NAD, on RA. The pt was able to provide pertinent medical hx. She reported an esophageal dilation in October of 2023. She was able to follow directions provided during the study. She endorsed occasional word finding deficits. Pt was seen on 1/15/24 by this service for a speech and language evaluation as well as a swallowing evaluation at which time a soft and bite sized diet with thin liquids and speech therapy were recommended. Following today's study, the pt was awake, alert, and in NAD. Patient was received sitting in the [facility] Radiology chair in the lateral plane. Patient was awake, alert, in NAD, on RA. The pt was able to provide pertinent medical hx. She reported an esophageal dilation in October of 2023. She was able to follow directions provided during the study. She endorsed occasional word finding deficits. Pt was seen on 1/15/24 by this service for a speech and language evaluation as well as a swallowing evaluation at which time a soft and bite sized diet with thin liquids and speech therapy were recommended. Following today's study, the pt was awake, alert, and in NAD.

## 2024-01-16 NOTE — PROGRESS NOTE ADULT - PROBLEM SELECTOR PLAN 4
- Continue home Sumatriptan PRN
- Continue home Metoprolol with hold parameters, out of window for permissive hypertension

## 2024-01-16 NOTE — PROGRESS NOTE ADULT - PROBLEM SELECTOR PLAN 3
- Continue home Sumatriptan PRN
- Continue home Lamictal, Topamax, Lexapro, Lithium, Ziprasidone, Ambien
- Continue home Sumatriptan PRN

## 2024-01-16 NOTE — PROGRESS NOTE ADULT - ASSESSMENT
MANE: Unclear etiology: Doubt contrast nephropathy, ? Ischemic ATN. R/o lab error.   Hypertension  CVA  h/o Bipolar disorder    01/15/24: Will get kidney and bladder sonogram. Check CPK, Lithium level. IV hydration. Recheck labs. Avoid nephrotoxic meds as possible. Avoid ACEI, ARB, NSAIDs and IV contrast.   Strict I & O. Will follow electrolytes and renal function trend.   01/16/24: abnormal renal function secondary to lab/collection error. Renal sonogram results noted. To continue current meds. Will sign off. Please recall if needed. Thank you. 
68 year old female PMHx Hypertension, Bipolar Affective Disorder, Migraines, Tardive Dyskinesia presents to ED from home c/o neurological symptoms x two weeks admitted for r/o CVA.

## 2024-01-16 NOTE — SWALLOW VFSS/MBS ASSESSMENT ADULT - THE ABOVE FINDINGS WERE DISCUSSED WITH
The pt provided verbal understanding following education provided regarding evaluation results./Physician/Nursing/Patient

## 2024-01-17 ENCOUNTER — TRANSCRIPTION ENCOUNTER (OUTPATIENT)
Age: 69
End: 2024-01-17

## 2024-01-17 ENCOUNTER — APPOINTMENT (OUTPATIENT)
Dept: NEUROLOGY | Facility: CLINIC | Age: 69
End: 2024-01-17

## 2024-01-17 VITALS
SYSTOLIC BLOOD PRESSURE: 134 MMHG | RESPIRATION RATE: 17 BRPM | DIASTOLIC BLOOD PRESSURE: 68 MMHG | TEMPERATURE: 98 F | OXYGEN SATURATION: 97 % | HEART RATE: 62 BPM

## 2024-01-17 LAB
ANION GAP SERPL CALC-SCNC: 4 MMOL/L — LOW (ref 5–17)
BUN SERPL-MCNC: 13 MG/DL — SIGNIFICANT CHANGE UP (ref 7–23)
CALCIUM SERPL-MCNC: 8.8 MG/DL — SIGNIFICANT CHANGE UP (ref 8.5–10.1)
CHLORIDE SERPL-SCNC: 113 MMOL/L — HIGH (ref 96–108)
CO2 SERPL-SCNC: 23 MMOL/L — SIGNIFICANT CHANGE UP (ref 22–31)
CREAT SERPL-MCNC: 1.2 MG/DL — SIGNIFICANT CHANGE UP (ref 0.5–1.3)
CULTURE RESULTS: SIGNIFICANT CHANGE UP
EGFR: 49 ML/MIN/1.73M2 — LOW
GLUCOSE SERPL-MCNC: 113 MG/DL — HIGH (ref 70–99)
HCT VFR BLD CALC: 37.2 % — SIGNIFICANT CHANGE UP (ref 34.5–45)
HGB BLD-MCNC: 11.6 G/DL — SIGNIFICANT CHANGE UP (ref 11.5–15.5)
MCHC RBC-ENTMCNC: 29.6 PG — SIGNIFICANT CHANGE UP (ref 27–34)
MCHC RBC-ENTMCNC: 31.2 GM/DL — LOW (ref 32–36)
MCV RBC AUTO: 94.9 FL — SIGNIFICANT CHANGE UP (ref 80–100)
NRBC # BLD: 0 /100 WBCS — SIGNIFICANT CHANGE UP (ref 0–0)
PLATELET # BLD AUTO: 192 K/UL — SIGNIFICANT CHANGE UP (ref 150–400)
POTASSIUM SERPL-MCNC: 3.8 MMOL/L — SIGNIFICANT CHANGE UP (ref 3.5–5.3)
POTASSIUM SERPL-SCNC: 3.8 MMOL/L — SIGNIFICANT CHANGE UP (ref 3.5–5.3)
RBC # BLD: 3.92 M/UL — SIGNIFICANT CHANGE UP (ref 3.8–5.2)
RBC # FLD: 14 % — SIGNIFICANT CHANGE UP (ref 10.3–14.5)
SODIUM SERPL-SCNC: 140 MMOL/L — SIGNIFICANT CHANGE UP (ref 135–145)
SPECIMEN SOURCE: SIGNIFICANT CHANGE UP
WBC # BLD: 6.04 K/UL — SIGNIFICANT CHANGE UP (ref 3.8–10.5)
WBC # FLD AUTO: 6.04 K/UL — SIGNIFICANT CHANGE UP (ref 3.8–10.5)

## 2024-01-17 PROCEDURE — 83036 HEMOGLOBIN GLYCOSYLATED A1C: CPT

## 2024-01-17 PROCEDURE — 85025 COMPLETE CBC W/AUTO DIFF WBC: CPT

## 2024-01-17 PROCEDURE — 99239 HOSP IP/OBS DSCHRG MGMT >30: CPT

## 2024-01-17 PROCEDURE — 87086 URINE CULTURE/COLONY COUNT: CPT

## 2024-01-17 PROCEDURE — 97535 SELF CARE MNGMENT TRAINING: CPT

## 2024-01-17 PROCEDURE — 92610 EVALUATE SWALLOWING FUNCTION: CPT

## 2024-01-17 PROCEDURE — 85027 COMPLETE CBC AUTOMATED: CPT

## 2024-01-17 PROCEDURE — A9698: CPT

## 2024-01-17 PROCEDURE — 92523 SPEECH SOUND LANG COMPREHEN: CPT

## 2024-01-17 PROCEDURE — 93005 ELECTROCARDIOGRAM TRACING: CPT

## 2024-01-17 PROCEDURE — 81003 URINALYSIS AUTO W/O SCOPE: CPT

## 2024-01-17 PROCEDURE — 84484 ASSAY OF TROPONIN QUANT: CPT

## 2024-01-17 PROCEDURE — 87507 IADNA-DNA/RNA PROBE TQ 12-25: CPT

## 2024-01-17 PROCEDURE — 80048 BASIC METABOLIC PNL TOTAL CA: CPT

## 2024-01-17 PROCEDURE — 97116 GAIT TRAINING THERAPY: CPT

## 2024-01-17 PROCEDURE — 87046 STOOL CULTR AEROBIC BACT EA: CPT

## 2024-01-17 PROCEDURE — 36415 COLL VENOUS BLD VENIPUNCTURE: CPT

## 2024-01-17 PROCEDURE — 82962 GLUCOSE BLOOD TEST: CPT

## 2024-01-17 PROCEDURE — 80061 LIPID PANEL: CPT

## 2024-01-17 PROCEDURE — 80178 ASSAY OF LITHIUM: CPT

## 2024-01-17 PROCEDURE — 93306 TTE W/DOPPLER COMPLETE: CPT

## 2024-01-17 PROCEDURE — 85730 THROMBOPLASTIN TIME PARTIAL: CPT

## 2024-01-17 PROCEDURE — 97162 PT EVAL MOD COMPLEX 30 MIN: CPT

## 2024-01-17 PROCEDURE — 96374 THER/PROPH/DIAG INJ IV PUSH: CPT

## 2024-01-17 PROCEDURE — 71045 X-RAY EXAM CHEST 1 VIEW: CPT

## 2024-01-17 PROCEDURE — 92611 MOTION FLUOROSCOPY/SWALLOW: CPT

## 2024-01-17 PROCEDURE — 82550 ASSAY OF CK (CPK): CPT

## 2024-01-17 PROCEDURE — 70450 CT HEAD/BRAIN W/O DYE: CPT | Mod: MA

## 2024-01-17 PROCEDURE — 85610 PROTHROMBIN TIME: CPT

## 2024-01-17 PROCEDURE — 99285 EMERGENCY DEPT VISIT HI MDM: CPT | Mod: 25

## 2024-01-17 PROCEDURE — 80053 COMPREHEN METABOLIC PANEL: CPT

## 2024-01-17 PROCEDURE — 97165 OT EVAL LOW COMPLEX 30 MIN: CPT

## 2024-01-17 PROCEDURE — 87045 FECES CULTURE AEROBIC BACT: CPT

## 2024-01-17 PROCEDURE — 80201 ASSAY OF TOPIRAMATE: CPT

## 2024-01-17 PROCEDURE — 70551 MRI BRAIN STEM W/O DYE: CPT

## 2024-01-17 PROCEDURE — 80175 DRUG SCREEN QUAN LAMOTRIGINE: CPT

## 2024-01-17 PROCEDURE — 70498 CT ANGIOGRAPHY NECK: CPT | Mod: MA

## 2024-01-17 PROCEDURE — 76770 US EXAM ABDO BACK WALL COMP: CPT

## 2024-01-17 PROCEDURE — 84443 ASSAY THYROID STIM HORMONE: CPT

## 2024-01-17 PROCEDURE — 70496 CT ANGIOGRAPHY HEAD: CPT | Mod: MA

## 2024-01-17 PROCEDURE — 74230 X-RAY XM SWLNG FUNCJ C+: CPT

## 2024-01-17 RX ORDER — ATORVASTATIN CALCIUM 80 MG/1
1 TABLET, FILM COATED ORAL
Qty: 30 | Refills: 0
Start: 2024-01-17 | End: 2024-02-15

## 2024-01-17 RX ORDER — ATORVASTATIN CALCIUM 80 MG/1
1 TABLET, FILM COATED ORAL
Qty: 0 | Refills: 0 | DISCHARGE

## 2024-01-17 RX ORDER — ASPIRIN/CALCIUM CARB/MAGNESIUM 324 MG
1 TABLET ORAL
Qty: 30 | Refills: 0
Start: 2024-01-17 | End: 2024-02-15

## 2024-01-17 RX ORDER — METOPROLOL TARTRATE 50 MG
1 TABLET ORAL
Qty: 0 | Refills: 0 | DISCHARGE

## 2024-01-17 RX ADMIN — Medication 100 MILLIGRAM(S): at 05:52

## 2024-01-17 RX ADMIN — PANTOPRAZOLE SODIUM 40 MILLIGRAM(S): 20 TABLET, DELAYED RELEASE ORAL at 05:52

## 2024-01-17 RX ADMIN — SUMATRIPTAN SUCCINATE 100 MILLIGRAM(S): 4 INJECTION, SOLUTION SUBCUTANEOUS at 10:30

## 2024-01-17 RX ADMIN — GABAPENTIN 300 MILLIGRAM(S): 400 CAPSULE ORAL at 05:52

## 2024-01-17 RX ADMIN — GABAPENTIN 300 MILLIGRAM(S): 400 CAPSULE ORAL at 16:07

## 2024-01-17 RX ADMIN — LITHIUM CARBONATE 300 MILLIGRAM(S): 300 TABLET, EXTENDED RELEASE ORAL at 16:07

## 2024-01-17 RX ADMIN — Medication 100 MILLIGRAM(S): at 16:07

## 2024-01-17 RX ADMIN — LITHIUM CARBONATE 300 MILLIGRAM(S): 300 TABLET, EXTENDED RELEASE ORAL at 06:00

## 2024-01-17 RX ADMIN — Medication 81 MILLIGRAM(S): at 16:07

## 2024-01-17 RX ADMIN — LAMOTRIGINE 200 MILLIGRAM(S): 25 TABLET, ORALLY DISINTEGRATING ORAL at 16:08

## 2024-01-17 RX ADMIN — SUMATRIPTAN SUCCINATE 100 MILLIGRAM(S): 4 INJECTION, SOLUTION SUBCUTANEOUS at 11:30

## 2024-01-17 RX ADMIN — ESCITALOPRAM OXALATE 10 MILLIGRAM(S): 10 TABLET, FILM COATED ORAL at 16:08

## 2024-01-17 NOTE — DISCHARGE NOTE PROVIDER - PROVIDER TOKENS
PROVIDER:[TOKEN:[[id number]],FOLLOWUP:[1 week]],PROVIDER:[TOKEN:[[id number]],FOLLOWUP:[1 week]],PROVIDER:[TOKEN:[[id number]],FOLLOWUP:[1 week]]

## 2024-01-17 NOTE — DISCHARGE NOTE PROVIDER - NSDCMRMEDTOKEN_GEN_ALL_CORE_FT
done... Ambien 10 mg oral tablet: 1.5 tab(s) orally once a day (at bedtime) as needed for  insomnia  aspirin 81 mg oral delayed release tablet: 1 tab(s) orally once a day  atorvastatin 40 mg oral tablet: 1 tab(s) orally once a day (at bedtime)  gabapentin 300 mg oral tablet: 1 tab(s) orally 2 times a day  LaMICtal 200 mg oral tablet: 1 tablet by mouth in the morning and 0.5 tablet by mouth in the evening  Lexapro 10 mg oral tablet: 1 tab(s) orally once a day  lithium 300 mg oral capsule: 1 cap(s) orally 2 times a day  meclizine 12.5 mg oral tablet: 1 tab(s) orally 3 times a day as needed for  dizziness  Metoprolol Succinate ER 25 mg oral tablet, extended release: 1 tab(s) orally once a day  pantoprazole 40 mg oral delayed release tablet: 1 tab(s) orally once a day  SUMAtriptan 100 mg oral tablet: 1 tab(s) orally once as needed for  headache take 1 tab at onset of headache and repeat after 4 hours as needed  topiramate 100 mg oral tablet: 1 tab(s) orally 2 times a day  Zavzpret 10 mg nasal spray: 1 inhaler(s) nasal as needed for  headache  ziprasidone 80 mg oral capsule: 1 capsule by mouth once daily in the evening   Ambien 10 mg oral tablet: 1.5 tab(s) orally once a day (at bedtime) as needed for  insomnia  aspirin 81 mg oral delayed release tablet: 1 tab(s) orally once a day  atorvastatin 40 mg oral tablet: 1 tab(s) orally once a day (at bedtime)  gabapentin 300 mg oral tablet: 1 tab(s) orally 2 times a day  LaMICtal 200 mg oral tablet: 1 tablet by mouth in the morning and 0.5 tablet by mouth in the evening  Lexapro 10 mg oral tablet: 1 tab(s) orally once a day  lithium 300 mg oral capsule: 1 cap(s) orally 2 times a day  meclizine 12.5 mg oral tablet: 1 tab(s) orally 3 times a day as needed for  dizziness  Metoprolol Succinate ER 25 mg oral tablet, extended release: 1 tab(s) orally once a day  pantoprazole 40 mg oral delayed release tablet: 1 tab(s) orally once a day  Speech Therapy: Please evaluate and treat as clinically indicated. ICD 13.10  SUMAtriptan 100 mg oral tablet: 1 tab(s) orally once as needed for  headache take 1 tab at onset of headache and repeat after 4 hours as needed  topiramate 100 mg oral tablet: 1 tab(s) orally 2 times a day  Zavzpret 10 mg nasal spray: 1 inhaler(s) nasal as needed for  headache  ziprasidone 80 mg oral capsule: 1 capsule by mouth once daily in the evening

## 2024-01-17 NOTE — DISCHARGE NOTE PROVIDER - CARE PROVIDER_API CALL
Rafael Samuels  Neurology  [address] [place] [address]  Phone: [phone]  Fax: [phone]  Follow Up Time: 1 week    Perlman, Craig Douglas  Internal Medicine  [address]  [place] [address]  Phone: [phone]  Fax: [phone]  Follow Up Time: 1 week    Tonia Johnson  Ophthalmology  [address] [place] [address]  Phone: [phone]  Fax: [phone]  Follow Up Time: 1 week

## 2024-01-17 NOTE — DISCHARGE NOTE NURSING/CASE MANAGEMENT/SOCIAL WORK - PATIENT PORTAL LINK FT
You can access the FollowMyHealth Patient Portal offered by [facility] by registering at the following website: [email or web address]. By joining [facility]’s FollowMyHealth portal, you will also be able to view your health information using other applications (apps) compatible with our system.

## 2024-01-17 NOTE — PROGRESS NOTE ADULT - SUBJECTIVE AND OBJECTIVE BOX
Neurology Follow up note    DEEPTHI [id number]    HPI:  68 year old female PMHx Hypertension, Bipolar Affective Disorder, Migraines, Tardive Dyskinesia presents to ED from home c/o neurological symptoms x two weeks. Weeks ago, pt had a severe migraine for which she saw her Neuro Dr. Lora and was prescribed a course of steroids which she completed. After completing the steroids, two weeks ago, she began to have word finding difficulties, double vision in her L eye, gait instability resulting in her walking into things. She admits to dizziness, facial asymmetry and trouble with her memory as well. The symptoms have been persistent for two weeks and have not improved. She denies slurred speech, changes in strength/sensation in extremities, bowel/urinary incontinence.   ED course:  Vitals: T 98.2F, HR 75, /75, RR 16, SpO2 100% on RA  Labs: Alk phos 133  EKG: sinus rhythm with 1st degree AV block, LBBB  CT head: negative  CT angio head/neck: no hemodynamically significant stenosis, multiple nodules in the b/l lobes of the thyroid compatible with a multinodular thyroid. Ultrasound of the thyroid may be helpful for further evaluation on a nonemergent basis  Given: IV Ofirmev x1, Percocet x1   (11 Jan 2024 20:58)      Interval History -improving   diplopia    Patient is seen, chart was reviewed and case was discussed with the treatment team.  Pt is not in any distress.   Lying on bed comfortably.   No events reported overnight.       Vital Signs Last 24 Hrs  T(C): 36.8 (17 Jan 2024 11:50), Max: 37.4 (17 Jan 2024 05:09)  T(F): 98.2 (17 Jan 2024 11:50), Max: 99.3 (17 Jan 2024 05:09)  HR: 46 (17 Jan 2024 11:50) (46 - 59)  BP: 135/63 (17 Jan 2024 11:50) (134/73 - 143/68)  BP(mean): --  RR: 18 (17 Jan 2024 11:50) (18 - 18)  SpO2: 98% (17 Jan 2024 11:50) (94% - 98%)    Parameters below as of 17 Jan 2024 11:50  Patient On (Oxygen Delivery Method): room air                    REVIEW OF SYSTEMS:    Constitutional: No fever, weight loss or fatigue  Eyes: No eye pain, visual disturbances, or discharge  ENT:  No difficulty hearing, tinnitus, vertigo; No sinus or throat pain  Neck: No pain or stiffness  Respiratory: No cough, wheezing, chills or hemoptysis  Cardiovascular: No chest pain, palpitations, shortness of breath, dizziness or leg swelling  Gastrointestinal: No abdominal or epigastric pain. No nausea, vomiting or hematemesis;   Genitourinary: No dysuria, frequency, hematuria or incontinence  Neurological: No headaches, memory loss, loss of strength, numbness or tremors  Psychiatric: No depression, anxiety, mood swings or difficulty sleeping  Musculoskeletal: No joint pain or swelling; No muscle, back or extremity pain  Skin: No itching, burning, rashes or lesions   Lymph Nodes: No enlarged glands  Endocrine: No heat or cold intolerance; No hair loss,  Allergy and Immunologic: No hives or eczema    On Neurological Examination:    Mental Status - Pt is alert, awake, oriented X3.. Follows commands well and able to answer questions appropriately.Mood and affect  normal    Speech -  Normal.     Cranial Nerves - Pupils 3 mm equal and reactive to light, ophthalmoplegia   Pt has no visual field deficit.  Pt has no  facial asymmetry. Facial sensation is intact.Tongue - is in midline.    Muscle tone - is normal all over      Motor Exam - 4+/5 all over, No drift. No shaking or tremors.    Sensory Exam - P. Pt withdraws all extremities equally on stimulation. No asymmetry seen. No complaints of tingling, numbness.    Gait- nl    Finger to nose: normal      Deep tendon Reflexes - 2 plus all over.        Romberg - Negative.    Neck Supple -  Yes.     MEDICATIONS  (STANDING):  aspirin enteric coated 81 milliGRAM(s) Oral daily  atorvastatin 40 milliGRAM(s) Oral at bedtime  diphenoxylate/atropine 1 Tablet(s) Oral two times a day  enoxaparin Injectable 40 milliGRAM(s) SubCutaneous every 24 hours  escitalopram 10 milliGRAM(s) Oral daily  gabapentin 300 milliGRAM(s) Oral two times a day  lamoTRIgine 200 milliGRAM(s) Oral daily  lamoTRIgine 100 milliGRAM(s) Oral at bedtime  lithium 300 milliGRAM(s) Oral two times a day  metoprolol succinate ER 25 milliGRAM(s) Oral daily  pantoprazole    Tablet 40 milliGRAM(s) Oral before breakfast  sodium chloride 0.9%. 1000 milliLiter(s) (50 mL/Hr) IV Continuous <Continuous>  topiramate 100 milliGRAM(s) Oral two times a day  ziprasidone 80 milliGRAM(s) Oral <User Schedule>    MEDICATIONS  (PRN):  acetaminophen     Tablet .. 650 milliGRAM(s) Oral every 6 hours PRN Mild Pain (1 - 3)  SUMAtriptan 100 milliGRAM(s) Oral daily PRN Migraine  zolpidem 5 milliGRAM(s) Oral at bedtime PRN Insomnia        Allergies    penicillins (Rash)  sulfa drugs (Rash)    Intolerances         01-17    140  |  113<H>  |  13  ----------------------------<  113<H>  3.8   |  23  |  1.20    Ca    8.8      17 Jan 2024 07:31    TPro  6.4  /  Alb  2.9<L>  /  TBili  0.2  /  DBili  x   /  AST  16  /  ALT  23  /  AlkPhos  350<H>  01-16      Hemoglobin A1C:     Vitamin B12     RADIOLOGY    ASSESSMENT AND PLAN:      seen for slurred speech/diplopia/trouble in walking   likely brain stem cva clinically  complicated migraine can give rise to diplopia as well  brain mri-negative for acute/subacute  cva  hx of migraine    discharge planning  continue ap/statin  neuro follow up as out patient  .

## 2024-01-17 NOTE — DISCHARGE NOTE PROVIDER - ATTENDING DISCHARGE PHYSICAL EXAMINATION:
T(C): 36.8 (01-17-24 @ 11:50), Max: 37.4 (01-17-24 @ 05:09)  HR: 46 (01-17-24 @ 11:50) (46 - 59)  BP: 135/63 (01-17-24 @ 11:50) (134/73 - 143/68)  RR: 18 (01-17-24 @ 11:50) (18 - 18)  SpO2: 98% (01-17-24 @ 11:50) (94% - 98%)    General: No apparent distress  Head: normocephalic, atraumatic  Eyes: EOMI, anicteric  ENT: moist mucous membranes, no pharyngeal exudates  Heart: rrr, S1, S2, no murmurs  Chest: CTA b/l, no rales, rhonchi, or wheezes  Abd: BS+, soft, NT, ND  Back: no CVA tenderness  Extr: no edema or cyanosis  Skin: warm, well perfused  Neuro: AA&Ox3, no focal weakness, sensation to light touch intact  Psych: normal affect

## 2024-01-17 NOTE — CHART NOTE - NSCHARTNOTEFT_GEN_A_CORE
Patient seen and examined on day of discharge. Feels well and has no complaints. States that she has been tolerating regular diet. Has intermittent dysphagia for which she follows with Dr. Traore. Dr. Traore called me as well and we spoke re: patient's hospital course. SLP and MBS evaluation noted. Dr. Traore recommends outpatient SLP follow up as well - which we will give as script and alternatively GI can do as an outpatient through [facility]. Spoke to patient re: GI and neuro recs. GI office to contact patient for urgent hospital follow up appointment. Stable for d/c home. Will follow up with neurology Dr. Samuels as an outpatient.

## 2024-01-17 NOTE — DISCHARGE NOTE PROVIDER - NSDCCPCAREPLAN_GEN_ALL_CORE_FT
PRINCIPAL DISCHARGE DIAGNOSIS  Diagnosis: TIA (transient ischemic attack)  Assessment and Plan of Treatment: You had a mini-stroke. A small blood clot in your brain briefly caused you to have trouble speaking. It is possible you may have repeat eipsodes of similar mini-strokes. If you have concerning symptoms like these again you should head to closest emergency room or call 911. You should follow stroke prevention lifestyle by excercising, keeping your blood pressure and blood sugar well controled, and taking all medications as prescribed by your primary care doctor. TAKE A BABY ASPIRIN 81MG EVERY DAY. TAKE YOUR ATORVASTATIN 40 MG EVERY DAY AS PRESCRIBED.   Follow up with your primary care doctor within 1 week of discharge.  Follow up with neurologist within 1 week of discharge.  Follow up with Ophthalmologist within 1 week of discharge.         SECONDARY DISCHARGE DIAGNOSES  Diagnosis: Dysphagia  Assessment and Plan of Treatment: You had trouble swallowing your food. Speech and swallow consultation was obtained and your diet was advanced as tolerated. Please follow up with speech therapy outpatient within 1 week of discharge.    Diagnosis: Migraines  Assessment and Plan of Treatment: You have a history of migraines. Please continue you home medications and follow up with PCP within 1 week of discharge. Please follow with neurologist within 1 week of discharge.       Diagnosis: Abnormal imaging of thyroid  Assessment and Plan of Treatment: You had multiple nodules in the b/l lobes of the thyroid compatible with a multinodular thyroid found on CT. Ultrasound of the thyroid may be helpful for further evaluation on a nonemergent basis. Please follow up with PCP and Endocrinologist within 1 week of discahrge.        PRINCIPAL DISCHARGE DIAGNOSIS  Diagnosis: TIA (transient ischemic attack)  Assessment and Plan of Treatment: You most likely had a TIA with a complex migraine as well. A small blood clot in your brain briefly caused you to have trouble speaking. It is possible you may have repeat eipsodes of similar mini-strokes. If you have concerning symptoms like these again you should head to closest emergency room or call 911. You should follow stroke prevention lifestyle by excercising, keeping your blood pressure and blood sugar well controled, and taking all medications as prescribed by your primary care doctor. TAKE A BABY ASPIRIN 81MG EVERY DAY. TAKE YOUR ATORVASTATIN 40 MG EVERY DAY AS PRESCRIBED.   Follow up with your primary care doctor within 1 week of discharge.  Follow up with neurologist within 1 week of discharge.  Follow up with Ophthalmologist within 1 week of discharge.         SECONDARY DISCHARGE DIAGNOSES  Diagnosis: Migraines  Assessment and Plan of Treatment: You have a history of migraines. Please continue you home medications and follow up with PCP within 1 week of discharge. Please follow with neurologist within 1 week of discharge.       Diagnosis: Abnormal imaging of thyroid  Assessment and Plan of Treatment: You had multiple nodules in the b/l lobes of the thyroid compatible with a multinodular thyroid found on CT. Ultrasound of the thyroid may be helpful for further evaluation on a nonemergent basis. Please follow up with PCP and Endocrinologist within 1 week of discahrge.       Diagnosis: Dysphagia  Assessment and Plan of Treatment: You had trouble swallowing your food. Speech and swallow consultation was obtained and your diet was advanced as tolerated. Please follow up with speech therapy outpatient within 1 week of discharge. Follow up with GI - Dr. Traore's office will reach out to you for urgent hospital follow up.

## 2024-01-17 NOTE — DISCHARGE NOTE NURSING/CASE MANAGEMENT/SOCIAL WORK - NSPROMEDSBROUGHTTOHOSP_GEN_A_NUR
[FreeTextEntry1] : 23 month old with resolved erythema of right TM here for gastroenteritis. Dad never started the ear drops and they were d/rosangela today. In order to maintain hydration consume "oral rehydration solution," such as Pedialyte or low calorie sports drinks. If vomiting, try to give child a few teaspoons of fluid every few minutes. Avoid drinking juice or soda. These can make diarrhea worse. If tolerating solids, it’s best to consume lean meats, fruits, vegetables, and whole-grain breads and cereals. Avoid eating foods with a lot of fat or sugar, which can make symptoms worse.\par  \par Any decrease in voids or PO intake RTO.\par \par All questions answered. Parent verbalized agreement with the above plan.  no

## 2024-01-17 NOTE — CASE MANAGEMENT PROGRESS NOTE - NSCMPROGRESSNOTE_GEN_ALL_CORE
Per MD, anticipate discharge home today. PT-no skilled needs or DME recommended. CM met with the patient at the bedside to discuss transition planning for today. Patient stated she is independent with ambulation/driving/medication management and will follow up outpatient. Patient stated her father will transport her home today. IMM explained and provided to the patient/patient signed. Patient verbalized understanding of the transition plan and is in agreement. CM remains available.

## 2024-01-17 NOTE — PROGRESS NOTE ADULT - PROVIDER SPECIALTY LIST ADULT
Nephrology
Neurology
Hospitalist

## 2024-01-17 NOTE — DISCHARGE NOTE PROVIDER - CARE PROVIDERS DIRECT ADDRESSES
,DirectAddress_Unknown,[email or web address]

## 2024-01-17 NOTE — DISCHARGE NOTE PROVIDER - NSDCQMSTROKERISK_NEU_ALL_CORE
Problem: Patient Care Overview (Adult)  Goal: Plan of Care Review  Outcome: Ongoing (interventions implemented as appropriate)       High blood pressure/High cholesterol/History of a stroke or TIA

## 2024-01-17 NOTE — DISCHARGE NOTE PROVIDER - HOSPITAL COURSE
HPI:  68 year old female PMHx Hypertension, Bipolar Affective Disorder, Migraines, Tardive Dyskinesia presents to ED from home c/o neurological symptoms x two weeks. Weeks ago, pt had a severe migraine for which she saw her Neuro Dr. Lora and was prescribed a course of steroids which she completed. After completing the steroids, two weeks ago, she began to have word finding difficulties, double vision in her L eye, gait instability resulting in her walking into things. She admits to dizziness, facial asymmetry and trouble with her memory as well. The symptoms have been persistent for two weeks and have not improved. She denies slurred speech, changes in strength/sensation in extremities, bowel/urinary incontinence.   ED course:  Vitals: T 98.2F, HR 75, /75, RR 16, SpO2 100% on RA  Labs: Alk phos 133  EKG: sinus rhythm with 1st degree AV block, LBBB  CT head: negative  CT angio head/neck: no hemodynamically significant stenosis, multiple nodules in the b/l lobes of the thyroid compatible with a multinodular thyroid. Ultrasound of the thyroid may be helpful for further evaluation on a nonemergent basis  Given: IV Ofirmev x1, Percocet x1   (11 Jan 2024 20:58)      ---  HOSPITAL COURSE: Patient admitted to medicine floor to rule out CVA. Neurology Dr. Samuels was consulted. CT Head and CTA Head and Neck were performed and was unremarkable. Brain MRI was performed and was negative for acute ischemia and pathology. Patient was continued on low dose aspirin and started on high dose statin.   TTE was also performed, showed LVEF estimated at 55%. Patient complainted of dysphagia and noted globus sensation during swallowing. Speech and swallow consultation was obtain and evaluation was performed and patient's diet was advanced as tolerated to DASH/TLC. Patient also developed MANE during het stay possibly secondary to contrast nephropathy. Nephrology Dr. Francisco was consulted. Patient was provided IV hydration. Renal ultrasound was performed and showed no hydronephrosis and mild increase renal cortical echogenicity suggestive of medical renal disease. Of note, patient's CT head showed multiple nodules in the bilateral lobes of the thyroid, compatible with a multinodular thyroid. Ultrasound of the thyroid may be helpful for further evaluation on a nonemergent basis. Patient to follow up with endocrinology outpatient for further follow up.     Pt seen and examined on day of discharge. Patient is medically optimized for discharge to home with close outpatient followup.    PHYSICAL EXAM ON DAY OF DISCHARGE:  The patient was seen and examined on the day of discharge. Please see progress note from day of discharge for further information.     ---  CONSULTANTS:   Neurology Dr. Samuels   Nephrology Dr. Francisco     ---  TIME SPENT:  I, the attending physician, was physically present for the key portions of the evaluation and management (E/M) service provided. The total amount of time spent reviewing the hospital notes, laboratory values, imaging findings, assessing/counseling the patient, discussing with consultant physicians, social work, nursing staff was -- minutes    ---  Primary care provider was made aware of plan for discharge:      [  ] NO     [  ] YES   HPI:  68 year old female PMHx Hypertension, Bipolar Affective Disorder, Migraines, Tardive Dyskinesia presents to ED from home c/o neurological symptoms x two weeks. Weeks ago, pt had a severe migraine for which she saw her Neuro Dr. Lora and was prescribed a course of steroids which she completed. After completing the steroids, two weeks ago, she began to have word finding difficulties, double vision in her L eye, gait instability resulting in her walking into things. She admits to dizziness, facial asymmetry and trouble with her memory as well. The symptoms have been persistent for two weeks and have not improved. She denies slurred speech, changes in strength/sensation in extremities, bowel/urinary incontinence.   ED course:  Vitals: T 98.2F, HR 75, /75, RR 16, SpO2 100% on RA  Labs: Alk phos 133  EKG: sinus rhythm with 1st degree AV block, LBBB  CT head: negative  CT angio head/neck: no hemodynamically significant stenosis, multiple nodules in the b/l lobes of the thyroid compatible with a multinodular thyroid. Ultrasound of the thyroid may be helpful for further evaluation on a nonemergent basis  Given: IV Ofirmev x1, Percocet x1   (11 Jan 2024 20:58)      ---  HOSPITAL COURSE: Patient admitted to medicine floor to rule out CVA. Neurology Dr. Samuels was consulted. CT Head and CTA Head and Neck were performed and was unremarkable. Brain MRI was performed and was negative for acute ischemia and pathology. Patient was continued on low dose aspirin and started on high dose statin.   TTE was also performed, showed LVEF estimated at 55%. Patient complained of dysphagia and noted globus sensation during swallowing. Speech and swallow consultation was obtain and evaluation was performed and patient's diet was advanced as tolerated to DASH/TLC. Patient also developed MANE during the stay - but suspect lab error - Cr resolved. Nephrology Dr. Francisco was consulted. Patient was provided IV hydration. Renal ultrasound was performed and showed no hydronephrosis and mild increase renal cortical echogenicity suggestive of medical renal disease. Of note, patient's CT head showed multiple nodules in the bilateral lobes of the thyroid, compatible with a multinodular thyroid. Ultrasound of the thyroid may be helpful for further evaluation on a nonemergent basis. Patient to follow up with endocrinology outpatient for further follow up. Will follow up with neuro Dr. Samuels and GI Dr. Traore. Given script for outpatient SLP follow up per GI recs.    Pt seen and examined on day of discharge. Patient is medically optimized for discharge to home with close outpatient followup.    ---  CONSULTANTS:   Neurology Dr. Samuels   Nephrology Dr. Francisco       ---  Primary care provider was made aware of plan for discharge:      [  ] NO     [ x ] YES

## 2024-01-17 NOTE — DISCHARGE NOTE PROVIDER - NSDCFUSCHEDAPPT_GEN_ALL_CORE_FT
Ani Palmer  [facility]  [facility] [address]  Scheduled Appointment: 01/19/2024

## 2024-01-18 NOTE — DISCUSSION/SUMMARY
[FreeTextEntry1] : Ms. Flores is a 69 yo woman with a history of bipolar disorder, chronic migraines and dizziness. She has had some chronic dizziness and had an intense episode of dizziness which caused a fall.  Dizziness -Cause of dizziness includes possible peripheral vestibular disturbance vs vestibular migraine -Previously this may have been partially related to post concussive syndrome. She now may have sustained another concussion. -She did not do vestibular rehab. She is not currently having vertigo and can hold off. -Can continue to use meclizine prn.  Balance Difficulties -May also be related to a vestibular disturbance -Polypharmacy may also be an issue. -She states that lithium levels are always stable. She is not sure if a Lamotrigine level has been checked. -Check lamotrigine level -Will refer to balance therapy. -Will check MRI brain.   Chronic Migraine -She has chronic migraines -Continue gabapentin 300 mg BID -Continue Topamax 100 mg BID. She does not think she has side effects with this high dose. She did not find Ubelvy, Nurtec to be as effective. Again discussed that sumatriptan may be an issue with high blood pressure.   f/u 3 months.

## 2024-01-18 NOTE — DATA REVIEWED
[de-identified] : MRI brain non-contrast 3/3/23:\par  Unremarkable MRI of the brain. Mild microvascular ischemic changes are noted.

## 2024-01-18 NOTE — HISTORY OF PRESENT ILLNESS
[FreeTextEntry1] : 3/15/23: Ms. Flores is here today for neurology evaluation. She reports that she has dizziness. About two months ago she had a sudden onset of dizziness, described as dizziness. She got so dizzy that she fell on her face. She could not get out of her bed for about 1.5 days. She was dry heaving.   She had an episode of likely vertigo in the past as well. She was with her father at a medical test and had extreme dizziness and could not walk.  She reports feeling dizzy when she looks to the side, mostly when she looks to the left. Motion makes her dizziness worse.  She denies a feeling of fullness in her ears of tinnitus. She denies double vision. Nausea has resolved.  She has been to see her ophthalmologist. She has cataracts and dry eyes. Her cardiologist did nto feel that her eyes were the cause of her dizziness.  She has chronic headaches. She has headaches fairly often but says that "acute migraines" are rare. She takes gabapentin 300 mg BID. She also takes Topamax 100 mg BID. She uses sumatriptan to abort migraines.  She saw Dr. Sanders in [place], who she has been seeing for many years for migraines. An MRI brain was ordered which was unremarkable.  She saw ENT, Dr. Contreras, last week. Other than an examination, additional testing was not done.   11/28/23: She fell on 11/23. She does not think that she was dizzy. She was getting out of her car, holding a package. She had to climb up on to the curb, lost her balance and fell backwards and hit the left side of her head on the car. She was bleeding from her scalp which stopped. She went to bed. Her balance was poor prior to this but she feels that it worse since that time. She feels dizzy since that time.  She has had one episode of vertigo since the last visit.  It resolved after taking meclizine.  She says that today in the car (she was a passenger) she could not stop blinking.   She has three bad, but not severe headaches. She tried Nurtec and Ubrelvy but did not find them to be as effective as sumatriptan.   She was recently started on Lexapro.    1/17/24: In late December she had a migraine lasting 1.5 weeks. Ubrelvy/nurtec did not provide relief. Sumatriptan provided temporary relief. In my absence, Dr. Solo prescribed a medrol pack and Zavzpret nasal spray. Unfortunately Zavzpret is not affordable.

## 2024-01-19 ENCOUNTER — APPOINTMENT (OUTPATIENT)
Dept: INTERNAL MEDICINE | Facility: CLINIC | Age: 69
End: 2024-01-19

## 2024-02-02 ENCOUNTER — APPOINTMENT (OUTPATIENT)
Dept: INTERNAL MEDICINE | Facility: CLINIC | Age: 69
End: 2024-02-02

## 2024-02-12 ENCOUNTER — INPATIENT (INPATIENT)
Facility: HOSPITAL | Age: 69
LOS: 2 days | Discharge: HOME | DRG: 101 | End: 2024-02-15
Attending: STUDENT IN AN ORGANIZED HEALTH CARE EDUCATION/TRAINING PROGRAM | Admitting: STUDENT IN AN ORGANIZED HEALTH CARE EDUCATION/TRAINING PROGRAM
Payer: MEDICARE

## 2024-02-12 ENCOUNTER — NON-APPOINTMENT (OUTPATIENT)
Age: 69
End: 2024-02-12

## 2024-02-12 VITALS
OXYGEN SATURATION: 98 % | RESPIRATION RATE: 16 BRPM | SYSTOLIC BLOOD PRESSURE: 156 MMHG | HEART RATE: 70 BPM | TEMPERATURE: 98 F | DIASTOLIC BLOOD PRESSURE: 85 MMHG | WEIGHT: 145.06 LBS

## 2024-02-12 DIAGNOSIS — G43.909 MIGRAINE, UNSPECIFIED, NOT INTRACTABLE, WITHOUT STATUS MIGRAINOSUS: ICD-10-CM

## 2024-02-12 DIAGNOSIS — G24.01 DRUG INDUCED SUBACUTE DYSKINESIA: ICD-10-CM

## 2024-02-12 DIAGNOSIS — Z79.899 OTHER LONG TERM (CURRENT) DRUG THERAPY: ICD-10-CM

## 2024-02-12 DIAGNOSIS — G08 INTRACRANIAL AND INTRASPINAL PHLEBITIS AND THROMBOPHLEBITIS: ICD-10-CM

## 2024-02-12 DIAGNOSIS — G40.909 EPILEPSY, UNSPECIFIED, NOT INTRACTABLE, WITHOUT STATUS EPILEPTICUS: ICD-10-CM

## 2024-02-12 DIAGNOSIS — F31.9 BIPOLAR DISORDER, UNSPECIFIED: ICD-10-CM

## 2024-02-12 DIAGNOSIS — G47.00 INSOMNIA, UNSPECIFIED: ICD-10-CM

## 2024-02-12 DIAGNOSIS — I10 ESSENTIAL (PRIMARY) HYPERTENSION: ICD-10-CM

## 2024-02-12 DIAGNOSIS — N17.9 ACUTE KIDNEY FAILURE, UNSPECIFIED: ICD-10-CM

## 2024-02-12 DIAGNOSIS — Z00.00 ENCOUNTER FOR GENERAL ADULT MEDICAL EXAMINATION WITHOUT ABNORMAL FINDINGS: ICD-10-CM

## 2024-02-12 DIAGNOSIS — Z98.89 OTHER SPECIFIED POSTPROCEDURAL STATES: Chronic | ICD-10-CM

## 2024-02-12 LAB
ALBUMIN SERPL ELPH-MCNC: 3.4 G/DL — SIGNIFICANT CHANGE UP (ref 3.3–5)
ALP SERPL-CCNC: 98 U/L — SIGNIFICANT CHANGE UP (ref 40–120)
ALT FLD-CCNC: 15 U/L — SIGNIFICANT CHANGE UP (ref 12–78)
AMMONIA BLD-MCNC: 54 UMOL/L — HIGH (ref 11–32)
ANION GAP SERPL CALC-SCNC: 7 MMOL/L — SIGNIFICANT CHANGE UP (ref 5–17)
APTT BLD: 30 SEC — SIGNIFICANT CHANGE UP (ref 24.5–35.6)
AST SERPL-CCNC: 19 U/L — SIGNIFICANT CHANGE UP (ref 15–37)
BASOPHILS # BLD AUTO: 0.08 K/UL — SIGNIFICANT CHANGE UP (ref 0–0.2)
BASOPHILS NFR BLD AUTO: 1 % — SIGNIFICANT CHANGE UP (ref 0–2)
BILIRUB SERPL-MCNC: 0.5 MG/DL — SIGNIFICANT CHANGE UP (ref 0.2–1.2)
BUN SERPL-MCNC: 14 MG/DL — SIGNIFICANT CHANGE UP (ref 7–23)
CALCIUM SERPL-MCNC: 8.7 MG/DL — SIGNIFICANT CHANGE UP (ref 8.5–10.1)
CHLORIDE SERPL-SCNC: 117 MMOL/L — HIGH (ref 96–108)
CO2 SERPL-SCNC: 19 MMOL/L — LOW (ref 22–31)
CREAT SERPL-MCNC: 1.4 MG/DL — HIGH (ref 0.5–1.3)
CRP SERPL-MCNC: <3 MG/L — SIGNIFICANT CHANGE UP
EGFR: 41 ML/MIN/1.73M2 — LOW
EOSINOPHIL # BLD AUTO: 0.22 K/UL — SIGNIFICANT CHANGE UP (ref 0–0.5)
EOSINOPHIL NFR BLD AUTO: 2.8 % — SIGNIFICANT CHANGE UP (ref 0–6)
ERYTHROCYTE [SEDIMENTATION RATE] IN BLOOD: 5 MM/HR — SIGNIFICANT CHANGE UP (ref 0–20)
ETHANOL SERPL-MCNC: <10 MG/DL — SIGNIFICANT CHANGE UP (ref 0–10)
FLUAV AG NPH QL: SIGNIFICANT CHANGE UP
FLUBV AG NPH QL: SIGNIFICANT CHANGE UP
GLUCOSE SERPL-MCNC: 99 MG/DL — SIGNIFICANT CHANGE UP (ref 70–99)
HCT VFR BLD CALC: 43.3 % — SIGNIFICANT CHANGE UP (ref 34.5–45)
HGB BLD-MCNC: 13.1 G/DL — SIGNIFICANT CHANGE UP (ref 11.5–15.5)
IMM GRANULOCYTES NFR BLD AUTO: 0.5 % — SIGNIFICANT CHANGE UP (ref 0–0.9)
INR BLD: 0.93 RATIO — SIGNIFICANT CHANGE UP (ref 0.85–1.18)
LIDOCAIN IGE QN: 47 U/L — SIGNIFICANT CHANGE UP (ref 13–75)
LYMPHOCYTES # BLD AUTO: 1.78 K/UL — SIGNIFICANT CHANGE UP (ref 1–3.3)
LYMPHOCYTES # BLD AUTO: 22.3 % — SIGNIFICANT CHANGE UP (ref 13–44)
MAGNESIUM SERPL-MCNC: 2.3 MG/DL — SIGNIFICANT CHANGE UP (ref 1.6–2.6)
MCHC RBC-ENTMCNC: 29.2 PG — SIGNIFICANT CHANGE UP (ref 27–34)
MCHC RBC-ENTMCNC: 30.3 GM/DL — LOW (ref 32–36)
MCV RBC AUTO: 96.4 FL — SIGNIFICANT CHANGE UP (ref 80–100)
MONOCYTES # BLD AUTO: 0.45 K/UL — SIGNIFICANT CHANGE UP (ref 0–0.9)
MONOCYTES NFR BLD AUTO: 5.6 % — SIGNIFICANT CHANGE UP (ref 2–14)
NEUTROPHILS # BLD AUTO: 5.4 K/UL — SIGNIFICANT CHANGE UP (ref 1.8–7.4)
NEUTROPHILS NFR BLD AUTO: 67.8 % — SIGNIFICANT CHANGE UP (ref 43–77)
NRBC # BLD: 0 /100 WBCS — SIGNIFICANT CHANGE UP (ref 0–0)
NT-PROBNP SERPL-SCNC: 134 PG/ML — HIGH (ref 0–125)
PLATELET # BLD AUTO: 246 K/UL — SIGNIFICANT CHANGE UP (ref 150–400)
POTASSIUM SERPL-MCNC: 4.2 MMOL/L — SIGNIFICANT CHANGE UP (ref 3.5–5.3)
POTASSIUM SERPL-SCNC: 4.2 MMOL/L — SIGNIFICANT CHANGE UP (ref 3.5–5.3)
PROT SERPL-MCNC: 7 G/DL — SIGNIFICANT CHANGE UP (ref 6–8.3)
PROTHROM AB SERPL-ACNC: 10.9 SEC — SIGNIFICANT CHANGE UP (ref 9.5–13)
RBC # BLD: 4.49 M/UL — SIGNIFICANT CHANGE UP (ref 3.8–5.2)
RBC # FLD: 14.6 % — HIGH (ref 10.3–14.5)
RSV RNA NPH QL NAA+NON-PROBE: SIGNIFICANT CHANGE UP
SARS-COV-2 RNA SPEC QL NAA+PROBE: SIGNIFICANT CHANGE UP
SODIUM SERPL-SCNC: 143 MMOL/L — SIGNIFICANT CHANGE UP (ref 135–145)
TROPONIN I, HIGH SENSITIVITY RESULT: 6.6 NG/L — SIGNIFICANT CHANGE UP
TSH SERPL-MCNC: 1.06 UIU/ML — SIGNIFICANT CHANGE UP (ref 0.36–3.74)
WBC # BLD: 7.97 K/UL — SIGNIFICANT CHANGE UP (ref 3.8–10.5)
WBC # FLD AUTO: 7.97 K/UL — SIGNIFICANT CHANGE UP (ref 3.8–10.5)

## 2024-02-12 PROCEDURE — 93010 ELECTROCARDIOGRAM REPORT: CPT

## 2024-02-12 PROCEDURE — 0042T: CPT | Mod: MA

## 2024-02-12 PROCEDURE — 99285 EMERGENCY DEPT VISIT HI MDM: CPT

## 2024-02-12 PROCEDURE — 71045 X-RAY EXAM CHEST 1 VIEW: CPT | Mod: 26

## 2024-02-12 PROCEDURE — 70496 CT ANGIOGRAPHY HEAD: CPT | Mod: 26,MA

## 2024-02-12 PROCEDURE — G0425: CPT | Mod: 95

## 2024-02-12 PROCEDURE — 99223 1ST HOSP IP/OBS HIGH 75: CPT | Mod: GC

## 2024-02-12 PROCEDURE — 36573 INSJ PICC RS&I 5 YR+: CPT

## 2024-02-12 PROCEDURE — 70450 CT HEAD/BRAIN W/O DYE: CPT | Mod: 26,MA,XU

## 2024-02-12 PROCEDURE — 70498 CT ANGIOGRAPHY NECK: CPT | Mod: 26,MA

## 2024-02-12 RX ORDER — UBROGEPANT 100 MG/1
100 TABLET ORAL
Qty: 10 | Refills: 3 | Status: ACTIVE | COMMUNITY
Start: 2024-01-18

## 2024-02-12 RX ORDER — ZOLPIDEM TARTRATE 10 MG/1
1 TABLET ORAL
Refills: 0 | DISCHARGE

## 2024-02-12 RX ORDER — SUMATRIPTAN SUCCINATE 4 MG/.5ML
1 INJECTION, SOLUTION SUBCUTANEOUS
Refills: 0 | DISCHARGE

## 2024-02-12 RX ORDER — ACETAMINOPHEN 500 MG
650 TABLET ORAL EVERY 6 HOURS
Refills: 0 | Status: DISCONTINUED | OUTPATIENT
Start: 2024-02-12 | End: 2024-02-15

## 2024-02-12 RX ORDER — LAMOTRIGINE 25 MG/1
200 TABLET, ORALLY DISINTEGRATING ORAL DAILY
Refills: 0 | Status: DISCONTINUED | OUTPATIENT
Start: 2024-02-12 | End: 2024-02-12

## 2024-02-12 RX ORDER — ASPIRIN/CALCIUM CARB/MAGNESIUM 324 MG
81 TABLET ORAL ONCE
Refills: 0 | Status: COMPLETED | OUTPATIENT
Start: 2024-02-12 | End: 2024-02-12

## 2024-02-12 RX ORDER — ZOLPIDEM TARTRATE 10 MG/1
5 TABLET ORAL AT BEDTIME
Refills: 0 | Status: DISCONTINUED | OUTPATIENT
Start: 2024-02-12 | End: 2024-02-15

## 2024-02-12 RX ORDER — ZIPRASIDONE HYDROCHLORIDE 20 MG/1
1 CAPSULE ORAL
Refills: 0 | DISCHARGE

## 2024-02-12 RX ORDER — LITHIUM CARBONATE 300 MG/1
300 TABLET, EXTENDED RELEASE ORAL
Refills: 0 | Status: DISCONTINUED | OUTPATIENT
Start: 2024-02-12 | End: 2024-02-12

## 2024-02-12 RX ORDER — ATORVASTATIN CALCIUM 80 MG/1
40 TABLET, FILM COATED ORAL AT BEDTIME
Refills: 0 | Status: DISCONTINUED | OUTPATIENT
Start: 2024-02-12 | End: 2024-02-15

## 2024-02-12 RX ORDER — METOPROLOL TARTRATE 50 MG
25 TABLET ORAL DAILY
Refills: 0 | Status: DISCONTINUED | OUTPATIENT
Start: 2024-02-12 | End: 2024-02-15

## 2024-02-12 RX ORDER — LITHIUM CARBONATE 300 MG/1
300 TABLET, EXTENDED RELEASE ORAL DAILY
Refills: 0 | Status: DISCONTINUED | OUTPATIENT
Start: 2024-02-12 | End: 2024-02-15

## 2024-02-12 RX ORDER — ATORVASTATIN CALCIUM 80 MG/1
1 TABLET, FILM COATED ORAL
Refills: 0 | DISCHARGE

## 2024-02-12 RX ORDER — PANTOPRAZOLE SODIUM 20 MG/1
1 TABLET, DELAYED RELEASE ORAL
Refills: 0 | DISCHARGE

## 2024-02-12 RX ORDER — CLONAZEPAM 1 MG
1 TABLET ORAL
Refills: 0 | Status: DISCONTINUED | OUTPATIENT
Start: 2024-02-12 | End: 2024-02-15

## 2024-02-12 RX ORDER — GABAPENTIN 400 MG/1
300 CAPSULE ORAL
Refills: 0 | Status: DISCONTINUED | OUTPATIENT
Start: 2024-02-12 | End: 2024-02-15

## 2024-02-12 RX ORDER — ESCITALOPRAM OXALATE 10 MG/1
1 TABLET, FILM COATED ORAL
Refills: 0 | DISCHARGE

## 2024-02-12 RX ORDER — SUMATRIPTAN SUCCINATE 4 MG/.5ML
100 INJECTION, SOLUTION SUBCUTANEOUS
Refills: 0 | Status: DISCONTINUED | OUTPATIENT
Start: 2024-02-12 | End: 2024-02-12

## 2024-02-12 RX ORDER — LANOLIN ALCOHOL/MO/W.PET/CERES
3 CREAM (GRAM) TOPICAL AT BEDTIME
Refills: 0 | Status: DISCONTINUED | OUTPATIENT
Start: 2024-02-12 | End: 2024-02-15

## 2024-02-12 RX ORDER — ZIPRASIDONE HYDROCHLORIDE 20 MG/1
0 CAPSULE ORAL
Refills: 0 | DISCHARGE

## 2024-02-12 RX ORDER — ESCITALOPRAM OXALATE 10 MG/1
10 TABLET, FILM COATED ORAL DAILY
Refills: 0 | Status: DISCONTINUED | OUTPATIENT
Start: 2024-02-12 | End: 2024-02-12

## 2024-02-12 RX ORDER — ASPIRIN/CALCIUM CARB/MAGNESIUM 324 MG
81 TABLET ORAL DAILY
Refills: 0 | Status: DISCONTINUED | OUTPATIENT
Start: 2024-02-13 | End: 2024-02-15

## 2024-02-12 RX ORDER — TOPIRAMATE 25 MG
100 TABLET ORAL
Refills: 0 | Status: DISCONTINUED | OUTPATIENT
Start: 2024-02-12 | End: 2024-02-15

## 2024-02-12 RX ORDER — ZAVEGEPANT 10 MG/.1ML
1 SPRAY NASAL
Refills: 0 | DISCHARGE

## 2024-02-12 RX ORDER — MECLIZINE HCL 12.5 MG
1 TABLET ORAL
Refills: 0 | DISCHARGE

## 2024-02-12 RX ORDER — LAMOTRIGINE 25 MG/1
200 TABLET, ORALLY DISINTEGRATING ORAL
Refills: 0 | Status: DISCONTINUED | OUTPATIENT
Start: 2024-02-12 | End: 2024-02-14

## 2024-02-12 RX ORDER — METOPROLOL TARTRATE 50 MG
1 TABLET ORAL
Refills: 0 | DISCHARGE

## 2024-02-12 RX ORDER — TOPIRAMATE 25 MG
1 TABLET ORAL
Qty: 0 | Refills: 0 | DISCHARGE

## 2024-02-12 RX ORDER — SODIUM CHLORIDE 9 MG/ML
1000 INJECTION INTRAMUSCULAR; INTRAVENOUS; SUBCUTANEOUS
Refills: 0 | Status: DISCONTINUED | OUTPATIENT
Start: 2024-02-12 | End: 2024-02-13

## 2024-02-12 RX ORDER — SODIUM CHLORIDE 9 MG/ML
500 INJECTION INTRAMUSCULAR; INTRAVENOUS; SUBCUTANEOUS ONCE
Refills: 0 | Status: COMPLETED | OUTPATIENT
Start: 2024-02-12 | End: 2024-02-12

## 2024-02-12 RX ORDER — ESCITALOPRAM OXALATE 10 MG/1
5 TABLET, FILM COATED ORAL EVERY 24 HOURS
Refills: 0 | Status: DISCONTINUED | OUTPATIENT
Start: 2024-02-12 | End: 2024-02-15

## 2024-02-12 RX ADMIN — LAMOTRIGINE 200 MILLIGRAM(S): 25 TABLET, ORALLY DISINTEGRATING ORAL at 23:15

## 2024-02-12 RX ADMIN — Medication 1 CAPSULE(S): at 23:15

## 2024-02-12 RX ADMIN — ESCITALOPRAM OXALATE 5 MILLIGRAM(S): 10 TABLET, FILM COATED ORAL at 23:16

## 2024-02-12 RX ADMIN — ATORVASTATIN CALCIUM 40 MILLIGRAM(S): 80 TABLET, FILM COATED ORAL at 23:16

## 2024-02-12 RX ADMIN — Medication 81 MILLIGRAM(S): at 18:16

## 2024-02-12 RX ADMIN — Medication 3 MILLIGRAM(S): at 23:16

## 2024-02-12 RX ADMIN — SODIUM CHLORIDE 500 MILLILITER(S): 9 INJECTION INTRAMUSCULAR; INTRAVENOUS; SUBCUTANEOUS at 18:07

## 2024-02-12 NOTE — PHARMACOTHERAPY INTERVENTION NOTE - COMMENTS
Patient is a 68 year old female presenting to the emergency department as a code stroke. Medication history completed by pharmacy representative. Patient does not endorse any anti-coagulation/blood thinner use, but does endorse use of aspirin 81mg QD. Patient pending disposition. OMR updated to reflect home medications. Patient is currently not ordered fo any home medications.  Patient is a 68 year old female presenting to the emergency department as a code stroke. Medication history completed by pharmacy representative. Patient does not endorse any anti-coagulation/blood thinner use, but does endorse use of aspirin 81mg QD. Patient pending disposition. OMR updated to reflect home medications. Patient is currently not ordered for any home medications. Patient was previously on ziprasidone therapy but this was discontinued in December 2023.

## 2024-02-12 NOTE — H&P ADULT - PROBLEM SELECTOR PLAN 1
CTA brain: At least partial venous thrombosis of the left transverse and sigmoid dural sinuses with asymmetrically decreased contrast enhancement and flow compared to the right. Suggestion of small linear filling defect proximally. Incomplete and diminished filling of the proximal left IJV  - given ASA 81mg in ED, continue daily  - hold AC   - neuro checks q4hr  - MRI/MRV with contrast ordered  - tele-neuro consulted in ED, recs appreciated CTA brain: At least partial venous thrombosis of the left transverse and sigmoid dural sinuses with asymmetrically decreased contrast enhancement and flow compared to the right. Suggestion of small linear filling defect proximally. Incomplete and diminished filling of the proximal left IJV  - given ASA 81mg in ED, continue daily  - hold AC   - neuro checks q4hr  - diet to be ordered once passed bedside dysphagia  - MRI/MRV with contrast ordered  - tele-neuro consulted in ED, recs appreciated CTA brain: At least partial venous thrombosis of the left transverse and sigmoid dural sinuses with asymmetrically decreased contrast enhancement and flow compared to the right. Suggestion of small linear filling defect proximally. Incomplete and diminished filling of the proximal left IJV  - continue ASA and statin  - hold AC   - neuro checks q4hr  - diet to be ordered once passed bedside dysphagia  - MRI/MRV with contrast ordered, results to determine if need for heparin drip or to transfer for intervention  - tele-neuro consulted in ED, recs appreciated

## 2024-02-12 NOTE — ED PROVIDER NOTE - PROGRESS NOTE DETAILS
Sandip Beckford MD (Attending Physician): Tele-stroke neuro fellow (Dr. Sinan Delcid) evaluated pt at bedside, says that pt's NIHSS is 0, not recommending TNK at this time. CTA head shows, "At least partial venous thrombosis of the left transverse and sigmoid dural sinuses with asymmetrically decreased contrast enhancement and flow compared to the right. Suggestion of small linear filling defect proximally. Incomplete and diminished filling of the proximal left IJV." Spoke to tele-stroke neuro fellow (Dr. Sinan Delcid) regarding these findings, who recommending starting pt on 81mg PO ASA for now. Recommending holding off on AC until MRI head is done to confirm diagnosis of dural venous sinus thrombosis. Pt admitted to Medicine.

## 2024-02-12 NOTE — CONSULT NOTE ADULT - ASSESSMENT
Episode of generalized shaking with preserved consciousness.   Episode of generalized shaking with preserved consciousness. Currently exam is only significant for diplopia which is chronic. NIHSS of 0. Would defer TNK. Left transverse VST is not that evident on our review. Flow is present throughout in the left transverse and left sigmoid sinuses.     Would continue aspirin 81 mg for now and obtain MRI/MRV with contrast.   Neurochecks q4h   Defer AC for now.    D/W THAO Spear stroke attending     Sinan Delcid MD

## 2024-02-12 NOTE — H&P ADULT - ATTENDING COMMENTS
69yo F with PMH HTN, Bipolar Affective Disorder, Migraines, Tardive Dyskinesia, prior TIA, one time seizure >20 years ago, presents to the ED 2/12/24 after an episode of generalized body shaking associated with the slurred speech, admitted for acute cerebral venous sinus thrombosis  agree with resident note  pt to have MR brain MRV to eval thrombosis, f/u neuro recs following imaging. Pt may need heparin drip and/or transfer to another hospital for further management.  Continue aspirin, statin, fluids

## 2024-02-12 NOTE — ED PROVIDER NOTE - PHYSICAL EXAMINATION
GEN - +In mild discomfort, A&Ox3  HEAD - NC/AT  EYES - PERRL, EOMI  ENT - Airway patent, +mucous membranes dry  NECK - Supple, non-tender without lymphadenopathy, no masses  PULMONARY - CTA b/l, symmetric breath sounds, no W/R/R  CARDIAC - +S1S2, +NSR but bradycardic to 40s, no M/G/R, no JVD  ABDOMEN - +BS, ND, NT, soft, no guarding, no rebound, no masses, no rigidity   - No CVA TTP b/l  EXTREMITIES - FROM, symmetric pulses, no edema, 5/5 strength in b/l UE and LE  SKIN - No rash or bruising  NEUROLOGIC - Alert, speech clear, +R eye double vision (remaining CN II-XII grossly intact), no pronator drift, +unable to assess gait at this time, strength and sensation grossly intact, +FTN positive b/l  PSYCH - Normal mood/affect, normal insight

## 2024-02-12 NOTE — ED ADULT NURSE NOTE - NS ED NURSE LEVEL OF CONSCIOUSNESS ORIENTATION
Received referral from Dr. Ifeoma Duong, with patient request for DNR bracelet. I called the patient and provided education on the DNR bracelet and consent, and the patient says he will \"hold off right now\", because he would not want to wear the DNR bracelet 24 hours a day, and he does not have any prognosis that would prompt a need for the DNR bracelet at this time. Dr. Duong notified.   Oriented - self; Oriented - place; Oriented - time

## 2024-02-12 NOTE — PROCEDURE NOTE - NSPROCDETAILS_GEN_ALL_CORE
Pre-calculated contrast dose 694 ml location identified, draped/prepped, sterile technique used/sterile dressing applied/sterile technique, catheter placed/supine position/ultrasound guidance

## 2024-02-12 NOTE — CONSULT NOTE ADULT - SUBJECTIVE AND OBJECTIVE BOX
67 yo F with PMH of TD, prior CVA with residual diplopia (on ASA 81), hx of one time seizure who presented to ED after an episode of generalized body shaking associated with the slurred speech. LNK around 1300. MRS of 1-2. She states that she had similar events in the past but milder. Her speech is improving, however not completely recovered back to normal. Her diplopia is not new.     Overall NIHSS is 0 on my assessment. No objective dysarthria is appreciated.       CTH with no ICH or hypodensities.   CTP unremarkable   CTA is significant for possible partial left transverse sinus thrombus.           69 yo F with PMH of TD, prior CVA with residual diplopia (on ASA 81), hx of one time seizure who presented to ED after an episode of generalized body shaking associated with the slurred speech but with preserved. LNK around 1300. MRS of 1-2. She states that she had similar events in the past but milder. Her speech is improving, however not completely recovered back to normal. Her diplopia is not new.     Overall NIHSS is 0 on my assessment. No objective dysarthria is appreciated.       CTH with no ICH or hypodensities.   CTP unremarkable   CTA is significant for possible partial left transverse sinus thrombus.

## 2024-02-12 NOTE — ED PROVIDER NOTE - CLINICAL SUMMARY MEDICAL DECISION MAKING FREE TEXT BOX
Sandip Beckford MD (Attending Physician): The patient is a 68y Female with pmhx of HTN, Bipolar Affective Disorder, Migraines, Tardive Dyskinesia, prior TIA, one time seizure >20 years ago, presents after an episode of generalized body shaking associated with slurred speech. DDx includes, but not limited to: TIA/stroke, seizure, worsening underlying psychiatric illness, EPS from neuroleptics, arrythmia, electrolyte derangement, UTI, PNA, viral syndrome. ekg, cxr, CT head, CTA head/neck, labs, urine, IVF, tele-stroke neuro consultation. Will most likely require admission.

## 2024-02-12 NOTE — H&P ADULT - NSHPSOCIALHISTORY_GEN_ALL_CORE
Tobacco: denies  Ethanol: denies  Illicit: denies  Lives at home with 92yr old father whom she cares for. ambulates without walker or cane. Prior L hip surgery which causes pain with ambulation

## 2024-02-12 NOTE — H&P ADULT - NSHPPHYSICALEXAM_GEN_ALL_CORE
T(C): 36.7 (02-12-24 @ 16:23), Max: 36.8 (02-12-24 @ 13:13)  HR: 46 (02-12-24 @ 17:09) (46 - 70)  BP: 126/60 (02-12-24 @ 17:09) (126/60 - 162/72)  RR: 16 (02-12-24 @ 17:09) (16 - 18)  SpO2: 97% (02-12-24 @ 17:09) (97% - 100%)    GENERAL: patient appears well, no acute distress, appropriately interactive  EYES: sclera clear, no exudates  ENMT: oropharynx clear without erythema, no exudates, moist mucous membranes  NECK: supple, soft  LUNGS: good air entry bilaterally, clear to auscultation, symmetric breath sounds, no wheezing or rhonchi appreciated  HEART: soft S1/S2, regular rate and rhythm, no murmurs noted, no lower extremity edema  GASTROINTESTINAL: abdomen is soft, nontender, nondistended, normoactive bowel sounds  INTEGUMENT: good skin turgor, warm skin, appears well perfused  MUSCULOSKELETAL: no clubbing or cyanosis, no obvious deformity  NEUROLOGIC: awake, alert, oriented x3, no slurred speech. some word finding difficulty, Spells WORLD backwards, unable to count back from 100 by 7's, 1/3 item recall. CN II-XII grossly intact, sensation intact in all extremities, motor 5/5 all extremities  HEME/LYMPH: +swollen L hand from infiltrated IV

## 2024-02-12 NOTE — ED PROVIDER NOTE - MDM ORDERS SUBMITTED SELECTION
PT DAILY TREATMENT NOTE - [facility] 2-15    Patient Name: Bianca Flores. Date:2021  : 2006  [x]  Patient  Verified  Payor: Estevan Thomas / Plan: Sarah Vaca / Product Type: Managed Care Medicaid /    In time: 4:30 pm  Out time: 5:25 pm  Total Treatment Time (min): 55  Visit #:  46  RTMD:     Treatment Area: Pain in right knee [M25.561]    SUBJECTIVE  Pain Level (0-10 scale): 0  Any medication changes, allergies to medications, adverse drug reactions, diagnosis change, or new procedure performed?: [x] No    [] Yes (see summary sheet for update)  Subjective functional status/changes:   [] No changes reported  Pt states he is able to run w/o pain. Pt is doing very well overall. Pt feels ready for discharge.       OBJECTIVE      Modality rationale: decrease edema, decrease inflammation, decrease pain, increase tissue extensibility and increase muscle contraction/control to improve the patients ability to ambulate   Min Type Additional Details      - [] Estim: []Att   []Unatt    []TENS instruct                  []IFC  []Premod   []NMES                     []Other: [place] 10:50 on/off []w/US   []w/ice   []w/heat  Position: supine  Location: R VMO       []  Traction: [] Cervical       []Lumbar                       [] Prone          []Supine                       []Intermittent   []Continuous Lbs:  [] before manual  [] after manual  []w/heat   NT []  Ultrasound: []Continuous   [x] Pulsed                       at: [x]1MHz   []3MHz Location: patellar tendon  W/cm2: 1.3    [] Paraffin         Location:   []w/heat   To go [x]  Ice     []  Heat  []  Ice massage Position:  Location:    []  Laser  []  Other: Position:  Location:   NT   [x]  Vasopneumatic Device Pressure:       [] lo [x] med [] hi   Temperature: 34 deg     [x] Skin assessment post-treatment:  [x]intact []redness- no adverse reaction    []redness  adverse reaction:     40 min Therapeutic Exercise:  [x] See flow sheet :    Rationale: increase ROM, increase strength and improve coordination to improve the patients ability to ambulate    15 min Manual Therapy: passive hamstrings stretch, hip flexor stretch, IASTM to posterior tibialis, massage stick to R quad and IT band   Rationale: decrease pain, increase ROM, increase tissue extensibility, decrease edema  and decrease trigger points to improve the patients ability to ambulate    With   [] TE   [] TA   [] neuro   [] other: Patient Education: [x] Review HEP    [] Progressed/Changed HEP based on:   [] positioning   [] body mechanics   [] transfers   [] heat/ice application    [] other:       Other Objective/Functional Measures:   Knee ROM: flexion 137 deg, ext 4 deg  R SLS: 30 sec  Hop Test: see previous notes  Gait/running: normal gait, mildly internally rotated on R LE    Pain Level (0-10 scale) post treatment: 0    ASSESSMENT/Changes in Function: Pt made very good progress w/ PT over the past few months. Pt is currently recovering from a minor quad injury but he has been doing better. Pt demonstrates full quad strength and knee/hip ROM. Pt has been educated on workout program at home and gym. Pt will be discharged from PT at this time.      []  See Plan of Care  []  See progress note/recertification  [x]  See Discharge Summary         Progress towards goals / Updated goals:   See D/C    PLAN  []  Upgrade activities as tolerated     []  Continue plan of care  []  Update interventions per flow sheet       [x]  Discharge due to:_MET goals  []  Other:     Josie Caldera DPT, OCS 4/21/2021 Imaging Studies/Medications

## 2024-02-12 NOTE — H&P ADULT - PROBLEM SELECTOR PLAN 2
Cr1.4 on arrival, baseline appears to be 1.1-1.3.   - will start on IVF to optimize for receiving gadolinium contrast (eGFR > 30 currently) Outpt med rec completed by pharmacy rep and in house pharmacist Ash Caro. However, pt has told pharmacist and provider different frequencies for some medications, some of which align with current prescription verified with pharmacy and some which do not. Pharmacist Ash Caro contacted regarding and current medication information was verified and will be followed up on in morning to ensure accuracy.

## 2024-02-12 NOTE — H&P ADULT - NSHPREVIEWOFSYSTEMS_GEN_ALL_CORE
REVIEW OF SYSTEMS:    CONSTITUTIONAL: +weakness, no fevers or chills  EYES/ENT: +visual changes;  No vertigo or throat pain   NECK: No pain or stiffness  RESPIRATORY: No cough, wheezing, hemoptysis; No shortness of breath  CARDIOVASCULAR: No chest pain or palpitations  GASTROINTESTINAL: No abdominal or epigastric pain. No nausea, vomiting, or hematemesis; No diarrhea or constipation. No melena or hematochezia.  GENITOURINARY: No dysuria, frequency or hematuria  NEUROLOGICAL: No numbness or focal weakness  SKIN: No itching, rashes

## 2024-02-12 NOTE — H&P ADULT - PROBLEM SELECTOR PLAN 3
DVT ppx: SCDs Cr1.4 on arrival, baseline appears to be 1.1-1.3.   - will start on NS 70cc while NPO and to optimize for receiving gadolinium contrast (eGFR > 30 currently)

## 2024-02-12 NOTE — ED ADULT NURSE NOTE - PAIN: PRESENCE, MLM
2000 Assumed care of patient from [facility]. Initial assessment performed-see flowsheet. Right groin drsg CDI, no hematoma or oozing noted- +2 PT/DP pulses noted. Sm amount of shadowing noted on lower part of 4x4 to left groin, no hematoma or ecchymosis present. +2 PT/DP pulses noted    2100 Wife updated via phone     2115 Increased shadowing noted to left groin, drsg removed, no active drainage noted from site. 4x4 was 50% covered with serosanginous drainage. No hematoma present, manual pressure held x5 minutes and clean drsg reapplied, No change to peripheral vascular assessment    2155 bilateral groin drsgs CDI, no hematoma or oozing. 2255 Groin reassessed- bilateral groin drsg CDI, no hematoma present. Assessed with Sallyanne Cowden RN    0694 Bedside and Verbal shift change report given to [address] [place] [address] (oncoming nurse) by Nika Beyer (offgoing nurse). Report included the following information SBAR, Kardex, MAR, Recent Results and Cardiac Rhythm SR with 1st degree AVB. denies pain/discomfort (Rating = 0)

## 2024-02-12 NOTE — ED ADULT NURSE REASSESSMENT NOTE - NS ED NURSE REASSESS COMMENT FT1
Called by CT Tech to help insert an IV for patient in need of CT Angio of the brain with and without contrast. Pt had a 20G left hand IV per CT Tech, but it infiltrated during exam. Attempted to insert a 20G IV to left antecubital area; however, unsuccessful. ER BELLE Borrego tried finding IV access as well, but patient has poor access. Called ELEAZAR Alva to help find an US guided IV access. PA attempted US guided IV access to left upper arm, IV line infiltrated/blew after being flushed. Called ER MD - Dr. Beckford and explained the patient's situation with poor access. Order obtained for Midline catheter insertion. Procedure explained by ELEAZAR Worthy to patient. 4F Power Midline inserted to Left Basilic vein @ 11cm length. Pt tolerated procedure well. CT Angio of brain completed. BELLE Borrego at bedside. Left hand elevated and ice pack applied by CT Tech. room air

## 2024-02-12 NOTE — ED ADULT NURSE NOTE - OBJECTIVE STATEMENT
Pt presents to the ED via ambulance from home  s/p slurred speech 1/2 hr prior to arrival. Dr. Beckford at bedside. Pt to CT SCan.

## 2024-02-12 NOTE — PROCEDURE NOTE - ADDITIONAL PROCEDURE DETAILS
11cm bard power midline inserted into patent left basilic vein under sterile conditions and ultrasound guidance.  Midline catheter can be accessed.

## 2024-02-12 NOTE — H&P ADULT - PROBLEM SELECTOR PLAN 7
continue PRN sumatriptan hold home sumatriptan given stroke like symptoms and venous thrombus  - continue on Fioricet PRN

## 2024-02-12 NOTE — H&P ADULT - HISTORY OF PRESENT ILLNESS
67yo F with PMH HTN, Bipolar Affective Disorder, Migraines, Tardive Dyskinesia, prior CVA with residual diplopia, one time seizure, presents presents to the ED 2/12/24 after an episode of generalized body shaking associated with the slurred speech.     LNK around 1300. MRS of 1-2. She states that she had similar events in the past but milder. Her speech is improving, however not completely recovered back to normal. Her diplopia is not new.       Denies fever, chills, chest pain, palpitations, SOB, cough, abdominal pain, nausea, vomiting, diarrhea, constipation, urinary frequency, urgency, or dysuria, headaches, changes in vision, dizziness, numbness, tingling.  Denies recent travel, recent antibiotic use, or sick contacts.    ED Course:   Vitals: BP: 156/85 , HR: 70 , Temp: 98.3, RR:16 , SpO2: 98% on RA   Labs:  Cr 1.4, proBNP 134,   UA: pending  CXR: No acute finding or change. Present study shows a left arm IV ending in the left axilla.  CTA Brain:  At least partial venous thrombosis of the left transverse and sigmoid dural sinuses with asymmetrically decreased contrast enhancement and flow compared to the right. Suggestion of small linear filling defect proximally. Incomplete and diminished filling of the proximal left IJV  EKG: pending  Received ASA 81mg x1, NS 500mL bolus x1 in the ED    67yo F with PMH HTN, Bipolar Affective Disorder, Migraines, Tardive Dyskinesia, prior TIA, one time seizure >20 years ago, presents to the ED 2/12/24 after an episode of generalized body shaking associated with the slurred speech. Pt reports being at here usual self when she lost control of her arms and legs and was shaking uncontrollably. Pt was unsure what to do and had father with whom she lives call for ambulance. No LOC, tongue biting, incontinence, or post ictal period observed. Last known normal around 1300. Pt notes that she is having blurry vision but this is an issue that has been going on for several months (prior to recent TIA) and has plans to follow up with a neuroophthalmologist. Pt also reports having word finding difficulty which is new but has been somewhat improving. Pt endorses feeling weak, lightheaded, and concerned about ambulating on her own. Does not feel like her self and is scared about the word finding difficulty.  Denies numbness, tingling.       ED Course:   Vitals: BP: 156/85 , HR: 70 , Temp: 98.3, RR:16 , SpO2: 98% on RA   Labs:  Cr 1.4, proBNP 134,   UA: pending  CXR: No acute finding or change. Present study shows a left arm IV ending in the left axilla.  CTA Brain:  At least partial venous thrombosis of the left transverse and sigmoid dural sinuses with asymmetrically decreased contrast enhancement and flow compared to the right. Suggestion of small linear filling defect proximally. Incomplete and diminished filling of the proximal left IJV  EKG: pending  Received ASA 81mg x1, NS 500mL bolus x1 in the ED   NIHSS 3 on arrival -> 0 by tele stroke

## 2024-02-12 NOTE — ED PROVIDER NOTE - OBJECTIVE STATEMENT
The patient is a 68y Female with pmhx of HTN, Bipolar Affective Disorder, Migraines, Tardive Dyskinesia, prior TIA, one time seizure >20 years ago, presents after an episode of generalized body shaking associated with slurred speech. Pt reports being at her usual baseline when she lost control of her arms and legs and was shaking uncontrollably. Pt was unsure what to do and her family called for an ambulance. Pt denies LOC, tongue biting, incontinence, or post-ictal period. Last known normal around 12:45pm today. Pt notes that she is having blurry vision, but this is an issue that has been going on for several months (prior to recent TIA) and has plans to follow up with a neuroophthalmologist. Pt also reports having word finding difficulty, which is new. Pt endorsing generalized weakness, lightheadedness, and difficulty walking on her own. Pt says that she does not feel like her usual self. Denies fever, HA, numbness, tingling, extremity weakness, cp, sob, abd pain, n/v/d, leg swelling, extremity weakness. Allergic to PCN and sulfa drugs.
moist

## 2024-02-12 NOTE — H&P ADULT - ASSESSMENT
69yo F with PMH HTN, Bipolar Affective Disorder, Migraines, Tardive Dyskinesia, prior TIA, one time seizure >20 years ago, presents to the ED 2/12/24 after an episode of generalized body shaking associated with the slurred speech, admitted for acute cerebral venous sinus thrombosis

## 2024-02-13 ENCOUNTER — APPOINTMENT (OUTPATIENT)
Dept: CARDIOLOGY | Facility: CLINIC | Age: 69
End: 2024-02-13

## 2024-02-13 ENCOUNTER — TRANSCRIPTION ENCOUNTER (OUTPATIENT)
Age: 69
End: 2024-02-13

## 2024-02-13 DIAGNOSIS — R56.9 UNSPECIFIED CONVULSIONS: ICD-10-CM

## 2024-02-13 LAB
ALBUMIN SERPL ELPH-MCNC: 3 G/DL — LOW (ref 3.3–5)
ALP SERPL-CCNC: 84 U/L — SIGNIFICANT CHANGE UP (ref 40–120)
ALT FLD-CCNC: 13 U/L — SIGNIFICANT CHANGE UP (ref 12–78)
ANION GAP SERPL CALC-SCNC: 5 MMOL/L — SIGNIFICANT CHANGE UP (ref 5–17)
APPEARANCE UR: CLEAR — SIGNIFICANT CHANGE UP
AST SERPL-CCNC: 12 U/L — LOW (ref 15–37)
BASOPHILS # BLD AUTO: 0.05 K/UL — SIGNIFICANT CHANGE UP (ref 0–0.2)
BASOPHILS NFR BLD AUTO: 0.6 % — SIGNIFICANT CHANGE UP (ref 0–2)
BILIRUB SERPL-MCNC: 0.3 MG/DL — SIGNIFICANT CHANGE UP (ref 0.2–1.2)
BILIRUB UR-MCNC: NEGATIVE — SIGNIFICANT CHANGE UP
BUN SERPL-MCNC: 16 MG/DL — SIGNIFICANT CHANGE UP (ref 7–23)
CALCIUM SERPL-MCNC: 8.9 MG/DL — SIGNIFICANT CHANGE UP (ref 8.5–10.1)
CHLORIDE SERPL-SCNC: 119 MMOL/L — HIGH (ref 96–108)
CHOLEST SERPL-MCNC: 123 MG/DL — SIGNIFICANT CHANGE UP
CO2 SERPL-SCNC: 23 MMOL/L — SIGNIFICANT CHANGE UP (ref 22–31)
COLOR SPEC: YELLOW — SIGNIFICANT CHANGE UP
CREAT SERPL-MCNC: 1.4 MG/DL — HIGH (ref 0.5–1.3)
DIFF PNL FLD: NEGATIVE — SIGNIFICANT CHANGE UP
EGFR: 41 ML/MIN/1.73M2 — LOW
EOSINOPHIL # BLD AUTO: 0.22 K/UL — SIGNIFICANT CHANGE UP (ref 0–0.5)
EOSINOPHIL NFR BLD AUTO: 2.5 % — SIGNIFICANT CHANGE UP (ref 0–6)
GLUCOSE SERPL-MCNC: 93 MG/DL — SIGNIFICANT CHANGE UP (ref 70–99)
GLUCOSE UR QL: NEGATIVE MG/DL — SIGNIFICANT CHANGE UP
HCT VFR BLD CALC: 38.4 % — SIGNIFICANT CHANGE UP (ref 34.5–45)
HDLC SERPL-MCNC: 52 MG/DL — SIGNIFICANT CHANGE UP
HGB BLD-MCNC: 11.6 G/DL — SIGNIFICANT CHANGE UP (ref 11.5–15.5)
IMM GRANULOCYTES NFR BLD AUTO: 0.6 % — SIGNIFICANT CHANGE UP (ref 0–0.9)
KETONES UR-MCNC: NEGATIVE MG/DL — SIGNIFICANT CHANGE UP
LEUKOCYTE ESTERASE UR-ACNC: NEGATIVE — SIGNIFICANT CHANGE UP
LIPID PNL WITH DIRECT LDL SERPL: 45 MG/DL — SIGNIFICANT CHANGE UP
LITHIUM SERPL-MCNC: 0.6 MMOL/L — SIGNIFICANT CHANGE UP (ref 0.6–1.2)
LYMPHOCYTES # BLD AUTO: 2.05 K/UL — SIGNIFICANT CHANGE UP (ref 1–3.3)
LYMPHOCYTES # BLD AUTO: 22.9 % — SIGNIFICANT CHANGE UP (ref 13–44)
MCHC RBC-ENTMCNC: 29.6 PG — SIGNIFICANT CHANGE UP (ref 27–34)
MCHC RBC-ENTMCNC: 30.2 GM/DL — LOW (ref 32–36)
MCV RBC AUTO: 98 FL — SIGNIFICANT CHANGE UP (ref 80–100)
MONOCYTES # BLD AUTO: 0.73 K/UL — SIGNIFICANT CHANGE UP (ref 0–0.9)
MONOCYTES NFR BLD AUTO: 8.2 % — SIGNIFICANT CHANGE UP (ref 2–14)
NEUTROPHILS # BLD AUTO: 5.84 K/UL — SIGNIFICANT CHANGE UP (ref 1.8–7.4)
NEUTROPHILS NFR BLD AUTO: 65.2 % — SIGNIFICANT CHANGE UP (ref 43–77)
NITRITE UR-MCNC: NEGATIVE — SIGNIFICANT CHANGE UP
NON HDL CHOLESTEROL: 71 MG/DL — SIGNIFICANT CHANGE UP
NRBC # BLD: 0 /100 WBCS — SIGNIFICANT CHANGE UP (ref 0–0)
PH UR: 7.5 — SIGNIFICANT CHANGE UP (ref 5–8)
PLATELET # BLD AUTO: 221 K/UL — SIGNIFICANT CHANGE UP (ref 150–400)
POTASSIUM SERPL-MCNC: 4.1 MMOL/L — SIGNIFICANT CHANGE UP (ref 3.5–5.3)
POTASSIUM SERPL-SCNC: 4.1 MMOL/L — SIGNIFICANT CHANGE UP (ref 3.5–5.3)
PROT SERPL-MCNC: 6.1 G/DL — SIGNIFICANT CHANGE UP (ref 6–8.3)
PROT UR-MCNC: NEGATIVE MG/DL — SIGNIFICANT CHANGE UP
RBC # BLD: 3.92 M/UL — SIGNIFICANT CHANGE UP (ref 3.8–5.2)
RBC # FLD: 14.8 % — HIGH (ref 10.3–14.5)
SODIUM SERPL-SCNC: 147 MMOL/L — HIGH (ref 135–145)
SP GR SPEC: 1.03 — HIGH (ref 1–1.03)
TRIGL SERPL-MCNC: 159 MG/DL — HIGH
UROBILINOGEN FLD QL: 1 MG/DL — SIGNIFICANT CHANGE UP (ref 0.2–1)
WBC # BLD: 8.94 K/UL — SIGNIFICANT CHANGE UP (ref 3.8–10.5)
WBC # FLD AUTO: 8.94 K/UL — SIGNIFICANT CHANGE UP (ref 3.8–10.5)

## 2024-02-13 PROCEDURE — 99233 SBSQ HOSP IP/OBS HIGH 50: CPT | Mod: GC

## 2024-02-13 PROCEDURE — 70553 MRI BRAIN STEM W/O & W/DYE: CPT | Mod: 26

## 2024-02-13 PROCEDURE — 70546 MR ANGIOGRAPH HEAD W/O&W/DYE: CPT | Mod: 26,59

## 2024-02-13 RX ORDER — HEPARIN SODIUM 5000 [USP'U]/ML
5000 INJECTION INTRAVENOUS; SUBCUTANEOUS EVERY 8 HOURS
Refills: 0 | Status: DISCONTINUED | OUTPATIENT
Start: 2024-02-13 | End: 2024-02-15

## 2024-02-13 RX ORDER — GABAPENTIN 400 MG/1
1 CAPSULE ORAL
Refills: 0 | DISCHARGE

## 2024-02-13 RX ORDER — SUMATRIPTAN SUCCINATE 4 MG/.5ML
100 INJECTION, SOLUTION SUBCUTANEOUS
Refills: 0 | Status: DISCONTINUED | OUTPATIENT
Start: 2024-02-13 | End: 2024-02-15

## 2024-02-13 RX ORDER — LITHIUM CARBONATE 300 MG/1
1 TABLET, EXTENDED RELEASE ORAL
Refills: 0 | DISCHARGE

## 2024-02-13 RX ORDER — LAMOTRIGINE 25 MG/1
0.5 TABLET, ORALLY DISINTEGRATING ORAL
Refills: 0 | DISCHARGE

## 2024-02-13 RX ORDER — CLONAZEPAM 1 MG
1 TABLET ORAL
Refills: 0 | DISCHARGE

## 2024-02-13 RX ADMIN — HEPARIN SODIUM 5000 UNIT(S): 5000 INJECTION INTRAVENOUS; SUBCUTANEOUS at 21:35

## 2024-02-13 RX ADMIN — HEPARIN SODIUM 5000 UNIT(S): 5000 INJECTION INTRAVENOUS; SUBCUTANEOUS at 14:46

## 2024-02-13 RX ADMIN — LAMOTRIGINE 200 MILLIGRAM(S): 25 TABLET, ORALLY DISINTEGRATING ORAL at 17:29

## 2024-02-13 RX ADMIN — GABAPENTIN 300 MILLIGRAM(S): 400 CAPSULE ORAL at 05:01

## 2024-02-13 RX ADMIN — Medication 1 CAPSULE(S): at 11:18

## 2024-02-13 RX ADMIN — Medication 1 CAPSULE(S): at 10:18

## 2024-02-13 RX ADMIN — SUMATRIPTAN SUCCINATE 100 MILLIGRAM(S): 4 INJECTION, SOLUTION SUBCUTANEOUS at 18:29

## 2024-02-13 RX ADMIN — ZOLPIDEM TARTRATE 5 MILLIGRAM(S): 10 TABLET ORAL at 21:32

## 2024-02-13 RX ADMIN — Medication 100 MILLIGRAM(S): at 05:02

## 2024-02-13 RX ADMIN — LITHIUM CARBONATE 300 MILLIGRAM(S): 300 TABLET, EXTENDED RELEASE ORAL at 12:32

## 2024-02-13 RX ADMIN — LAMOTRIGINE 200 MILLIGRAM(S): 25 TABLET, ORALLY DISINTEGRATING ORAL at 05:02

## 2024-02-13 RX ADMIN — ZOLPIDEM TARTRATE 5 MILLIGRAM(S): 10 TABLET ORAL at 03:01

## 2024-02-13 RX ADMIN — SUMATRIPTAN SUCCINATE 100 MILLIGRAM(S): 4 INJECTION, SOLUTION SUBCUTANEOUS at 17:29

## 2024-02-13 RX ADMIN — Medication 100 MILLIGRAM(S): at 17:29

## 2024-02-13 RX ADMIN — ZOLPIDEM TARTRATE 5 MILLIGRAM(S): 10 TABLET ORAL at 23:24

## 2024-02-13 RX ADMIN — Medication 81 MILLIGRAM(S): at 12:32

## 2024-02-13 RX ADMIN — ATORVASTATIN CALCIUM 40 MILLIGRAM(S): 80 TABLET, FILM COATED ORAL at 21:32

## 2024-02-13 RX ADMIN — GABAPENTIN 300 MILLIGRAM(S): 400 CAPSULE ORAL at 17:29

## 2024-02-13 RX ADMIN — ESCITALOPRAM OXALATE 5 MILLIGRAM(S): 10 TABLET, FILM COATED ORAL at 21:32

## 2024-02-13 RX ADMIN — Medication 1 CAPSULE(S): at 00:06

## 2024-02-13 NOTE — HISTORY OF PRESENT ILLNESS
[FreeTextEntry1] : 3/15/23: Ms. Flores is here today for neurology evaluation. She reports that she has dizziness. About two months ago she had a sudden onset of dizziness, described as dizziness. She got so dizzy that she fell on her face. She could not get out of her bed for about 1.5 days. She was dry heaving.   She had an episode of likely vertigo in the past as well. She was with her father at a medical test and had extreme dizziness and could not walk.  She reports feeling dizzy when she looks to the side, mostly when she looks to the left. Motion makes her dizziness worse.  She denies a feeling of fullness in her ears of tinnitus. She denies double vision. Nausea has resolved.  She has been to see her ophthalmologist. She has cataracts and dry eyes. Her cardiologist did nto feel that her eyes were the cause of her dizziness.  She has chronic headaches. She has headaches fairly often but says that "acute migraines" are rare. She takes gabapentin 300 mg BID. She also takes Topamax 100 mg BID. She uses sumatriptan to abort migraines.  She saw Dr. Sanders in [place], who she has been seeing for many years for migraines. An MRI brain was ordered which was unremarkable.  She saw ENT, Dr. Contreras, last week. Other than an examination, additional testing was not done.   11/28/23: She fell on 11/23. She does not think that she was dizzy. She was getting out of her car, holding a package. She had to climb up on to the curb, lost her balance and fell backwards and hit the left side of her head on the car. She was bleeding from her scalp which stopped. She went to bed. Her balance was poor prior to this but she feels that it worse since that time. She feels dizzy since that time.  She has had one episode of vertigo since the last visit.  It resolved after taking meclizine.  She says that today in the car (she was a passenger) she could not stop blinking.   She has three bad, but not severe headaches. She tried Nurtec and Ubrelvy but did not find them to be as effective as sumatriptan.   She was recently started on Lexapro.    2/15/24: In late December she had a migraine lasting 1.5 weeks. Ubrelvy/nurtec did not provide relief. Sumatriptan provided temporary relief. In my absence, Dr. Solo prescribed a medrol pack and Zavzpret nasal spray. Unfortunately Zavzpret was not affordable.  She was admitted to [facility] on 1/11/24 with two weeks of worsening symptoms including weakness, more so on the left and difficulty seeing out of her left eye.  An MRI brain was negative for infarct. She was started on aspirin and her dose of atorvastatin was increased. She was discharged on 1/17/24. In the hospital she wad given sumatriptan for migraine.   She returned to the ED at [facility] on 2/12/24 after an episode of generalized body shaking with preserved consciousness and slurred speech.    THYROID

## 2024-02-13 NOTE — PROGRESS NOTE ADULT - PROBLEM SELECTOR PLAN 2
Outpt med rec completed by pharmacy rep and in house pharmacist Ash Caro. However, pt has told pharmacist and provider different frequencies for some medications, some of which align with current prescription verified with pharmacy and some which do not. Pharmacist Ash Caro contacted regarding and current medication information was verified. Will be followed up this morning. Cr1.4 on arrival, baseline appears to be 1.1-1.3.   - Started on NS 70cc while NPO and to optimize for receiving gadolinium contrast (eGFR > 30 currently).

## 2024-02-13 NOTE — SWALLOW BEDSIDE ASSESSMENT ADULT - COMMENTS
accepted
Chart reviewed order received for swallow eval.  Pt received received in bed A&A on RA, pt immediately declined swallow eval upon introduction.  Pt left as received NAD BELLE Blanco and Resident [phone] notified.  Will defer diet to medical team. No further follow up at this time, please reconsult prn.

## 2024-02-13 NOTE — SWALLOW BEDSIDE ASSESSMENT ADULT - SLP PERTINENT HISTORY OF CURRENT PROBLEM
Pt known to speech tx from previous admission, seen for speech eval & swallow eval 1/15/24 and MBS 1/16/24, Rx Regular with Thin and GI consult due to pt c/o globus sensation, see reports for details.

## 2024-02-13 NOTE — CARE COORDINATION ASSESSMENT. - OTHER PERTINENT REFERRAL INFORMATION
Spoke with patient at bedside.   Role of case management explained w verbalized understanding. Patient states she lives w her father in a private house . She was taking care of him , tho he drives for errands, and they get take out for meals. Pt states her vision has worsened recently and is unable to drive.She had no DME or home care services. Pt had MRI of head today , awaiting results and approval  for PT evaluation. CM to follow for needs pending hospital course.

## 2024-02-13 NOTE — PROGRESS NOTE ADULT - ASSESSMENT
Assessment:  · Assessment	  67yo F with PMH HTN, Bipolar Affective Disorder, Migraines, Tardive Dyskinesia, prior TIA, one time seizure >20 years ago, presents to the ED 2/12/24 after an episode of generalized body shaking associated with the slurred speech, admitted for acute cerebral venous sinus thrombosis.      Problem/Plan - 1:  ·  Problem: Acute cerebral venous sinus thrombosis.   ·  Plan: CTA brain: At least partial venous thrombosis of the left transverse and sigmoid dural sinuses with asymmetrically decreased contrast enhancement and flow compared to the right. Suggestion of small linear filling defect proximally. Incomplete and diminished filling of the proximal left IJV  - continue ASA and statin  - hold AC   - neuro checks q4hr  - diet to be ordered once passed bedside dysphagia  - FU MRI/MRV with contrast, results to determine if need for heparin drip or to transfer for intervention  - FU Neuro recs    Problem/Plan - 2:  ·  Problem: Medication management.   ·  Plan: Outpt med rec completed by pharmacy rep and in house pharmacist Ash Caro. However, pt has told pharmacist and provider different frequencies for some medications, some of which align with current prescription verified with pharmacy and some which do not. Pharmacist Ash Caro contacted regarding and current medication information was verified. Will be followed up this morning.    Problem/Plan - 3:  ·  Problem: MAEN (acute kidney injury).   ·  Plan: Cr1.4 on arrival, baseline appears to be 1.1-1.3.   - Started on NS 70cc while NPO and to optimize for receiving gadolinium contrast (eGFR > 30 currently).    Problem/Plan - 4:  ·  Problem: Bipolar illness.   ·  Plan: continue home lexapro, lithium, topirimate  - f/u lithium level.    Problem/Plan - 5:  ·  Problem: Tardive dyskinesia.   ·  Plan: continue home gabapentin, Klonopin PRN.    Problem/Plan - 6:  ·  Problem: Seizure disorder.   ·  Plan: continue home lamictal  - f/u lamictal level.    Problem/Plan - 7:  ·  Problem: Migraines.   ·  Plan: hold home sumatriptan given stroke like symptoms and venous thrombus  - continue on Fioricet PRN.    Problem/Plan - 8:  ·  Problem: HTN (hypertension).   ·  Plan: continue home metoprolol.    Problem/Plan - 9:  ·  Problem: Insomnia.   ·  Plan: continue home ambien (10mg standing with 5mg additional PRN).    Problem/Plan - 10:  ·  Problem: Preventative health care.   ·  Plan; DVT ppx: SCDs 69yo F with PMH HTN, Bipolar Affective Disorder, Migraines, Tardive Dyskinesia, prior TIA, one time seizure >20 years ago, presents to the ED 2/12/24 after an episode of generalized body shaking associated with the slurred speech, admitted for acute cerebral venous sinus thrombosis. 67yo F with PMH HTN, Bipolar Affective Disorder, Migraines, Tardive Dyskinesia, prior TIA, one time seizure >20 years ago, presents to the ED 2/12/24 after an episode of generalized body shaking associated with slurred speech. MRI/V negative for CVA and venous sinus thrombosis.

## 2024-02-13 NOTE — DATA REVIEWED
[de-identified] : MRI brain non-contrast 3/3/23: Unremarkable MRI of the brain. Mild microvascular ischemic changes are noted.  MRI brain 1/123/24: No acute infarct or other acute abnormality. [de-identified] : CT head, CTA head and neck     1.   Brain:  Unremarkable      2.   Intracranial circulation:  No hemodynamically significant  stenosis.      3.    Right carotid/vertebral artery system:  No hemodynamically  significant stenosis.      4.   Left carotid/vertebral artery system:  No hemodynamically  significant stenosis.  5. There are multiple nodules in the bilateral lobes of the thyroid,  compatible with a multinodular thyroid. Ultrasound of the thyroid may be  helpful for further evaluation on a nonemergent basis.  CT head 2/12/24: No acute intracranial bleeding. CTA head and neck, CT perfusion 2/12/24:  CT PERFUSION: No regional perfusion abnormality to indicate arterial  ischemia or infarction.  CTA BRAIN: At least partial venous thrombosis of the left transverse and  sigmoid dural sinuses with asymmetrically decreased contrast enhancement  and flow compared to the right. Suggestion of small linear filling defect  proximally. Incomplete and diminished filling of the proximal left IJV.  Recommend MRI and MR venogram of the brain for further evaluation for  signs of venous ischemia/infarction.  CTA NECK: Patent cervical vasculature. No flow limiting stenosis or  occlusion.  Tortuous mid cervical ICA with focal kinking.

## 2024-02-13 NOTE — PROGRESS NOTE ADULT - PROBLEM SELECTOR PLAN 4
continue home lexapro, lithium, topirimate  - f/u lithium level. continue home gabapentin, Klonopin PRN

## 2024-02-13 NOTE — DISCUSSION/SUMMARY
[FreeTextEntry1] : Ms. Flores is a 67 yo woman with a history of bipolar disorder, chronic migraines and dizziness. She has had some chronic dizziness and had an intense episode of dizziness which caused a fall.  Dizziness -Cause of dizziness includes possible peripheral vestibular disturbance vs vestibular migraine -Previously this may have been partially related to post concussive syndrome. She now may have sustained another concussion. -She did not do vestibular rehab. She is not currently having vertigo and can hold off. -Can continue to use meclizine prn.  Balance Difficulties -May also be related to a vestibular disturbance -Polypharmacy may also be an issue. -She states that lithium levels are always stable. She is not sure if a Lamotrigine level has been checked. -Check lamotrigine level -Will refer to balance therapy. -Will check MRI brain.   Chronic Migraine -She has chronic migraines -Continue gabapentin 300 mg BID -Continue Topamax 100 mg BID. She does not think she has side effects with this high dose. She did not find Ubelvy, Nurtec to be as effective. Again discussed that sumatriptan may be an issue with high blood pressure.   f/u 3 months.

## 2024-02-13 NOTE — PROGRESS NOTE ADULT - SUBJECTIVE AND OBJECTIVE BOX
Patient is a 68y old  Female who presents with a chief complaint of venous sinus thrombosis (2024 17:53)      Subjective:  INTERVAL HPI/OVERNIGHT EVENTS: Patient seen and examined at bedside. No overnight events occurred. Patient complains of blurry vision and tremors of the BL hands. Denies fevers, chills, headache, lightheadedness, chest pain, dyspnea, abdominal pain, n/v/d/c.    MEDICATIONS  (STANDING):  aspirin enteric coated 81 milliGRAM(s) Oral daily  atorvastatin 40 milliGRAM(s) Oral at bedtime  escitalopram 5 milliGRAM(s) Oral every 24 hours  gabapentin 300 milliGRAM(s) Oral two times a day  lamoTRIgine 200 milliGRAM(s) Oral two times a day  lithium 300 milliGRAM(s) Oral daily  metoprolol succinate ER 25 milliGRAM(s) Oral daily  sodium chloride 0.9%. 1000 milliLiter(s) (70 mL/Hr) IV Continuous <Continuous>  topiramate 100 milliGRAM(s) Oral two times a day    MEDICATIONS  (PRN):  acetaminophen     Tablet .. 650 milliGRAM(s) Oral every 6 hours PRN Mild Pain (1 - 3)  acetaminophen 300 mG/butalbital 50 mG/ caffeine 40 mG 1 Capsule(s) Oral every 6 hours PRN headache  clonazePAM  Tablet 1 milliGRAM(s) Oral two times a day PRN dyskinesia and/or anxiety  LORazepam     Tablet 0.5 milliGRAM(s) Oral once PRN prior to MRI if needed  melatonin 3 milliGRAM(s) Oral at bedtime PRN Insomnia  zolpidem 5 milliGRAM(s) Oral at bedtime PRN Insomnia  zolpidem 5 milliGRAM(s) Oral at bedtime PRN Insomnia      Allergies    penicillins (Rash)  sulfa drugs (Rash)    Intolerances      ROS:  CONSTITUTIONAL: +weakness, no fevers or chills  EYES/ENT: +visual changes;  No vertigo or throat pain   NECK: No pain or stiffness  RESPIRATORY: No cough, wheezing, hemoptysis; No shortness of breath  CARDIOVASCULAR: No chest pain or palpitations  GASTROINTESTINAL: No abdominal or epigastric pain. No nausea, vomiting, or hematemesis; No diarrhea or constipation. No melena or hematochezia.  GENITOURINARY: No dysuria, frequency or hematuria  NEUROLOGICAL: No numbness or focal weakness  SKIN: No itching, rashes    Objective:  Vital Signs Last 24 Hrs  T(C): 36.6 (2024 08:34), Max: 36.9 (2024 04:17)  T(F): 97.8 (2024 08:34), Max: 98.4 (2024 04:17)  HR: 52 (2024 08:34) (44 - 70)  BP: 124/74 (2024 08:34) (97/54 - 162/72)  BP(mean): --  RR: 18 (2024 08:34) (16 - 18)  SpO2: 100% (:34) (96% - 100%)    Parameters below as of 2024 08:34  Patient On (Oxygen Delivery Method): room air    PE:  GENERAL: patient appears well, no acute distress, appropriately interactive  EYES: sclera clear, no exudates  ENMT: oropharynx clear without erythema, no exudates, moist mucous membranes  NECK: supple, soft  LUNGS: good air entry bilaterally, clear to auscultation, symmetric breath sounds, no wheezing or rhonchi appreciated  HEART: soft S1/S2, regular rate and rhythm, no murmurs noted, no lower extremity edema  GASTROINTESTINAL: abdomen is soft, nontender, nondistended, normoactive bowel sounds  INTEGUMENT: good skin turgor, warm skin, appears well perfused  MUSCULOSKELETAL: no clubbing or cyanosis, no obvious deformity  NEUROLOGIC: awake, alert, oriented x3, no slurred speech. some word finding difficulty, Spells WORLD backwards, unable to count back from 100 by 7's, 1/3 item recall. CN II-XII grossly intact, sensation intact in all extremities, motor 5/5 all extremities  HEME/LYMPH: +swollen L hand from infiltrated IV      LABS:                        11.6   8.94  )-----------( 221      ( 2024 06:08 )             38.4     CBC Full  -  ( 2024 06:08 )  WBC Count : 8.94 K/uL  Hemoglobin : 11.6 g/dL  Hematocrit : 38.4 %  Platelet Count - Automated : 221 K/uL  Mean Cell Volume : 98.0 fl  Mean Cell Hemoglobin : 29.6 pg  Mean Cell Hemoglobin Concentration : 30.2 gm/dL  Auto Neutrophil # : 5.84 K/uL  Auto Lymphocyte # : 2.05 K/uL  Auto Monocyte # : 0.73 K/uL  Auto Eosinophil # : 0.22 K/uL  Auto Basophil # : 0.05 K/uL  Auto Neutrophil % : 65.2 %  Auto Lymphocyte % : 22.9 %  Auto Monocyte % : 8.2 %  Auto Eosinophil % : 2.5 %  Auto Basophil % : 0.6 %    2024 06:08    147    |  119    |  16     ----------------------------<  93     4.1     |  23     |  1.40     Ca    8.9        2024 06:08  Mg     2.3       2024 13:40    TPro  6.1    /  Alb  3.0    /  TBili  0.3    /  DBili  x      /  AST  12     /  ALT  13     /  AlkPhos  84     2024 06:08    PT/INR - ( 2024 13:40 )   PT: 10.9 sec;   INR: 0.93 ratio         PTT - ( 2024 13:40 )  PTT:30.0 sec  Urinalysis Basic - ( 2024 07:00 )    Color: Yellow / Appearance: Clear / S.033 / pH: x  Gluc: x / Ketone: Negative mg/dL  / Bili: Negative / Urobili: 1.0 mg/dL   Blood: x / Protein: Negative mg/dL / Nitrite: Negative   Leuk Esterase: Negative / RBC: x / WBC x   Sq Epi: x / Non Sq Epi: x / Bacteria: x      CAPILLARY BLOOD GLUCOSE      POCT Blood Glucose.: 95 mg/dL (2024 13:15)          RADIOLOGY & ADDITIONAL TESTS:    Personally reviewed.     Consultant(s) Notes Reviewed:  [x] YES  [ ] NO

## 2024-02-13 NOTE — PROGRESS NOTE ADULT - PROBLEM SELECTOR PLAN 7
hold home sumatriptan given stroke like symptoms and venous thrombus  - continue on Fioricet PRN continue home metoprolol

## 2024-02-13 NOTE — PROGRESS NOTE ADULT - PROBLEM SELECTOR PLAN 3
Cr1.4 on arrival, baseline appears to be 1.1-1.3.   - Started on NS 70cc while NPO and to optimize for receiving gadolinium contrast (eGFR > 30 currently). continue home lexapro, lithium, topirimate  - f/u lithium level.

## 2024-02-13 NOTE — PROGRESS NOTE ADULT - PROBLEM SELECTOR PLAN 1
CTA brain: At least partial venous thrombosis of the left transverse and sigmoid dural sinuses with asymmetrically decreased contrast enhancement and flow compared to the right. Suggestion of small linear filling defect proximally. Incomplete and diminished filling of the proximal left IJV  - continue ASA and statin  - hold AC   - neuro checks q4hr  - diet to be ordered once passed bedside dysphagia  - FU MRI/MRV with contrast, results to determine if need for heparin drip or to transfer for intervention  - FU Neuro recs CTA brain: At least partial venous thrombosis of the left transverse and sigmoid dural sinuses with asymmetrically decreased contrast enhancement and flow compared to the right. Suggestion of small linear filling defect proximally. Incomplete and diminished filling of the proximal left IJV  - continue ASA and statin  - neuro checks q4hr  - FU MRI/MRV with contrast -negative for stroke and venous sinus thrombosis  - check orthostatic vital signs  - FU Neuro recs - d/w Dr. Samuels - to obtain EEG - f/u results

## 2024-02-13 NOTE — CARE COORDINATION ASSESSMENT. - NSCAREPROVIDERS_GEN_ALL_CORE_FT
CARE PROVIDERS:  Accepting Physician: Marissa Samayoa  Administration: Justina Vigil  Administration: Brad Neff  Administration: Elmira Zafar  Administration: Cyril Douglas  Admitting: Marissa Samayao  Attending: Marissa Samayoa  Consultant: Nida Worthy  Consultant: Rafael Samuels  Covering Team: Rafael Samuels  Covering Team: Jorge A Fry  Covering Team: Jer Enriquez  ED Attending: Sandip Beckford  ED Nurse: Jaylin Garza  Nurse: Soren De Leon  Nurse: Bella Nam  Nurse: Catalina Bhat  Ordered: Physician, Ordering  Outpatient Provider: Bernard Francisco  Outpatient Provider: Mitali Lora  Override: Nika Romero  Override: Marie Callejas  PCA/Nursing Assistant: Lady Virginia  Physical Therapy: Vega Conley  Primary Team: Sinan Delcid  Primary Team: Tone Cole  Primary Team: Luis Rincon  Primary Team: Kyara Pena  Primary Team: Jorge A Torrez  Quality Review: Kary Aguilar  Registered Dietitian: Tanja Gil  : Holly Dong  Speech Pathology: Kimmy Hawthorne  Speech Pathology: Michael Contreras  Team: [facility]  Hospitalists, Team

## 2024-02-13 NOTE — DISCHARGE NOTE NURSING/CASE MANAGEMENT/SOCIAL WORK - PATIENT PORTAL LINK FT
You can access the FollowMyHealth Patient Portal offered by [facility] by registering at the following website: [email or web address]. By joining [facility]’s FollowMyHealth portal, you will also be able to view your health information using other applications (apps) compatible with our system.

## 2024-02-14 ENCOUNTER — TRANSCRIPTION ENCOUNTER (OUTPATIENT)
Age: 69
End: 2024-02-14

## 2024-02-14 LAB
CULTURE RESULTS: SIGNIFICANT CHANGE UP
SPECIMEN SOURCE: SIGNIFICANT CHANGE UP

## 2024-02-14 PROCEDURE — 99233 SBSQ HOSP IP/OBS HIGH 50: CPT | Mod: GC

## 2024-02-14 RX ORDER — CHLORHEXIDINE GLUCONATE 213 G/1000ML
1 SOLUTION TOPICAL DAILY
Refills: 0 | Status: DISCONTINUED | OUTPATIENT
Start: 2024-02-14 | End: 2024-02-15

## 2024-02-14 RX ORDER — LAMOTRIGINE 25 MG/1
225 TABLET, ORALLY DISINTEGRATING ORAL
Refills: 0 | Status: DISCONTINUED | OUTPATIENT
Start: 2024-02-14 | End: 2024-02-15

## 2024-02-14 RX ADMIN — HEPARIN SODIUM 5000 UNIT(S): 5000 INJECTION INTRAVENOUS; SUBCUTANEOUS at 21:19

## 2024-02-14 RX ADMIN — HEPARIN SODIUM 5000 UNIT(S): 5000 INJECTION INTRAVENOUS; SUBCUTANEOUS at 14:27

## 2024-02-14 RX ADMIN — Medication 100 MILLIGRAM(S): at 17:35

## 2024-02-14 RX ADMIN — LAMOTRIGINE 200 MILLIGRAM(S): 25 TABLET, ORALLY DISINTEGRATING ORAL at 05:17

## 2024-02-14 RX ADMIN — GABAPENTIN 300 MILLIGRAM(S): 400 CAPSULE ORAL at 17:35

## 2024-02-14 RX ADMIN — Medication 81 MILLIGRAM(S): at 11:51

## 2024-02-14 RX ADMIN — ZOLPIDEM TARTRATE 5 MILLIGRAM(S): 10 TABLET ORAL at 21:19

## 2024-02-14 RX ADMIN — CHLORHEXIDINE GLUCONATE 1 APPLICATION(S): 213 SOLUTION TOPICAL at 11:51

## 2024-02-14 RX ADMIN — LAMOTRIGINE 225 MILLIGRAM(S): 25 TABLET, ORALLY DISINTEGRATING ORAL at 17:35

## 2024-02-14 RX ADMIN — ATORVASTATIN CALCIUM 40 MILLIGRAM(S): 80 TABLET, FILM COATED ORAL at 21:19

## 2024-02-14 RX ADMIN — ESCITALOPRAM OXALATE 5 MILLIGRAM(S): 10 TABLET, FILM COATED ORAL at 21:19

## 2024-02-14 RX ADMIN — GABAPENTIN 300 MILLIGRAM(S): 400 CAPSULE ORAL at 05:16

## 2024-02-14 RX ADMIN — Medication 1 MILLIGRAM(S): at 11:51

## 2024-02-14 RX ADMIN — Medication 1 MILLIGRAM(S): at 05:16

## 2024-02-14 RX ADMIN — Medication 100 MILLIGRAM(S): at 05:16

## 2024-02-14 RX ADMIN — LITHIUM CARBONATE 300 MILLIGRAM(S): 300 TABLET, EXTENDED RELEASE ORAL at 11:51

## 2024-02-14 RX ADMIN — HEPARIN SODIUM 5000 UNIT(S): 5000 INJECTION INTRAVENOUS; SUBCUTANEOUS at 05:17

## 2024-02-14 RX ADMIN — ZOLPIDEM TARTRATE 5 MILLIGRAM(S): 10 TABLET ORAL at 23:47

## 2024-02-14 NOTE — DISCHARGE NOTE PROVIDER - NSDCCPCAREPLAN_GEN_ALL_CORE_FT
PRINCIPAL DISCHARGE DIAGNOSIS  Diagnosis: Seizure-like activity  Assessment and Plan of Treatment: You were diagnosed with seizure-like activity here at [facility]. You were evaluated with CT, MRI, and EEG. You were treated conservatively and were monitored for imrpovement.     PRINCIPAL DISCHARGE DIAGNOSIS  Diagnosis: Seizure-like activity  Assessment and Plan of Treatment: You were diagnosed with seizure-like activity here at [facility]. You were evaluated with CT, MRI, and EEG. The tests did not indicate acute cerebral infarction. You were treated conservatively and were monitored for imrpovement.     PRINCIPAL DISCHARGE DIAGNOSIS  Diagnosis: Seizure-like activity  Assessment and Plan of Treatment: You were diagnosed with seizure-like activity here at [facility]. You were evaluated with CT, MRI, and EEG. The tests did not indicate acute cerebral infarction. You were treated conservatively and were monitored for imrpovement.  Your Lamictal was increased to 225mg twice daily   Follow up with your primary care physician within the week  Follow up with your neurologist within the week

## 2024-02-14 NOTE — DISCHARGE NOTE PROVIDER - CARE PROVIDER_API CALL
Rafael Samuels  Neurology  [address] [place] [address]  Phone: [phone]  Fax: [phone]  Follow Up Time:     Dr. Freed,   PCP  Phone: (   )    -  Fax: (   )    -  Follow Up Time:     Dr. Collins,   Psych  Phone: (   )    -  Fax: (   )    -  Follow Up Time:

## 2024-02-14 NOTE — OCCUPATIONAL THERAPY INITIAL EVALUATION ADULT - NAME OF CLINICIAN
Chief Complaint   Patient presents with   • Cough   • Nasal Congestion       Lulu Packer female 3  y.o. 6  m.o.    History was provided by the mother.    Cough for over a week    Cough   This is a new problem. The current episode started in the past 7 days. The problem has been gradually worsening. The cough is productive of sputum. Associated symptoms include nasal congestion and rhinorrhea. Pertinent negatives include no chest pain, ear pain, eye redness, fever, myalgias, rash, sore throat or wheezing. Nothing aggravates the symptoms. She has tried prescription cough suppressant for the symptoms. The treatment provided mild relief.         The following portions of the patient's history were reviewed and updated as appropriate: allergies, current medications, past family history, past medical history, past social history, past surgical history and problem list.    Current Outpatient Medications   Medication Sig Dispense Refill   • brompheniramine-pseudoephedrine-DM 30-2-10 MG/5ML syrup Take 2.5 mL by mouth 3 (Three) Times a Day As Needed for Congestion or Cough. 120 mL 0   • cefdinir (OMNICEF) 250 MG/5ML suspension Take 6 mL by mouth Daily for 10 days. 60 mL 0   • cyproheptadine 2 MG/5ML syrup Take 5 mL by mouth Every 8 (Eight) Hours. 118 mL 3     No current facility-administered medications for this visit.        No Known Allergies        Review of Systems   Constitutional: Negative for activity change, appetite change, fatigue and fever.   HENT: Positive for rhinorrhea. Negative for congestion, ear discharge, ear pain, hearing loss, mouth sores, sneezing, sore throat and swollen glands.    Eyes: Negative for discharge, redness and visual disturbance.   Respiratory: Positive for cough. Negative for wheezing and stridor.    Cardiovascular: Negative for chest pain.   Gastrointestinal: Negative for abdominal pain, constipation, diarrhea, nausea, vomiting and GERD.   Genitourinary: Negative for  "dysuria, enuresis and frequency.   Musculoskeletal: Negative for arthralgias and myalgias.   Skin: Negative for rash.   Neurological: Negative for headache.   Hematological: Negative for adenopathy.   Psychiatric/Behavioral: Negative for behavioral problems and sleep disturbance.              Temp 97.9 °F (36.6 °C)   Ht 119.1 cm (46.88\")   Wt (!) 24.1 kg (53 lb 3.2 oz)   BMI 17.02 kg/m²     Physical Exam   Constitutional: She appears well-developed and well-nourished.   HENT:   Right Ear: Tympanic membrane normal.   Left Ear: Tympanic membrane normal.   Nose: Nose normal. No nasal discharge.   Mouth/Throat: Mucous membranes are moist. Dentition is normal. No tonsillar exudate. Oropharynx is clear. Pharynx is normal.   Eyes: Conjunctivae are normal. Right eye exhibits no discharge. Left eye exhibits no discharge.   Neck: Neck supple.   Cardiovascular: Normal rate, regular rhythm, S1 normal and S2 normal. Pulses are palpable.   No murmur heard.  Pulmonary/Chest: Effort normal and breath sounds normal. No nasal flaring or stridor. No respiratory distress. She has no wheezes. She has no rhonchi. She has no rales. She exhibits no retraction.   Abdominal: Soft. Bowel sounds are normal. She exhibits no distension and no mass. There is no hepatosplenomegaly. There is no tenderness. There is no rebound and no guarding.   Musculoskeletal: Normal range of motion.   Lymphadenopathy: No occipital adenopathy is present.     She has no cervical adenopathy.   Neurological: She is alert.   Skin: Skin is warm and dry. No rash noted.         Assessment/Plan     Diagnoses and all orders for this visit:    1. Acute non-recurrent frontal sinusitis (Primary)  -     cyproheptadine 2 MG/5ML syrup; Take 5 mL by mouth Every 8 (Eight) Hours.  Dispense: 118 mL; Refill: 3  -     cefdinir (OMNICEF) 250 MG/5ML suspension; Take 6 mL by mouth Daily for 10 days.  Dispense: 60 mL; Refill: 0          Return if symptoms worsen or fail to " improve.                     Mayela ODONNELL and Chrystal THOMPSON

## 2024-02-14 NOTE — PROGRESS NOTE ADULT - ATTENDING COMMENTS
69yo F with PMH HTN, Bipolar Affective Disorder, Migraines, Tardive Dyskinesia, prior TIA, one time seizure >20 years ago, presents to the ED 2/12/24 after an episode of generalized body shaking associated with slurred speech. MRI/V negative for CVA and venous sinus thrombosis. D/w neurology - will increase lamictal. PT recommended home PT. Will plan for discharge 2/15 if stable. Patient reports continued headache, scared to take sumatriptan now after googling it - was reassured by neurology and myself. Plan for discharge 2/15 if no issues as per neurology.
67yo F with PMH HTN, Bipolar Affective Disorder, Migraines, Tardive Dyskinesia, prior TIA, one time seizure >20 years ago, presents to the ED 2/12/24 after an episode of generalized body shaking associated with slurred speech. MRI/V negative for CVA and venous sinus thrombosis. Will check EEG to r/o seizure activity. Patient also with migraine history - ?complex migraine causing presentation. Discussed with neurology Dr. Samuels - states to continue home medications & check EEG. Will also check orthostatics. Neurology cleared patient to resume PRN imitrex. Discussed with patient and neurology consultant at patient's bedside. Dispo planning.

## 2024-02-14 NOTE — OCCUPATIONAL THERAPY INITIAL EVALUATION ADULT - STANDING BALANCE: DYNAMIC, REHAB EVAL
fair balance fair plus Calcipotriene Pregnancy And Lactation Text: This medication has not been proven safe during pregnancy. It is unknown if this medication is excreted in breast milk.

## 2024-02-14 NOTE — PHYSICAL THERAPY INITIAL EVALUATION ADULT - PERTINENT HX OF CURRENT PROBLEM, REHAB EVAL
As per EMR "67 yo F with PMH of TD, prior CVA with residual diplopia (on ASA 81), hx of one time seizure who presented to ED after an episode of generalized body shaking associated with the slurred speech but with preserved. "

## 2024-02-14 NOTE — PROGRESS NOTE ADULT - TIME BILLING
activities including direct patient care, counseling and/or coordinating care, reviewing notes/lab data/imaging, and discussion with multidisciplinary team (excluding time spent on resident teaching)
activities including direct patient care, counseling and/or coordinating care, reviewing notes/lab data/imaging, and discussion with multidisciplinary team (excluding time spent on resident teaching)

## 2024-02-14 NOTE — OCCUPATIONAL THERAPY INITIAL EVALUATION ADULT - PERTINENT HX OF CURRENT PROBLEM, REHAB EVAL
MRI BRAIN: No hydrocephalus, mass effect, acute intracranial hemorrhage, vasogenic   edema, restricted diffusion, or acute territorial infarct. No abnormal parenchymal, leptomeningeal, or dural enhancement. MRV BRAIN: No evidence for venous sinus or cortical vein thrombosis. 68 year old female with PMH HTN, Bipolar Affective Disorder, Migraines, Tardive Dyskinesia, prior TIA, one time seizure >20 years ago, presents to the ED 2/12/24 after an episode of generalized body shaking associated with slurred speech. MRI/V negative for CVA and venous sinus thrombosis. CTA brain: At least partial venous thrombosis of the left transverse and sigmoid dural sinuses with asymmetrically decreased contrast enhancement and flow compared to the right. Suggestion of small linear filling defect proximally. Incomplete and diminished filling of the proximal left IJV. 2/13/24: MRI BRAIN: No hydrocephalus, mass effect, acute intracranial hemorrhage, vasogenic edema, restricted diffusion, or acute territorial infarct. No abnormal parenchymal, leptomeningeal, or dural enhancement. MRV BRAIN: No evidence for venous sinus or cortical vein thrombosis.

## 2024-02-14 NOTE — PHYSICAL THERAPY INITIAL EVALUATION ADULT - ADDITIONAL COMMENTS
Pt lives in a private home with her father. Pt has 3 steps to enter and 1 FOS (about 11 steps) with handrail to the basement where her bed/bath are location. +walk-in shower +reports she was independent with all ADLs PTA. +progressive eyeglasses (reports double vision though improved at this time). Pt was ambulating independently PTA without AD (states she has a SC at home).

## 2024-02-14 NOTE — DISCHARGE NOTE PROVIDER - PROVIDER TOKENS
PROVIDER:[TOKEN:[[id number]]],FREE:[LAST:[Dr. Freed],PHONE:[(   )    -],FAX:[(   )    -],ADDRESS:[PCP]],FREE:[LAST:[Dr. Collins],PHONE:[(   )    -],FAX:[(   )    -],ADDRESS:[Psych]]

## 2024-02-14 NOTE — PHYSICAL THERAPY INITIAL EVALUATION ADULT - GENERAL OBSERVATIONS, REHAB EVAL
Patient chart reviewed, events noted. Patient cleared to be seen for therapy by RN prior to session. Patient received semi-reclined in bed +IV +room air, in NAD. Bifocal progressive lenses donned t/o. Pt agreeable to PT at this time.

## 2024-02-14 NOTE — OCCUPATIONAL THERAPY INITIAL EVALUATION ADULT - ADDITIONAL COMMENTS
Pt lives in a private home with her father. Pt has 3 steps to enter and 11 steps with handrail to the basement where her bed/bath are location. Pt has a walk-in shower and reports she was independent with all ADLs PTA. Pt wears progressive eyeglasses and reports that she was experiencing double vision though not at this time. Pt was ambulating independently PTA. Pt performed sit to stand and ambulated 40' with no devices at a supervision level. Pt s/p CVA/TIA with MRS=2.

## 2024-02-14 NOTE — PROGRESS NOTE ADULT - PROBLEM SELECTOR PLAN 3
continue home lexapro, lithium, topirimate  - f/u lithium level. continue home lexapro, lithium, topirimate  - lithium level - 0.6

## 2024-02-14 NOTE — OCCUPATIONAL THERAPY INITIAL EVALUATION ADULT - GENERAL OBSERVATIONS, REHAB EVAL
Patient found seated on EOB wearing progressive eyeglasses on room air. Patient was cooperative during evaluation. Nurse made aware of current pain level and to follow up or medicate for pain.

## 2024-02-14 NOTE — DISCHARGE NOTE PROVIDER - NSDCHHENCOUNTER_GEN_ALL_CORE
14-Feb-2024
PAST SURGICAL HISTORY:  H/O eye surgery     H/O laminectomy     H/O left knee surgery     H/O spinal fusion     S/P arthroscopy of left shoulder

## 2024-02-14 NOTE — PROGRESS NOTE ADULT - PROBLEM SELECTOR PLAN 1
CTA brain: At least partial venous thrombosis of the left transverse and sigmoid dural sinuses with asymmetrically decreased contrast enhancement and flow compared to the right. Suggestion of small linear filling defect proximally. Incomplete and diminished filling of the proximal left IJV  - continue ASA and statin  - neuro checks q4hr  - MRI/MRV with contrast -negative for stroke and venous sinus thrombosis  - check orthostatic vital signs  - EEG with nonspecific findings  - FU Neuro recs w/ Dr. Samuels post EEG CTA brain: At least partial venous thrombosis of the left transverse and sigmoid dural sinuses with asymmetrically decreased contrast enhancement and flow compared to the right. Suggestion of small linear filling defect proximally. Incomplete and diminished filling of the proximal left IJV  - continue ASA and statin  - neuro checks q4hr  - MRI/MRV with contrast -negative for stroke and venous sinus thrombosis  - check orthostatic vital signs  - EEG with nonspecific findings  - D/w Neuro - recs to increase lamictal to 225mg BID  - PT recs home PT  - DC planning 2/15

## 2024-02-14 NOTE — PHYSICAL THERAPY INITIAL EVALUATION ADULT - GAIT TRAINING, PT EVAL
Patient will ambulate independently for 150 feet with use of appropriate assistive device to be able to negotiate home environment, within 3 to 5 sessions.

## 2024-02-14 NOTE — DISCHARGE NOTE PROVIDER - NSDCMRMEDTOKEN_GEN_ALL_CORE_FT
Ambien 10 mg oral tablet: 1.5 tab(s) orally once a day (at bedtime) Takes 1 tab every night and an additional 0.5 tab if needed  aspirin 81 mg oral delayed release tablet: 1 tab(s) orally once a day  atorvastatin 40 mg oral tablet: 1 tab(s) orally once a day  escitalopram 5 mg oral tablet: 1 tab(s) orally once a day  gabapentin 300 mg oral tablet: 1 tab(s) orally 2 times a day patient endorses taking it prn  LaMICtal 200 mg oral tablet: 1 tab(s) orally 2 times a day  lithium 300 mg oral capsule: 1 cap(s) orally once a day  Metoprolol Succinate ER 25 mg oral tablet, extended release: 1 tab(s) orally once a day  SUMAtriptan 100 mg oral tablet: 1 tab(s) orally every 4 hours as needed for migraine take up to two tablets daily  topiramate 100 mg oral tablet: 1 tab(s) orally 2 times a day   Ambien 10 mg oral tablet: 1.5 tab(s) orally once a day (at bedtime) Takes 1 tab every night and an additional 0.5 tab if needed  aspirin 81 mg oral delayed release tablet: 1 tab(s) orally once a day  atorvastatin 40 mg oral tablet: 1 tab(s) orally once a day  escitalopram 5 mg oral tablet: 1 tab(s) orally once a day  gabapentin 300 mg oral tablet: 1 tab(s) orally 2 times a day patient endorses taking it prn  LaMICtal 200 mg oral tablet: 1 tab(s) orally 2 times a day Please take a total 225 mg of Lamictal twice daily.  LaMICtal 25 mg oral tablet: 1 tab(s) orally 2 times a day Please take a total 225 mg of Lamictal twice daily.  lithium 300 mg oral capsule: 1 cap(s) orally once a day  Metoprolol Succinate ER 25 mg oral tablet, extended release: 1 tab(s) orally once a day  SUMAtriptan 100 mg oral tablet: 1 tab(s) orally every 4 hours as needed for migraine take up to two tablets daily  topiramate 100 mg oral tablet: 1 tab(s) orally 2 times a day

## 2024-02-14 NOTE — PROGRESS NOTE ADULT - SUBJECTIVE AND OBJECTIVE BOX
Patient is a 68y old  Female who presents with a chief complaint of venous sinus thrombosis (2024 10:39)      Subjective:  Patient seen and examined at bedside. No overnight events occurred. Patient complains of continuous blurry vision and tremors of the BL hands, but reports improvement from yesterday. Denies fevers, chills, headache, lightheadedness, chest pain, dyspnea, abdominal pain, n/v/d/c.    MEDICATIONS  (STANDING):  aspirin enteric coated 81 milliGRAM(s) Oral daily  atorvastatin 40 milliGRAM(s) Oral at bedtime  chlorhexidine 2% Cloths 1 Application(s) Topical daily  escitalopram 5 milliGRAM(s) Oral every 24 hours  gabapentin 300 milliGRAM(s) Oral two times a day  heparin   Injectable 5000 Unit(s) SubCutaneous every 8 hours  lamoTRIgine 200 milliGRAM(s) Oral two times a day  lithium 300 milliGRAM(s) Oral daily  metoprolol succinate ER 25 milliGRAM(s) Oral daily  topiramate 100 milliGRAM(s) Oral two times a day    MEDICATIONS  (PRN):  acetaminophen     Tablet .. 650 milliGRAM(s) Oral every 6 hours PRN Mild Pain (1 - 3)  acetaminophen 300 mG/butalbital 50 mG/ caffeine 40 mG 1 Capsule(s) Oral every 6 hours PRN headache  clonazePAM  Tablet 1 milliGRAM(s) Oral two times a day PRN dyskinesia and/or anxiety  melatonin 3 milliGRAM(s) Oral at bedtime PRN Insomnia  SUMAtriptan 100 milliGRAM(s) Oral two times a day PRN Migraine  zolpidem 5 milliGRAM(s) Oral at bedtime PRN Insomnia  zolpidem 5 milliGRAM(s) Oral at bedtime PRN Insomnia      Allergies    penicillins (Rash)  sulfa drugs (Rash)    Intolerances        ROS:  CONSTITUTIONAL: +weakness, no fevers or chills  EYES/ENT: +visual changes;  No vertigo or throat pain   NECK: No pain or stiffness  RESPIRATORY: No cough, wheezing, hemoptysis; No shortness of breath  CARDIOVASCULAR: No chest pain or palpitations  GASTROINTESTINAL: No abdominal or epigastric pain. No nausea, vomiting, or hematemesis; No diarrhea or constipation. No melena or hematochezia.  GENITOURINARY: No dysuria, frequency or hematuria  NEUROLOGICAL: No numbness or focal weakness  SKIN: No itching, rashes    Objective:  Vital Signs Last 24 Hrs  T(C): 37.1 (:), Max: 37.1 (:)  T(F): 98.7 (:), Max: 98.7 (:)  HR: 52 (:) (48 - 52)  BP: 125/69 (:) (102/61 - 138/66)  BP(mean): --  RR: 17 (:) (17 - 18)  SpO2: 95% () (95% - 96%)    Parameters below as of   Patient On (Oxygen Delivery Method): room air      PE:  GENERAL: patient appears well, no acute distress, appropriately interactive  EYES: sclera clear, no exudates  ENMT: oropharynx clear without erythema, no exudates, moist mucous membranes  NECK: supple, soft  LUNGS: good air entry bilaterally, clear to auscultation, symmetric breath sounds, no wheezing or rhonchi appreciated  HEART: soft S1/S2, regular rate and rhythm, no murmurs noted, no lower extremity edema  GASTROINTESTINAL: abdomen is soft, nontender, nondistended, normoactive bowel sounds  INTEGUMENT: good skin turgor, warm skin, appears well perfused  MUSCULOSKELETAL: no clubbing or cyanosis, no obvious deformity  NEUROLOGIC: awake, alert, oriented x3, no slurred speech. some word finding difficulty, Spells WORLD backwards, unable to count back from 100 by 7's, 1/3 item recall. CN II-XII grossly intact, sensation intact in all extremities, motor 5/5 all extremities  HEME/LYMPH: +swollen L hand from infiltrated IV      LABS:    CBC Full  -  ( 2024 06:08 )  WBC Count : 8.94 K/uL  Hemoglobin : 11.6 g/dL  Hematocrit : 38.4 %  Platelet Count - Automated : 221 K/uL  Mean Cell Volume : 98.0 fl  Mean Cell Hemoglobin : 29.6 pg  Mean Cell Hemoglobin Concentration : 30.2 gm/dL  Auto Neutrophil # : 5.84 K/uL  Auto Lymphocyte # : 2.05 K/uL  Auto Monocyte # : 0.73 K/uL  Auto Eosinophil # : 0.22 K/uL  Auto Basophil # : 0.05 K/uL  Auto Neutrophil % : 65.2 %  Auto Lymphocyte % : 22.9 %  Auto Monocyte % : 8.2 %  Auto Eosinophil % : 2.5 %  Auto Basophil % : 0.6 %      Ca    8.9        2024 06:08      PT/INR - ( 2024 13:40 )   PT: 10.9 sec;   INR: 0.93 ratio         PTT - ( 2024 13:40 )  PTT:30.0 sec  Urinalysis Basic - ( 2024 07:00 )    Color: Yellow / Appearance: Clear / S.033 / pH: x  Gluc: x / Ketone: Negative mg/dL  / Bili: Negative / Urobili: 1.0 mg/dL   Blood: x / Protein: Negative mg/dL / Nitrite: Negative   Leuk Esterase: Negative / RBC: x / WBC x   Sq Epi: x / Non Sq Epi: x / Bacteria: x      CAPILLARY BLOOD GLUCOSE              RADIOLOGY & ADDITIONAL TESTS:    Personally reviewed.     Consultant(s) Notes Reviewed:  [x] YES  [ ] NO     Patient is a 68y old  Female who presents with a chief complaint of venous sinus thrombosis (2024 10:39)      Subjective:  Patient seen and examined at bedside. No overnight events occurred. Patient complains of continuous blurry vision and tremors of the BL hands, but reports improvement from yesterday. States last tremor was 2 days ago. Had EEG yesterday - nonspecific. States that she did well with PT/OT. Denies fevers, chills, headache, lightheadedness, chest pain, dyspnea, abdominal pain, n/v/d/c.    MEDICATIONS  (STANDING):  aspirin enteric coated 81 milliGRAM(s) Oral daily  atorvastatin 40 milliGRAM(s) Oral at bedtime  chlorhexidine 2% Cloths 1 Application(s) Topical daily  escitalopram 5 milliGRAM(s) Oral every 24 hours  gabapentin 300 milliGRAM(s) Oral two times a day  heparin   Injectable 5000 Unit(s) SubCutaneous every 8 hours  lamoTRIgine 200 milliGRAM(s) Oral two times a day  lithium 300 milliGRAM(s) Oral daily  metoprolol succinate ER 25 milliGRAM(s) Oral daily  topiramate 100 milliGRAM(s) Oral two times a day    MEDICATIONS  (PRN):  acetaminophen     Tablet .. 650 milliGRAM(s) Oral every 6 hours PRN Mild Pain (1 - 3)  acetaminophen 300 mG/butalbital 50 mG/ caffeine 40 mG 1 Capsule(s) Oral every 6 hours PRN headache  clonazePAM  Tablet 1 milliGRAM(s) Oral two times a day PRN dyskinesia and/or anxiety  melatonin 3 milliGRAM(s) Oral at bedtime PRN Insomnia  SUMAtriptan 100 milliGRAM(s) Oral two times a day PRN Migraine  zolpidem 5 milliGRAM(s) Oral at bedtime PRN Insomnia  zolpidem 5 milliGRAM(s) Oral at bedtime PRN Insomnia      Allergies    penicillins (Rash)  sulfa drugs (Rash)    Intolerances        ROS:  CONSTITUTIONAL: +weakness, no fevers or chills  EYES/ENT: +visual changes;  No vertigo or throat pain   NECK: No pain or stiffness  RESPIRATORY: No cough, wheezing, hemoptysis; No shortness of breath  CARDIOVASCULAR: No chest pain or palpitations  GASTROINTESTINAL: No abdominal or epigastric pain. No nausea, vomiting, or hematemesis; No diarrhea or constipation. No melena or hematochezia.  GENITOURINARY: No dysuria, frequency or hematuria  NEUROLOGICAL: No numbness or focal weakness  SKIN: No itching, rashes    Objective:  Vital Signs Last 24 Hrs  T(C): 37.1 (2024 04:22), Max: 37.1 (2024 04:22)  T(F): 98.7 (:), Max: 98.7 (2024 04:22)  HR: 52 (:22) (48 - 52)  BP: 125/69 (:) (102/61 - 138/66)  RR: 17 (:) (17 - 18)  SpO2: 95% (:) (95% - 96%)  Parameters below as of :22  Patient On (Oxygen Delivery Method): room air      PE:  GENERAL: patient appears well, no acute distress, appropriately interactive  EYES: sclera clear, no exudates  ENMT: oropharynx clear without erythema, no exudates, moist mucous membranes  NECK: supple, soft  LUNGS: good air entry bilaterally, clear to auscultation, symmetric breath sounds, no wheezing or rhonchi appreciated  HEART: soft S1/S2, regular rate and rhythm, no murmurs noted, no lower extremity edema  GASTROINTESTINAL: abdomen is soft, nontender, nondistended, normoactive bowel sounds  INTEGUMENT: good skin turgor, warm skin, appears well perfused  MUSCULOSKELETAL: no clubbing or cyanosis, no obvious deformity  NEUROLOGIC: awake, alert, oriented x3, no slurred speech  HEME/LYMPH: no lymphadenopathy  PSYCH: normal affect      LABS:    CBC Full  -  ( 2024 06:08 )  WBC Count : 8.94 K/uL  Hemoglobin : 11.6 g/dL  Hematocrit : 38.4 %  Platelet Count - Automated : 221 K/uL  Mean Cell Volume : 98.0 fl  Mean Cell Hemoglobin : 29.6 pg  Mean Cell Hemoglobin Concentration : 30.2 gm/dL  Auto Neutrophil # : 5.84 K/uL  Auto Lymphocyte # : 2.05 K/uL  Auto Monocyte # : 0.73 K/uL  Auto Eosinophil # : 0.22 K/uL  Auto Basophil # : 0.05 K/uL  Auto Neutrophil % : 65.2 %  Auto Lymphocyte % : 22.9 %  Auto Monocyte % : 8.2 %  Auto Eosinophil % : 2.5 %  Auto Basophil % : 0.6 %      Ca    8.9        2024 06:08      PT/INR - ( 2024 13:40 )   PT: 10.9 sec;   INR: 0.93 ratio         PTT - ( 2024 13:40 )  PTT:30.0 sec  Urinalysis Basic - ( 2024 07:00 )    Color: Yellow / Appearance: Clear / S.033 / pH: x  Gluc: x / Ketone: Negative mg/dL  / Bili: Negative / Urobili: 1.0 mg/dL   Blood: x / Protein: Negative mg/dL / Nitrite: Negative   Leuk Esterase: Negative / RBC: x / WBC x   Sq Epi: x / Non Sq Epi: x / Bacteria: x      CAPILLARY BLOOD GLUCOSE              RADIOLOGY & ADDITIONAL TESTS:    Personally reviewed.     Consultant(s) Notes Reviewed:  [x] YES  [ ] NO

## 2024-02-14 NOTE — DISCHARGE NOTE PROVIDER - ATTENDING DISCHARGE PHYSICAL EXAMINATION:
gen: NAD  neuro: Alert, follows simple commands, grossly moves all extremities  lungs: CTA bl no wheezing

## 2024-02-14 NOTE — PROGRESS NOTE ADULT - PROBLEM SELECTOR PLAN 5
continue home lamictal  - f/u lamictal level lamictal increased from 200mg BID to 225mg BID  - f/u lamictal level - pending result (collected 2/13)

## 2024-02-14 NOTE — CASE MANAGEMENT PROGRESS NOTE - NSCMPROGRESSNOTE_GEN_ALL_CORE
Discussed pt in rounds, neuro following meds being adjusted/increased. Physical Therapy recommending home Physical Therapy, pt declining stated that she is completely independent and able to negotiate stairs. CM encouraged patient to accept Physical therapy, she continues to decline.

## 2024-02-14 NOTE — PROGRESS NOTE ADULT - SUBJECTIVE AND OBJECTIVE BOX
Neurology Follow up note    DEEPTHI [id number]    HPI:  69yo F with PMH HTN, Bipolar Affective Disorder, Migraines, Tardive Dyskinesia, prior TIA, one time seizure >20 years ago, presents to the ED 24 after an episode of generalized body shaking associated with the slurred speech. Pt reports being at here usual self when she lost control of her arms and legs and was shaking uncontrollably. Pt was unsure what to do and had father with whom she lives call for ambulance. No LOC, tongue biting, incontinence, or post ictal period observed. Last known normal around 1300. Pt notes that she is having blurry vision but this is an issue that has been going on for several months (prior to recent TIA) and has plans to follow up with a neuroophthalmologist. Pt also reports having word finding difficulty which is new but has been somewhat improving. Pt endorses feeling weak, lightheaded, and concerned about ambulating on her own. Does not feel like her self and is scared about the word finding difficulty.  Denies numbness, tingling.       ED Course:   Vitals: BP: 156/85 , HR: 70 , Temp: 98.3, RR:16 , SpO2: 98% on RA   Labs:  Cr 1.4, proBNP 134,   UA: pending  CXR: No acute finding or change. Present study shows a left arm IV ending in the left axilla.  CTA Brain:  At least partial venous thrombosis of the left transverse and sigmoid dural sinuses with asymmetrically decreased contrast enhancement and flow compared to the right. Suggestion of small linear filling defect proximally. Incomplete and diminished filling of the proximal left IJV  EKG: pending  Received ASA 81mg x1, NS 500mL bolus x1 in the ED   NIHSS 3 on arrival -> 0 by tele stroke   (2024 17:53)      Interval History -doing better    Patient is seen, chart was reviewed and case was discussed with the treatment team.  Pt is not in any distress.   Lying on bed comfortably.   No events reported overnight.       Vital Signs Last 24 Hrs  T(C): 36.7 (2024 11:40), Max: 37.1 (2024 04:22)  T(F): 98 (2024 11:40), Max: 98.7 (2024 04:22)  HR: 49 (2024 11:40) (48 - 52)  BP: 115/65 (2024 11:40) (115/65 - 138/66)  BP(mean): --  RR: 18 (2024 11:40) (17 - 18)  SpO2: 94% (2024 11:40) (94% - 96%)    Parameters below as of 2024 11:40  Patient On (Oxygen Delivery Method): room air            REVIEW OF SYSTEMS:    Constitutional: No fever, weight loss or fatigue  Eyes: No eye pain, visual disturbances, or discharge  ENT:  No difficulty hearing, tinnitus, vertigo; No sinus or throat pain  Neck: No pain or stiffness  Respiratory: No cough, wheezing, chills or hemoptysis  Cardiovascular: No chest pain, palpitations, shortness of breath, dizziness or leg swelling  Gastrointestinal: No abdominal or epigastric pain. No nausea, vomiting or hematemesis;.  Genitourinary: No dysuria, frequency, hematuria or incontinence  Neurological: No , memory loss, loss of strength, numbness or tremors  Psychiatric: No depression, anxiety, mood swings or difficulty sleeping  Musculoskeletal: No joint pain or swelling; No muscle, back or extremity pain  Skin: No itching, burning, rashes or lesions   Lymph Nodes: No enlarged glands  Endocrine: No heat or cold intolerance; No hair loss,  Allergy and Immunologic: No hives or eczema    On Neurological Examination:    Mental Status - Pt is alert, awake, oriented X3. Follows commands well and able to answer questions appropriately.Mood and affect  normal    Speech -  Normal.     Cranial Nerves - Pupils 3 mm equal and reactive to light, extraocular eye movements intact. Pt has no visual field deficit.  Pt has no facial asymmetry. Facial sensation is intact.Tongue - is in midline.    Muscle tone - is normal        Motor Exam - 5/5 all over, No drift. No shaking or tremors.    Sensory Ex-. Pt withdraws all extremities equally on stimulation. No asymmetry seen. No complaints of tingling, numbness.    Gait - Able to stand and walk unassisted.    .    coordination:    Finger-nose; nl    Deep tendon Reflexes - 2 plus all over.         Neck Supple -  Yes.     MEDICATIONS    acetaminophen     Tablet .. 650 milliGRAM(s) Oral every 6 hours PRN  acetaminophen 300 mG/butalbital 50 mG/ caffeine 40 mG 1 Capsule(s) Oral every 6 hours PRN  aspirin enteric coated 81 milliGRAM(s) Oral daily  atorvastatin 40 milliGRAM(s) Oral at bedtime  chlorhexidine 2% Cloths 1 Application(s) Topical daily  clonazePAM  Tablet 1 milliGRAM(s) Oral two times a day PRN  escitalopram 5 milliGRAM(s) Oral every 24 hours  gabapentin 300 milliGRAM(s) Oral two times a day  heparin   Injectable 5000 Unit(s) SubCutaneous every 8 hours  lamoTRIgine 225 milliGRAM(s) Oral two times a day  lithium 300 milliGRAM(s) Oral daily  melatonin 3 milliGRAM(s) Oral at bedtime PRN  metoprolol succinate ER 25 milliGRAM(s) Oral daily  SUMAtriptan 100 milliGRAM(s) Oral two times a day PRN  topiramate 100 milliGRAM(s) Oral two times a day  zolpidem 5 milliGRAM(s) Oral at bedtime PRN  zolpidem 5 milliGRAM(s) Oral at bedtime PRN      Allergies    penicillins (Rash)  sulfa drugs (Rash)    Intolerances        LABS:  CBC Full  -  ( 2024 06:08 )  WBC Count : 8.94 K/uL  RBC Count : 3.92 M/uL  Hemoglobin : 11.6 g/dL  Hematocrit : 38.4 %  Platelet Count - Automated : 221 K/uL  Mean Cell Volume : 98.0 fl  Mean Cell Hemoglobin : 29.6 pg  Mean Cell Hemoglobin Concentration : 30.2 gm/dL  Auto Neutrophil # : 5.84 K/uL  Auto Lymphocyte # : 2.05 K/uL  Auto Monocyte # : 0.73 K/uL  Auto Eosinophil # : 0.22 K/uL  Auto Basophil # : 0.05 K/uL  Auto Neutrophil % : 65.2 %  Auto Lymphocyte % : 22.9 %  Auto Monocyte % : 8.2 %  Auto Eosinophil % : 2.5 %  Auto Basophil % : 0.6 %    Urinalysis Basic - ( 2024 07:00 )    Color: Yellow / Appearance: Clear / S.033 / pH: x  Gluc: x / Ketone: Negative mg/dL  / Bili: Negative / Urobili: 1.0 mg/dL   Blood: x / Protein: Negative mg/dL / Nitrite: Negative   Leuk Esterase: Negative / RBC: x / WBC x   Sq Epi: x / Non Sq Epi: x / Bacteria: x          147<H>  |  119<H>  |  16  ----------------------------<  93  4.1   |  23  |  1.40<H>    Ca    8.9      2024 06:08    TPro  6.1  /  Alb  3.0<L>  /  TBili  0.3  /  DBili  x   /  AST  12<L>  /  ALT  13  /  AlkPhos  84      Hemoglobin A1C:     Vitamin B12     RADIOLOGY    ASSESSMENT AND PLAN:      seen for shaking/brief aphasia without loc  ?seizure  mri/mrv - no acute cva or cerebral sinus/cortical vein thrombosis  eeg- no epileptiform activity  increase lamictal to 225 mg bid  management dw dr hernandez and patient  Physical therapy evaluation.  OOB to chair/ambulation with assistance only.  Pain is accessed and addressed.  Would continue to follow.

## 2024-02-14 NOTE — PHYSICAL THERAPY INITIAL EVALUATION ADULT - NSACTIVITYREC_GEN_A_PT
OOB to chair with supervision; ambulate to bathroom with supervision; ambulate in hallway with supervision as tolerated; home exercise program.

## 2024-02-14 NOTE — PROGRESS NOTE ADULT - ASSESSMENT
67yo F with PMH HTN, Bipolar Affective Disorder, Migraines, Tardive Dyskinesia, prior TIA, one time seizure >20 years ago, presents to the ED 2/12/24 after an episode of generalized body shaking associated with slurred speech. MRI/V negative for CVA and venous sinus thrombosis.

## 2024-02-14 NOTE — DISCHARGE NOTE PROVIDER - HOSPITAL COURSE
HPI:  67yo F with PMH HTN, Bipolar Affective Disorder, Migraines, Tardive Dyskinesia, prior TIA, one time seizure >20 years ago, presents to the ED 2/12/24 after an episode of generalized body shaking associated with the slurred speech. Pt reports being at here usual self when she lost control of her arms and legs and was shaking uncontrollably. Pt was unsure what to do and had father with whom she lives call for ambulance. No LOC, tongue biting, incontinence, or post ictal period observed. Last known normal around 1300. Pt notes that she is having blurry vision but this is an issue that has been going on for several months (prior to recent TIA) and has plans to follow up with a neuroophthalmologist. Pt also reports having word finding difficulty which is new but has been somewhat improving. Pt endorses feeling weak, lightheaded, and concerned about ambulating on her own. Does not feel like her self and is scared about the word finding difficulty.  Denies numbness, tingling.       ED Course:   Vitals: BP: 156/85 , HR: 70 , Temp: 98.3, RR:16 , SpO2: 98% on RA   Labs:  Cr 1.4, proBNP 134,   UA: pending  CXR: No acute finding or change. Present study shows a left arm IV ending in the left axilla.  CTA Brain:  At least partial venous thrombosis of the left transverse and sigmoid dural sinuses with asymmetrically decreased contrast enhancement and flow compared to the right. Suggestion of small linear filling defect proximally. Incomplete and diminished filling of the proximal left IJV  EKG: pending  Received ASA 81mg x1, NS 500mL bolus x1 in the ED   NIHSS 3 on arrival -> 0 by tele stroke   (12 Feb 2024 17:53)      ---  HOSPITAL COURSE: Patient admitted to medicine floor for management of seizure-like activity. Patient presented to the ER on 2/12/24/ Patient underwent CT of the head which showed partial venous thrombosis of L transverse and sigmoid dural sinuses. Patient underwent MRI of the Head, which was found to be negative for acute infarct or thrombosis.    Pt seen and examined on day of discharge. Patient is medically optimized for discharge to home with close outpatient followup.    PHYSICAL EXAM ON DAY OF DISCHARGE:  The patient was seen and examined on the day of discharge. Please see progress note from day of discharge for further information.         ---  CONSULTANTS:   Neurology: [facility]    ---  TIME SPENT:  I, the attending physician, was physically present for the key portions of the evaluation and management (E/M) service provided. The total amount of time spent reviewing the hospital notes, laboratory values, imaging findings, assessing/counseling the patient, discussing with consultant physicians, social work, nursing staff was -- minutes    ---  Primary care provider was made aware of plan for discharge:      [  ] NO     [ x] YES   HPI:  69yo F with PMH HTN, Bipolar Affective Disorder, Migraines, Tardive Dyskinesia, prior TIA, one time seizure >20 years ago, presents to the ED 2/12/24 after an episode of generalized body shaking associated with the slurred speech. Pt reports being at here usual self when she lost control of her arms and legs and was shaking uncontrollably. Pt was unsure what to do and had father with whom she lives call for ambulance. No LOC, tongue biting, incontinence, or post ictal period observed. Last known normal around 1300. Pt notes that she is having blurry vision but this is an issue that has been going on for several months (prior to recent TIA) and has plans to follow up with a neuroophthalmologist. Pt also reports having word finding difficulty which is new but has been somewhat improving. Pt endorses feeling weak, lightheaded, and concerned about ambulating on her own. Does not feel like her self and is scared about the word finding difficulty.  Denies numbness, tingling.       ED Course:   Vitals: BP: 156/85 , HR: 70 , Temp: 98.3, RR:16 , SpO2: 98% on RA   Labs:  Cr 1.4, proBNP 134,   UA: pending  CXR: No acute finding or change. Present study shows a left arm IV ending in the left axilla.  CTA Brain:  At least partial venous thrombosis of the left transverse and sigmoid dural sinuses with asymmetrically decreased contrast enhancement and flow compared to the right. Suggestion of small linear filling defect proximally. Incomplete and diminished filling of the proximal left IJV  EKG: pending  Received ASA 81mg x1, NS 500mL bolus x1 in the ED   NIHSS 3 on arrival -> 0 by tele stroke   (12 Feb 2024 17:53)      ---  HOSPITAL COURSE: Patient admitted to medicine floor for management of seizure-like activity. Patient presented to the ER on 2/12/24/ Patient underwent CT of the head which showed partial venous thrombosis of L transverse and sigmoid dural sinuses. Patient underwent MRI of the Head, which was found to be negative for acute infarct or thrombosis. The patient also underwent an EEG which showed nonspecific findings. Patient was seen by Dr. Samuels from Neurology. She was diagnosed with seizure like activity. Her dose of Lamictal was increased to 225 mg BID. Her symptoms improved as her stay in the hospital progressed and she was discharged.    Pt seen and examined on day of discharge. Patient is medically optimized for discharge to home with close outpatient followup.    PHYSICAL EXAM ON DAY OF DISCHARGE:  The patient was seen and examined on the day of discharge. Please see progress note from day of discharge for further information.         ---  CONSULTANTS:   Neurology: [facility]    ---  TIME SPENT:  I, the attending physician, was physically present for the key portions of the evaluation and management (E/M) service provided. The total amount of time spent reviewing the hospital notes, laboratory values, imaging findings, assessing/counseling the patient, discussing with consultant physicians, social work, nursing staff was -- minutes    ---  Primary care provider was made aware of plan for discharge:      [  ] NO     [ x] YES   HPI:  67yo F with PMH HTN, Bipolar Affective Disorder, Migraines, Tardive Dyskinesia, prior TIA, one time seizure >20 years ago, presents to the ED 2/12/24 after an episode of generalized body shaking associated with the slurred speech. Pt reports being at here usual self when she lost control of her arms and legs and was shaking uncontrollably. Pt was unsure what to do and had father with whom she lives call for ambulance. No LOC, tongue biting, incontinence, or post ictal period observed. Last known normal around 1300. Pt notes that she is having blurry vision but this is an issue that has been going on for several months (prior to recent TIA) and has plans to follow up with a neuroophthalmologist. Pt also reports having word finding difficulty which is new but has been somewhat improving. Pt endorses feeling weak, lightheaded, and concerned about ambulating on her own. Does not feel like her self and is scared about the word finding difficulty.  Denies numbness, tingling.       ED Course:   Vitals: BP: 156/85 , HR: 70 , Temp: 98.3, RR:16 , SpO2: 98% on RA   Labs:  Cr 1.4, proBNP 134,   UA: pending  CXR: No acute finding or change. Present study shows a left arm IV ending in the left axilla.  CTA Brain:  At least partial venous thrombosis of the left transverse and sigmoid dural sinuses with asymmetrically decreased contrast enhancement and flow compared to the right. Suggestion of small linear filling defect proximally. Incomplete and diminished filling of the proximal left IJV  EKG: pending  Received ASA 81mg x1, NS 500mL bolus x1 in the ED   NIHSS 3 on arrival -> 0 by tele stroke   (12 Feb 2024 17:53)      ---  HOSPITAL COURSE: Patient admitted to medicine floor for management of seizure-like activity. Patient presented to the ER on 2/12/24/ Patient underwent CT of the head which showed partial venous thrombosis of L transverse and sigmoid dural sinuses. Patient underwent MRI of the Head, which was found to be negative for acute infarct or thrombosis. The patient also underwent an EEG which showed nonspecific findings. Patient was seen by Dr. Samuels from Neurology. She was diagnosed with seizure like activity. Her dose of Lamictal was increased to 225 mg BID. Her symptoms improved as her stay in the hospital progressed and she was discharged.    Pt seen and examined on day of discharge. Patient is medically optimized for discharge to home with close outpatient followup.      Vital Signs Last 24 Hrs  T(C): 36.6 (15 Feb 2024 11:51), Max: 36.6 (14 Feb 2024 16:31)  T(F): 97.9 (15 Feb 2024 11:51), Max: 97.9 (14 Feb 2024 16:31)  HR: 47 (15 Feb 2024 11:51) (47 - 50)  BP: 128/73 (15 Feb 2024 11:51) (108/73 - 136/75)  BP(mean): --  RR: 18 (15 Feb 2024 11:51) (17 - 18)  SpO2: 95% (15 Feb 2024 11:51) (95% - 96%)    Parameters below as of 15 Feb 2024 11:51  Patient On (Oxygen Delivery Method): room air    PE:  GENERAL: patient appears well, no acute distress, appropriately interactive  EYES: sclera clear, no exudates  ENMT: oropharynx clear without erythema, no exudates, moist mucous membranes  NECK: supple, soft  LUNGS: good air entry bilaterally, clear to auscultation, symmetric breath sounds, no wheezing or rhonchi appreciated  HEART: soft S1/S2, regular rate and rhythm, no murmurs noted, no lower extremity edema  GASTROINTESTINAL: abdomen is soft, nontender, nondistended, normoactive bowel sounds  INTEGUMENT: good skin turgor, warm skin, appears well perfused  MUSCULOSKELETAL: no clubbing or cyanosis, no obvious deformity  NEUROLOGIC: awake, alert, oriented x3, no slurred speech  HEME/LYMPH: no lymphadenopathy  PSYCH: normal affect

## 2024-02-14 NOTE — PROGRESS NOTE ADULT - PROBLEM SELECTOR PLAN 2
Cr1.4 on arrival, baseline appears to be 1.1-1.3.   - Continue to monitor Cr1.4 on arrival, baseline appears to be 1.1-1.3.   - Continue to monitor - awaiting AM labs (collected)

## 2024-02-15 ENCOUNTER — APPOINTMENT (OUTPATIENT)
Dept: NEUROLOGY | Facility: CLINIC | Age: 69
End: 2024-02-15

## 2024-02-15 VITALS
SYSTOLIC BLOOD PRESSURE: 128 MMHG | HEART RATE: 47 BPM | OXYGEN SATURATION: 95 % | RESPIRATION RATE: 18 BRPM | TEMPERATURE: 98 F | DIASTOLIC BLOOD PRESSURE: 73 MMHG

## 2024-02-15 LAB
ANION GAP SERPL CALC-SCNC: 2 MMOL/L — LOW (ref 5–17)
BUN SERPL-MCNC: 11 MG/DL — SIGNIFICANT CHANGE UP (ref 7–23)
CALCIUM SERPL-MCNC: 8.3 MG/DL — LOW (ref 8.5–10.1)
CHLORIDE SERPL-SCNC: 122 MMOL/L — HIGH (ref 96–108)
CO2 SERPL-SCNC: 23 MMOL/L — SIGNIFICANT CHANGE UP (ref 22–31)
CREAT SERPL-MCNC: 1.2 MG/DL — SIGNIFICANT CHANGE UP (ref 0.5–1.3)
EGFR: 49 ML/MIN/1.73M2 — LOW
GLUCOSE SERPL-MCNC: 104 MG/DL — HIGH (ref 70–99)
HCT VFR BLD CALC: 35.1 % — SIGNIFICANT CHANGE UP (ref 34.5–45)
HGB BLD-MCNC: 11.1 G/DL — LOW (ref 11.5–15.5)
LAMOTRIGINE SERPL-MCNC: 11.5 UG/ML — SIGNIFICANT CHANGE UP (ref 2–20)
MCHC RBC-ENTMCNC: 29.8 PG — SIGNIFICANT CHANGE UP (ref 27–34)
MCHC RBC-ENTMCNC: 31.6 GM/DL — LOW (ref 32–36)
MCV RBC AUTO: 94.4 FL — SIGNIFICANT CHANGE UP (ref 80–100)
NRBC # BLD: 0 /100 WBCS — SIGNIFICANT CHANGE UP (ref 0–0)
PLATELET # BLD AUTO: 185 K/UL — SIGNIFICANT CHANGE UP (ref 150–400)
POTASSIUM SERPL-MCNC: 4 MMOL/L — SIGNIFICANT CHANGE UP (ref 3.5–5.3)
POTASSIUM SERPL-SCNC: 4 MMOL/L — SIGNIFICANT CHANGE UP (ref 3.5–5.3)
RBC # BLD: 3.72 M/UL — LOW (ref 3.8–5.2)
RBC # FLD: 14.5 % — SIGNIFICANT CHANGE UP (ref 10.3–14.5)
SODIUM SERPL-SCNC: 147 MMOL/L — HIGH (ref 135–145)
WBC # BLD: 7.28 K/UL — SIGNIFICANT CHANGE UP (ref 3.8–10.5)
WBC # FLD AUTO: 7.28 K/UL — SIGNIFICANT CHANGE UP (ref 3.8–10.5)

## 2024-02-15 PROCEDURE — C1751: CPT

## 2024-02-15 PROCEDURE — 82140 ASSAY OF AMMONIA: CPT

## 2024-02-15 PROCEDURE — 99285 EMERGENCY DEPT VISIT HI MDM: CPT

## 2024-02-15 PROCEDURE — 85610 PROTHROMBIN TIME: CPT

## 2024-02-15 PROCEDURE — 70496 CT ANGIOGRAPHY HEAD: CPT | Mod: MA

## 2024-02-15 PROCEDURE — 70450 CT HEAD/BRAIN W/O DYE: CPT | Mod: MA

## 2024-02-15 PROCEDURE — 85027 COMPLETE CBC AUTOMATED: CPT

## 2024-02-15 PROCEDURE — 84443 ASSAY THYROID STIM HORMONE: CPT

## 2024-02-15 PROCEDURE — 97162 PT EVAL MOD COMPLEX 30 MIN: CPT

## 2024-02-15 PROCEDURE — 76937 US GUIDE VASCULAR ACCESS: CPT

## 2024-02-15 PROCEDURE — 80048 BASIC METABOLIC PNL TOTAL CA: CPT

## 2024-02-15 PROCEDURE — 95816 EEG AWAKE AND DROWSY: CPT

## 2024-02-15 PROCEDURE — 70553 MRI BRAIN STEM W/O & W/DYE: CPT

## 2024-02-15 PROCEDURE — 81003 URINALYSIS AUTO W/O SCOPE: CPT

## 2024-02-15 PROCEDURE — 93005 ELECTROCARDIOGRAM TRACING: CPT

## 2024-02-15 PROCEDURE — 82962 GLUCOSE BLOOD TEST: CPT

## 2024-02-15 PROCEDURE — 97530 THERAPEUTIC ACTIVITIES: CPT

## 2024-02-15 PROCEDURE — 80061 LIPID PANEL: CPT

## 2024-02-15 PROCEDURE — 70498 CT ANGIOGRAPHY NECK: CPT | Mod: MA

## 2024-02-15 PROCEDURE — 80178 ASSAY OF LITHIUM: CPT

## 2024-02-15 PROCEDURE — 97116 GAIT TRAINING THERAPY: CPT

## 2024-02-15 PROCEDURE — 87637 SARSCOV2&INF A&B&RSV AMP PRB: CPT

## 2024-02-15 PROCEDURE — 87086 URINE CULTURE/COLONY COUNT: CPT

## 2024-02-15 PROCEDURE — 97166 OT EVAL MOD COMPLEX 45 MIN: CPT

## 2024-02-15 PROCEDURE — 0042T: CPT | Mod: MA

## 2024-02-15 PROCEDURE — 86140 C-REACTIVE PROTEIN: CPT

## 2024-02-15 PROCEDURE — 84484 ASSAY OF TROPONIN QUANT: CPT

## 2024-02-15 PROCEDURE — 36573 INSJ PICC RS&I 5 YR+: CPT

## 2024-02-15 PROCEDURE — 70546 MR ANGIOGRAPH HEAD W/O&W/DYE: CPT

## 2024-02-15 PROCEDURE — 85652 RBC SED RATE AUTOMATED: CPT

## 2024-02-15 PROCEDURE — A9579: CPT

## 2024-02-15 PROCEDURE — 80175 DRUG SCREEN QUAN LAMOTRIGINE: CPT

## 2024-02-15 PROCEDURE — 83880 ASSAY OF NATRIURETIC PEPTIDE: CPT

## 2024-02-15 PROCEDURE — 71045 X-RAY EXAM CHEST 1 VIEW: CPT

## 2024-02-15 PROCEDURE — 83735 ASSAY OF MAGNESIUM: CPT

## 2024-02-15 PROCEDURE — 85730 THROMBOPLASTIN TIME PARTIAL: CPT

## 2024-02-15 PROCEDURE — 80053 COMPREHEN METABOLIC PANEL: CPT

## 2024-02-15 PROCEDURE — 85025 COMPLETE CBC W/AUTO DIFF WBC: CPT

## 2024-02-15 PROCEDURE — 80307 DRUG TEST PRSMV CHEM ANLYZR: CPT

## 2024-02-15 PROCEDURE — 36415 COLL VENOUS BLD VENIPUNCTURE: CPT

## 2024-02-15 PROCEDURE — 83690 ASSAY OF LIPASE: CPT

## 2024-02-15 PROCEDURE — 99239 HOSP IP/OBS DSCHRG MGMT >30: CPT

## 2024-02-15 RX ORDER — LAMOTRIGINE 25 MG/1
1 TABLET, ORALLY DISINTEGRATING ORAL
Refills: 0 | DISCHARGE

## 2024-02-15 RX ORDER — LAMOTRIGINE 25 MG/1
1 TABLET, ORALLY DISINTEGRATING ORAL
Qty: 60 | Refills: 0
Start: 2024-02-15 | End: 2024-03-15

## 2024-02-15 RX ADMIN — HEPARIN SODIUM 5000 UNIT(S): 5000 INJECTION INTRAVENOUS; SUBCUTANEOUS at 05:11

## 2024-02-15 RX ADMIN — LAMOTRIGINE 225 MILLIGRAM(S): 25 TABLET, ORALLY DISINTEGRATING ORAL at 05:11

## 2024-02-15 RX ADMIN — SUMATRIPTAN SUCCINATE 100 MILLIGRAM(S): 4 INJECTION, SOLUTION SUBCUTANEOUS at 10:17

## 2024-02-15 RX ADMIN — GABAPENTIN 300 MILLIGRAM(S): 400 CAPSULE ORAL at 05:11

## 2024-02-15 RX ADMIN — CHLORHEXIDINE GLUCONATE 1 APPLICATION(S): 213 SOLUTION TOPICAL at 11:38

## 2024-02-15 RX ADMIN — LITHIUM CARBONATE 300 MILLIGRAM(S): 300 TABLET, EXTENDED RELEASE ORAL at 11:39

## 2024-02-15 RX ADMIN — Medication 81 MILLIGRAM(S): at 11:39

## 2024-02-15 RX ADMIN — HEPARIN SODIUM 5000 UNIT(S): 5000 INJECTION INTRAVENOUS; SUBCUTANEOUS at 13:01

## 2024-02-15 RX ADMIN — SUMATRIPTAN SUCCINATE 100 MILLIGRAM(S): 4 INJECTION, SOLUTION SUBCUTANEOUS at 11:13

## 2024-02-15 RX ADMIN — Medication 100 MILLIGRAM(S): at 05:10

## 2024-02-15 RX ADMIN — Medication 1 MILLIGRAM(S): at 05:10

## 2024-02-15 NOTE — CASE MANAGEMENT PROGRESS NOTE - NSCMPROGRESSNOTE_GEN_ALL_CORE
Per MD pt is stable for transition home today. Denies caregiver. CM again discussed home care Physical Therapy services, pt declined despite much encouragement. CM notified MD. Discharge notice reviewed, copy given to patient. Pt is in agreement with transitioning home today, stated a family member will be transporting her home.

## 2024-02-15 NOTE — CHART NOTE - NSCHARTNOTEFT_GEN_A_CORE
Called by RN with concerns of patients upper and lower extremity shaking. Per RN, patient received his lamictal and topamax. Of note, pt lamictal was increased to 225mg BID. Pt evaluated with RN at bedside. States that shaking is less than it was prior to previous incident prior to admission which resolved.  States its worsening with rest and started around 20 minutes ago after her morning medications were given. Denies any pain, numbness in extremities. Denies CP, SOB, loss of vision, dizziness, n/v. RN informed to observe closely, likely 2/2 to recent increase in lamictal. RN to call if any changes. Called by RN with concerns of patients upper and lower extremity shaking. Per RN, patient received his lamictal and topamax. Of note, pt lamictal was increased to 225mg BID. Pt evaluated with RN at bedside. States that shaking is less than it was prior to previous incident prior to admission which resolved.  States its worsening with rest and started around 20 minutes ago after her morning medications were given. Denies any pain, numbness in extremities. Denies CP, SOB, loss of vision, dizziness, n/v. RN informed to observe closely, likely 2/2 to recent increase in lamictal. RN to call if any changes.      Muscle strength 5/5 in b/l upper and lower extremities noted. Able to move upper and lower extremities to command. Noticeable cessation in tremors upon movement of extremities b/l

## 2024-02-15 NOTE — PROVIDER CONTACT NOTE (OTHER) - ASSESSMENT
pt having b/l leg twitching, states "These are nothing like the symptoms I had at home" also at this time c/o not being able to open the left eye as well as blurry vision

## 2024-02-28 ENCOUNTER — APPOINTMENT (OUTPATIENT)
Dept: INTERNAL MEDICINE | Facility: CLINIC | Age: 69
End: 2024-02-28
Payer: MEDICARE

## 2024-02-28 VITALS
HEIGHT: 63 IN | RESPIRATION RATE: 14 BRPM | BODY MASS INDEX: 22.68 KG/M2 | DIASTOLIC BLOOD PRESSURE: 90 MMHG | SYSTOLIC BLOOD PRESSURE: 152 MMHG | TEMPERATURE: 98.2 F | OXYGEN SATURATION: 99 % | WEIGHT: 128 LBS | HEART RATE: 49 BPM

## 2024-02-28 PROCEDURE — G2211 COMPLEX E/M VISIT ADD ON: CPT

## 2024-02-28 PROCEDURE — 99214 OFFICE O/P EST MOD 30 MIN: CPT

## 2024-02-28 RX ORDER — DICLOFENAC SODIUM 75 MG/1
75 TABLET, DELAYED RELEASE ORAL
Qty: 60 | Refills: 0 | Status: DISCONTINUED | COMMUNITY
Start: 2023-09-28 | End: 2024-02-28

## 2024-02-28 RX ORDER — METHYLPREDNISOLONE 4 MG/1
4 TABLET ORAL
Qty: 1 | Refills: 1 | Status: DISCONTINUED | COMMUNITY
Start: 2023-12-24 | End: 2024-02-28

## 2024-02-28 RX ORDER — LITHIUM CARBONATE 300 MG/1
300 TABLET ORAL DAILY
Refills: 0 | Status: ACTIVE | COMMUNITY
Start: 2023-10-06

## 2024-02-28 RX ORDER — LAMOTRIGINE 25 MG/1
25 TABLET ORAL TWICE DAILY
Refills: 0 | Status: ACTIVE | COMMUNITY
Start: 2024-02-28

## 2024-02-28 NOTE — ASSESSMENT
[FreeTextEntry1] : anxiety and depression continue lamictal and lithium and lexapro  diplopia see neuro ophtho  headaches continue topiramate and sumatriptan  HTN continue meotoprolol  insomnia continue zolpidem  leg pain  continue gabapentin

## 2024-02-28 NOTE — HISTORY OF PRESENT ILLNESS
[Post-hospitalization from ___ Hospital] : Post-hospitalization from [unfilled] Hospital [Admitted on: ___] : The patient was admitted on [unfilled] [Discharged on ___] : discharged on [unfilled] [FreeTextEntry2] : Pt had uncontrollable shaking of her arms and legs. Remained conscious and ambulance was called. Denied any loss of urine or tongue biting. Lasted 5 minutes or more. Dx with TIA.  She feels miserable with dizziness, eyes bothering her.  She has episodes of aphasia.  c/o double visions for a few weeks and has a neuro ophtho appt. It is a little better.

## 2024-02-29 ENCOUNTER — APPOINTMENT (OUTPATIENT)
Dept: NEUROLOGY | Facility: CLINIC | Age: 69
End: 2024-02-29
Payer: MEDICARE

## 2024-02-29 VITALS
TEMPERATURE: 97.8 F | DIASTOLIC BLOOD PRESSURE: 76 MMHG | WEIGHT: 128 LBS | BODY MASS INDEX: 22.68 KG/M2 | HEIGHT: 63 IN | HEART RATE: 50 BPM | SYSTOLIC BLOOD PRESSURE: 155 MMHG

## 2024-02-29 DIAGNOSIS — H53.2 DIPLOPIA: ICD-10-CM

## 2024-02-29 DIAGNOSIS — G43.909 MIGRAINE, UNSPECIFIED, NOT INTRACTABLE, W/OUT STATUS MIGRAINOSUS: ICD-10-CM

## 2024-02-29 PROCEDURE — 99214 OFFICE O/P EST MOD 30 MIN: CPT

## 2024-02-29 PROCEDURE — G2211 COMPLEX E/M VISIT ADD ON: CPT

## 2024-02-29 NOTE — DATA REVIEWED
[de-identified] : MRI brain non-contrast 3/3/23: Unremarkable MRI of the brain. Mild microvascular ischemic changes are noted.  MRI brain 1/123/24: No acute infarct or other acute abnormality.  MRI/MRV brain 2/13/24: MRI BRAIN: No hydrocephalus, mass effect, acute intracranial hemorrhage, vasogenic  edema, restricted diffusion, or acute territorial infarct.  No abnormal parenchymal, leptomeningeal, or dural enhancement.  MRV BRAIN: No evidence for venous sinus or cortical vein thrombosis.  [de-identified] : EEG 2/13/24: Abnormal limited awake tense EEG due to the presence of mild bilateral background slowing. This is consistent with bilateral or  multifocal cerebral dysfunction of numerous etiologies and are nonspecific.  [de-identified] : CT head, CTA head and neck     1.   Brain:  Unremarkable      2.   Intracranial circulation:  No hemodynamically significant  stenosis.      3.    Right carotid/vertebral artery system:  No hemodynamically  significant stenosis.      4.   Left carotid/vertebral artery system:  No hemodynamically  significant stenosis.  5. There are multiple nodules in the bilateral lobes of the thyroid,  compatible with a multinodular thyroid. Ultrasound of the thyroid may be  helpful for further evaluation on a nonemergent basis.  CT head 2/12/24: No acute intracranial bleeding. CTA head and neck, CT perfusion 2/12/24:  CT PERFUSION: No regional perfusion abnormality to indicate arterial  ischemia or infarction.  CTA BRAIN: At least partial venous thrombosis of the left transverse and  sigmoid dural sinuses with asymmetrically decreased contrast enhancement  and flow compared to the right. Suggestion of small linear filling defect  proximally. Incomplete and diminished filling of the proximal left IJV.  Recommend MRI and MR venogram of the brain for further evaluation for  signs of venous ischemia/infarction.  CTA NECK: Patent cervical vasculature. No flow limiting stenosis or  occlusion.  Tortuous mid cervical ICA with focal kinking.

## 2024-02-29 NOTE — DISCUSSION/SUMMARY
[FreeTextEntry1] : Ms. Flores is a 67 yo woman with a history of bipolar disorder, chronic migraines and dizziness. She had had two recent hospitalizations and has current complaints of diplopia and word finding difficulty.  Diplopia -Unclear cause -Imaging has been unremarkable. -I do not see any abnormalities in ocular movements on exam -She has a scheduled appointment with neuro-ophthalmology -Polypharmacy may be playing a role. I am reducing topiramate. -Can consider ACh antibodies, MuSK antibodies but based on her history my suspicion for myasthenia gravis is not high.   Dizziness -Imaging has been unremarkable -Will complete remainder of workup. -Will hold off on vestibular rehab for now since she is not having vertigo at this time.   Chronic Migraine -She has chronic migraines -Continue gabapentin 300 mg BID -Topamax may be contributing to word finding difficulty. Will reduce the dose to 50 mg in the AM and 100 mg at night -Nurtec, Ubrelvy were previously not that effective. Can do a retrial of Ubrelvy 100 mg . I explained the dose can be repeated x 1  for a max of 200 mg in 24 hours. Samples given.  -Continue Topamax 100 mg BID. She does not think she has side effects with this high dose. -Would avoid sumatriptan which may be an issue with high blood pressure.  Shaking episode: -I explained that b/l shaking without loss of consciousness is unusual for an epileptic seizure. I offered a 24 hour EEG. She said she will consider this if she has another episode. -Continue Lamotrigine 225 mg BID for now  I again explained that I think her symptoms were long in duration (hospitalization in January) to be considered a TIA. She reported days of vision difficulties and weakness.  f/u will be arranged.

## 2024-02-29 NOTE — PHYSICAL EXAM
[FreeTextEntry1] : Examination: Constitutional: normal, no apparent distress Eyes: normal conjunctiva b/l, no ptosis, visual fields full Respiratory: no respiratory distress, normal effort, normal auscultation Cardiovascular: normal rate, rhythm, no murmurs Neck: supple, no masses Vascular: carotids normal Skin: normal color, no rashes Psych: normal mood, affect  Neurological: Memory: oriented to person, place, time Language intact/no aphasia Cranial Nerves: Pupils equally round and reactive to light, ocular muscles/movements intact, no ptosis, no facial weakness, tongue protrudes normally in the midline, + diplopia when looking up and to the left Motor: normal tone, no pronator drift, full strength in all four extremities in the proximal and distal muscle groups Coordination: Fine motor movements intact, rapid alternating movements intact, finger to nose intact bilaterally Sensory: intact to light touch DTRs: symmetric, 1/4 in biceps, radialis, patellars Gait: narrow based, steady + tardive dyskinesia

## 2024-02-29 NOTE — HISTORY OF PRESENT ILLNESS
[FreeTextEntry1] : 3/15/23: Ms. Flores is here today for neurology evaluation. She reports that she has dizziness. About two months ago she had a sudden onset of dizziness, described as dizziness. She got so dizzy that she fell on her face. She could not get out of her bed for about 1.5 days. She was dry heaving.   She had an episode of likely vertigo in the past as well. She was with her father at a medical test and had extreme dizziness and could not walk.  She reports feeling dizzy when she looks to the side, mostly when she looks to the left. Motion makes her dizziness worse.  She denies a feeling of fullness in her ears of tinnitus. She denies double vision. Nausea has resolved.  She has been to see her ophthalmologist. She has cataracts and dry eyes. Her cardiologist did nto feel that her eyes were the cause of her dizziness.  She has chronic headaches. She has headaches fairly often but says that "acute migraines" are rare. She takes gabapentin 300 mg BID. She also takes Topamax 100 mg BID. She uses sumatriptan to abort migraines.  She saw Dr. Sanders in [place], who she has been seeing for many years for migraines. An MRI brain was ordered which was unremarkable.  She saw ENT, Dr. Contreras, last week. Other than an examination, additional testing was not done.   11/28/23: She fell on 11/23. She does not think that she was dizzy. She was getting out of her car, holding a package. She had to climb up on to the curb, lost her balance and fell backwards and hit the left side of her head on the car. She was bleeding from her scalp which stopped. She went to bed. Her balance was poor prior to this but she feels that it worse since that time. She feels dizzy since that time.  She has had one episode of vertigo since the last visit.  It resolved after taking meclizine.  She says that today in the car (she was a passenger) she could not stop blinking.   She has three bad, but not severe headaches. She tried Nurtec and Ubrelvy but did not find them to be as effective as sumatriptan.   She was recently started on Lexapro.    2/29/24: In late December she had a migraine lasting 1.5 weeks. Ubrelvy/nurtec did not provide relief. Sumatriptan provided temporary relief. In my absence, Dr. Solo prescribed a medrol pack and Zavzpret nasal spray. Unfortunately Zavzpret was not affordable.  She was admitted to [facility] on 1/11/24 with two weeks of worsening symptoms including weakness, more so on the left and difficulty seeing out of her left eye.  An MRI brain was negative for infarct. She was started on aspirin and her dose of atorvastatin was increased. She was discharged on 1/17/24. In the hospital she was given sumatriptan for migraine.   She returned to the ED at [facility] on 2/12/24 after an episode of generalized body shaking with preserved consciousness and slurred speech.  She was seen by neurology.  MRI/MRV showed no sign of acute stroke or cerebral sinus/cortical vein thrombosis. EEG did not show epileptiform activity but did show generalized slowing. Her dose of Lamotrigine was increased to 225 mg BID.   There is a resident note on the day of discharge (2/15/24) that there was an episode of shaking of her upper and lower extremities without loss of consciousness which subsided with movement of her extremities.  Today she reports dizziness. She denies a spinning sensation. She says that she is off balance and feels like she is tipping over. She reports double vision that resolves with closure of one eye. She also has blurry vision. The symptoms come and go but she is not aware of any particular pattern in terms of time of day. She notes worsening when she looks to the side and then comes back to center.  She says that she always has a headache. She is taking Topiramate 100 mg BID.  She has stopped ziprasidone and her lithium dose has been decreased.  She says that she is still having difficulty finding words.  THYROID

## 2024-03-12 ENCOUNTER — NON-APPOINTMENT (OUTPATIENT)
Age: 69
End: 2024-03-12

## 2024-03-12 ENCOUNTER — APPOINTMENT (OUTPATIENT)
Dept: OPHTHALMOLOGY | Facility: CLINIC | Age: 69
End: 2024-03-12
Payer: MEDICARE

## 2024-03-12 PROCEDURE — 92060 SENSORIMOTOR EXAMINATION: CPT

## 2024-03-12 PROCEDURE — 99204 OFFICE O/P NEW MOD 45 MIN: CPT

## 2024-03-12 PROCEDURE — V2718: CPT

## 2024-03-19 ENCOUNTER — NON-APPOINTMENT (OUTPATIENT)
Age: 69
End: 2024-03-19

## 2024-03-19 ENCOUNTER — APPOINTMENT (OUTPATIENT)
Dept: CARDIOLOGY | Facility: CLINIC | Age: 69
End: 2024-03-19
Payer: MEDICARE

## 2024-03-19 VITALS
DIASTOLIC BLOOD PRESSURE: 80 MMHG | WEIGHT: 133 LBS | HEART RATE: 54 BPM | BODY MASS INDEX: 23.56 KG/M2 | OXYGEN SATURATION: 100 % | SYSTOLIC BLOOD PRESSURE: 140 MMHG

## 2024-03-19 DIAGNOSIS — R00.1 BRADYCARDIA, UNSPECIFIED: ICD-10-CM

## 2024-03-19 DIAGNOSIS — I44.7 LEFT BUNDLE-BRANCH BLOCK, UNSPECIFIED: ICD-10-CM

## 2024-03-19 PROCEDURE — 93000 ELECTROCARDIOGRAM COMPLETE: CPT

## 2024-03-19 PROCEDURE — 99214 OFFICE O/P EST MOD 30 MIN: CPT

## 2024-03-19 PROCEDURE — G2211 COMPLEX E/M VISIT ADD ON: CPT

## 2024-03-19 RX ORDER — AMLODIPINE BESYLATE 2.5 MG/1
2.5 TABLET ORAL DAILY
Qty: 90 | Refills: 1 | Status: ACTIVE | COMMUNITY
Start: 2024-03-19 | End: 1900-01-01

## 2024-03-19 NOTE — DISCUSSION/SUMMARY
[With Me] : with me [___ Week(s)] : in [unfilled] week(s) [FreeTextEntry1] :  Left bundle branch block / 1st degree AV block; Chronic.  Sinus bradycardia:  Asymptomatic--given marked bradycardia (heart rate in the 40s) I recommended decreasing metoprolol dose to 12.5 mg daily--can eventually consider its discontinuation although it has been effective in controlling sporadic palpitations.  Hypertension: Mild; add amlodipine 2.5 mg daily.

## 2024-03-19 NOTE — PHYSICAL EXAM
[No Acute Distress] : no acute distress [Normal Gait] : normal gait [Clear Lung Fields] : clear lung fields [No Edema] : no edema [de-identified] : Wearing eyeglasses [de-identified] : bradycardia

## 2024-03-19 NOTE — HISTORY OF PRESENT ILLNESS
[FreeTextEntry1] : Samantha Flores is a 68-year-old woman with a history of remote cardiomyopathy (at age 52) with recovery/normalization of LV systolic function, left bundle branch block, hypertension, hypertriglyceridemia, and psychiatric illness (bipolar affective, obsessive-compulsive disorder) who returns for cardiac examination -I last saw her approximately 1 year ago.  She was hospitalized in mid-January 2024 ([place]) with neurologic symptoms, including word finding difficulties, visual disturbance, gait instability, and problems with memory.  Brain imaging with MRI revealed no acute findings.  CVA, TIA, and migraine were included in the differential diagnosis.  She has no specific cardiac complaints.  She tells me her blood pressure has been "high" when checked in other settings.  * This visit was conducted as part of ongoing, longitudinal medical care for patient's chronic medical diagnoses and other issues.

## 2024-03-19 NOTE — REVIEW OF SYSTEMS
[Anxiety] : anxiety [Under Stress] : under stress [Weight Gain (___ Lbs)] : [unfilled] ~Ulb weight gain [Blurry Vision] : blurred vision [Dyspnea on exertion] : not dyspnea during exertion [Chest Discomfort] : no chest discomfort [Palpitations] : no palpitations

## 2024-03-19 NOTE — CARDIOLOGY SUMMARY
[de-identified] : 3/19/2024. Sinus bradycardia.  Left bundle branch block [de-identified] : 1/12/2024.  Normal left ventricular size and systolic function with ejection fraction 55%.  Mild diastolic dysfunction.  Moderate left ventricular hypertrophy.  Normal right ventricular size and function.  Mild mitral regurgitation.  Mild pulmonic regurgitation.  Trace tricuspid regurgitation.

## 2024-04-16 ENCOUNTER — APPOINTMENT (OUTPATIENT)
Dept: CARDIOLOGY | Facility: CLINIC | Age: 69
End: 2024-04-16

## 2024-04-30 ENCOUNTER — APPOINTMENT (OUTPATIENT)
Dept: CARDIOLOGY | Facility: CLINIC | Age: 69
End: 2024-04-30

## 2024-05-22 ENCOUNTER — RX RENEWAL (OUTPATIENT)
Age: 69
End: 2024-05-22

## 2024-05-29 ENCOUNTER — APPOINTMENT (OUTPATIENT)
Dept: INTERNAL MEDICINE | Facility: CLINIC | Age: 69
End: 2024-05-29
Payer: MEDICARE

## 2024-05-29 VITALS
SYSTOLIC BLOOD PRESSURE: 138 MMHG | OXYGEN SATURATION: 98 % | WEIGHT: 126 LBS | HEART RATE: 58 BPM | DIASTOLIC BLOOD PRESSURE: 82 MMHG | HEIGHT: 63 IN | BODY MASS INDEX: 22.32 KG/M2 | TEMPERATURE: 98 F | RESPIRATION RATE: 14 BRPM

## 2024-05-29 DIAGNOSIS — F32.A DEPRESSION, UNSPECIFIED: ICD-10-CM

## 2024-05-29 DIAGNOSIS — R51.9 HEADACHE, UNSPECIFIED: ICD-10-CM

## 2024-05-29 DIAGNOSIS — R55 SYNCOPE AND COLLAPSE: ICD-10-CM

## 2024-05-29 DIAGNOSIS — I10 ESSENTIAL (PRIMARY) HYPERTENSION: ICD-10-CM

## 2024-05-29 DIAGNOSIS — E78.5 HYPERLIPIDEMIA, UNSPECIFIED: ICD-10-CM

## 2024-05-29 DIAGNOSIS — F41.9 ANXIETY DISORDER, UNSPECIFIED: ICD-10-CM

## 2024-05-29 PROCEDURE — G2211 COMPLEX E/M VISIT ADD ON: CPT

## 2024-05-29 PROCEDURE — 99214 OFFICE O/P EST MOD 30 MIN: CPT

## 2024-05-29 RX ORDER — METOPROLOL SUCCINATE 25 MG/1
25 TABLET, EXTENDED RELEASE ORAL
Qty: 90 | Refills: 1 | Status: ACTIVE | COMMUNITY
Start: 2022-10-27

## 2024-05-29 RX ORDER — TRIAZOLAM 0.25 MG/1
0.25 TABLET ORAL
Refills: 0 | Status: ACTIVE | COMMUNITY
Start: 2024-05-29

## 2024-05-29 RX ORDER — SODIUM SULFATE, POTASSIUM SULFATE AND MAGNESIUM SULFATE 1.6; 3.13; 17.5 G/177ML; G/177ML; G/177ML
17.5-3.13-1.6 SOLUTION ORAL
Qty: 2 | Refills: 0 | Status: DISCONTINUED | COMMUNITY
Start: 2023-09-13 | End: 2024-05-29

## 2024-05-29 RX ORDER — TOPIRAMATE 50 MG/1
50 TABLET, FILM COATED ORAL
Qty: 270 | Refills: 1 | Status: ACTIVE | COMMUNITY
Start: 2022-06-01

## 2024-05-29 RX ORDER — METHYLPREDNISOLONE 4 MG/1
4 TABLET ORAL
Qty: 1 | Refills: 0 | Status: DISCONTINUED | COMMUNITY
Start: 2024-04-08 | End: 2024-05-29

## 2024-05-29 NOTE — HEALTH RISK ASSESSMENT
[No] : No [Little interest or pleasure doing things] : 1) Little interest or pleasure doing things [Feeling down, depressed, or hopeless] : 2) Feeling down, depressed, or hopeless [0] : 2) Feeling down, depressed, or hopeless: Not at all (0) [PHQ-2 Negative - No further assessment needed] : PHQ-2 Negative - No further assessment needed [[id number]] : 0 [Never] : Never

## 2024-05-29 NOTE — ASSESSMENT
[FreeTextEntry1] : anxiety and depression continue lamictal, lithium, lexapro psych follow up  hyperlipidemia continue atorvastatin - renewed  HTN continue amlodipine and metoprolol - lowered b/o bradycardia  migraines much improved

## 2024-05-29 NOTE — HISTORY OF PRESENT ILLNESS
[FreeTextEntry1] : f/u of medical conditions [de-identified] : Pt was hospitalized twice. First had trouble finding words and dx as possible TIA which the neurologist did not agree with. Next time had seizure like activity when she lost control of extremities w/o LOC. Has been taking lamictal for mood stabilizer and 25 mg added.  Has had migraines every day since December which has improved.  Taking gabapentin prn.  She has poor balance and falls. She does feel better than in February and March. Gradually improving.

## 2024-05-31 ENCOUNTER — APPOINTMENT (OUTPATIENT)
Dept: OPHTHALMOLOGY | Facility: CLINIC | Age: 69
End: 2024-05-31

## 2024-06-19 ENCOUNTER — APPOINTMENT (OUTPATIENT)
Dept: ORTHOPEDIC SURGERY | Facility: CLINIC | Age: 69
End: 2024-06-19
Payer: MEDICARE

## 2024-06-19 VITALS — HEART RATE: 55 BPM | SYSTOLIC BLOOD PRESSURE: 135 MMHG | DIASTOLIC BLOOD PRESSURE: 70 MMHG

## 2024-06-19 DIAGNOSIS — Z91.81 HISTORY OF FALLING: ICD-10-CM

## 2024-06-19 DIAGNOSIS — M54.9 DORSALGIA, UNSPECIFIED: ICD-10-CM

## 2024-06-19 DIAGNOSIS — M25.552 PAIN IN LEFT HIP: ICD-10-CM

## 2024-06-19 DIAGNOSIS — M25.551 PAIN IN RIGHT HIP: ICD-10-CM

## 2024-06-19 DIAGNOSIS — Z96.642 PRESENCE OF LEFT ARTIFICIAL HIP JOINT: ICD-10-CM

## 2024-06-19 PROCEDURE — 99213 OFFICE O/P EST LOW 20 MIN: CPT

## 2024-06-19 PROCEDURE — 73502 X-RAY EXAM HIP UNI 2-3 VIEWS: CPT | Mod: RT

## 2024-06-19 RX ORDER — CELECOXIB 200 MG/1
200 CAPSULE ORAL
Qty: 42 | Refills: 0 | Status: ACTIVE | COMMUNITY
Start: 2024-06-19 | End: 1900-01-01

## 2024-06-19 NOTE — HISTORY OF PRESENT ILLNESS
[de-identified] : The patient is a 68 yr old female presents today for evaluation of bilateral hips. She is s/p fall 2 days ago; she landed on her knees and has difficulty ambulating. Left hip pain > right hip pain. 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 She is using a cane for ambulation. She reports the pain is an 8/10. She is experiencing difficulty getting up from the bed, sitting down, and she also reports difficulty raising the left lower extremity. She reports she is also experiencing right groin pain and lower back pain. She has been taking ibuprofen 600-800 mg with minimal relief. She reports the pain has not improved at all. [Stable] : stable [___ days] : [unfilled] day(s) ago [Bending] : worsened by bending [Hip Movement] : worsened by hip movement [Sitting] : worsened by sitting [Walking] : worsened by walking [Knee Flexion] : worsened with knee flexion [Knee Extension] : worsened with knee extension [Ice] : relieved by ice [NSAIDs] : relieved by nonsteroidal anti-inflammatory drugs [Rest] : relieved by rest

## 2024-06-19 NOTE — DISCUSSION/SUMMARY
[Medication Risks Reviewed] : Medication risks reviewed [2 Weeks] : in 2 weeks [de-identified] : The patient was ordered Celebrex 200 mg twice a day for 2 weeks. She was also given a referral for a CT of the pelvis to evaluate bilateral hips. She was advised to obtain the CT if her symptoms worsen or if she continues to have symptoms over the next week. Patient verbalized understanding of all instructions and all of her questions were addressed to her satisfaction. Patient was advised to call this office if she has further questions or concerns.

## 2024-06-19 NOTE — PHYSICAL EXAM
[Antalgic] : antalgic [Cane] : ambulates with cane [LE] : Sensory: Intact in bilateral lower extremities [DP] : dorsalis pedis 2+ and symmetric bilaterally [de-identified] : The patient has stable antalgic gait utilizing a cane. The [left/right] hip is with [Dermabond/sutures] intact.  The left hip flexes to degrees with (+) discomfort to the groin. The external and internal rotation of the left hip is with minimal discomfort and stiffness. Leg lengths appear equal.    [de-identified] : Radiographs, including 1 view of the pelvis and 1 view of the left hip and 1 view of the right hip were obtained at today's visit. X-rays reveal a well-positioned, well fixed total hip replacement in good alignment. There is no obvious evidence of fracture, dislocation or osteolysis. X-rays of the right hip reveals severe osteoarthritic changes.

## 2024-07-02 ENCOUNTER — APPOINTMENT (OUTPATIENT)
Dept: CARDIOLOGY | Facility: CLINIC | Age: 69
End: 2024-07-02

## 2024-07-26 ENCOUNTER — APPOINTMENT (OUTPATIENT)
Dept: INTERNAL MEDICINE | Facility: CLINIC | Age: 69
End: 2024-07-26
Payer: MEDICARE

## 2024-07-26 DIAGNOSIS — M62.838 OTHER MUSCLE SPASM: ICD-10-CM

## 2024-07-26 DIAGNOSIS — K59.00 CONSTIPATION, UNSPECIFIED: ICD-10-CM

## 2024-07-26 PROCEDURE — 99213 OFFICE O/P EST LOW 20 MIN: CPT

## 2024-07-26 NOTE — HISTORY OF PRESENT ILLNESS
[FreeTextEntry8] : Teleheath  Pt fell twice and lower back hurts. She is having muscle cramps in both her thighs. Having trouble walking because of her thigh pain. Thighs are not bruised or swollen. She had fallen on her head and got a bump and bleeding. Denies LOC. The one of the falls happened while at an event when she missed a step. The other was when she got dizzy and lost balance and feel on her back. Has been taking ibuprofen.   She takes halcion and zolpidem alternating.

## 2024-07-26 NOTE — ASSESSMENT
[FreeTextEntry1] : muscle spasm advised pt that she should come in rx for robaxin to be used cautiously  anxiety and depression continue lamictal, lithium, lexapro  HTN continue amlodipine  hyperlipidemia continue atorvastatin

## 2024-07-31 ENCOUNTER — APPOINTMENT (OUTPATIENT)
Dept: INTERNAL MEDICINE | Facility: CLINIC | Age: 69
End: 2024-07-31
Payer: MEDICARE

## 2024-07-31 VITALS
OXYGEN SATURATION: 98 % | TEMPERATURE: 98.4 F | RESPIRATION RATE: 16 BRPM | SYSTOLIC BLOOD PRESSURE: 142 MMHG | HEART RATE: 78 BPM | HEIGHT: 63 IN | DIASTOLIC BLOOD PRESSURE: 84 MMHG

## 2024-07-31 DIAGNOSIS — F41.9 ANXIETY DISORDER, UNSPECIFIED: ICD-10-CM

## 2024-07-31 DIAGNOSIS — E78.5 HYPERLIPIDEMIA, UNSPECIFIED: ICD-10-CM

## 2024-07-31 DIAGNOSIS — F31.9 BIPOLAR DISORDER, UNSPECIFIED: ICD-10-CM

## 2024-07-31 DIAGNOSIS — M54.9 DORSALGIA, UNSPECIFIED: ICD-10-CM

## 2024-07-31 DIAGNOSIS — F32.A DEPRESSION, UNSPECIFIED: ICD-10-CM

## 2024-07-31 DIAGNOSIS — I10 ESSENTIAL (PRIMARY) HYPERTENSION: ICD-10-CM

## 2024-07-31 DIAGNOSIS — M25.559 PAIN IN UNSPECIFIED HIP: ICD-10-CM

## 2024-07-31 PROCEDURE — 99214 OFFICE O/P EST MOD 30 MIN: CPT

## 2024-07-31 PROCEDURE — G2211 COMPLEX E/M VISIT ADD ON: CPT

## 2024-07-31 RX ORDER — METHOCARBAMOL 500 MG/1
500 TABLET, FILM COATED ORAL
Qty: 10 | Refills: 0 | Status: DISCONTINUED | COMMUNITY
Start: 2024-07-26 | End: 2024-07-31

## 2024-07-31 RX ORDER — TIZANIDINE 2 MG/1
2 TABLET ORAL
Qty: 20 | Refills: 0 | Status: ACTIVE | COMMUNITY
Start: 2024-07-31 | End: 1900-01-01

## 2024-07-31 NOTE — ASSESSMENT
[FreeTextEntry1] : back and hip pains check x-rays recommend ortho eval meloxicam   anxiety, depression, bipolar continue lamictal and lithium and lexapro f/u with psychiatry  insomnia alternates halcion and zolpidem  HTN continue metoprolol and amlodipine

## 2024-07-31 NOTE — HISTORY OF PRESENT ILLNESS
[FreeTextEntry8] : Pt fell 2 weeks ago when she missed the bottom state at an event and fell. Broke her glasses, hit her head w/o LOC. Has back pain since then. It is back and leg pain and unable to step down. It is a sharp pain that gets a little better with walking.

## 2024-07-31 NOTE — PHYSICAL EXAM
[No Edema] : there was no peripheral edema [Normal] : affect was normal and insight and judgment were intact [de-identified] : tenderness lower back and pain with movement. some hip limitation.

## 2024-08-08 ENCOUNTER — APPOINTMENT (OUTPATIENT)
Dept: ORTHOPEDIC SURGERY | Facility: CLINIC | Age: 69
End: 2024-08-08

## 2024-08-22 ENCOUNTER — APPOINTMENT (OUTPATIENT)
Dept: ORTHOPEDIC SURGERY | Facility: CLINIC | Age: 69
End: 2024-08-22
Payer: MEDICARE

## 2024-08-22 VITALS — WEIGHT: 118 LBS | HEIGHT: 63 IN | BODY MASS INDEX: 20.91 KG/M2

## 2024-08-22 DIAGNOSIS — S76.319A STRAIN OF MUSCLE, FASCIA AND TENDON OF THE POSTERIOR MUSCLE GROUP AT THIGH LEVEL, UNSPECIFIED THIGH, INITIAL ENCOUNTER: ICD-10-CM

## 2024-08-22 DIAGNOSIS — S76.311S STRAIN OF MUSCLE, FASCIA AND TENDON OF THE POSTERIOR MUSCLE GROUP AT THIGH LEVEL, RIGHT THIGH, SEQUELA: ICD-10-CM

## 2024-08-22 PROCEDURE — 99213 OFFICE O/P EST LOW 20 MIN: CPT

## 2024-08-22 NOTE — PHYSICAL EXAM
[de-identified] : General Appearance / Station: Well developed, well nourished, in no acute distress Orientation: Oriented to person, place, and time Gait & Station: Ambulates without assistive device Neurologic: Normal leg sensation Cardiovascular: Warm extremity Lymphatics: No lymphedema Generalized Ligament Laxity: Normal Stiffness: Normal.   LUMBAR SPINE Nontender at lumbar spine Straight leg raise: Negative Motor: 5/5 motor L2-S1 Sensation Intact. No paresthesias L2-S1   SYMPTOMATIC RIGHT HIP Range of motion: Painless internal and external rotation of the hip. Strength: Within Normal Limits. Negative Trendelenburg sign                                                                    FADIR: Negative Stinchfield: Negative  Palpation: TENDER at ischial tuberosity.  Nontender greater trochanter, Nontender SI joint                    SYMPTOMATIC LEFT HIP Range of motion: Painless internal and external rotation of the hip. Strength: Within Normal Limits. Negative Trendelenburg sign                                                                    FADIR: Negative Stinchfield: Positive  Palpation: TENDER at the tuberosity.  Nontender greater trochanter, Nontender SI joint   LEFT KNEE Alignment: Varus Skin: Normal. Effusion: None Quadriceps: Normal Range of motion: Normal PF crepitus: 1+ PF apprehension: None Patella / Patella Tendon: None Palpation: Nontender Meniscus signs: Negative

## 2024-08-22 NOTE — HISTORY OF PRESENT ILLNESS
[de-identified] :  patient presents for bilateral hip and posterior thigh pain.  She has a history of a left total hip replacement and gets intermittent left groin pain.  She has undergone iliopsoas bursa injections which helped in the past.  She has a history of right hip avascular necrosis been a second pain in the right hip.  She did have a fall 5 weeks ago and is bilateral posterior thigh pain and hamstring pain.

## 2024-08-22 NOTE — DISCUSSION/SUMMARY
[de-identified] :  I discussed treatment options with the patient.  I recommended an MRI of the bony pelvis to rule out any fracture proximal hamstring injury as she has had bilateral ischial tuberosity pain did have a fall 5 weeks ago.  I recommended anti-inflammatory medications and she would prefer to continue with over-the-counter medication.  Regarding her left groin pain, it is possible that she is iliopsoas bursitis again and we will follow-up with the MRI results and determine if repeat injection is warranted.  All questions answered she is in agreement the plan.

## 2024-09-12 ENCOUNTER — APPOINTMENT (OUTPATIENT)
Dept: ORTHOPEDIC SURGERY | Facility: CLINIC | Age: 69
End: 2024-09-12
Payer: MEDICARE

## 2024-09-12 DIAGNOSIS — M48.062 SPINAL STENOSIS, LUMBAR REGION WITH NEUROGENIC CLAUDICATION: ICD-10-CM

## 2024-09-12 PROCEDURE — 99214 OFFICE O/P EST MOD 30 MIN: CPT

## 2024-09-12 NOTE — HISTORY OF PRESENT ILLNESS
[de-identified] : Patient presents for follow-up of MRI of pelvis after a fall.  She underwent an MRI of [facility] that showed edema in the gluteus daquan muscle on the right side and low-grade AVN of the right hip.  There is also degenerative changes of the lumbar spine and left total hip replacement.  Her pain has persisted.  There were no acute findings in the hamstring or the bony bones.  She finds that the pain is worse with standing and walking for prolonged periods of time.  She also has left iliopsoas bursitis and groin pain and has previously undergone injections that have relieved those symptoms.

## 2024-09-12 NOTE — DISCUSSION/SUMMARY
[de-identified] : I discussed treatment options for their ischial pain and iliopsoas bursitis.  They will undergo an ultrasound-guided injection of the iliopsoas bursa on the left side.  They will undergo physical therapy for rehabilitation of their buttock and posterior hamstring.  They will follow-up in 2 months for repeat evaluations all questions were answered in agreement the plan. Abdominal Pain, N/V/D

## 2024-09-12 NOTE — PHYSICAL EXAM
[de-identified] : General Appearance / Station: Well developed, well nourished, in no acute distress Orientation: Oriented to person, place, and time Gait & Station: Ambulates without assistive device Neurologic: Normal leg sensation Cardiovascular: Warm extremity Lymphatics: No lymphedema Generalized Ligament Laxity: Normal Stiffness: Normal.   LUMBAR SPINE Nontender at lumbar spine Straight leg raise: Negative Motor: 5/5 motor L2-S1 Sensation Intact. No paresthesias L2-S1   SYMPTOMATIC RIGHT HIP Range of motion: Painless internal and external rotation of the hip. Strength: Within Normal Limits. Negative Trendelenburg sign                                                                    FADIR: Negative Stinchfield: Negative  Palpation: TENDER at ischial tuberosity.  Nontender greater trochanter, Nontender SI joint                    SYMPTOMATIC LEFT HIP Range of motion: Painless internal and external rotation of the hip. Strength: Within Normal Limits. Negative Trendelenburg sign                                                                    FADIR: Negative Stinchfield: Positive  Palpation: TENDER at the tuberosity.  Nontender greater trochanter, Nontender SI joint   LEFT KNEE Alignment: Varus Skin: Normal. Effusion: None Quadriceps: Normal Range of motion: Normal PF crepitus: 1+ PF apprehension: None Patella / Patella Tendon: None Palpation: Nontender Meniscus signs: Negative      [de-identified] : Right bony pelvis from [facility] radiology reviewed

## 2024-09-13 NOTE — ED ADULT NURSE NOTE - NSFALLRISKINTERV_ED_ALL_ED
37 Assistance OOB with selected safe patient handling equipment if applicable/Assistance with ambulation/Communicate fall risk and risk factors to all staff, patient, and family/Provide visual cue: yellow wristband, yellow gown, etc/Reinforce activity limits and safety measures with patient and family/Call bell, personal items and telephone in reach/Instruct patient to call for assistance before getting out of bed/chair/stretcher/Non-slip footwear applied when patient is off stretcher/[place] to call system/Physically safe environment - no spills, clutter or unnecessary equipment/Purposeful Proactive Rounding/Room/bathroom lighting operational, light cord in reach

## 2024-09-16 ENCOUNTER — NON-APPOINTMENT (OUTPATIENT)
Age: 69
End: 2024-09-16

## 2024-09-17 ENCOUNTER — APPOINTMENT (OUTPATIENT)
Dept: NEUROLOGY | Facility: CLINIC | Age: 69
End: 2024-09-17
Payer: MEDICARE

## 2024-09-17 DIAGNOSIS — R11.2 NAUSEA WITH VOMITING, UNSPECIFIED: ICD-10-CM

## 2024-09-17 DIAGNOSIS — G43.809 OTHER MIGRAINE, NOT INTRACTABLE, W/OUT STATUS MIGRAINOSUS: ICD-10-CM

## 2024-09-17 DIAGNOSIS — R42 DIZZINESS AND GIDDINESS: ICD-10-CM

## 2024-09-17 PROCEDURE — 99214 OFFICE O/P EST MOD 30 MIN: CPT

## 2024-09-17 PROCEDURE — G2211 COMPLEX E/M VISIT ADD ON: CPT

## 2024-09-17 NOTE — DATA REVIEWED
[de-identified] : MRI brain non-contrast 3/3/23: Unremarkable MRI of the brain. Mild microvascular ischemic changes are noted.  MRI brain 1/123/24: No acute infarct or other acute abnormality.  MRI/MRV brain 2/13/24: MRI BRAIN: No hydrocephalus, mass effect, acute intracranial hemorrhage, vasogenic  edema, restricted diffusion, or acute territorial infarct.  No abnormal parenchymal, leptomeningeal, or dural enhancement.  MRV BRAIN: No evidence for venous sinus or cortical vein thrombosis.  [de-identified] : EEG 2/13/24: Abnormal limited awake tense EEG due to the presence of mild bilateral background slowing. This is consistent with bilateral or  multifocal cerebral dysfunction of numerous etiologies and are nonspecific.  [de-identified] : CT head, CTA head and neck     1.   Brain:  Unremarkable      2.   Intracranial circulation:  No hemodynamically significant  stenosis.      3.    Right carotid/vertebral artery system:  No hemodynamically  significant stenosis.      4.   Left carotid/vertebral artery system:  No hemodynamically  significant stenosis.  5. There are multiple nodules in the bilateral lobes of the thyroid,  compatible with a multinodular thyroid. Ultrasound of the thyroid may be  helpful for further evaluation on a nonemergent basis.  CT head 2/12/24: No acute intracranial bleeding. CTA head and neck, CT perfusion 2/12/24:  CT PERFUSION: No regional perfusion abnormality to indicate arterial  ischemia or infarction.  CTA BRAIN: At least partial venous thrombosis of the left transverse and  sigmoid dural sinuses with asymmetrically decreased contrast enhancement  and flow compared to the right. Suggestion of small linear filling defect  proximally. Incomplete and diminished filling of the proximal left IJV.  Recommend MRI and MR venogram of the brain for further evaluation for  signs of venous ischemia/infarction.  CTA NECK: Patent cervical vasculature. No flow limiting stenosis or  occlusion.  Tortuous mid cervical ICA with focal kinking.

## 2024-09-17 NOTE — PHYSICAL EXAM
[FreeTextEntry1] : Examination: Patient is well appearing Neurological Examination: Mental Status: Awake, alert. Oriented to person, place, time Language: No aphasia Cranial Nerves:  EOMI, No facial weakness, tongue protrudes in the midline Motor Exam: No pronator drift. Barrel roll intact. Fine motor movements intact in hands Able to stand up from chair without difficulty Sensory: intact on self exam Reflexes: Unable to examine Cerebellar: Finger to nose intact bilaterally

## 2024-09-17 NOTE — HISTORY OF PRESENT ILLNESS
[Home] : at home, [unfilled] , at the time of the visit. [Medical Office: ([facility])___] : at the medical office located in  [Verbal consent obtained from patient] : the patient, [unfilled] [FreeTextEntry1] : 3/15/23: Ms. Flores is here today for neurology evaluation. She reports that she has dizziness. About two months ago she had a sudden onset of dizziness, described as dizziness. She got so dizzy that she fell on her face. She could not get out of her bed for about 1.5 days. She was dry heaving.   She had an episode of likely vertigo in the past as well. She was with her father at a medical test and had extreme dizziness and could not walk.  She reports feeling dizzy when she looks to the side, mostly when she looks to the left. Motion makes her dizziness worse.  She denies a feeling of fullness in her ears of tinnitus. She denies double vision. Nausea has resolved.  She has been to see her ophthalmologist. She has cataracts and dry eyes. Her cardiologist did nto feel that her eyes were the cause of her dizziness.  She has chronic headaches. She has headaches fairly often but says that "acute migraines" are rare. She takes gabapentin 300 mg BID. She also takes Topamax 100 mg BID. She uses sumatriptan to abort migraines.  She saw Dr. Sanders in [place], who she has been seeing for many years for migraines. An MRI brain was ordered which was unremarkable.  She saw ENT, Dr. Contreras, last week. Other than an examination, additional testing was not done.   11/28/23: She fell on 11/23. She does not think that she was dizzy. She was getting out of her car, holding a package. She had to climb up on to the curb, lost her balance and fell backwards and hit the left side of her head on the car. She was bleeding from her scalp which stopped. She went to bed. Her balance was poor prior to this but she feels that it worse since that time. She feels dizzy since that time.  She has had one episode of vertigo since the last visit.  It resolved after taking meclizine.  She says that today in the car (she was a passenger) she could not stop blinking.   She has three bad, but not severe headaches. She tried Nurtec and Ubrelvy but did not find them to be as effective as sumatriptan.   She was recently started on Lexapro.    2/29/24: In late December she had a migraine lasting 1.5 weeks. Ubrelvy/nurtec did not provide relief. Sumatriptan provided temporary relief. In my absence, Dr. Solo prescribed a medrol pack and Zavzpret nasal spray. Unfortunately Zavzpret was not affordable.  She was admitted to [facility] on 1/11/24 with two weeks of worsening symptoms including weakness, more so on the left and difficulty seeing out of her left eye.  An MRI brain was negative for infarct. She was started on aspirin and her dose of atorvastatin was increased. She was discharged on 1/17/24. In the hospital she was given sumatriptan for migraine.   She returned to the ED at [facility] on 2/12/24 after an episode of generalized body shaking with preserved consciousness and slurred speech.  She was seen by neurology.  MRI/MRV showed no sign of acute stroke or cerebral sinus/cortical vein thrombosis. EEG did not show epileptiform activity but did show generalized slowing. Her dose of Lamotrigine was increased to 225 mg BID.   There is a resident note on the day of discharge (2/15/24) that there was an episode of shaking of her upper and lower extremities without loss of consciousness which subsided with movement of her extremities.  Today she reports dizziness. She denies a spinning sensation. She says that she is off balance and feels like she is tipping over. She reports double vision that resolves with closure of one eye. She also has blurry vision. The symptoms come and go but she is not aware of any particular pattern in terms of time of day. She notes worsening when she looks to the side and then comes back to center.  She says that she always has a headache. She is taking Topiramate 100 mg BID.  She has stopped ziprasidone and her lithium dose has been decreased.  She says that she is still having difficulty finding words.  9/17/24: Last week she had an appointment. Her father came up to the office and said she felt too ill to come up to the office. When she got out of the car she fell. She is not sure if she lost consciousness. She says that her father caught her head before she hit the pavement. By the time she hit the ground she was conscious but says that she was disoriented.   Today, she also developed symptoms and was unable to come to an in person visit. The visit was converted to telehealth. She reports dizziness, nausea. She describes the dizziness as "not vertigo" but "worse than lightheadedness". She reports having double vision. She also gets headache at times. When these symptoms occur they tend to last for hours at a time. She is unaware of any triggers and says that they occur randomly.  She is currently taking lamotrigine, lithium 300 mg/day, metoprolol, aspirin, atorvastatin, Ambien 10 mg, triazolam 0.25 mg hs, medical marijuana.  Headaches are much improved with medical marijuana.  She is also prescribed Geodon but is not taking it every day because of tardive dyskinesia. She is only taking it every third day. She is no longer taking Topiramate. She is not taking gabapentin on a regular basis.  She is not sure if she is taking amlodipine or tizanidine which are both on her medications lists. Ubrelvy helps "a little" when she gets a headache. She says that sumatriptan worked much better.  Dr. Powell sent blood tests for myasthenia gravis.

## 2024-09-17 NOTE — HISTORY OF PRESENT ILLNESS
[Home] : at home, [unfilled] , at the time of the visit. [Medical Office: ([facility])___] : at the medical office located in  [Verbal consent obtained from patient] : the patient, [unfilled] [FreeTextEntry1] : 3/15/23: Ms. Flores is here today for neurology evaluation. She reports that she has dizziness. About two months ago she had a sudden onset of dizziness, described as dizziness. She got so dizzy that she fell on her face. She could not get out of her bed for about 1.5 days. She was dry heaving.   She had an episode of likely vertigo in the past as well. She was with her father at a medical test and had extreme dizziness and could not walk.  She reports feeling dizzy when she looks to the side, mostly when she looks to the left. Motion makes her dizziness worse.  She denies a feeling of fullness in her ears of tinnitus. She denies double vision. Nausea has resolved.  She has been to see her ophthalmologist. She has cataracts and dry eyes. Her cardiologist did nto feel that her eyes were the cause of her dizziness.  She has chronic headaches. She has headaches fairly often but says that "acute migraines" are rare. She takes gabapentin 300 mg BID. She also takes Topamax 100 mg BID. She uses sumatriptan to abort migraines.  She saw Dr. Sanders in [place], who she has been seeing for many years for migraines. An MRI brain was ordered which was unremarkable.  She saw ENT, Dr. Contreras, last week. Other than an examination, additional testing was not done.   11/28/23: She fell on 11/23. She does not think that she was dizzy. She was getting out of her car, holding a package. She had to climb up on to the curb, lost her balance and fell backwards and hit the left side of her head on the car. She was bleeding from her scalp which stopped. She went to bed. Her balance was poor prior to this but she feels that it worse since that time. She feels dizzy since that time.  She has had one episode of vertigo since the last visit.  It resolved after taking meclizine.  She says that today in the car (she was a passenger) she could not stop blinking.   She has three bad, but not severe headaches. She tried Nurtec and Ubrelvy but did not find them to be as effective as sumatriptan.   She was recently started on Lexapro.    2/29/24: In late December she had a migraine lasting 1.5 weeks. Ubrelvy/nurtec did not provide relief. Sumatriptan provided temporary relief. In my absence, Dr. Solo prescribed a medrol pack and Zavzpret nasal spray. Unfortunately Zavzpret was not affordable.  She was admitted to [facility] on 1/11/24 with two weeks of worsening symptoms including weakness, more so on the left and difficulty seeing out of her left eye.  An MRI brain was negative for infarct. She was started on aspirin and her dose of atorvastatin was increased. She was discharged on 1/17/24. In the hospital she was given sumatriptan for migraine.   She returned to the ED at [facility] on 2/12/24 after an episode of generalized body shaking with preserved consciousness and slurred speech.  She was seen by neurology.  MRI/MRV showed no sign of acute stroke or cerebral sinus/cortical vein thrombosis. EEG did not show epileptiform activity but did show generalized slowing. Her dose of Lamotrigine was increased to 225 mg BID.   There is a resident note on the day of discharge (2/15/24) that there was an episode of shaking of her upper and lower extremities without loss of consciousness which subsided with movement of her extremities.  Today she reports dizziness. She denies a spinning sensation. She says that she is off balance and feels like she is tipping over. She reports double vision that resolves with closure of one eye. She also has blurry vision. The symptoms come and go but she is not aware of any particular pattern in terms of time of day. She notes worsening when she looks to the side and then comes back to center.  She says that she always has a headache. She is taking Topiramate 100 mg BID.  She has stopped ziprasidone and her lithium dose has been decreased.  She says that she is still having difficulty finding words.  9/17/24: Last week she had an appointment. Her father came up to the office and said she felt too ill to come up to the office. When she got out of the car she fell. She is not sure if she lost consciousness. She says that her father caught her head before she hit the pavement. By the time she hit the ground she was conscious but says that she was disoriented.   Today, she also developed symptoms and was unable to come to an in person visit. The visit was converted to telehealth. She reports dizziness, nausea. She describes the dizziness as "not vertigo" but "worse than lightheadedness". She reports having double vision. She also gets headache at times. When these symptoms occur they tend to last for hours at a time. She is unaware of any triggers and says that they occur randomly.  She is currently taking lamotrigine, lithium 300 mg/day, metoprolol, aspirin, atorvastatin, Ambien 10 mg, triazolam 0.25 mg hs, medical marijuana.  Headaches are much improved with medical marijuana.  She is also prescribed Geodon but is not taking it every day because of tardive dyskinesia. She is only taking it every third day. She is no longer taking Topiramate. She is not taking gabapentin on a regular basis.  She is not sure if she is taking amlodipine or tizanidine which are both on her medications lists. Ubrelvy helps "a little" when she gets a headache. She says that sumatriptan worked much better.  Dr. Powell sent blood tests for myasthenia gravis.

## 2024-09-17 NOTE — DISCUSSION/SUMMARY
[FreeTextEntry1] : Ms. Flores is a 68 yo woman with a history of bipolar disorder, chronic migraines and dizziness. She had two recent hospitalizations and has current complaints of diplopia and word finding difficulty in January and February 2024.  Migraines: She has had ongoing episodes of intermittent dizziness, nausea and diplopia. I explained that it is possible that these intermittent episodes are migraines with vestibular symptoms being more prominent than headaches - vestibular migraines. I have suggested a trial of Nurtec when symptoms begin. If the medication is not affordable through insurance, she may  samples at the office. I have explained that Nurtec (and Ubrelvy) are safer options than triptans for her since she has hypertension. She can also use ondansetron for nausea. She has had great improvement in migraine headaches with medical marijuana and can continue to use this. She should continue to track her symptoms.   Diplopia -Seems to be occurring with dizziness. -Imaging has been unremarkable. -ACh antibodies and MuSK antibodies were negative -Although polypharmacy is still a possibility, she is now off Topiramate. -LTG level was 11.5 in February at a dose of 300 mg BID. It is unlikely that she has toxic levels at this time with a small increase in the total dose.  -Possibly related to vestibular migraines as above.   Shaking episode: -I explained that b/l shaking without loss of consciousness is unusual for an epileptic seizure. I offered a 24 hour EEG at her last visit. . She said she will consider this if she has another episode. -Continue Lamotrigine for now.  f/u will be scheduled

## 2024-09-17 NOTE — ED ADULT NURSE NOTE - NIH STROKE SCALE: 10. DYSARTHRIA, QM
The patient is a 57y Male complaining of ankle pain/injury.
(1) Mild-to-moderate dysarthria; patient slurs at least some words and, at worst, can be understood with some difficulty

## 2024-09-17 NOTE — DATA REVIEWED
[de-identified] : MRI brain non-contrast 3/3/23: Unremarkable MRI of the brain. Mild microvascular ischemic changes are noted.  MRI brain 1/123/24: No acute infarct or other acute abnormality.  MRI/MRV brain 2/13/24: MRI BRAIN: No hydrocephalus, mass effect, acute intracranial hemorrhage, vasogenic  edema, restricted diffusion, or acute territorial infarct.  No abnormal parenchymal, leptomeningeal, or dural enhancement.  MRV BRAIN: No evidence for venous sinus or cortical vein thrombosis.  [de-identified] : EEG 2/13/24: Abnormal limited awake tense EEG due to the presence of mild bilateral background slowing. This is consistent with bilateral or  multifocal cerebral dysfunction of numerous etiologies and are nonspecific.  [de-identified] : CT head, CTA head and neck     1.   Brain:  Unremarkable      2.   Intracranial circulation:  No hemodynamically significant  stenosis.      3.    Right carotid/vertebral artery system:  No hemodynamically  significant stenosis.      4.   Left carotid/vertebral artery system:  No hemodynamically  significant stenosis.  5. There are multiple nodules in the bilateral lobes of the thyroid,  compatible with a multinodular thyroid. Ultrasound of the thyroid may be  helpful for further evaluation on a nonemergent basis.  CT head 2/12/24: No acute intracranial bleeding. CTA head and neck, CT perfusion 2/12/24:  CT PERFUSION: No regional perfusion abnormality to indicate arterial  ischemia or infarction.  CTA BRAIN: At least partial venous thrombosis of the left transverse and  sigmoid dural sinuses with asymmetrically decreased contrast enhancement  and flow compared to the right. Suggestion of small linear filling defect  proximally. Incomplete and diminished filling of the proximal left IJV.  Recommend MRI and MR venogram of the brain for further evaluation for  signs of venous ischemia/infarction.  CTA NECK: Patent cervical vasculature. No flow limiting stenosis or  occlusion.  Tortuous mid cervical ICA with focal kinking.

## 2024-09-17 NOTE — HISTORY OF PRESENT ILLNESS
[Home] : at home, [unfilled] , at the time of the visit. [Medical Office: ([facility])___] : at the medical office located in  [Verbal consent obtained from patient] : the patient, [unfilled] [FreeTextEntry1] : 3/15/23: Ms. Flores is here today for neurology evaluation. She reports that she has dizziness. About two months ago she had a sudden onset of dizziness, described as dizziness. She got so dizzy that she fell on her face. She could not get out of her bed for about 1.5 days. She was dry heaving.   She had an episode of likely vertigo in the past as well. She was with her father at a medical test and had extreme dizziness and could not walk.  She reports feeling dizzy when she looks to the side, mostly when she looks to the left. Motion makes her dizziness worse.  She denies a feeling of fullness in her ears of tinnitus. She denies double vision. Nausea has resolved.  She has been to see her ophthalmologist. She has cataracts and dry eyes. Her cardiologist did nto feel that her eyes were the cause of her dizziness.  She has chronic headaches. She has headaches fairly often but says that "acute migraines" are rare. She takes gabapentin 300 mg BID. She also takes Topamax 100 mg BID. She uses sumatriptan to abort migraines.  She saw Dr. Sanders in [place], who she has been seeing for many years for migraines. An MRI brain was ordered which was unremarkable.  She saw ENT, Dr. Contreras, last week. Other than an examination, additional testing was not done.   11/28/23: She fell on 11/23. She does not think that she was dizzy. She was getting out of her car, holding a package. She had to climb up on to the curb, lost her balance and fell backwards and hit the left side of her head on the car. She was bleeding from her scalp which stopped. She went to bed. Her balance was poor prior to this but she feels that it worse since that time. She feels dizzy since that time.  She has had one episode of vertigo since the last visit.  It resolved after taking meclizine.  She says that today in the car (she was a passenger) she could not stop blinking.   She has three bad, but not severe headaches. She tried Nurtec and Ubrelvy but did not find them to be as effective as sumatriptan.   She was recently started on Lexapro.    2/29/24: In late December she had a migraine lasting 1.5 weeks. Ubrelvy/nurtec did not provide relief. Sumatriptan provided temporary relief. In my absence, Dr. Solo prescribed a medrol pack and Zavzpret nasal spray. Unfortunately Zavzpret was not affordable.  She was admitted to [facility] on 1/11/24 with two weeks of worsening symptoms including weakness, more so on the left and difficulty seeing out of her left eye.  An MRI brain was negative for infarct. She was started on aspirin and her dose of atorvastatin was increased. She was discharged on 1/17/24. In the hospital she was given sumatriptan for migraine.   She returned to the ED at [facility] on 2/12/24 after an episode of generalized body shaking with preserved consciousness and slurred speech.  She was seen by neurology.  MRI/MRV showed no sign of acute stroke or cerebral sinus/cortical vein thrombosis. EEG did not show epileptiform activity but did show generalized slowing. Her dose of Lamotrigine was increased to 225 mg BID.   There is a resident note on the day of discharge (2/15/24) that there was an episode of shaking of her upper and lower extremities without loss of consciousness which subsided with movement of her extremities.  Today she reports dizziness. She denies a spinning sensation. She says that she is off balance and feels like she is tipping over. She reports double vision that resolves with closure of one eye. She also has blurry vision. The symptoms come and go but she is not aware of any particular pattern in terms of time of day. She notes worsening when she looks to the side and then comes back to center.  She says that she always has a headache. She is taking Topiramate 100 mg BID.  She has stopped ziprasidone and her lithium dose has been decreased.  She says that she is still having difficulty finding words.  9/17/24: Last week she had an appointment. Her father came up to the office and said she felt too ill to come up to the office. When she got out of the car she fell. She is not sure if she lost consciousness. She says that her father caught her head before she hit the pavement. By the time she hit the ground she was conscious but says that she was disoriented.   Today, she also developed symptoms and was unable to come to an in person visit. The visit was converted to telehealth. She reports dizziness, nausea. She describes the dizziness as "not vertigo" but "worse than lightheadedness". She reports having double vision. She also gets headache at times. When these symptoms occur they tend to last for hours at a time. She is unaware of any triggers and says that they occur randomly.  She is currently taking lamotrigine, lithium 300 mg/day, metoprolol, aspirin, atorvastatin, Ambien 10 mg, triazolam 0.25 mg hs, medical marijuana.  Headaches are much improved with medical marijuana.  She is also prescribed Geodon but is not taking it every day because of tardive dyskinesia. She is only taking it every third day. She is no longer taking Topiramate. She is not taking gabapentin on a regular basis.  She is not sure if she is taking amlodipine or tizanidine which are both on her medications lists. Ubrelvy helps "a little" when she gets a headache. She says that sumatriptan worked much better.  Dr. Powell sent blood tests for myasthenia gravis.

## 2024-09-17 NOTE — DISCUSSION/SUMMARY
[FreeTextEntry1] : Ms. Flores is a 70 yo woman with a history of bipolar disorder, chronic migraines and dizziness. She had two recent hospitalizations and has current complaints of diplopia and word finding difficulty in January and February 2024.  Migraines: She has had ongoing episodes of intermittent dizziness, nausea and diplopia. I explained that it is possible that these intermittent episodes are migraines with vestibular symptoms being more prominent than headaches - vestibular migraines. I have suggested a trial of Nurtec when symptoms begin. If the medication is not affordable through insurance, she may  samples at the office. I have explained that Nurtec (and Ubrelvy) are safer options than triptans for her since she has hypertension. She can also use ondansetron for nausea. She has had great improvement in migraine headaches with medical marijuana and can continue to use this. She should continue to track her symptoms.   Diplopia -Seems to be occurring with dizziness. -Imaging has been unremarkable. -ACh antibodies and MuSK antibodies were negative -Although polypharmacy is still a possibility, she is now off Topiramate. -LTG level was 11.5 in February at a dose of 300 mg BID. It is unlikely that she has toxic levels at this time with a small increase in the total dose.  -Possibly related to vestibular migraines as above.   Shaking episode: -I explained that b/l shaking without loss of consciousness is unusual for an epileptic seizure. I offered a 24 hour EEG at her last visit. . She said she will consider this if she has another episode. -Continue Lamotrigine for now.  f/u will be scheduled

## 2024-09-17 NOTE — DATA REVIEWED
[de-identified] : MRI brain non-contrast 3/3/23: Unremarkable MRI of the brain. Mild microvascular ischemic changes are noted.  MRI brain 1/123/24: No acute infarct or other acute abnormality.  MRI/MRV brain 2/13/24: MRI BRAIN: No hydrocephalus, mass effect, acute intracranial hemorrhage, vasogenic  edema, restricted diffusion, or acute territorial infarct.  No abnormal parenchymal, leptomeningeal, or dural enhancement.  MRV BRAIN: No evidence for venous sinus or cortical vein thrombosis.  [de-identified] : EEG 2/13/24: Abnormal limited awake tense EEG due to the presence of mild bilateral background slowing. This is consistent with bilateral or  multifocal cerebral dysfunction of numerous etiologies and are nonspecific.  [de-identified] : CT head, CTA head and neck     1.   Brain:  Unremarkable      2.   Intracranial circulation:  No hemodynamically significant  stenosis.      3.    Right carotid/vertebral artery system:  No hemodynamically  significant stenosis.      4.   Left carotid/vertebral artery system:  No hemodynamically  significant stenosis.  5. There are multiple nodules in the bilateral lobes of the thyroid,  compatible with a multinodular thyroid. Ultrasound of the thyroid may be  helpful for further evaluation on a nonemergent basis.  CT head 2/12/24: No acute intracranial bleeding. CTA head and neck, CT perfusion 2/12/24:  CT PERFUSION: No regional perfusion abnormality to indicate arterial  ischemia or infarction.  CTA BRAIN: At least partial venous thrombosis of the left transverse and  sigmoid dural sinuses with asymmetrically decreased contrast enhancement  and flow compared to the right. Suggestion of small linear filling defect  proximally. Incomplete and diminished filling of the proximal left IJV.  Recommend MRI and MR venogram of the brain for further evaluation for  signs of venous ischemia/infarction.  CTA NECK: Patent cervical vasculature. No flow limiting stenosis or  occlusion.  Tortuous mid cervical ICA with focal kinking.

## 2024-09-19 RX ORDER — RIMEGEPANT SULFATE 75 MG/75MG
75 TABLET, ORALLY DISINTEGRATING ORAL
Qty: 8 | Refills: 5 | Status: ACTIVE | COMMUNITY
Start: 2024-09-17 | End: 1900-01-01

## 2024-09-27 ENCOUNTER — APPOINTMENT (OUTPATIENT)
Dept: ORTHOPEDIC SURGERY | Facility: CLINIC | Age: 69
End: 2024-09-27
Payer: MEDICARE

## 2024-09-27 DIAGNOSIS — M43.16 SPONDYLOLISTHESIS, LUMBAR REGION: ICD-10-CM

## 2024-09-27 DIAGNOSIS — M48.062 SPINAL STENOSIS, LUMBAR REGION WITH NEUROGENIC CLAUDICATION: ICD-10-CM

## 2024-09-27 DIAGNOSIS — M51.37 OTHER INTERVERTEBRAL DISC DEGENERATION, LUMBOSACRAL REGION: ICD-10-CM

## 2024-09-27 DIAGNOSIS — F31.9 BIPOLAR DISORDER, UNSPECIFIED: ICD-10-CM

## 2024-09-27 DIAGNOSIS — Z96.642 PRESENCE OF LEFT ARTIFICIAL HIP JOINT: ICD-10-CM

## 2024-09-27 PROCEDURE — 99204 OFFICE O/P NEW MOD 45 MIN: CPT

## 2024-09-27 RX ORDER — ZIPRASIDONE HYDROCHLORIDE 80 MG/1
80 CAPSULE ORAL
Refills: 0 | Status: ACTIVE | COMMUNITY

## 2024-09-27 RX ORDER — METHYLPREDNISOLONE 4 MG/1
4 TABLET ORAL
Qty: 1 | Refills: 1 | Status: ACTIVE | COMMUNITY
Start: 2024-09-27 | End: 1900-01-01

## 2024-09-27 NOTE — PHYSICAL EXAM
[Normal Mood and Affect] : normal mood and affect [Able to Communicate] : able to communicate [Well Developed] : well developed [Well Nourished] : well nourished [Extension] : extension [Bilateral] : hip bilaterally [de-identified] : thin [[id number]] : forward flexion 75 degrees [de-identified] : extension 20 degrees [de-identified] : left lateral bending 20 degrees [de-identified] : right lateral bending 20 degrees [] : no pain with log-rolling of hip

## 2024-09-27 NOTE — DATA REVIEWED
[Lumbar Spine] : lumbar spine [Outside X-rays] : outside x-rays [Left] : left [Hip] : hip [I independently reviewed and interpreted images and report] : I independently reviewed and interpreted images and report [FreeTextEntry1] : Moderate DDD at L5-S1 and grade I degen spondylo at L4-L5. Facet arthritis from L4-S1 [FreeTextEntry2] : cementless lt THR  NO osteolysis. Stage II AVN rt hip noted

## 2024-09-27 NOTE — PLAN
[TextEntry] : We discussed at length with the patient the options for treatment.  We discussed conservative care including physical therapy, acupuncture, massage therapy and chiropractic care.  We discussed injection therapy and even surgical intervention should the patient fail conservative care.  We discussed, risks, benefits, complications, alternatives, outcomes and expectations.   All questions answered.  Will stop motrin and try a MDP  Medrol dose pack was prescribed. Risks and benefits were explained including but not limited to the possibilities of gastritis, indigestion, and other GI side effects. It was also explained that in patients with a history of diabetes mellitus, it may temporarily elevate their blood sugars which should be monitored at home. A refill was also prescribed to take the subsequent week if needed.  Patient was advised to continue with physical therapy.  The patient was referred to a Pain Management Specialist for Chemical and Interventional Pain Management.

## 2024-10-03 ENCOUNTER — APPOINTMENT (OUTPATIENT)
Dept: ORTHOPEDIC SURGERY | Facility: CLINIC | Age: 69
End: 2024-10-03

## 2024-10-12 DIAGNOSIS — D12.6 BENIGN NEOPLASM OF COLON, UNSPECIFIED: ICD-10-CM

## 2024-10-12 DIAGNOSIS — D36.9 BENIGN NEOPLASM, UNSPECIFIED SITE: ICD-10-CM

## 2024-10-21 ENCOUNTER — APPOINTMENT (OUTPATIENT)
Dept: GASTROENTEROLOGY | Facility: CLINIC | Age: 69
End: 2024-10-21
Payer: MEDICARE

## 2024-10-21 PROCEDURE — 99214 OFFICE O/P EST MOD 30 MIN: CPT

## 2024-10-31 ENCOUNTER — APPOINTMENT (OUTPATIENT)
Dept: PAIN MANAGEMENT | Facility: CLINIC | Age: 69
End: 2024-10-31
Payer: MEDICARE

## 2024-10-31 VITALS — BODY MASS INDEX: 20.91 KG/M2 | HEIGHT: 63 IN | WEIGHT: 118 LBS

## 2024-10-31 DIAGNOSIS — M54.17 RADICULOPATHY, LUMBOSACRAL REGION: ICD-10-CM

## 2024-10-31 DIAGNOSIS — M79.10 MYALGIA, UNSPECIFIED SITE: ICD-10-CM

## 2024-10-31 DIAGNOSIS — M54.50 LOW BACK PAIN, UNSPECIFIED: ICD-10-CM

## 2024-10-31 PROCEDURE — 99204 OFFICE O/P NEW MOD 45 MIN: CPT

## 2024-11-08 ENCOUNTER — RESULT REVIEW (OUTPATIENT)
Age: 69
End: 2024-11-08

## 2024-11-08 ENCOUNTER — APPOINTMENT (OUTPATIENT)
Dept: INTERNAL MEDICINE | Facility: CLINIC | Age: 69
End: 2024-11-08

## 2024-11-08 VITALS
WEIGHT: 118 LBS | SYSTOLIC BLOOD PRESSURE: 126 MMHG | RESPIRATION RATE: 16 BRPM | HEART RATE: 64 BPM | HEIGHT: 63 IN | OXYGEN SATURATION: 99 % | DIASTOLIC BLOOD PRESSURE: 74 MMHG | BODY MASS INDEX: 20.91 KG/M2

## 2024-11-08 DIAGNOSIS — G43.809 OTHER MIGRAINE, NOT INTRACTABLE, W/OUT STATUS MIGRAINOSUS: ICD-10-CM

## 2024-11-08 DIAGNOSIS — I44.7 LEFT BUNDLE-BRANCH BLOCK, UNSPECIFIED: ICD-10-CM

## 2024-11-08 DIAGNOSIS — R26.89 OTHER ABNORMALITIES OF GAIT AND MOBILITY: ICD-10-CM

## 2024-11-08 DIAGNOSIS — F31.9 BIPOLAR DISORDER, UNSPECIFIED: ICD-10-CM

## 2024-11-08 DIAGNOSIS — I10 ESSENTIAL (PRIMARY) HYPERTENSION: ICD-10-CM

## 2024-11-08 DIAGNOSIS — F41.9 ANXIETY DISORDER, UNSPECIFIED: ICD-10-CM

## 2024-11-08 DIAGNOSIS — F32.A DEPRESSION, UNSPECIFIED: ICD-10-CM

## 2024-11-08 DIAGNOSIS — E78.5 HYPERLIPIDEMIA, UNSPECIFIED: ICD-10-CM

## 2024-11-08 DIAGNOSIS — Z01.818 ENCOUNTER FOR OTHER PREPROCEDURAL EXAMINATION: ICD-10-CM

## 2024-11-08 PROCEDURE — G2211 COMPLEX E/M VISIT ADD ON: CPT

## 2024-11-08 PROCEDURE — 99214 OFFICE O/P EST MOD 30 MIN: CPT

## 2024-11-08 RX ORDER — LUMATEPERONE 10.5 MG/1
10.5 CAPSULE ORAL DAILY
Refills: 0 | Status: ACTIVE | COMMUNITY
Start: 2024-11-08

## 2024-11-14 ENCOUNTER — OUTPATIENT (OUTPATIENT)
Dept: OUTPATIENT SERVICES | Facility: HOSPITAL | Age: 69
LOS: 1 days | End: 2024-11-14
Payer: MEDICARE

## 2024-11-14 ENCOUNTER — RESULT REVIEW (OUTPATIENT)
Age: 69
End: 2024-11-14

## 2024-11-14 ENCOUNTER — TRANSCRIPTION ENCOUNTER (OUTPATIENT)
Age: 69
End: 2024-11-14

## 2024-11-14 ENCOUNTER — APPOINTMENT (OUTPATIENT)
Dept: GASTROENTEROLOGY | Facility: HOSPITAL | Age: 69
End: 2024-11-14

## 2024-11-14 DIAGNOSIS — D12.6 BENIGN NEOPLASM OF COLON, UNSPECIFIED: ICD-10-CM

## 2024-11-14 DIAGNOSIS — Z98.89 OTHER SPECIFIED POSTPROCEDURAL STATES: Chronic | ICD-10-CM

## 2024-11-14 DIAGNOSIS — R13.19 OTHER DYSPHAGIA: ICD-10-CM

## 2024-11-14 PROCEDURE — 45385 COLONOSCOPY W/LESION REMOVAL: CPT

## 2024-11-14 PROCEDURE — 45385 COLONOSCOPY W/LESION REMOVAL: CPT | Mod: PT

## 2024-11-14 PROCEDURE — 43251 EGD REMOVE LESION SNARE: CPT

## 2024-11-14 PROCEDURE — 88305 TISSUE EXAM BY PATHOLOGIST: CPT | Mod: 26

## 2024-11-14 PROCEDURE — 43239 EGD BIOPSY SINGLE/MULTIPLE: CPT | Mod: XU

## 2024-11-14 PROCEDURE — 43249 ESOPH EGD DILATION <30 MM: CPT

## 2024-11-14 PROCEDURE — 88313 SPECIAL STAINS GROUP 2: CPT | Mod: 26

## 2024-11-14 PROCEDURE — C1726: CPT

## 2024-11-14 PROCEDURE — 88313 SPECIAL STAINS GROUP 2: CPT

## 2024-11-14 PROCEDURE — 88305 TISSUE EXAM BY PATHOLOGIST: CPT

## 2024-11-14 PROCEDURE — 43239 EGD BIOPSY SINGLE/MULTIPLE: CPT | Mod: XS

## 2024-11-14 PROCEDURE — 45380 COLONOSCOPY AND BIOPSY: CPT | Mod: XS,PT

## 2024-11-14 DEVICE — ESOPHAGEAL BALLOON CATH CRE FIXED WIRE 6-7-8MM: Type: IMPLANTABLE DEVICE | Status: FUNCTIONAL

## 2024-11-14 DEVICE — HEMOSPRAY HEMOSTAT ENDO 7F: Type: IMPLANTABLE DEVICE | Status: FUNCTIONAL

## 2024-11-14 DEVICE — CLIP RESOLUTION 360 235CM: Type: IMPLANTABLE DEVICE | Status: FUNCTIONAL

## 2024-11-14 DEVICE — ESOPHAGEAL BALLOON CATH CRE FIXED WIRE 18-19-20MM: Type: IMPLANTABLE DEVICE | Status: FUNCTIONAL

## 2024-11-15 ENCOUNTER — APPOINTMENT (OUTPATIENT)
Dept: PAIN MANAGEMENT | Facility: CLINIC | Age: 69
End: 2024-11-15
Payer: MEDICARE

## 2024-11-15 VITALS — BODY MASS INDEX: 20.91 KG/M2 | WEIGHT: 118 LBS | HEIGHT: 63 IN

## 2024-11-15 DIAGNOSIS — M54.50 LOW BACK PAIN, UNSPECIFIED: ICD-10-CM

## 2024-11-15 DIAGNOSIS — M54.17 RADICULOPATHY, LUMBOSACRAL REGION: ICD-10-CM

## 2024-11-15 LAB — SURGICAL PATHOLOGY STUDY: SIGNIFICANT CHANGE UP

## 2024-11-15 PROCEDURE — 99214 OFFICE O/P EST MOD 30 MIN: CPT

## 2024-11-15 RX ORDER — MELOXICAM 15 MG/1
15 TABLET ORAL
Qty: 30 | Refills: 0 | Status: ACTIVE | COMMUNITY
Start: 2024-11-15 | End: 1900-01-01

## 2024-11-16 ENCOUNTER — TRANSCRIPTION ENCOUNTER (OUTPATIENT)
Age: 69
End: 2024-11-16

## 2024-11-17 ENCOUNTER — EMERGENCY (EMERGENCY)
Facility: HOSPITAL | Age: 69
LOS: 1 days | Discharge: HOME | End: 2024-11-17
Attending: STUDENT IN AN ORGANIZED HEALTH CARE EDUCATION/TRAINING PROGRAM | Admitting: STUDENT IN AN ORGANIZED HEALTH CARE EDUCATION/TRAINING PROGRAM
Payer: MEDICARE

## 2024-11-17 VITALS
HEART RATE: 58 BPM | OXYGEN SATURATION: 98 % | TEMPERATURE: 98 F | SYSTOLIC BLOOD PRESSURE: 132 MMHG | DIASTOLIC BLOOD PRESSURE: 76 MMHG | RESPIRATION RATE: 18 BRPM

## 2024-11-17 VITALS
TEMPERATURE: 97 F | WEIGHT: 119.93 LBS | DIASTOLIC BLOOD PRESSURE: 74 MMHG | RESPIRATION RATE: 22 BRPM | HEIGHT: 63 IN | OXYGEN SATURATION: 100 % | HEART RATE: 63 BPM | SYSTOLIC BLOOD PRESSURE: 123 MMHG

## 2024-11-17 DIAGNOSIS — Z98.89 OTHER SPECIFIED POSTPROCEDURAL STATES: Chronic | ICD-10-CM

## 2024-11-17 LAB
ALBUMIN SERPL ELPH-MCNC: 3.6 G/DL — SIGNIFICANT CHANGE UP (ref 3.3–5)
ALP SERPL-CCNC: 74 U/L — SIGNIFICANT CHANGE UP (ref 40–120)
ALT FLD-CCNC: 16 U/L — SIGNIFICANT CHANGE UP (ref 12–78)
ANION GAP SERPL CALC-SCNC: 11 MMOL/L — SIGNIFICANT CHANGE UP (ref 5–17)
APPEARANCE UR: CLEAR — SIGNIFICANT CHANGE UP
AST SERPL-CCNC: 14 U/L — LOW (ref 15–37)
BASOPHILS # BLD AUTO: 0.06 K/UL — SIGNIFICANT CHANGE UP (ref 0–0.2)
BASOPHILS NFR BLD AUTO: 0.7 % — SIGNIFICANT CHANGE UP (ref 0–2)
BILIRUB SERPL-MCNC: 0.6 MG/DL — SIGNIFICANT CHANGE UP (ref 0.2–1.2)
BILIRUB UR-MCNC: NEGATIVE — SIGNIFICANT CHANGE UP
BUN SERPL-MCNC: 17 MG/DL — SIGNIFICANT CHANGE UP (ref 7–23)
CALCIUM SERPL-MCNC: 9.1 MG/DL — SIGNIFICANT CHANGE UP (ref 8.5–10.1)
CHLORIDE SERPL-SCNC: 108 MMOL/L — SIGNIFICANT CHANGE UP (ref 96–108)
CO2 SERPL-SCNC: 23 MMOL/L — SIGNIFICANT CHANGE UP (ref 22–31)
COLOR SPEC: YELLOW — SIGNIFICANT CHANGE UP
CREAT SERPL-MCNC: 1.3 MG/DL — SIGNIFICANT CHANGE UP (ref 0.5–1.3)
DIFF PNL FLD: NEGATIVE — SIGNIFICANT CHANGE UP
EGFR: 44 ML/MIN/1.73M2 — LOW
EOSINOPHIL # BLD AUTO: 0.11 K/UL — SIGNIFICANT CHANGE UP (ref 0–0.5)
EOSINOPHIL NFR BLD AUTO: 1.3 % — SIGNIFICANT CHANGE UP (ref 0–6)
GLUCOSE SERPL-MCNC: 140 MG/DL — HIGH (ref 70–99)
GLUCOSE UR QL: NEGATIVE MG/DL — SIGNIFICANT CHANGE UP
HCT VFR BLD CALC: 36.9 % — SIGNIFICANT CHANGE UP (ref 34.5–45)
HGB BLD-MCNC: 12 G/DL — SIGNIFICANT CHANGE UP (ref 11.5–15.5)
IMM GRANULOCYTES NFR BLD AUTO: 0.4 % — SIGNIFICANT CHANGE UP (ref 0–0.9)
KETONES UR-MCNC: NEGATIVE MG/DL — SIGNIFICANT CHANGE UP
LACTATE SERPL-SCNC: 1.3 MMOL/L — SIGNIFICANT CHANGE UP (ref 0.7–2)
LACTATE SERPL-SCNC: 3.9 MMOL/L — HIGH (ref 0.7–2)
LEUKOCYTE ESTERASE UR-ACNC: NEGATIVE — SIGNIFICANT CHANGE UP
LIDOCAIN IGE QN: 45 U/L — SIGNIFICANT CHANGE UP (ref 13–75)
LITHIUM SERPL-MCNC: 1.4 MMOL/L — HIGH (ref 0.6–1.2)
LITHIUM SERPL-MCNC: <0.2 MMOL/L — LOW (ref 0.6–1.2)
LYMPHOCYTES # BLD AUTO: 2.24 K/UL — SIGNIFICANT CHANGE UP (ref 1–3.3)
LYMPHOCYTES # BLD AUTO: 26.3 % — SIGNIFICANT CHANGE UP (ref 13–44)
MCHC RBC-ENTMCNC: 29.8 PG — SIGNIFICANT CHANGE UP (ref 27–34)
MCHC RBC-ENTMCNC: 32.5 G/DL — SIGNIFICANT CHANGE UP (ref 32–36)
MCV RBC AUTO: 91.6 FL — SIGNIFICANT CHANGE UP (ref 80–100)
MONOCYTES # BLD AUTO: 0.53 K/UL — SIGNIFICANT CHANGE UP (ref 0–0.9)
MONOCYTES NFR BLD AUTO: 6.2 % — SIGNIFICANT CHANGE UP (ref 2–14)
NEUTROPHILS # BLD AUTO: 5.56 K/UL — SIGNIFICANT CHANGE UP (ref 1.8–7.4)
NEUTROPHILS NFR BLD AUTO: 65.1 % — SIGNIFICANT CHANGE UP (ref 43–77)
NITRITE UR-MCNC: NEGATIVE — SIGNIFICANT CHANGE UP
NRBC # BLD: 0 /100 WBCS — SIGNIFICANT CHANGE UP (ref 0–0)
PH UR: 7.5 — SIGNIFICANT CHANGE UP (ref 5–8)
PLATELET # BLD AUTO: 285 K/UL — SIGNIFICANT CHANGE UP (ref 150–400)
POTASSIUM SERPL-MCNC: 4.1 MMOL/L — SIGNIFICANT CHANGE UP (ref 3.5–5.3)
POTASSIUM SERPL-SCNC: 4.1 MMOL/L — SIGNIFICANT CHANGE UP (ref 3.5–5.3)
PROT SERPL-MCNC: 6.9 G/DL — SIGNIFICANT CHANGE UP (ref 6–8.3)
PROT UR-MCNC: NEGATIVE MG/DL — SIGNIFICANT CHANGE UP
RBC # BLD: 4.03 M/UL — SIGNIFICANT CHANGE UP (ref 3.8–5.2)
RBC # FLD: 13.6 % — SIGNIFICANT CHANGE UP (ref 10.3–14.5)
SODIUM SERPL-SCNC: 142 MMOL/L — SIGNIFICANT CHANGE UP (ref 135–145)
SP GR SPEC: 1 — SIGNIFICANT CHANGE UP (ref 1–1.03)
TROPONIN I, HIGH SENSITIVITY RESULT: 6 NG/L — SIGNIFICANT CHANGE UP
UROBILINOGEN FLD QL: 0.2 MG/DL — SIGNIFICANT CHANGE UP (ref 0.2–1)
WBC # BLD: 8.53 K/UL — SIGNIFICANT CHANGE UP (ref 3.8–10.5)
WBC # FLD AUTO: 8.53 K/UL — SIGNIFICANT CHANGE UP (ref 3.8–10.5)

## 2024-11-17 PROCEDURE — 71045 X-RAY EXAM CHEST 1 VIEW: CPT

## 2024-11-17 PROCEDURE — 74176 CT ABD & PELVIS W/O CONTRAST: CPT | Mod: MC

## 2024-11-17 PROCEDURE — 96361 HYDRATE IV INFUSION ADD-ON: CPT

## 2024-11-17 PROCEDURE — 80053 COMPREHEN METABOLIC PANEL: CPT

## 2024-11-17 PROCEDURE — 36415 COLL VENOUS BLD VENIPUNCTURE: CPT

## 2024-11-17 PROCEDURE — 70450 CT HEAD/BRAIN W/O DYE: CPT | Mod: 26,MC

## 2024-11-17 PROCEDURE — 80178 ASSAY OF LITHIUM: CPT

## 2024-11-17 PROCEDURE — 93010 ELECTROCARDIOGRAM REPORT: CPT

## 2024-11-17 PROCEDURE — 74176 CT ABD & PELVIS W/O CONTRAST: CPT | Mod: 26,MC

## 2024-11-17 PROCEDURE — 96375 TX/PRO/DX INJ NEW DRUG ADDON: CPT

## 2024-11-17 PROCEDURE — 70450 CT HEAD/BRAIN W/O DYE: CPT | Mod: MC

## 2024-11-17 PROCEDURE — 83605 ASSAY OF LACTIC ACID: CPT

## 2024-11-17 PROCEDURE — 96374 THER/PROPH/DIAG INJ IV PUSH: CPT

## 2024-11-17 PROCEDURE — 85025 COMPLETE CBC W/AUTO DIFF WBC: CPT

## 2024-11-17 PROCEDURE — 84484 ASSAY OF TROPONIN QUANT: CPT

## 2024-11-17 PROCEDURE — 99285 EMERGENCY DEPT VISIT HI MDM: CPT | Mod: 25

## 2024-11-17 PROCEDURE — 81003 URINALYSIS AUTO W/O SCOPE: CPT

## 2024-11-17 PROCEDURE — 93005 ELECTROCARDIOGRAM TRACING: CPT

## 2024-11-17 PROCEDURE — 99285 EMERGENCY DEPT VISIT HI MDM: CPT

## 2024-11-17 PROCEDURE — 82962 GLUCOSE BLOOD TEST: CPT

## 2024-11-17 PROCEDURE — 71045 X-RAY EXAM CHEST 1 VIEW: CPT | Mod: 26

## 2024-11-17 PROCEDURE — 83690 ASSAY OF LIPASE: CPT

## 2024-11-17 RX ORDER — METOCLOPRAMIDE HCL 10 MG
10 TABLET ORAL ONCE
Refills: 0 | Status: COMPLETED | OUTPATIENT
Start: 2024-11-17 | End: 2024-11-17

## 2024-11-17 RX ORDER — SODIUM CHLORIDE 9 MG/ML
1000 INJECTION, SOLUTION INTRAMUSCULAR; INTRAVENOUS; SUBCUTANEOUS ONCE
Refills: 0 | Status: COMPLETED | OUTPATIENT
Start: 2024-11-17 | End: 2024-11-17

## 2024-11-17 RX ORDER — LORAZEPAM 2 MG
1 TABLET ORAL ONCE
Refills: 0 | Status: DISCONTINUED | OUTPATIENT
Start: 2024-11-17 | End: 2024-11-17

## 2024-11-17 RX ORDER — ONDANSETRON HYDROCHLORIDE 2 MG/ML
4 INJECTION, SOLUTION INTRAMUSCULAR; INTRAVENOUS ONCE
Refills: 0 | Status: COMPLETED | OUTPATIENT
Start: 2024-11-17 | End: 2024-11-17

## 2024-11-17 RX ADMIN — SODIUM CHLORIDE 1000 MILLILITER(S): 9 INJECTION, SOLUTION INTRAMUSCULAR; INTRAVENOUS; SUBCUTANEOUS at 10:41

## 2024-11-17 RX ADMIN — SODIUM CHLORIDE 1000 MILLILITER(S): 9 INJECTION, SOLUTION INTRAMUSCULAR; INTRAVENOUS; SUBCUTANEOUS at 11:49

## 2024-11-17 RX ADMIN — SODIUM CHLORIDE 1000 MILLILITER(S): 9 INJECTION, SOLUTION INTRAMUSCULAR; INTRAVENOUS; SUBCUTANEOUS at 12:50

## 2024-11-17 RX ADMIN — Medication 1 MILLIGRAM(S): at 12:52

## 2024-11-17 RX ADMIN — SODIUM CHLORIDE 1000 MILLILITER(S): 9 INJECTION, SOLUTION INTRAMUSCULAR; INTRAVENOUS; SUBCUTANEOUS at 11:42

## 2024-11-17 RX ADMIN — ONDANSETRON HYDROCHLORIDE 4 MILLIGRAM(S): 2 INJECTION, SOLUTION INTRAMUSCULAR; INTRAVENOUS at 10:41

## 2024-11-17 RX ADMIN — Medication 10 MILLIGRAM(S): at 11:13

## 2024-11-17 NOTE — ED PROVIDER NOTE - WR INTERPRETED BY 1
DISCHARGE SUMMARY    Pt Name: Paola Herrmann  MRN: [id number]  YOB: 1953  Primary Care Physician: Keny Stoddard    Admit date:  8/31/2022  9:55 AM  Discharge date:  No discharge date for patient encounter. Disposition: Home  Admitting Diagnosis:   1. 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 Laryngeal stenosis      Discharge Diagnosis:   Patient Active Problem List   Diagnosis Code    COVID-19 U07.1    Hypoxia R09.02    Acute respiratory failure with hypoxia (HCC) J96.01    Debility R53.81    Physical deconditioning R53.81    History of COVID-19 Z86.16    Dysarthria R47.1    Primary hypertension I10    Type 2 diabetes mellitus without complication, with long-term current use of insulin (HCC) E11.9, Z79.4    Obesity (BMI 30-39. 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    Hoarseness R49.0    Unstable angina (HCC) I20.0    Chronic respiratory failure with hypoxia (HCC) J96.11    Acute chest pain R07.9    Coronary artery disease involving native heart I25.10    Normocytic anemia D64.9    Anxiety disorder F41.9    Gastroesophageal reflux disease K21.9    Other tracheostomy complication ([address] E84.64    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 ([address] J95.03    Status post surgery Z98.890    Diabetes due to underlying condition w oth circulatory comp ([address] E08.59    Elevated serum creatinine R79.89    Laryngeal stenosis J38.6    Airway compromise J98.8     Consultants:  Hospitalist for glucose management  Procedures/Diagnostic Test:  POD#1 Laryngoscopy with Dilation Debridement  Bronchoscopy with Dilation & Resection of Obstructing Tissues Transoral Laryngoplasty Left True Vocal Fold Lateralization left partial aryteniodectomy    Hospital Course: Mojgan Hinds originally presented to the hospital on 8/31/2022  9:55 AM for surgery with Dr Hoa Preston. She was stable at time of discharge, Mojgan Hinds was tolerating a pureed diet, having bowel movements, ambulating on her own accord and had adequate analgesia on oral pain medications. Ports that she is breathing well and denies any significant postop bleeding. She did notice a very small amount of pink-tinged mucus from her tracheostomy earlier, but none since. Patient had no signs or symptoms of complications. The patient would like to keep her PICC line in place in anticipation of next procedure with Dr. Hoa Preston in the next few weeks. I discussed this with Dr. Hoa Preston and he agrees with PICC line staying in place. Her PCP did previously place orders for outpatient nursing care for the PICC line at a hospital closer to home. I discussed the case with hospitalist as well regarding her glucose and they are agreeable that she is ready for discharge. Patient reports that she has been following her blood glucoses very closely and managing them via directions of her PCP. I recommend she continue to this at discharge since she will continue on a fairly high dose of p.o. steroid    PHYSICAL EXAMINATION     Discharge Vitals:  height is 5' 2\" (1.575 m) and weight is 196 lb 3.2 oz (89 kg). Her oral temperature is 98.6 °F (37 °C). Her blood pressure is 99/68 and her pulse is 64. Her respiration is 16 and oxygen saturation is 100%. General appearance - alert, well appearing, and in no distress  Chest - no tachypnea, retractions or cyanosis  Heart - normal rate and regular rhythm    Oral mucosa: moist  Oropharynx: normal-appearing mucosa  Hard and soft palates symmetrical and intact. Uvula midline. Gag reflex present    Neck: Trachea midline.   Thyroid not enlarged, no palpable masses or tenderness  Lymphatic: No cervical lymphadenopathy noted. Eyes: ERLIN, EOM intact. Conjunctiva moist without discharge. Lungs: Normal effort of breathing, not obviously distressed. Procedure: Dr. MENDEZ SURG[facility] [place] placed a cuffed tracheostomy yesterday in case patient were to experience some postop bleeding, but she needed the tracheostomy changed prior to discharge. Using Seldinger's technique I removed the 7.5 TTS Bivona tracheostomy and then placed Shiley 7.5 cuffless over a bougie introducer. The new tracheostomy was secured with trach ties. She tolerated this procedure well without evidence of complication. She was able to use her Passy-Janeth valve immediately after the new tracheostomy was placed as her Passy-Janeth did not fit on the Bivona. LABS     Recent Labs     09/01/22  0730   *   K 4.6      CO2 23   BUN 20   CREATININE 0.7       DISCHARGE INSTRUCTIONS     Discharge Medications:      Medication List        CONTINUE taking these medications      Insulin Pen Needle 31G X 6 MM Misc  1 each by Does not apply route daily            ASK your doctor about these medications      * albuterol 0.63 MG/3ML nebulizer solution  Commonly known as: ACCUNEB     * albuterol sulfate  (90 Base) MCG/ACT inhaler  Commonly known as: PROVENTIL;VENTOLIN;PROAIR  Inhale 2 puffs into the lungs every 6 hours as needed for Wheezing     aspirin 81 MG chewable tablet     furosemide 40 MG tablet  Commonly known as: LASIX  Take 1 tablet by mouth daily     glucagon 1 MG injection  Inject 1 mg into the muscle See Admin Instructions Follow package directions for low blood sugar. guaiFENesin 400 MG tablet     insulin lispro (1 Unit Dial) 100 UNIT/ML Sopn  Commonly known as: HumaLOG KwikPen  Inject 20-25 Units into the skin in the morning and 20-25 Units at noon and 20-25 Units in the evening. Inject before meals.      insulin  UNIT/ML injection vial  Commonly known as: HUMULIN N;NOVOLIN N     Lanjerryus Imtiaz Manuela Britt

## 2024-11-17 NOTE — ED ADULT NURSE NOTE - NSFALLHARMRISKINTERV_ED_ALL_ED
Assistance OOB with selected safe patient handling equipment if applicable/Communicate risk of Fall with Harm to all staff, patient, and family/Provide visual cue: red socks, yellow wristband, yellow gown, etc/Reinforce activity limits and safety measures with patient and family/Bed in lowest position, wheels locked, appropriate side rails in place/Call bell, personal items and telephone in reach/Instruct patient to call for assistance before getting out of bed/chair/stretcher/Non-slip footwear applied when patient is off stretcher/[place] to call system/Physically safe environment - no spills, clutter or unnecessary equipment/Purposeful Proactive Rounding/Room/bathroom lighting operational, light cord in reach

## 2024-11-17 NOTE — ED PROVIDER NOTE - PROGRESS NOTE DETAILS
outpt neuro notes reviewed, pt has had similar episode of shaking, dizziness, double vision and vomiting, has ahd MRIs and EEg for same, per neuro likely a vestibular migraine. pt recommended to have outpt 24 hour EEG done.   p t now feeling improved, pending CT reads CT abdomen results discussed with GI, pneuombilia and dilated CBD likely related to ERCP, pt with no abd pain or LFT/bili abnormality so can fu findings with oupt GI  pt feeling improved, will dc pending rpt lithium pt able to ambulate and feels improved

## 2024-11-17 NOTE — ED PROVIDER NOTE - OBJECTIVE STATEMENT
68yo F with PMH HTN, Bipolar Affective Disorder, Migraines, Tardive Dyskinesia, prior TIA, seizure?, presents with possible seizure like activity,. this morning was in kitchen and father noted her shaking and falling over. she was awake but drowsy during the episode and then had another episode. since then she has been feeling nausea and vomiting and very weak. had a headache at the time. she was seen in ED for similar in feb and had CT and MRI imaging and eeg which were negative, she was discharged on lamictal, pt states she sees a neurologist dr bernard and was told she did have an abnormal EEG and was also prescribed geodon. she states that last night she changed her medication and took the geodon 80 bec it works better for her,.

## 2024-11-17 NOTE — ED PROVIDER NOTE - PATIENT PORTAL LINK FT
You can access the FollowMyHealth Patient Portal offered by [facility] by registering at the following website: [email or web address]. By joining [employer]’s FollowMyHealth portal, you will also be able to view your health information using other applications (apps) compatible with our system.

## 2024-11-17 NOTE — ED ADULT NURSE NOTE - BOWEL SOUNDS RUQ
Anesthesia Type: 1% lidocaine with epinephrine and a 1:10 solution of 8.4% sodium bicarbonate Hide Additional Anticipated Plan?: No Biopsy Type: H and E Cryotherapy Text: The wound bed was treated with cryotherapy after the biopsy was performed. Additional Anesthesia Volume In Cc (Will Not Render If 0): 0 Information: Selecting Yes will display possible errors in your note based on the variables you have selected. This validation is only offered as a suggestion for you. PLEASE NOTE THAT THE VALIDATION TEXT WILL BE REMOVED WHEN YOU FINALIZE YOUR NOTE. IF YOU WANT TO FAX A PRELIMINARY NOTE YOU WILL NEED TO TOGGLE THIS TO 'NO' IF YOU DO NOT WANT IT IN YOUR FAXED NOTE. Type Of Destruction Used: Curettage Post-Care Instructions: I reviewed with the patient in detail post-care instructions. Patient is to keep the biopsy site dry overnight, and then apply petroleum twice daily until healed. Hemostasis: Katherine's Curettage Text: The wound bed was treated with curettage after the biopsy was performed. Was A Bandage Applied: Yes Silver Nitrate Text: The wound bed was treated with silver nitrate after the biopsy was performed. Anesthesia Volume In Cc: 0.5 Depth Of Biopsy: dermis Detail Level: Detailed Billing Type: Third-Party Bill Biopsy Method: Personna blade Wound Care: Vaseline Electrodesiccation Text: The wound bed was treated with electrodesiccation after the biopsy was performed. Electrodesiccation And Curettage Text: The wound bed was treated with electrodesiccation and curettage after the biopsy was performed. Notification Instructions: Patient will be notified of biopsy results. However, patient instructed to call the office if not contacted within 2 weeks. Dressing: bandage Consent: Written consent was obtained and risks were reviewed including but not limited to scarring, infection, bleeding, scabbing, incomplete removal, nerve damage and allergy to anesthesia. present

## 2024-11-17 NOTE — ED PROVIDER NOTE - CARE PROVIDER_API CALL
Mitali Lora.  Neurology  [address] [facility], [place] [address]  Phone: [phone]  Fax: [phone]  Follow Up Time: 1-3 Days

## 2024-11-17 NOTE — ED PROVIDER NOTE - CLINICAL SUMMARY MEDICAL DECISION MAKING FREE TEXT BOX
70yo F with PMH HTN, Bipolar Affective Disorder, Migraines, Tardive Dyskinesia, prior TIA, seizure?, presents with possible seizure like activity,. this morning was in kitchen and father noted her shaking and falling over. she was awake but drowsy during the episode and then had another episode. since then she has been feeling nausea and vomiting and very weak. had a headache at the time. she was seen in ED for similar in feb and had CT and MRI imaging and eeg which were negative, she was discharged on lamictal, pt states she sees a neurologist dr bernard and was told she did have an abnormal EEG and was also prescribed geodon. she states that last night she changed her medication and took the geodon 80 bec it works better for her,.   possible seizure? labs, CT head, lytes, EKG, neuro eval

## 2024-11-17 NOTE — ED PROVIDER NOTE - NSFOLLOWUPINSTRUCTIONS_ED_ALL_ED_FT
1. TAKE ALL MEDICATIONS AS DIRECTED.    2. FOR PAIN OR FEVER YOU CAN TAKE IBUPROFEN (MOTRIN, ADVIL) OR ACETAMINOPHEN (TYLENOL) AS NEEDED, AS DIRECTED ON PACKAGING.  3. FOLLOW UP WITH YOUR PRIMARY DOCTOR WITHIN 5 DAYS AS DIRECTED.  4. IF YOU HAD LABS OR IMAGING DONE, YOU WERE GIVEN COPIES OF ALL LABS AND/OR IMAGING RESULTS FROM YOUR ER VISIT--PLEASE TAKE THEM WITH YOU TO YOUR FOLLOW UP APPOINTMENTS.  5. IF NEEDED, CALL PATIENT ACCESS SERVICES AT [phone] TO FIND A PRIMARY CARE PHYSICIAN.  OR CALL [phone] TO MAKE AN APPOINTMENT WITH THE CLINIC.  6. RETURN TO THE ER FOR ANY WORSENING SYMPTOMS OR CONCERNS.    Follow up with your neurologist for recurrent migraines and possible seizures  Follow up with GI for dilated bile duct   Continue with your anti seizure medications as directed        English    Migraine Headache  A migraine headache is an intense pulsing or throbbing pain on one or both sides of the head. Migraine headaches may also cause other symptoms, such as nausea, vomiting, and sensitivity to light and noise. A migraine headache can last from 4 hours to 3 days. Talk with your health care provider about what things may bring on (trigger) your migraine headaches.    What are the causes?  The exact cause is not known. However, a migraine may be caused when nerves in the brain get irritated and release chemicals that cause blood vessels to become inflamed. This inflammation causes pain. Migraines may be triggered or caused by:  Smoking.  Medicines, such as:  Nitroglycerin, which is used to treat chest pain.  Birth control pills.  Estrogen.  Certain blood pressure medicines.  Foods or drinks that contain nitrates, glutamate, aspartame, MSG, or tyramine.  Certain foods or drinks, such as aged cheeses, chocolate, alcohol, or caffeine.  Doing physical activity that is very hard.  Other triggers may include:  Menstruation.  Pregnancy.  Hunger.  Stress.  Getting too much or too little sleep.  Weather changes.  Tiredness (fatigue).  What increases the risk?  The following factors may make you more likely to have migraine headaches:  Being between the ages of 25-55 years old.  Being female.  Having a family history of migraine headaches.  Being .  Having a mental health condition, such as depression or anxiety.  Being obese.  What are the signs or symptoms?  The main symptom of this condition is pulsing or throbbing pain. This pain may:  Happen in any area of the head, such as on one or both sides.  Make it hard to do daily activities.  Get worse with physical activity.  Get worse around bright lights, loud noises, or smells.  Other symptoms may include:  Nausea.  Vomiting.  Dizziness.  Before a migraine headache starts, you may get warning signs (an aura). An aura may include:  Seeing flashing lights or having blind spots.  Seeing bright spots, halos, or zigzag lines.  Having tunnel vision or blurred vision.  Having numbness or a tingling feeling.  Having trouble talking.  Having muscle weakness.  After a migraine ends, you may have symptoms. These may include:  Feeling tired.  Trouble concentrating.  How is this diagnosed?  A migraine headache can be diagnosed based on:  Your symptoms.  A physical exam.  Tests, such as:  A CT scan or an MRI of the head. These tests can help rule out other causes of headaches.  Taking fluid from the spine (lumbar puncture) to examine it (cerebrospinal fluid analysis, or CSF analysis).  How is this treated?  This condition may be treated with medicines that:  Relieve pain and nausea.  Prevent migraines.  Treatment may also include:  Acupuncture.  Lifestyle changes like avoiding foods that trigger migraine headaches.  Learning ways to control your body (biofeedback).  Talk therapy to help you know and deal with negative thoughts (cognitive behavioral therapy).  Follow these instructions at home:  Medicines    Take over-the-counter and prescription medicines only as told by your provider.  Ask your provider if the medicine prescribed to you:  Requires you to avoid driving or using machinery.  Can cause constipation. You may need to take these actions to prevent or treat constipation:  Drink enough fluid to keep your pee (urine) pale yellow.  Take over-the-counter or prescription medicines.  Eat foods that are high in fiber, such as beans, whole grains, and fresh fruits and vegetables.  Limit foods that are high in fat and processed sugars, such as fried or sweet foods.  Lifestyle    A silhouette of a person sitting on the floor doing yoga.  Do not drink alcohol.  Do not use any products that contain nicotine or tobacco. These products include cigarettes, chewing tobacco, and vaping devices, such as e-cigarettes. If you need help quitting, ask your provider.  Get 7–9 hours of sleep each night, or the amount recommended by your provider.  Find ways to manage stress, such as meditation, deep breathing, or yoga.  Try to exercise regularly. This can help lessen how bad and how often your migraines occur.  General instructions    Keep a journal to find out what triggers your migraines, so you can avoid those things. For example, write down:  What you eat and drink.  How much sleep you get.  Any change to your diet or medicines.  If you have a migraine headache:  Avoid things that make your symptoms worse, such as bright lights.  Lie down in a dark, quiet room.  Do not drive or use machinery.  Ask your provider what activities are safe for you while you have symptoms.  Keep all follow-up visits. Your provider will monitor your symptoms and recommend any further treatment.  Where to find more information  Coalition for Headache and Migraine Patients (CHAMP): headachemigraine.org  American Migraine Foundation: americanmigrainefoundation.org  National Headache Foundation: headaches.org  Contact a health care provider if:  You have symptoms that are different or worse than your usual migraine headache symptoms.  You have more than 15 days of headaches in one month.  Get help right away if:  Your migraine headache becomes severe or lasts more than 72 hours.  You have a fever or stiff neck.  You have vision loss.  Your muscles feel weak or like you cannot control them.  You lose your balance often or have trouble walking.  You faint.  You have a seizure.  This information is not intended to replace advice given to you by your health care provider. Make sure you discuss any questions you have with your health care provider.    Document Revised: 08/14/2023 Document Reviewed: 08/14/2023  [employer] Patient Education © 2024 [employer]  [employer] logo  Terms and Conditions  Privacy Policy  Editorial Policy  All content on this site: Copyright © 2024 Elsevier, its licensors, and contributors. All rights are reserved, including those for text and data mining, AI training, and similar technologies. For all open access content, the Creative Commons licensing terms apply.  Cookies are used by this site. To decline or learn more, visit our Cookies page.  [employer]

## 2024-11-17 NOTE — ED ADULT NURSE NOTE - CHPI ED NUR SYMPTOMS NEG
no abdominal distension/no blood in stool/no burning urination/no chills/no diarrhea/no dysuria/no fever/no hematuria no fever/no loss of consciousness/no weakness

## 2024-11-17 NOTE — ED ADULT NURSE NOTE - SUICIDE SCREENING QUESTION 3
At rest patient's pulse ox is 91-94% on room air, reports feeling much better since chest tube removed with no complaints of SOB. Patient ambulated in bustillo on room air and pulse ox dropped to 84% consistently. Patient did not report any shortness of breath with ambulation. Notified Dr. Elva Gamino of this and also that his blood pressure has climbed to 172/86. RN received orders and patient is in agreement with plan. Will continue to monitor. No

## 2024-11-17 NOTE — ED PROVIDER NOTE - CARE PROVIDERS DIRECT ADDRESSES
[email or web address]

## 2024-11-17 NOTE — ED PROVIDER NOTE - PHYSICAL EXAMINATION
Gen: appears uncomfortable  Head: NC/AT  Neck: trachea midline  cv:" rrr  Resp:  No distress, lungs clear  abd nontender   Ext: no deformities  Neuro:  A&O appears non focal, eomi, no facial droop, no motor or sensory deficit   Skin:  Warm and dry as visualized  Psych:  Normal affect and mood

## 2024-11-17 NOTE — ED ADULT NURSE NOTE - OBJECTIVE STATEMENT
pt aox4, abd discomfort, vomiting several times today " no sob pt aox4, dizziness, episode off blurred vision, headache,  abd discomfort, vomiting several times today " no sob

## 2024-11-19 DIAGNOSIS — K86.1 OTHER CHRONIC PANCREATITIS: ICD-10-CM

## 2024-11-19 DIAGNOSIS — R25.1 TREMOR, UNSPECIFIED: ICD-10-CM

## 2024-11-22 ENCOUNTER — APPOINTMENT (OUTPATIENT)
Dept: GASTROENTEROLOGY | Facility: CLINIC | Age: 69
End: 2024-11-22

## 2024-11-25 ENCOUNTER — APPOINTMENT (OUTPATIENT)
Dept: GASTROENTEROLOGY | Facility: CLINIC | Age: 69
End: 2024-11-25

## 2024-12-03 ENCOUNTER — APPOINTMENT (OUTPATIENT)
Dept: NEUROLOGY | Facility: CLINIC | Age: 69
End: 2024-12-03

## 2024-12-09 ENCOUNTER — APPOINTMENT (OUTPATIENT)
Dept: GASTROENTEROLOGY | Facility: CLINIC | Age: 69
End: 2024-12-09

## 2024-12-13 ENCOUNTER — INPATIENT (INPATIENT)
Facility: HOSPITAL | Age: 69
LOS: 4 days | Discharge: HOME | DRG: 948 | End: 2024-12-18
Attending: STUDENT IN AN ORGANIZED HEALTH CARE EDUCATION/TRAINING PROGRAM | Admitting: INTERNAL MEDICINE
Payer: MEDICARE

## 2024-12-13 VITALS
WEIGHT: 160.06 LBS | OXYGEN SATURATION: 100 % | HEART RATE: 82 BPM | HEIGHT: 63 IN | RESPIRATION RATE: 20 BRPM | TEMPERATURE: 98 F | SYSTOLIC BLOOD PRESSURE: 180 MMHG | DIASTOLIC BLOOD PRESSURE: 110 MMHG

## 2024-12-13 DIAGNOSIS — R41.82 ALTERED MENTAL STATUS, UNSPECIFIED: ICD-10-CM

## 2024-12-13 DIAGNOSIS — Z98.89 OTHER SPECIFIED POSTPROCEDURAL STATES: Chronic | ICD-10-CM

## 2024-12-13 LAB
ALBUMIN SERPL ELPH-MCNC: 4.5 G/DL — SIGNIFICANT CHANGE UP (ref 3.3–5)
ALP SERPL-CCNC: 80 U/L — SIGNIFICANT CHANGE UP (ref 30–120)
ALT FLD-CCNC: 18 U/L — SIGNIFICANT CHANGE UP (ref 10–60)
ANION GAP SERPL CALC-SCNC: 16 MMOL/L — SIGNIFICANT CHANGE UP (ref 5–17)
APAP SERPL-MCNC: <1 UG/ML — LOW (ref 10–30)
AST SERPL-CCNC: 26 U/L — SIGNIFICANT CHANGE UP (ref 10–40)
BASOPHILS # BLD AUTO: 0.13 K/UL — SIGNIFICANT CHANGE UP (ref 0–0.2)
BASOPHILS NFR BLD AUTO: 0.8 % — SIGNIFICANT CHANGE UP (ref 0–2)
BILIRUB SERPL-MCNC: 0.8 MG/DL — SIGNIFICANT CHANGE UP (ref 0.2–1.2)
BUN SERPL-MCNC: 35 MG/DL — HIGH (ref 7–23)
CALCIUM SERPL-MCNC: 10.7 MG/DL — HIGH (ref 8.4–10.5)
CHLORIDE SERPL-SCNC: 104 MMOL/L — SIGNIFICANT CHANGE UP (ref 96–108)
CO2 SERPL-SCNC: 20 MMOL/L — LOW (ref 22–31)
CREAT SERPL-MCNC: 1.36 MG/DL — HIGH (ref 0.5–1.3)
EGFR: 42 ML/MIN/1.73M2 — LOW
EOSINOPHIL # BLD AUTO: 0.07 K/UL — SIGNIFICANT CHANGE UP (ref 0–0.5)
EOSINOPHIL NFR BLD AUTO: 0.4 % — SIGNIFICANT CHANGE UP (ref 0–6)
ETHANOL SERPL-MCNC: <3 MG/DL — SIGNIFICANT CHANGE UP (ref 0–3)
GLUCOSE SERPL-MCNC: 120 MG/DL — HIGH (ref 70–99)
HCT VFR BLD CALC: 40 % — SIGNIFICANT CHANGE UP (ref 34.5–45)
HGB BLD-MCNC: 12.9 G/DL — SIGNIFICANT CHANGE UP (ref 11.5–15.5)
IMM GRANULOCYTES NFR BLD AUTO: 0.7 % — SIGNIFICANT CHANGE UP (ref 0–0.9)
LITHIUM SERPL-MCNC: 0.95 MMOL/L — SIGNIFICANT CHANGE UP (ref 0.6–1.2)
LYMPHOCYTES # BLD AUTO: 15.4 % — SIGNIFICANT CHANGE UP (ref 13–44)
LYMPHOCYTES # BLD AUTO: 2.56 K/UL — SIGNIFICANT CHANGE UP (ref 1–3.3)
MAGNESIUM SERPL-MCNC: 2.1 MG/DL — SIGNIFICANT CHANGE UP (ref 1.6–2.6)
MCHC RBC-ENTMCNC: 29.4 PG — SIGNIFICANT CHANGE UP (ref 27–34)
MCHC RBC-ENTMCNC: 32.3 G/DL — SIGNIFICANT CHANGE UP (ref 32–36)
MCV RBC AUTO: 91.1 FL — SIGNIFICANT CHANGE UP (ref 80–100)
MONOCYTES # BLD AUTO: 1.29 K/UL — HIGH (ref 0–0.9)
MONOCYTES NFR BLD AUTO: 7.8 % — SIGNIFICANT CHANGE UP (ref 2–14)
NEUTROPHILS # BLD AUTO: 12.44 K/UL — HIGH (ref 1.8–7.4)
NEUTROPHILS NFR BLD AUTO: 74.9 % — SIGNIFICANT CHANGE UP (ref 43–77)
NRBC # BLD: 0 /100 WBCS — SIGNIFICANT CHANGE UP (ref 0–0)
PLATELET # BLD AUTO: 286 K/UL — SIGNIFICANT CHANGE UP (ref 150–400)
POTASSIUM SERPL-MCNC: 4 MMOL/L — SIGNIFICANT CHANGE UP (ref 3.5–5.3)
POTASSIUM SERPL-SCNC: 4 MMOL/L — SIGNIFICANT CHANGE UP (ref 3.5–5.3)
PROT SERPL-MCNC: 8.2 G/DL — SIGNIFICANT CHANGE UP (ref 6–8.3)
RBC # BLD: 4.39 M/UL — SIGNIFICANT CHANGE UP (ref 3.8–5.2)
RBC # FLD: 13 % — SIGNIFICANT CHANGE UP (ref 10.3–14.5)
SALICYLATES SERPL-MCNC: 3.1 MG/DL — SIGNIFICANT CHANGE UP (ref 2.8–20)
SODIUM SERPL-SCNC: 140 MMOL/L — SIGNIFICANT CHANGE UP (ref 135–145)
WBC # BLD: 16.6 K/UL — HIGH (ref 3.8–10.5)
WBC # FLD AUTO: 16.6 K/UL — HIGH (ref 3.8–10.5)

## 2024-12-13 PROCEDURE — 70450 CT HEAD/BRAIN W/O DYE: CPT | Mod: 26,MC

## 2024-12-13 PROCEDURE — 71045 X-RAY EXAM CHEST 1 VIEW: CPT | Mod: 26

## 2024-12-13 PROCEDURE — 99285 EMERGENCY DEPT VISIT HI MDM: CPT | Mod: FS

## 2024-12-13 PROCEDURE — 93010 ELECTROCARDIOGRAM REPORT: CPT

## 2024-12-13 PROCEDURE — 99223 1ST HOSP IP/OBS HIGH 75: CPT

## 2024-12-13 RX ORDER — HEPARIN SODIUM,PORCINE 1000/ML
5000 VIAL (ML) INJECTION EVERY 8 HOURS
Refills: 0 | Status: DISCONTINUED | OUTPATIENT
Start: 2024-12-13 | End: 2024-12-18

## 2024-12-13 RX ORDER — METOPROLOL TARTRATE 100 MG/1
25 TABLET, FILM COATED ORAL AT BEDTIME
Refills: 0 | Status: DISCONTINUED | OUTPATIENT
Start: 2024-12-13 | End: 2024-12-18

## 2024-12-13 RX ORDER — SODIUM CHLORIDE 9 MG/ML
1000 INJECTION, SOLUTION INTRAMUSCULAR; INTRAVENOUS; SUBCUTANEOUS ONCE
Refills: 0 | Status: COMPLETED | OUTPATIENT
Start: 2024-12-13 | End: 2024-12-13

## 2024-12-13 RX ORDER — LITHIUM CARBONATE 300 MG/1
300 CAPSULE ORAL DAILY
Refills: 0 | Status: DISCONTINUED | OUTPATIENT
Start: 2024-12-13 | End: 2024-12-18

## 2024-12-13 RX ORDER — LAMOTRIGINE 50 MG/1
200 TABLET, EXTENDED RELEASE ORAL
Refills: 0 | Status: DISCONTINUED | OUTPATIENT
Start: 2024-12-13 | End: 2024-12-13

## 2024-12-13 RX ORDER — ESCITALOPRAM OXALATE 10 MG/1
5 TABLET, FILM COATED ORAL DAILY
Refills: 0 | Status: DISCONTINUED | OUTPATIENT
Start: 2024-12-13 | End: 2024-12-18

## 2024-12-13 RX ORDER — LAMOTRIGINE 50 MG/1
225 TABLET, EXTENDED RELEASE ORAL
Refills: 0 | Status: DISCONTINUED | OUTPATIENT
Start: 2024-12-13 | End: 2024-12-18

## 2024-12-13 RX ORDER — TOPIRAMATE 25 MG/1
100 TABLET ORAL
Refills: 0 | Status: DISCONTINUED | OUTPATIENT
Start: 2024-12-13 | End: 2024-12-18

## 2024-12-13 RX ORDER — SUMATRIPTAN SUCCINATE 100 MG/1
100 TABLET, FILM COATED ORAL EVERY 6 HOURS
Refills: 0 | Status: DISCONTINUED | OUTPATIENT
Start: 2024-12-13 | End: 2024-12-18

## 2024-12-13 RX ADMIN — SODIUM CHLORIDE 2000 MILLILITER(S): 9 INJECTION, SOLUTION INTRAMUSCULAR; INTRAVENOUS; SUBCUTANEOUS at 20:25

## 2024-12-13 RX ADMIN — Medication 325 MILLIGRAM(S): at 23:39

## 2024-12-13 RX ADMIN — METOPROLOL TARTRATE 25 MILLIGRAM(S): 100 TABLET, FILM COATED ORAL at 23:24

## 2024-12-13 NOTE — ED PROVIDER NOTE - NS ED MD TWO NIGHTS YN
Patient was left a message to call the office.  Inform the patient that we have received Shingrix vaccine and would like to get him scheduled for his 2nd dose.   Yes

## 2024-12-13 NOTE — H&P ADULT - HISTORY OF PRESENT ILLNESS
This is a a 68 y/o F with PMH of HTN, Dyslipidemia, TIA, CHF, Migraine, Seizure Disorder, Tardive Dyskinesia, Pill Rolling Tremors, Chronic Pancreatitis, Diverticulitis, GERD, OCD, Insomnia, Anxiety, and Depression who presented with AMS. Patient reportedly was last seen well was on Monday (5 days ago), and yesterday her father visited her at home as he didn't hear from her since  then, found her locked naked in the bathroom and confused, and today he called EMS as she didn't improve. Patient is awake & alert but still confused & disoriented, unable to give any history. her father reported earlier to ED team that she had a similar episode in the past & her w/u then didn't reveal a specific cause of her condition.

## 2024-12-13 NOTE — H&P ADULT - PROBLEM SELECTOR PLAN 1
R/O CVA, possible metabolic encephalopathy related to the below mentioned, ordered serum Topiramate level, urine tox screen still pending, her stat brain NCCT without any acute findings, admitted to telemetry with TIA /CVA protocol, neuro checks, was on low dose Aspirin as per meds list but possibly didn't received any meds over the past several days given her AMS, gave a state full dose Aspirin on admission, then continue on daily low dose Aspirin, atorvastatin, VF hydration, Glycated hemoglobin with average plasma glucose, lipid profile, and TSH levels in am, CHAD in am, neurology consult with Dr. Saul was called.

## 2024-12-13 NOTE — ED ADULT TRIAGE NOTE - CHIEF COMPLAINT QUOTE
confusion  for months. has seen pmd with no results confusion  for months. has seen pmd with no results  sister tara

## 2024-12-13 NOTE — H&P ADULT - ASSESSMENT
68 y/o F with PMH of HTN, Dyslipidemia, TIA, CHF, Migraine, Seizure Disorder, Tardive Dyskinesia, Pill Rolling Tremors, Chronic Pancreatitis, Diverticulitis, GERD, OCD, Insomnia, Anxiety, and Depression presented with AMS.
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
## 2024-12-13 NOTE — ED PROVIDER NOTE - DIFFERENTIAL DIAGNOSIS
Ddx includes but not limited to seizure, CVA, ICH, SAH, infection, UTI, illicit drug use, medication overdose, trauma, bipolar d/o, electrolyte imbalance, hypoglycemia Differential Diagnosis

## 2024-12-13 NOTE — ED PROVIDER NOTE - CLINICAL SUMMARY MEDICAL DECISION MAKING FREE TEXT BOX
69 year old female with a history of seizure d/o, bipolar d/o, TIA p/w AMS.  Last seen in her usual state of health 4 days ago.  Found in her bathroom 4 days ago by her father and was confused.  No improvement today so EMS was called.  Patient being work up for AMS x 1 year, following with neuro and psychiatry for possible seizure vs CVA/TIA.  Patient confused on exam, unable to answer questions appropriately.  No obvious signs of trauma, no focal neuro deficits.  Check labs, UA, Utox, BAL, Li level, EKG, CT head, admit for neuro and psychiatry evaluations

## 2024-12-13 NOTE — H&P ADULT - PROBLEM SELECTOR PLAN 6
uncontrolled, ML caused by missing her anti HTN meds as stated above, restarted her on Metoprolol succinate with good response, monitor BP.
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
## 2024-12-13 NOTE — ED ADULT NURSE NOTE - NS ED NOTE ABUSE RESPONSE YN
Oral and Written notification given to patient and/or caregiver informing them that they are currently an Outpatient receiving care in our facility. Outpatient services include Observation Services under [place] and [facility] requirements. Yes

## 2024-12-13 NOTE — H&P ADULT - NSHPPHYSICALEXAM_GEN_ALL_CORE
-    Vital Signs Last 24 Hrs  T(C): 36.9 (13 Dec 2024 23:54), Max: 36.9 (13 Dec 2024 18:56)  T(F): 98.4 (13 Dec 2024 23:54), Max: 98.4 (13 Dec 2024 18:56)  HR: 78 (13 Dec 2024 23:54) (78 - 86)  BP: 174/82 (13 Dec 2024 23:54) (174/82 - 210/98)  BP(mean): --  RR: 18 (13 Dec 2024 23:54) (18 - 20)  SpO2: 98% (13 Dec 2024 23:54) (98% - 100%)    Parameters below as of 13 Dec 2024 23:54  Patient On (Oxygen Delivery Method): room air        PHYSICAL EXAM:  		  GENERAL: NAD, well-groomed, well-developed.  HEAD:  Atraumatic, Norm cephalic.  EYES: PERRLA, conjunctiva clear.  ENMT: no nasal discharge, mildly dry MM.   NECK: Supple, No JVD.  NERVOUS SYSTEM:  Awake & alert , confused & completely disoriented, fully co-operative with exam, (+) fine tremors of face, eye lids, and mouth with some speech difficulty, also tremors of the hands, no motor weakness, no ataxia, negative B/L EPR.   CHEST/LUNG: Good air entry B/L, no rales, rhonchi, or wheezing.  HEART: Normal S1 & acc S2, (+) grade II/VI SEMM max over cardiac base, propagated to the apex, no extra sounds.  ABDOMEN: Soft, non-tender, non-distended; bowel sounds present, no palpable masses or organomegaly.  EXTREMITIES:  No clubbing, cyanosis, or edema.  VASCULAR: 2+ radial, DPA / PTA pulses B/L.  SKIN: No rashes or lesions.  PSYCH: Calm, smiling & jocking, fully co-operative, no agitation.

## 2024-12-13 NOTE — PATIENT PROFILE ADULT - FALL HARM RISK - HARM RISK INTERVENTIONS

## 2024-12-13 NOTE — ED ADULT NURSE NOTE - NSFALLRISKINTERV_ED_ALL_ED

## 2024-12-13 NOTE — H&P ADULT - PROBLEM/PLAN-5
[FreeTextEntry1] : Amada Catalan is a 50-year-old woman with a history of bicuspid aortic valve with mild stenosis, hypertension, palpitations (ambulatory ECG monitor revealed rare PACs, occasional PVCs, and infrequent/short runs of SVT/atrial tachycardia), Meniere's disease, and obesity, who returns for cardiac examination.   She has been feeling well; infrequent and mild palpitations but no syncope, dyspnea, or chest discomfort. DISPLAY PLAN FREE TEXT

## 2024-12-13 NOTE — ED ADULT TRIAGE NOTE - HOW PATIENT ADDRESSED, PROFILE
SUBJECTIVE: 63 year old white male Presents for a full body skin exam. Complaint of spot on forehead.  Spot nose had jay cheek  Treated Dr Mauricio, Nursing notes reviewed, agree with content.  Nose was actinic in past 2013 bx near current site  Allergies as of 06/13/2017 - Ralf as Reviewed 06/13/2017   Allergen Reaction Noted   • Fish SWELLING 01/22/2013   • Penicillins  03/05/2003     Past Medical History:   Diagnosis Date   • Benign neoplasm of colon 03/06/12    tubular adenoma & hyperplastic x 1   • Family history of malignant neoplasm of gastrointestinal tract     father colon ca.   • Inflamed seborrheic keratosis 2/8/2007   • Malignant neoplasm (CMS/HCC)     skin   • Osteoarthrosis, unspecified whether generalized or localized, hand    • Other and unspecified hyperlipidemia    • Other dermatitis due to solar radiation 2/8/2007   • Special screening for malignant neoplasms, colon 4/15/03    path: 2 hyperplastic polyps & 1 colonic mucosa with mild vascular congestin. f/u 3 years.   • Tubular adenoma of colon x 1 03/21/2017   • Unspecified vitamin D deficiency      OBJECTIVE: 63 year old white male    Well developed, well nourished, oriented male with normal affect. red papule nose, stuck on papule trunk stuck on papules lentigos tags; preauricular keratotic papule        CHIEF COMPLAINT:  Derm Problem (ROVERTO)   Complaint of spot on forehead.    PAST HISTORIES:  Personal history of skin cancer:Yes:  Basal Cell Carcinoma,    Squamous Cell Cancer,    Family history of skin cancer: No  History of sunburns/tanning bed use:  sunburns as a child/teenager and sunburns occasionally as an adult   Sunscreen use: always    Review of systems:    Constitutional: Negative for fever and chills.   Integumentary: Negative for rash.   Cardiovascular: Negative for chest pain or chest pressure.   Hematological: Negative for bleeding easily or anticoagulation therapy    Physical Exam:    Vital Signs:  There were no vitals taken for  Samantha

## 2024-12-13 NOTE — H&P ADULT - NSHPLABSRESULTS_GEN_ALL_CORE
-                          12.9   16.60 )-----------( 286      ( 13 Dec 2024 20:20 )             40.0       13 Dec 2024 20:20    140    |  104    |  35     ----------------------------<  120    4.0     |  20     |  1.36     Ca    10.7       13 Dec 2024 20:20  Mg     2.1       13 Dec 2024 20:20    TPro  8.2    /  Alb  4.5    /  TBili  0.8    /  DBili  x      /  AST  26     /  ALT  18     /  AlkPhos  80     13 Dec 2024 20:20    LIVER FUNCTIONS - ( 13 Dec 2024 20:20 )  Alb: 4.5 g/dL / Pro: 8.2 g/dL / ALK PHOS: 80 U/L / ALT: 18 U/L / AST: 26 U/L / GGT: x           Urinalysis Basic - ( 13 Dec 2024 20:20 )  Color: x / Appearance: x / SG: x / pH: x  Gluc: 120 mg/dL / Ketone: x  / Bili: x / Urobili: x   Blood: x / Protein: x / Nitrite: x   Leuk Esterase: x / RBC: x / WBC x   Sq Epi: x / Non Sq Epi: x / Bacteria: x      Acetaminophen Level, Serum (12.13.24 @ 21:02)   Acetaminophen Level, Serum: <1 ug/mL  Alcohol, Blood (12.13.24 @ 21:02)   Alcohol, Blood: <3  Salicylate Level, Serum (12.13.24 @ 21:02)   Salicylate Level, Serum: 3.1 mg/dL  Lithium Level, Serum (12.13.24 @ 21:02)   Lithium Level, Serum: 0.95 mmol/L      CT BRAIN    PROCEDURE DATE:  12/13/2024    INTERPRETATION:  CLINICAL INFORMATION: AMS  5mm axial sections of the brain were obtained from base to vertex, without the intravenous administration of contrast material.   Coronal and sagittal computer generated reconstructed views are available.  Comparison prior CT of 11/17/2024 and demonstrates no significant interval change.  The ventricles and sulci are prominent consistent age appropriate involutional changes. There is no hemorrhage, mass, or shift of midline structures. Bone window examination is unremarkable.  IMPRESSION: Age-appropriate involutional changes. No hemorrhage. No change from 11/17/2024.         XR CHEST PORTABLE URGENT 1V    PROCEDURE DATE:  12/13/2024    INTERPRETATION:  TECHNIQUE: Single portable view of the chest.  COMPARISON:  11/17/2024  CLINICAL HISTORY: AMS  FINDINGS:  Single frontal view of the chest demonstrates the lungs to be clear. The cardiomediastinal silhouette is unremarkable. No acute osseous abnormalities.  IMPRESSION: No acute cardiopulmonary disease process.  Personally reviewed by me.        EKG:    As per my review shows SR at 90/min, normal MA & prolonged QTc intervals, LAD (-45), LAE, complete LBBB with 2ry repolarization abnormality, normal QRS voltage with normal transition.    -

## 2024-12-13 NOTE — ED PROVIDER NOTE - ATTENDING APP SHARED VISIT CONTRIBUTION OF CARE
69 year old female with a history of bipolar d/o, seizure d/o, TIA, HTN, tardive dyskinesia p/w AMS.  Patient BIBA from home.  Per patient's father, he last saw her in her usual state of health 4 days ago.  They then did not speak for several days which is unusual.  He went to her apartment yesterday and found her locked in her bathroom naked and confused.  Unknown if any trauma but no obvious signs of injury. Today, there was no improvement in mental status so he called 911.  Father denies any known history of illicit drug use or alcohol use. Patient has been worked up for AMS for the past year, has been following with neurology and psychiatry for possible seizure/CVA/TIA..  She is currently on Lamictal and lithium. She is unable to provide further history secondary to AMS.   PMD Dr. Palmer

## 2024-12-13 NOTE — H&P ADULT - PROBLEM SELECTOR PLAN 4
mild, ML related to Topiramate in the setting of MANE, f/u serum level result, repeat BMP in am after the above management.

## 2024-12-13 NOTE — H&P ADULT - PROBLEM SELECTOR PLAN 3
ML MANE on CKD 2ry to the above, IVF hydration as stated above, avoid nephrotoxins & renally dose meds, will not change lisa her Topiramate & Gabapentin doses as they were same based on CKD3 & almost same Cr Cl, trend BUN/Cr.

## 2024-12-13 NOTE — H&P ADULT - PROBLEM SELECTOR PLAN 2
possibly 2ry to poor PO intake related to her AMS as she lives alone, received 1000 ml NS bolus by Ed team, gave an additional 1000 ml of LR bolus, monitor I & O.

## 2024-12-14 ENCOUNTER — RESULT REVIEW (OUTPATIENT)
Age: 69
End: 2024-12-14

## 2024-12-14 DIAGNOSIS — N17.9 ACUTE KIDNEY FAILURE, UNSPECIFIED: ICD-10-CM

## 2024-12-14 DIAGNOSIS — E87.29 OTHER ACIDOSIS: ICD-10-CM

## 2024-12-14 DIAGNOSIS — Z29.9 ENCOUNTER FOR PROPHYLACTIC MEASURES, UNSPECIFIED: ICD-10-CM

## 2024-12-14 DIAGNOSIS — I10 ESSENTIAL (PRIMARY) HYPERTENSION: ICD-10-CM

## 2024-12-14 DIAGNOSIS — E86.0 DEHYDRATION: ICD-10-CM

## 2024-12-14 DIAGNOSIS — D72.829 ELEVATED WHITE BLOOD CELL COUNT, UNSPECIFIED: ICD-10-CM

## 2024-12-14 DIAGNOSIS — R41.82 ALTERED MENTAL STATUS, UNSPECIFIED: ICD-10-CM

## 2024-12-14 LAB
A1C WITH ESTIMATED AVERAGE GLUCOSE RESULT: 5.2 % — SIGNIFICANT CHANGE UP (ref 4–5.6)
AMPHET UR-MCNC: NEGATIVE — SIGNIFICANT CHANGE UP
ANION GAP SERPL CALC-SCNC: 14 MMOL/L — SIGNIFICANT CHANGE UP (ref 5–17)
APPEARANCE UR: CLEAR — SIGNIFICANT CHANGE UP
BACTERIA # UR AUTO: NEGATIVE /HPF — SIGNIFICANT CHANGE UP
BARBITURATES UR SCN-MCNC: NEGATIVE — SIGNIFICANT CHANGE UP
BASOPHILS # BLD AUTO: 0.14 K/UL — SIGNIFICANT CHANGE UP (ref 0–0.2)
BASOPHILS NFR BLD AUTO: 1 % — SIGNIFICANT CHANGE UP (ref 0–2)
BENZODIAZ UR-MCNC: NEGATIVE — SIGNIFICANT CHANGE UP
BILIRUB UR-MCNC: NEGATIVE — SIGNIFICANT CHANGE UP
BUN SERPL-MCNC: 29 MG/DL — HIGH (ref 7–23)
CALCIUM SERPL-MCNC: 9.4 MG/DL — SIGNIFICANT CHANGE UP (ref 8.4–10.5)
CHLORIDE SERPL-SCNC: 105 MMOL/L — SIGNIFICANT CHANGE UP (ref 96–108)
CHOLEST SERPL-MCNC: 137 MG/DL — SIGNIFICANT CHANGE UP
CO2 SERPL-SCNC: 21 MMOL/L — LOW (ref 22–31)
COCAINE METAB.OTHER UR-MCNC: NEGATIVE — SIGNIFICANT CHANGE UP
COLOR SPEC: YELLOW — SIGNIFICANT CHANGE UP
CREAT SERPL-MCNC: 1.1 MG/DL — SIGNIFICANT CHANGE UP (ref 0.5–1.3)
DIFF PNL FLD: ABNORMAL
EGFR: 54 ML/MIN/1.73M2 — LOW
EOSINOPHIL # BLD AUTO: 0.16 K/UL — SIGNIFICANT CHANGE UP (ref 0–0.5)
EOSINOPHIL NFR BLD AUTO: 1.2 % — SIGNIFICANT CHANGE UP (ref 0–6)
EPI CELLS # UR: PRESENT
ESTIMATED AVERAGE GLUCOSE: 103 MG/DL — SIGNIFICANT CHANGE UP (ref 68–114)
GLUCOSE SERPL-MCNC: 121 MG/DL — HIGH (ref 70–99)
GLUCOSE UR QL: NEGATIVE MG/DL — SIGNIFICANT CHANGE UP
HCT VFR BLD CALC: 35.1 % — SIGNIFICANT CHANGE UP (ref 34.5–45)
HDLC SERPL-MCNC: 55 MG/DL — SIGNIFICANT CHANGE UP
HGB BLD-MCNC: 11.2 G/DL — LOW (ref 11.5–15.5)
HYALINE CASTS # UR AUTO: PRESENT
IMM GRANULOCYTES NFR BLD AUTO: 0.6 % — SIGNIFICANT CHANGE UP (ref 0–0.9)
KETONES UR-MCNC: ABNORMAL MG/DL
LEUKOCYTE ESTERASE UR-ACNC: NEGATIVE — SIGNIFICANT CHANGE UP
LIPID PNL WITH DIRECT LDL SERPL: 58 MG/DL — SIGNIFICANT CHANGE UP
LYMPHOCYTES # BLD AUTO: 18.9 % — SIGNIFICANT CHANGE UP (ref 13–44)
LYMPHOCYTES # BLD AUTO: 2.53 K/UL — SIGNIFICANT CHANGE UP (ref 1–3.3)
MCHC RBC-ENTMCNC: 29.4 PG — SIGNIFICANT CHANGE UP (ref 27–34)
MCHC RBC-ENTMCNC: 31.9 G/DL — LOW (ref 32–36)
MCV RBC AUTO: 92.1 FL — SIGNIFICANT CHANGE UP (ref 80–100)
METHADONE UR-MCNC: NEGATIVE — SIGNIFICANT CHANGE UP
MONOCYTES # BLD AUTO: 1.34 K/UL — HIGH (ref 0–0.9)
MONOCYTES NFR BLD AUTO: 10 % — SIGNIFICANT CHANGE UP (ref 2–14)
NEUTROPHILS # BLD AUTO: 9.11 K/UL — HIGH (ref 1.8–7.4)
NEUTROPHILS NFR BLD AUTO: 68.3 % — SIGNIFICANT CHANGE UP (ref 43–77)
NITRITE UR-MCNC: NEGATIVE — SIGNIFICANT CHANGE UP
NON HDL CHOLESTEROL: 82 MG/DL — SIGNIFICANT CHANGE UP
NRBC # BLD: 0 /100 WBCS — SIGNIFICANT CHANGE UP (ref 0–0)
OPIATES UR-MCNC: NEGATIVE — SIGNIFICANT CHANGE UP
PCP SPEC-MCNC: SIGNIFICANT CHANGE UP
PCP UR-MCNC: NEGATIVE — SIGNIFICANT CHANGE UP
PH UR: 6 — SIGNIFICANT CHANGE UP (ref 5–8)
PLATELET # BLD AUTO: 225 K/UL — SIGNIFICANT CHANGE UP (ref 150–400)
POTASSIUM SERPL-MCNC: 3.4 MMOL/L — LOW (ref 3.5–5.3)
POTASSIUM SERPL-SCNC: 3.4 MMOL/L — LOW (ref 3.5–5.3)
PROT UR-MCNC: 300 MG/DL
RBC # BLD: 3.81 M/UL — SIGNIFICANT CHANGE UP (ref 3.8–5.2)
RBC # FLD: 13.1 % — SIGNIFICANT CHANGE UP (ref 10.3–14.5)
RBC CASTS # UR COMP ASSIST: 5 /HPF — HIGH (ref 0–4)
SODIUM SERPL-SCNC: 140 MMOL/L — SIGNIFICANT CHANGE UP (ref 135–145)
SP GR SPEC: 1.02 — SIGNIFICANT CHANGE UP (ref 1–1.03)
THC UR QL: POSITIVE
TRIGL SERPL-MCNC: 141 MG/DL — SIGNIFICANT CHANGE UP
UROBILINOGEN FLD QL: 0.2 MG/DL — SIGNIFICANT CHANGE UP (ref 0.2–1)
WBC # BLD: 13.36 K/UL — HIGH (ref 3.8–10.5)
WBC # FLD AUTO: 13.36 K/UL — HIGH (ref 3.8–10.5)
WBC UR QL: 3 /HPF — SIGNIFICANT CHANGE UP (ref 0–5)

## 2024-12-14 PROCEDURE — 93356 MYOCRD STRAIN IMG SPCKL TRCK: CPT

## 2024-12-14 PROCEDURE — 99232 SBSQ HOSP IP/OBS MODERATE 35: CPT

## 2024-12-14 PROCEDURE — 76376 3D RENDER W/INTRP POSTPROCES: CPT | Mod: 26

## 2024-12-14 PROCEDURE — 93306 TTE W/DOPPLER COMPLETE: CPT | Mod: 26

## 2024-12-14 RX ORDER — POTASSIUM CHLORIDE 600 MG/1
40 TABLET, EXTENDED RELEASE ORAL ONCE
Refills: 0 | Status: COMPLETED | OUTPATIENT
Start: 2024-12-14 | End: 2024-12-14

## 2024-12-14 RX ORDER — 0.9 % SODIUM CHLORIDE 0.9 %
1000 INTRAVENOUS SOLUTION INTRAVENOUS ONCE
Refills: 0 | Status: COMPLETED | OUTPATIENT
Start: 2024-12-14 | End: 2024-12-14

## 2024-12-14 RX ORDER — HYDRALAZINE HYDROCHLORIDE 10 MG/1
25 TABLET ORAL ONCE
Refills: 0 | Status: COMPLETED | OUTPATIENT
Start: 2024-12-14 | End: 2024-12-14

## 2024-12-14 RX ADMIN — Medication 81 MILLIGRAM(S): at 11:58

## 2024-12-14 RX ADMIN — Medication 40 MILLIGRAM(S): at 21:55

## 2024-12-14 RX ADMIN — Medication 5000 UNIT(S): at 21:55

## 2024-12-14 RX ADMIN — LAMOTRIGINE 225 MILLIGRAM(S): 50 TABLET, EXTENDED RELEASE ORAL at 05:48

## 2024-12-14 RX ADMIN — TOPIRAMATE 100 MILLIGRAM(S): 25 TABLET ORAL at 17:36

## 2024-12-14 RX ADMIN — Medication 1000 MILLILITER(S): at 02:20

## 2024-12-14 RX ADMIN — Medication 5000 UNIT(S): at 15:07

## 2024-12-14 RX ADMIN — LAMOTRIGINE 225 MILLIGRAM(S): 50 TABLET, EXTENDED RELEASE ORAL at 17:35

## 2024-12-14 RX ADMIN — LITHIUM CARBONATE 300 MILLIGRAM(S): 300 CAPSULE ORAL at 11:59

## 2024-12-14 RX ADMIN — HYDRALAZINE HYDROCHLORIDE 25 MILLIGRAM(S): 10 TABLET ORAL at 17:36

## 2024-12-14 RX ADMIN — POTASSIUM CHLORIDE 40 MILLIEQUIVALENT(S): 600 TABLET, EXTENDED RELEASE ORAL at 11:59

## 2024-12-14 RX ADMIN — TOPIRAMATE 100 MILLIGRAM(S): 25 TABLET ORAL at 05:48

## 2024-12-14 RX ADMIN — Medication 5000 UNIT(S): at 05:49

## 2024-12-14 RX ADMIN — METOPROLOL TARTRATE 25 MILLIGRAM(S): 100 TABLET, FILM COATED ORAL at 21:55

## 2024-12-14 NOTE — PROGRESS NOTE ADULT - SUBJECTIVE AND OBJECTIVE BOX
Patient is a 69y old  Female who presents with a chief complaint of AMS (13 Dec 2024 23:30)      INTERVAL HPI/OVERNIGHT EVENTS:  Overnight nothing significant happened.   Pt was seen today at bedside, no acute complaints. she does not know her name, , where she is now. Does not know why she was brought to the hospital.     MEDICATIONS  (STANDING):  aspirin enteric coated 81 milliGRAM(s) Oral daily  atorvastatin 40 milliGRAM(s) Oral at bedtime  escitalopram 5 milliGRAM(s) Oral daily  heparin   Injectable 5000 Unit(s) SubCutaneous every 8 hours  lamoTRIgine 225 milliGRAM(s) Oral two times a day  lithium 300 milliGRAM(s) Oral daily  metoprolol succinate ER 25 milliGRAM(s) Oral at bedtime  topiramate 100 milliGRAM(s) Oral two times a day    MEDICATIONS  (PRN):  SUMAtriptan 100 milliGRAM(s) Oral every 6 hours PRN Migraine      Allergies    sulfa drugs (Rash)  penicillins (Rash)    Intolerances        REVIEW OF SYSTEMS:  CONSTITUTIONAL: No fever, weight loss, or fatigue  EYES: No eye pain, visual disturbances, or discharge  ENMT:  No difficulty hearing, tinnitus, vertigo; No sinus or throat pain  NECK: No pain or stiffness  RESPIRATORY: No cough, wheezing, chills or hemoptysis; No shortness of breath  CARDIOVASCULAR: No chest pain, palpitations, lightheadedness, or leg swelling  GASTROINTESTINAL: No abdominal or epigastric pain. No nausea, vomiting, or hematemesis; No diarrhea or constipation. No melena or hematochezia.  GENITOURINARY: No dysuria, frequency, hematuria, or incontinence  NEUROLOGICAL: No headaches, memory loss, vertigo, loss of strength, numbness, or tremors  SKIN: No itching, burning, rashes, or lesions   MUSCULOSKELETAL: No joint pain or swelling; No muscle, back, or extremity pain          Vital Signs Last 24 Hrs  T(C): 36.3 (14 Dec 2024 07:45), Max: 36.9 (13 Dec 2024 18:56)  T(F): 97.4 (14 Dec 2024 07:45), Max: 98.4 (13 Dec 2024 18:56)  HR: 63 (14 Dec 2024 07:45) (63 - 86)  BP: 164/78 (14 Dec 2024 07:45) (163/84 - 210/98)  BP(mean): --  RR: 18 (14 Dec 2024 07:45) (17 - 20)  SpO2: 99% (14 Dec 2024 07:45) (98% - 100%)    Parameters below as of 14 Dec 2024 07:45  Patient On (Oxygen Delivery Method): room air        PHYSICAL EXAM:  GENERAL: NAD, well-groomed, well-developed  HEAD:  Atraumatic, Normocephalic  EYES: EOMI, PERRLA, conjunctiva and sclera clear  ENMT: Moist mucous membranes,   NECK: Supple, No JVD, Normal thyroid  NERVOUS SYSTEM:  Alert & Oriented X 0, confused & completely disoriented, speech difficulty,  tremors of the hands, no motor weakness  CHEST/LUNG: Clear to auscultation bilaterally; No rales, rhonchi, wheezing, or rubs  HEART: Regular rate and rhythm; No murmurs, rubs, or gallops  ABDOMEN: Soft, Nontender, Nondistended; Bowel sounds present  EXTREMITIES:  2+ Peripheral Pulses, No clubbing, cyanosis, or edema  Psych: Calm, cooperative     LABS:                        11.2   13.36 )-----------( 225      ( 14 Dec 2024 06:00 )             35.1     14 Dec 2024 06:00    140    |  105    |  29     ----------------------------<  121    3.4     |  21     |  1.10     Ca    9.4        14 Dec 2024 06:00  Mg     2.1       13 Dec 2024 20:20    TPro  8.2    /  Alb  4.5    /  TBili  0.8    /  DBili  x      /  AST  26     /  ALT  18     /  AlkPhos  80     13 Dec 2024 20:20      Urinalysis Basic - ( 14 Dec 2024 06:00 )    Color: x / Appearance: x / SG: x / pH: x  Gluc: 121 mg/dL / Ketone: x  / Bili: x / Urobili: x   Blood: x / Protein: x / Nitrite: x   Leuk Esterase: x / RBC: x / WBC x   Sq Epi: x / Non Sq Epi: x / Bacteria: x
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
## 2024-12-14 NOTE — PROGRESS NOTE ADULT - ASSESSMENT
Metabolic encephalopathy   Likely related to medication vs underlying infection less likely stroke or TIA   NCCT without any acute findings  Continue on telemetry with TIA /CVA protocol, neuro checks  Received full dose Aspirin on admission  Continue on daily low dose Aspirin, atorvastatin  Gentle  hydration  Follow up A1c, lipid profile, and TSH   Follow up CHAD  Neurology consulted     Dehydration  Possibly secondary to poor PO intake related to her AMS  Continue IV fluids   Encourage oral intake     MANE on CKD- Improving    Avoid nephrotoxins & renally dose meds, will continue Topiramate & Gabapentin doses as they were same based on CKD3 & almost same Cr Cl, trend BUN/Cr.    Hypercalcemia -Improved   Hypokalemia- replete K as needed to Keep > 4     Leukocytosis  Possibly related to the above, no evidence suggestive of an infection, trend off antibiotics.  Follow up blood culture   Monitor fever and WBC curve   Seizure Disorder  Tardive Dyskinesia  Pill Rolling Tremors  Migraine   Continue lamotrigine 225 mg twice daily   Continue lithium 300 mg daily   Continue Topiramate 100 mg twice daily   Sumatriptan prn    HTN (hypertension)  Continue metoprolol 25mg     Need for prophylactic measure.   Heparin

## 2024-12-14 NOTE — CARE COORDINATION ASSESSMENT. - LIVES WITH, PROFILE
Detail Level: Detailed
General Sunscreen Counseling: I recommended a broad-spectrum sunscreen with an SPF of 30 or higher.  I explained that SPF 30 sunscreens block approximately 97 percent of the sun's harmful rays.  Sunscreens should be applied at least 15 minutes prior to expected sun exposure and then every 2 hours after that as long as sun exposure continues. If swimming or exercising sunscreen should be reapplied every 45 minutes to an hour after getting wet or sweating.  One ounce, or the equivalent of a shot glass full of sunscreen, is adequate to protect the skin not covered by a bathing suit. I also recommended a lip balm with a sunscreen as well. Sun protective clothing can be used in lieu of sunscreen but must be worn the entire time you are exposed to the sun's rays.
alone

## 2024-12-14 NOTE — CARE COORDINATION ASSESSMENT. - NSCAREPROVIDERS_GEN_ALL_CORE_FT
CARE PROVIDERS:  Accepting Physician: Lashonda Llamas  Admitting: Lashonad Llamas  Attending: Lashonda Llamas  Cardiology Technician: Irene Peña  Consultant: Ede Saul  Covering Team: Mike Figueroa ED ACP: Elmira Martin  ED Attending: Shannan Alfaro ED Nurse: Lawson Real  Emergency Medicine: Kelly Mars  Emergency Medicine: Elmira Martin  Nurse: Traci Haq  Nurse: Manuela Jeffery  Nurse: Lawson Real  Ordered: ServiceAccount, [facility] CenterMLM  Outpatient Provider: Mitali Lora  Outpatient Provider: Bernard Francisco  Outpatient Provider: Ede Saul  Override: Lawson Real  PCA/Nursing Assistant: Josselyn Hutson  Physical Therapy: Jair Suárez  Physical Therapy: Jeremy Puente  Primary Team: Lashonda Llamas  Primary Team: Micha Pisano  Primary Team: Safia Mchugh  Registered Dietitian: Narda Mcdonnell  : Stacy Christianson  Team: DARLEEN  Hospitalists, Team

## 2024-12-15 LAB
ALBUMIN SERPL ELPH-MCNC: 3.8 G/DL — SIGNIFICANT CHANGE UP (ref 3.3–5)
ALP SERPL-CCNC: 84 U/L — SIGNIFICANT CHANGE UP (ref 30–120)
ALT FLD-CCNC: 13 U/L — SIGNIFICANT CHANGE UP (ref 10–60)
ANION GAP SERPL CALC-SCNC: 15 MMOL/L — SIGNIFICANT CHANGE UP (ref 5–17)
AST SERPL-CCNC: 14 U/L — SIGNIFICANT CHANGE UP (ref 10–40)
BASOPHILS # BLD AUTO: 0.1 K/UL — SIGNIFICANT CHANGE UP (ref 0–0.2)
BASOPHILS NFR BLD AUTO: 0.9 % — SIGNIFICANT CHANGE UP (ref 0–2)
BILIRUB SERPL-MCNC: 0.8 MG/DL — SIGNIFICANT CHANGE UP (ref 0.2–1.2)
BUN SERPL-MCNC: 18 MG/DL — SIGNIFICANT CHANGE UP (ref 7–23)
CALCIUM SERPL-MCNC: 10 MG/DL — SIGNIFICANT CHANGE UP (ref 8.4–10.5)
CHLORIDE SERPL-SCNC: 108 MMOL/L — SIGNIFICANT CHANGE UP (ref 96–108)
CO2 SERPL-SCNC: 18 MMOL/L — LOW (ref 22–31)
CREAT SERPL-MCNC: 1.2 MG/DL — SIGNIFICANT CHANGE UP (ref 0.5–1.3)
EGFR: 49 ML/MIN/1.73M2 — LOW
EOSINOPHIL # BLD AUTO: 0.14 K/UL — SIGNIFICANT CHANGE UP (ref 0–0.5)
EOSINOPHIL NFR BLD AUTO: 1.3 % — SIGNIFICANT CHANGE UP (ref 0–6)
FOLATE SERPL-MCNC: 17 NG/ML — SIGNIFICANT CHANGE UP
GLUCOSE SERPL-MCNC: 118 MG/DL — HIGH (ref 70–99)
HCT VFR BLD CALC: 36.9 % — SIGNIFICANT CHANGE UP (ref 34.5–45)
HGB BLD-MCNC: 11.9 G/DL — SIGNIFICANT CHANGE UP (ref 11.5–15.5)
IMM GRANULOCYTES NFR BLD AUTO: 0.5 % — SIGNIFICANT CHANGE UP (ref 0–0.9)
LYMPHOCYTES # BLD AUTO: 1.82 K/UL — SIGNIFICANT CHANGE UP (ref 1–3.3)
LYMPHOCYTES # BLD AUTO: 16.5 % — SIGNIFICANT CHANGE UP (ref 13–44)
MAGNESIUM SERPL-MCNC: 2.1 MG/DL — SIGNIFICANT CHANGE UP (ref 1.6–2.6)
MCHC RBC-ENTMCNC: 29.8 PG — SIGNIFICANT CHANGE UP (ref 27–34)
MCHC RBC-ENTMCNC: 32.2 G/DL — SIGNIFICANT CHANGE UP (ref 32–36)
MCV RBC AUTO: 92.3 FL — SIGNIFICANT CHANGE UP (ref 80–100)
MONOCYTES # BLD AUTO: 0.9 K/UL — SIGNIFICANT CHANGE UP (ref 0–0.9)
MONOCYTES NFR BLD AUTO: 8.1 % — SIGNIFICANT CHANGE UP (ref 2–14)
NEUTROPHILS # BLD AUTO: 8.05 K/UL — HIGH (ref 1.8–7.4)
NEUTROPHILS NFR BLD AUTO: 72.7 % — SIGNIFICANT CHANGE UP (ref 43–77)
NRBC # BLD: 0 /100 WBCS — SIGNIFICANT CHANGE UP (ref 0–0)
PLATELET # BLD AUTO: 254 K/UL — SIGNIFICANT CHANGE UP (ref 150–400)
POTASSIUM SERPL-MCNC: 3.7 MMOL/L — SIGNIFICANT CHANGE UP (ref 3.5–5.3)
POTASSIUM SERPL-SCNC: 3.7 MMOL/L — SIGNIFICANT CHANGE UP (ref 3.5–5.3)
PROT SERPL-MCNC: 6.7 G/DL — SIGNIFICANT CHANGE UP (ref 6–8.3)
RBC # BLD: 4 M/UL — SIGNIFICANT CHANGE UP (ref 3.8–5.2)
RBC # FLD: 13.1 % — SIGNIFICANT CHANGE UP (ref 10.3–14.5)
SODIUM SERPL-SCNC: 141 MMOL/L — SIGNIFICANT CHANGE UP (ref 135–145)
TSH SERPL-MCNC: 2.49 UIU/ML — SIGNIFICANT CHANGE UP (ref 0.27–4.2)
VIT B12 SERPL-MCNC: 303 PG/ML — SIGNIFICANT CHANGE UP (ref 232–1245)
WBC # BLD: 11.06 K/UL — HIGH (ref 3.8–10.5)
WBC # FLD AUTO: 11.06 K/UL — HIGH (ref 3.8–10.5)

## 2024-12-15 PROCEDURE — 99232 SBSQ HOSP IP/OBS MODERATE 35: CPT

## 2024-12-15 RX ORDER — ACETAMINOPHEN 500MG 500 MG/1
650 TABLET, COATED ORAL EVERY 6 HOURS
Refills: 0 | Status: DISCONTINUED | OUTPATIENT
Start: 2024-12-15 | End: 2024-12-18

## 2024-12-15 RX ORDER — HYDRALAZINE HYDROCHLORIDE 10 MG/1
10 TABLET ORAL EVERY 8 HOURS
Refills: 0 | Status: DISCONTINUED | OUTPATIENT
Start: 2024-12-15 | End: 2024-12-16

## 2024-12-15 RX ADMIN — LAMOTRIGINE 225 MILLIGRAM(S): 50 TABLET, EXTENDED RELEASE ORAL at 06:12

## 2024-12-15 RX ADMIN — Medication 5000 UNIT(S): at 21:15

## 2024-12-15 RX ADMIN — METOPROLOL TARTRATE 25 MILLIGRAM(S): 100 TABLET, FILM COATED ORAL at 21:15

## 2024-12-15 RX ADMIN — Medication 5000 UNIT(S): at 13:55

## 2024-12-15 RX ADMIN — LITHIUM CARBONATE 300 MILLIGRAM(S): 300 CAPSULE ORAL at 13:56

## 2024-12-15 RX ADMIN — Medication 81 MILLIGRAM(S): at 13:55

## 2024-12-15 RX ADMIN — HYDRALAZINE HYDROCHLORIDE 10 MILLIGRAM(S): 10 TABLET ORAL at 21:15

## 2024-12-15 RX ADMIN — TOPIRAMATE 100 MILLIGRAM(S): 25 TABLET ORAL at 18:13

## 2024-12-15 RX ADMIN — Medication 5000 UNIT(S): at 06:11

## 2024-12-15 RX ADMIN — LAMOTRIGINE 225 MILLIGRAM(S): 50 TABLET, EXTENDED RELEASE ORAL at 18:13

## 2024-12-15 RX ADMIN — ACETAMINOPHEN 500MG 650 MILLIGRAM(S): 500 TABLET, COATED ORAL at 16:00

## 2024-12-15 RX ADMIN — HYDRALAZINE HYDROCHLORIDE 10 MILLIGRAM(S): 10 TABLET ORAL at 13:56

## 2024-12-15 RX ADMIN — ESCITALOPRAM OXALATE 5 MILLIGRAM(S): 10 TABLET, FILM COATED ORAL at 13:56

## 2024-12-15 RX ADMIN — ACETAMINOPHEN 500MG 650 MILLIGRAM(S): 500 TABLET, COATED ORAL at 15:25

## 2024-12-15 RX ADMIN — Medication 40 MILLIGRAM(S): at 21:15

## 2024-12-15 RX ADMIN — TOPIRAMATE 100 MILLIGRAM(S): 25 TABLET ORAL at 06:11

## 2024-12-15 NOTE — PROGRESS NOTE ADULT - SUBJECTIVE AND OBJECTIVE BOX
Patient is a 69y old  Female who presents with a chief complaint of AMS (15 Dec 2024 09:56)      INTERVAL HPI/OVERNIGHT EVENTS:  Overnight nothing significant happened.   Pt was seen today at bedside, no acute complaints. she does not know her name, , where she is now. Father is at bedside, recognizes him but could not say his name.   As per father this is completely new. She was able to conversate before, she lives alone.     MEDICATIONS  (STANDING):  aspirin enteric coated 81 milliGRAM(s) Oral daily  atorvastatin 40 milliGRAM(s) Oral at bedtime  escitalopram 5 milliGRAM(s) Oral daily  heparin   Injectable 5000 Unit(s) SubCutaneous every 8 hours  hydrALAZINE 10 milliGRAM(s) Oral every 8 hours  lamoTRIgine 225 milliGRAM(s) Oral two times a day  lithium 300 milliGRAM(s) Oral daily  metoprolol succinate ER 25 milliGRAM(s) Oral at bedtime  topiramate 100 milliGRAM(s) Oral two times a day    MEDICATIONS  (PRN):  SUMAtriptan 100 milliGRAM(s) Oral every 6 hours PRN Migraine      Allergies    sulfa drugs (Rash)  penicillins (Rash)    Intolerances        REVIEW OF SYSTEMS:  CONSTITUTIONAL: No fever, weight loss, or fatigue  EYES: No eye pain, visual disturbances, or discharge  ENMT:  No difficulty hearing, tinnitus, vertigo; No sinus or throat pain  NECK: No pain or stiffness  RESPIRATORY: No cough, wheezing, chills or hemoptysis; No shortness of breath  CARDIOVASCULAR: No chest pain, palpitations, lightheadedness, or leg swelling  GASTROINTESTINAL: No abdominal or epigastric pain. No nausea, vomiting, or hematemesis; No diarrhea or constipation. No melena or hematochezia.  GENITOURINARY: No dysuria, frequency, hematuria, or incontinence  NEUROLOGICAL: No headaches, memory loss, vertigo, loss of strength, numbness, or tremors  MUSCULOSKELETAL: No joint pain or swelling; No muscle, back, or extremity pain  PSYCHIATRIC: No depression, anxiety, or mood swings        Vital Signs Last 24 Hrs  T(C): 36.8 (15 Dec 2024 07:45), Max: 37 (14 Dec 2024 19:15)  T(F): 98.3 (15 Dec 2024 07:45), Max: 98.6 (14 Dec 2024 19:15)  HR: 70 (15 Dec 2024 07:45) (70 - 88)  BP: 174/84 (15 Dec 2024 07:45) (163/75 - 182/88)  BP(mean): --  RR: 16 (15 Dec 2024 07:45) (12 - 17)  SpO2: 96% (15 Dec 2024 07:45) (95% - 99%)    Parameters below as of 15 Dec 2024 07:45  Patient On (Oxygen Delivery Method): room air        PHYSICAL EXAM:  GENERAL: NAD, well-groomed, well-developed  HEAD:  Atraumatic, Normocephalic  EYES: EOMI, PERRLA, conjunctiva and sclera clear  ENMT: Moist mucous membranes,   NECK: Supple, No JVD, Normal thyroid  NERVOUS SYSTEM:  Alert & Oriented X 0, has difficulty completing sentence, hand and leg tremors present. knows her dad but does not remember name, motor strength symmetrical all extremities    CHEST/LUNG: Clear to auscultation bilaterally; No rales, rhonchi, wheezing, or rubs  HEART: Regular rate and rhythm; No murmurs, rubs, or gallops  ABDOMEN: Soft, Nontender, Nondistended; Bowel sounds present  EXTREMITIES:  2+ Peripheral Pulses, No clubbing, cyanosis, or edema  SKIN: No rashes or lesions    LABS:                        11.9   11.06 )-----------( 254      ( 15 Dec 2024 06:00 )             36.9     15 Dec 2024 06:00    141    |  108    |  18     ----------------------------<  118    3.7     |  18     |  1.20     Ca    10.0       15 Dec 2024 06:00  Mg     2.1       15 Dec 2024 06:00    TPro  6.7    /  Alb  3.8    /  TBili  0.8    /  DBili  x      /  AST  14     /  ALT  13     /  AlkPhos  84     15 Dec 2024 06:00      Urinalysis Basic - ( 15 Dec 2024 06:00 )    Color: x / Appearance: x / SG: x / pH: x  Gluc: 118 mg/dL / Ketone: x  / Bili: x / Urobili: x   Blood: x / Protein: x / Nitrite: x   Leuk Esterase: x / RBC: x / WBC x   Sq Epi: x / Non Sq Epi: x / Bacteria: x

## 2024-12-15 NOTE — PROGRESS NOTE ADULT - ASSESSMENT
Metabolic encephalopathy   Likely related to medication vs underlying infection less likely stroke or TIA   NCCT without any acute findings  Continue on telemetry with TIA /CVA protocol, neuro checks  Continue on daily low dose Aspirin, atorvastatin  Gentle  hydration  A1c, lipid profile, and TSH normal   TTE normal   Neurology following   Will get psychiatry consult tomorrow       Dehydration  Possibly secondary to poor PO intake related to her AMS  Continue IV fluids   Encourage oral intake     MANE on CKD- Improving    Avoid nephrotoxins & renally dose meds, will continue Topiramate & Gabapentin doses as they were same based on CKD3 & almost same Cr Cl, trend BUN/Cr.      Hypokalemia- Resolved   Monitor BMP     Leukocytosis- Improving   Possibly related to the above, no evidence suggestive of an infection,   Follow up blood culture   Monitor fever and WBC curve     Seizure Disorder  Tardive Dyskinesia  Pill Rolling Tremors  Migraine   Continue lamotrigine 225 mg twice daily   Continue lithium 300 mg daily   Continue Topiramate 100 mg twice daily   Sumatriptan prn    HTN (hypertension)  Continue metoprolol 25mg   Start hydralazine 10 mg tid     Need for prophylactic measure  Heparin

## 2024-12-15 NOTE — PROGRESS NOTE ADULT - SUBJECTIVE AND OBJECTIVE BOX
Neurology follow up note    DEEPTHI [id number]      Interval History:    Patient feels ok no new complaints.    Allergies    sulfa drugs (Rash)  penicillins (Rash)    Intolerances        MEDICATIONS    aspirin enteric coated 81 milliGRAM(s) Oral daily  atorvastatin 40 milliGRAM(s) Oral at bedtime  escitalopram 5 milliGRAM(s) Oral daily  heparin   Injectable 5000 Unit(s) SubCutaneous every 8 hours  hydrALAZINE 10 milliGRAM(s) Oral every 8 hours  lamoTRIgine 225 milliGRAM(s) Oral two times a day  lithium 300 milliGRAM(s) Oral daily  metoprolol succinate ER 25 milliGRAM(s) Oral at bedtime  SUMAtriptan 100 milliGRAM(s) Oral every 6 hours PRN  topiramate 100 milliGRAM(s) Oral two times a day              Vital Signs Last 24 Hrs  T(C): 36.8 (15 Dec 2024 07:45), Max: 37 (14 Dec 2024 19:15)  T(F): 98.3 (15 Dec 2024 07:45), Max: 98.6 (14 Dec 2024 19:15)  HR: 70 (15 Dec 2024 07:45) (70 - 88)  BP: 174/84 (15 Dec 2024 07:45) (163/75 - 182/88)  BP(mean): --  RR: 16 (15 Dec 2024 07:45) (12 - 17)  SpO2: 96% (15 Dec 2024 07:45) (95% - 99%)    Parameters below as of 15 Dec 2024 07:45  Patient On (Oxygen Delivery Method): room air      REVIEW OF SYSTEMS:  CONSTITUTIONAL:  The patient denies fever, chills, night sweats.  HEAD:  Occasional headaches.  EYES:  No double vision.  EARS:  No ringing in the ears.  NECK:  No neck pain.  CARDIOVASCULAR:  No chest pain.  RESPIRATORY:  No shortness of breath.  ABDOMEN:  No nausea, vomiting, or abdominal pain.  EXTREMITIES/NEUROLOGICAL:  No numbness or tingling.  MUSCULOSKELETAL:  Occasional joint pain.    PHYSICAL EXAMINATION:  HEAD:  Normocephalic, atraumatic.  EYES:  No scleral icterus.  EARS:  Hearing appeared to be intact.  NECK:  Supple.  CARDIOVASCULAR:  S1, S2 heard.  RESPIRATORY:  Air entry bilaterally.  ABDOMEN:  Soft, nontender.  EXTREMITIES:  No clubbing or cyanosis was noted.    NEUROLOGIC:  The patient is awake, alert.  Was able to say her correct age and month.  Upon conversion with the patient, at times, she did repeat the same sentences over and over.  Was able to name simple objects.  Was able to make out number of fingers in each eye.  Speech was fluent.  Smile symmetric.  Motor, bilateral upper and lower extremities 4/5.  Sensory bilateral upper and lower extremities intact to light touch.  No drift of either upper or lower extremities.        LABS:  CBC Full  -  ( 15 Dec 2024 06:00 )  WBC Count : 11.06 K/uL  RBC Count : 4.00 M/uL  Hemoglobin : 11.9 g/dL  Hematocrit : 36.9 %  Platelet Count - Automated : 254 K/uL  Mean Cell Volume : 92.3 fL  Mean Cell Hemoglobin : 29.8 pg  Mean Cell Hemoglobin Concentration : 32.2 g/dL  Auto Neutrophil # : 8.05 K/uL  Auto Lymphocyte # : 1.82 K/uL  Auto Monocyte # : 0.90 K/uL  Auto Eosinophil # : 0.14 K/uL  Auto Basophil # : 0.10 K/uL  Auto Neutrophil % : 72.7 %  Auto Lymphocyte % : 16.5 %  Auto Monocyte % : 8.1 %  Auto Eosinophil % : 1.3 %  Auto Basophil % : 0.9 %    Urinalysis Basic - ( 15 Dec 2024 06:00 )    Color: x / Appearance: x / SG: x / pH: x  Gluc: 118 mg/dL / Ketone: x  / Bili: x / Urobili: x   Blood: x / Protein: x / Nitrite: x   Leuk Esterase: x / RBC: x / WBC x   Sq Epi: x / Non Sq Epi: x / Bacteria: x      12-15    141  |  108  |  18  ----------------------------<  118[H]  3.7   |  18[L]  |  1.20    Ca    10.0      15 Dec 2024 06:00  Mg     2.1     12-15    TPro  6.7  /  Alb  3.8  /  TBili  0.8  /  DBili  x   /  AST  14  /  ALT  13  /  AlkPhos  84  12-15    Hemoglobin A1C:     LIVER FUNCTIONS - ( 15 Dec 2024 06:00 )  Alb: 3.8 g/dL / Pro: 6.7 g/dL / ALK PHOS: 84 U/L / ALT: 13 U/L / AST: 14 U/L / GGT: x           Vitamin B12         RADIOLOGY    ANALYSIS AND PLAN:  This is a 69-year-old with altered mental status.    .For altered mental status, at present unclear etiology.  Questionable metabolic encephalopathy.  We would recommend to rule out infections.  We will check basic chemistries.  Question was could be a complex migraine, questionable any subclinical seizure events, questionable exacerbation of underlying psychiatric disorders.  Examination is limited to evaluate for cerebrovascular accident.  No motor deficit, but does repeat things over.  .For history of migraines, continue home medication.  .For hyperlipidemia, continue statin.  -For history of seizures, continue home epilepsy medications   For history of hypertension, monitor systolic blood pressure.  A. continue patient on her aspirin.  .For history of anxiety and depression, Psychiatry followup.    Spoke with father, Brody, at [phone].  He understands the above process. called today 10am voicemial     52 minutes of time spent with the patient; plan of care, reviewing data, speaking to multidisciplinary care team with greater than 50% time in counseling, care coordination.    Thank you for courtesy of consultation.

## 2024-12-16 ENCOUNTER — APPOINTMENT (OUTPATIENT)
Dept: PAIN MANAGEMENT | Facility: CLINIC | Age: 69
End: 2024-12-16

## 2024-12-16 LAB — LAMOTRIGINE SERPL-MCNC: 4.2 UG/ML — SIGNIFICANT CHANGE UP (ref 2–20)

## 2024-12-16 PROCEDURE — 99232 SBSQ HOSP IP/OBS MODERATE 35: CPT

## 2024-12-16 RX ORDER — AMLODIPINE BESYLATE 10 MG/1
5 TABLET ORAL DAILY
Refills: 0 | Status: DISCONTINUED | OUTPATIENT
Start: 2024-12-16 | End: 2024-12-18

## 2024-12-16 RX ORDER — LORAZEPAM 2 MG/1
1 TABLET ORAL ONCE
Refills: 0 | Status: DISCONTINUED | OUTPATIENT
Start: 2024-12-16 | End: 2024-12-17

## 2024-12-16 RX ADMIN — LAMOTRIGINE 225 MILLIGRAM(S): 50 TABLET, EXTENDED RELEASE ORAL at 17:25

## 2024-12-16 RX ADMIN — Medication 5000 UNIT(S): at 17:26

## 2024-12-16 RX ADMIN — METOPROLOL TARTRATE 25 MILLIGRAM(S): 100 TABLET, FILM COATED ORAL at 09:42

## 2024-12-16 RX ADMIN — ESCITALOPRAM OXALATE 5 MILLIGRAM(S): 10 TABLET, FILM COATED ORAL at 11:45

## 2024-12-16 RX ADMIN — TOPIRAMATE 100 MILLIGRAM(S): 25 TABLET ORAL at 17:30

## 2024-12-16 RX ADMIN — TOPIRAMATE 100 MILLIGRAM(S): 25 TABLET ORAL at 05:03

## 2024-12-16 RX ADMIN — LAMOTRIGINE 225 MILLIGRAM(S): 50 TABLET, EXTENDED RELEASE ORAL at 05:04

## 2024-12-16 RX ADMIN — HYDRALAZINE HYDROCHLORIDE 10 MILLIGRAM(S): 10 TABLET ORAL at 05:03

## 2024-12-16 RX ADMIN — LITHIUM CARBONATE 300 MILLIGRAM(S): 300 CAPSULE ORAL at 11:45

## 2024-12-16 RX ADMIN — Medication 40 MILLIGRAM(S): at 21:18

## 2024-12-16 RX ADMIN — Medication 81 MILLIGRAM(S): at 11:45

## 2024-12-16 RX ADMIN — Medication 5000 UNIT(S): at 05:03

## 2024-12-16 NOTE — OCCUPATIONAL THERAPY INITIAL EVALUATION ADULT - LONG TERM MEMORY, REHAB EVAL
Patient: Laura D Reyes Brown Date of Service: 2023   : 1974 MRN: [id number]     SUBJECTIVE:   HISTORY OF PRESENT ILLNESS:  Laura D Reyes Brown is a 49 year old female who presents today for complete physical exam     Pt states that she had vaginal bleeding throughout the whole month of August   Bleeding was heavier during the week of her period   She had a regular period this month in September     HTN   - well controlled on losartan-hydrochlorothiazide     HLD   - on atorvastatin 20mg daily     Anxiety   - doing well on zoloft 50mg daily     Mammogram; 2023 no evidence of malignancy. Dense breasts, screening ultrasound is normal  Colonoscopy: , due in 5 years/   Pap : NILM. HPV negative     1 daughter, 3 step children             PAST MEDICAL HISTORY:  Past Medical History:   Diagnosis Date   • Anxiety    • Elevated vitamin B12 level 3/18/2021   • Essential (primary) hypertension        MEDICATIONS:  Current Outpatient Medications   Medication Sig   • sertraline (ZOLOFT) 50 MG tablet TAKE 1 TABLET DAILY   • losartan-hydrochlorothiazide (HYZAAR) 50-12.5 MG per tablet TAKE 1 TABLET DAILY   • loratadine (CLARITIN) 10 MG tablet Take 10 mg by mouth daily.   • fenofibrate (TRICOR) 145 MG tablet Take 1 tablet by mouth daily.   • atorvastatin (Lipitor) 20 MG tablet Take 1 tablet by mouth at bedtime.   • Cholecalciferol (Vitamin D) 50 mcg (2,000 units) tablet Take 1 tablet by mouth daily.   • Ascorbic Acid (vitamin C) 100 MG tablet Take 1 tablet by mouth daily.   • Multiple Vitamin (Multi-Vitamin) tablet Take 1 tablet by mouth daily.     No current facility-administered medications for this visit.       ALLERGIES:  ALLERGIES:   Allergen Reactions   • Seasonal Other (See Comments)     Runny nose, Itchy eyes       PAST SURGICAL HISTORY:  Past Surgical History:   Procedure Laterality Date   •  delivery only     • Keloid excision     • Lasik surgery     • Tonsillectomy          FAMILY HISTORY:  Family History   Problem Relation Age of Onset   • Diabetes Mother    • Hypertension Mother    • Hypertension Father    • Stroke Father    • Cardiomyopathy Sister 25   • Cancer Maternal Aunt         colon cancer   • Cancer Maternal Uncle         colon cancer       SOCIAL HISTORY:  Social History     Tobacco Use   • Smoking status: Never     Passive exposure: Never   • Smokeless tobacco: Never   Vaping Use   • Vaping Use: never used   • Substances: CBD, ocasionally CBD   Substance Use Topics   • Alcohol use: Not Currently   • Drug use: Never       Review of Systems   All other systems reviewed and are negative.        OBJECTIVE:     Physical Exam  Vitals reviewed.   Constitutional:       Appearance: Normal appearance.   HENT:      Head: Normocephalic and atraumatic.      Right Ear: Tympanic membrane, ear canal and external ear normal. There is no impacted cerumen.      Left Ear: Tympanic membrane, ear canal and external ear normal. There is no impacted cerumen.   Eyes:      Conjunctiva/sclera: Conjunctivae normal.   Neck:      Thyroid: No thyromegaly.   Cardiovascular:      Rate and Rhythm: Normal rate and regular rhythm.      Pulses: Normal pulses.      Heart sounds: Normal heart sounds. No murmur heard.     No friction rub. No gallop.   Pulmonary:      Effort: Pulmonary effort is normal. No respiratory distress.      Breath sounds: Normal breath sounds. No wheezing or rales.   Abdominal:      General: Bowel sounds are normal. There is no distension.      Palpations: Abdomen is soft.      Tenderness: There is no abdominal tenderness. There is no rebound.   Genitourinary:     Labia:         Right: No rash, tenderness, lesion or injury.         Left: No rash, tenderness, lesion or injury.       Vagina: No signs of injury. Vaginal discharge (light yellow ) present. No tenderness or lesions.   Musculoskeletal:         General: Normal range of motion.      Cervical back: Normal range of motion and  neck supple.      Right lower leg: No edema.      Left lower leg: No edema.   Lymphadenopathy:      Cervical: No cervical adenopathy.   Skin:     General: Skin is warm and dry.   Neurological:      General: No focal deficit present.      Mental Status: She is alert.      Gait: Gait is intact.   Psychiatric:         Mood and Affect: Mood and affect normal.         Visit Vitals  /74 (BP Location: LUE - Left upper extremity, Patient Position: Sitting, Cuff Size: Regular)   Pulse 78   Ht 4' 11.5\" (1.511 m)   Wt 64.8 kg (142 lb 12 oz)   LMP 04/17/2023 (Exact Date)   SpO2 98%   BMI 28.35 kg/m²       DIAGNOSTIC STUDIES:   LAB RESULTS:    No results found for this visit on 09/26/23.    Assessment AND PLAN:     Problem List Items Addressed This Visit        Cardiac and Vasculature    Mixed hyperlipidemia     Check lipid panel and CMP   Continue atorvastatin 20 mg daily          Essential hypertension     Well controlled. CPM            Health Encounters    Encounter for routine adult health examination without abnormal findings - Primary     Labs: as ordered below   Imm: reviewed   Screening: Pap smear today.  Up-to-date on mammogram due 2024.  Colonoscopy up-to-date due 2024  Lifestyle: Recommend healthy diet and regular exercise            Mental Health    Current mild episode of major depressive disorder without prior episode (CMD)     Stable on zoloft 50mg daily         Other Visit Diagnoses     Need for vaccination        Relevant Orders    INFLUENZA QUADRIVALENT SPLIT PRES FREE 0.5 ML VACC, IM (FLULAVAL,FLUARIX,FLUZONE) (Completed)    ZOSTER SHINGLES RECOMBINANT ADJUVANTED IM(SHINGRIX) (Completed)    Screening for endocrine, nutritional, metabolic and immunity disorder        Relevant Orders    Thyroid Stimulating Hormone Reflex    Lipid Panel With Reflex    Comprehensive Metabolic Panel    CBC with Automated Differential    Cervical cancer screening        Relevant Orders    Pap Test        Please take  medications as directed.    Instructions provided as documented in the AVS.    The patient indicated understanding of the diagnosis and agreed with the plan of care.    No follow-ups on file.   impaired

## 2024-12-16 NOTE — DIETITIAN INITIAL EVALUATION ADULT - PHYSCIAL ASSESSMENT
adm wt 160#, bed scale wt 121.2# appears closer to actual wt   per comprehensive chart review, pt weighed ~150 during Oct 2020  -will continue to monitor and trend wts as able/well nourished

## 2024-12-16 NOTE — OCCUPATIONAL THERAPY INITIAL EVALUATION ADULT - ADDITIONAL COMMENTS
as per pt report she lives w/ her father and there are stairs for her to negotiate. Independent w/ ADL's PTA.  Pt unable to offer any other information at this time.  As per EMR She currently has her own apt in a [facility] complex in [place], but has been staying with her father.

## 2024-12-16 NOTE — DIETITIAN INITIAL EVALUATION ADULT - ENTER TO (G/KG)
September 6, 2021         Patient: William Cho   YOB: 1990   Date of Visit: 9/6/2021           To Whom it May Concern:    William Cho was seen in my clinic on 9/6/2021.     COVID-19 test pending. Patient is advised to self-isolate as recommended by the CDC.    The CDC recommends that any person who has symptoms of COVID-19 self-isolates until 1) at least 10 days have elapsed since symptom onset, and 2) at least 24 hours have passed since resolution of any fever without use of fever-reducing medications, and 3) other symptoms have improved.    If results are positive, the health department should be consulted for further instructions. Otherwise, follow the CDC self-isolation criteria stated above.    If results are negative and there is exposure to COVID-19, the CDC recommends self-isolation for 14 days after last exposure.    If results are negative and there is no exposure to COVID-19, the patient may return to work, school, or regular activities after symptoms have fully resolved for at least 24 hours.    In general, repeat testing is not necessary and is not offered in our urgent cares.   If you have any questions or concerns, please don't hesitate to call.        Sincerely,         SANDRA Robles.  Electronically Signed     
1.4

## 2024-12-16 NOTE — DIETITIAN INITIAL EVALUATION ADULT - OTHER INFO
Per H&P, "This is a a 68 y/o F with PMH of HTN, Dyslipidemia, TIA, CHF, Migraine, Seizure Disorder, Tardive Dyskinesia, Pill Rolling Tremors, Chronic Pancreatitis, Diverticulitis, GERD, OCD, Insomnia, Anxiety, and Depression who presented with AMS. Patient reportedly was last seen well was on Monday (5 days ago), and yesterday her father visited her at home as he didn't hear from her since  then, found her locked naked in the bathroom and confused, and today he called EMS as she didn't improve. Patient is awake & alert but still confused & disoriented, unable to give any history. her father reported earlier to ED team that she had a similar episode in the past & her w/u then didn't reveal a specific cause of her condition."    Pt seen for nutrition assessment secondary to reported < po intake and dehydration.  Pt with hx bipolar disorder, admitted with AMS/Metabolic encephalopathy, noted likely related to medication vs underlying infection.  Pt visited while resting in bed - unable to obtain significant nutrition related hx, even preferred foods.  Repeatedly stated that she appreciates writers request to obtain food preferences but stated "not really" when offered various items.  Pt's father visiting - reports preferred foods include grilled cheese.  Pt untimely stated she "really loves juice".  Pt with minimal po intake this admission which is a change from baseline.  Unable to obtain wt hx.  Recommend Ensure Clear supplement once daily to trial for acceptance given preferences for juice (pt refused traditional ensure supplement).  Will also trial magic cup fortified ice cream bid (290kcal, 9g protein/4oz cup).  PO intake encouraged as tolerated, meal selections will be attempted to be obtained daily to optimize po intake.  RD to follow up and will continue to monitor pt's nutrition status.

## 2024-12-16 NOTE — DIETITIAN INITIAL EVALUATION ADULT - PERTINENT MEDS FT
MEDICATIONS  (STANDING):  amLODIPine   Tablet 5 milliGRAM(s) Oral daily  aspirin enteric coated 81 milliGRAM(s) Oral daily  atorvastatin 40 milliGRAM(s) Oral at bedtime  escitalopram 5 milliGRAM(s) Oral daily  heparin   Injectable 5000 Unit(s) SubCutaneous every 8 hours  lamoTRIgine 225 milliGRAM(s) Oral two times a day  lithium 300 milliGRAM(s) Oral daily  metoprolol succinate ER 25 milliGRAM(s) Oral at bedtime  topiramate 100 milliGRAM(s) Oral two times a day    MEDICATIONS  (PRN):  acetaminophen     Tablet .. 650 milliGRAM(s) Oral every 6 hours PRN Temp greater or equal to 38C (100.4F)  LORazepam   Injectable 1 milliGRAM(s) IV Push once PRN mri  SUMAtriptan 100 milliGRAM(s) Oral every 6 hours PRN Migraine

## 2024-12-16 NOTE — OCCUPATIONAL THERAPY INITIAL EVALUATION ADULT - PERTINENT HX OF CURRENT PROBLEM, REHAB EVAL
Pt admitted for confusion 12/13/24   69-year-old female with history of hypertension, bipolar, migraines, TIA, tardive dyskinesia, seizures brought in by ambulance for confusion.  As per father he dropped her off at her house on Monday.  He did not hear from her for the past few days, which is abnormal.  Usually they talk daily.  She was not answering her phone calls so he came to her house yesterday, found her locked in the bathroom naked.  Patient still confused today so called 911.  Unknown trauma.  Patient has no complaints currently however limited historian due to altered mental status. Father explains that she has had numerous episodes of confusion, has been worked up for TIA/stroke and seizures.  Patient has been evaluated by neurology and psychiatrist previously. No anticoagulation.  Unsure if patient took extra medication since pill bottles were in the bathroom.

## 2024-12-16 NOTE — OCCUPATIONAL THERAPY INITIAL EVALUATION ADULT - ADL RETRAINING, OT EVAL
Patient will dress upper body Independently  within 1-2 sessions  Patient will dress lower body I'ly , AE as needed within 2-3 sessions.

## 2024-12-16 NOTE — DIETITIAN INITIAL EVALUATION ADULT - PROBLEM SELECTOR PLAN 4
mild, ML related to Topiramate in the setting of MNAE, f/u serum level result, repeat BMP in am after the above management.

## 2024-12-16 NOTE — PROGRESS NOTE ADULT - ASSESSMENT
Metabolic encephalopathy   Likely related to medication vs underlying infection less likely stroke or TIA   NCCT without any acute findings  Continue on telemetry with TIA /CVA protocol, neuro checks  Continue on daily low dose Aspirin, atorvastatin  Gentle  hydration  A1c, lipid profile, and TSH normal   TTE normal   Neurology following   Psychiatry consulted     Dehydration  Possibly secondary to poor PO intake related to her AMS  Continue IV fluids   Encourage oral intake     MANE on CKD- Improving    Avoid nephrotoxins & renally dose meds, will continue Topiramate & Gabapentin doses as they were same based on CKD3 & almost same Cr Cl, trend BUN/Cr.    Leukocytosis- Improving   Possibly related to the above, no evidence suggestive of an infection,   Follow up blood culture   Monitor fever and WBC curve     Seizure Disorder  Tardive Dyskinesia  Pill Rolling Tremors  Migraine   Continue lamotrigine 225 mg twice daily   Continue lithium 300 mg daily   Continue Topiramate 100 mg twice daily   Sumatriptan prn    HTN (hypertension)  Continue metoprolol 25mg   D/C hydralazine 10 mg tid   Start Amlodipine 5 mg daily     Need for prophylactic measure  Heparin  Metabolic encephalopathy   Likely related to medication vs underlying infection less likely stroke or TIA   NCCT without any acute findings  Continue on telemetry with TIA /CVA protocol, neuro checks  Continue on daily low dose Aspirin, atorvastatin  Gentle  hydration  A1c, lipid profile, and TSH normal   TTE normal   Neurology following   Psychiatry consulted, awaiting recs     Dehydration  Possibly secondary to poor PO intake related to her AMS  Continue IV fluids   Encourage oral intake     MANE on CKD- Improving    Avoid nephrotoxins & renally dose meds    Leukocytosis- Improving   Possibly related to the above, no evidence suggestive of an infection,   Follow up blood culture   Monitor fever and WBC curve     Seizure Disorder  Tardive Dyskinesia  Pill Rolling Tremors  Migraine   Continue lamotrigine 225 mg twice daily   Continue lithium 300 mg daily   Continue Topiramate 100 mg twice daily   Sumatriptan prn    HTN (hypertension)  Continue metoprolol 25mg   D/C hydralazine 10 mg tid   Start Amlodipine 5 mg daily     Need for prophylactic measure  Heparin

## 2024-12-16 NOTE — DIETITIAN INITIAL EVALUATION ADULT - PERTINENT LABORATORY DATA
12-15    141  |  108  |  18  ----------------------------<  118[H]  3.7   |  18[L]  |  1.20    Ca    10.0      15 Dec 2024 06:00  Mg     2.1     12-15    TPro  6.7  /  Alb  3.8  /  TBili  0.8  /  DBili  x   /  AST  14  /  ALT  13  /  AlkPhos  84  12-15  A1C with Estimated Average Glucose Result: 5.2 % (12-14-24 @ 06:00)  A1C with Estimated Average Glucose Result: 5.5 % (01-12-24 @ 11:00)

## 2024-12-17 ENCOUNTER — OUTPATIENT (OUTPATIENT)
Dept: OUTPATIENT SERVICES | Facility: HOSPITAL | Age: 69
LOS: 1 days | End: 2024-12-17
Payer: COMMERCIAL

## 2024-12-17 DIAGNOSIS — Z98.89 OTHER SPECIFIED POSTPROCEDURAL STATES: Chronic | ICD-10-CM

## 2024-12-17 DIAGNOSIS — R41.82 ALTERED MENTAL STATUS, UNSPECIFIED: ICD-10-CM

## 2024-12-17 LAB
ANION GAP SERPL CALC-SCNC: 15 MMOL/L — SIGNIFICANT CHANGE UP (ref 5–17)
BASOPHILS # BLD AUTO: 0.1 K/UL — SIGNIFICANT CHANGE UP (ref 0–0.2)
BASOPHILS NFR BLD AUTO: 0.9 % — SIGNIFICANT CHANGE UP (ref 0–2)
BUN SERPL-MCNC: 17 MG/DL — SIGNIFICANT CHANGE UP (ref 7–23)
CALCIUM SERPL-MCNC: 9.8 MG/DL — SIGNIFICANT CHANGE UP (ref 8.4–10.5)
CHLORIDE SERPL-SCNC: 105 MMOL/L — SIGNIFICANT CHANGE UP (ref 96–108)
CO2 SERPL-SCNC: 21 MMOL/L — LOW (ref 22–31)
CREAT SERPL-MCNC: 1.27 MG/DL — SIGNIFICANT CHANGE UP (ref 0.5–1.3)
EGFR: 45 ML/MIN/1.73M2 — LOW
EOSINOPHIL # BLD AUTO: 0.23 K/UL — SIGNIFICANT CHANGE UP (ref 0–0.5)
EOSINOPHIL NFR BLD AUTO: 2.1 % — SIGNIFICANT CHANGE UP (ref 0–6)
GLUCOSE SERPL-MCNC: 94 MG/DL — SIGNIFICANT CHANGE UP (ref 70–99)
HCT VFR BLD CALC: 40.6 % — SIGNIFICANT CHANGE UP (ref 34.5–45)
HGB BLD-MCNC: 12.8 G/DL — SIGNIFICANT CHANGE UP (ref 11.5–15.5)
IMM GRANULOCYTES NFR BLD AUTO: 0.5 % — SIGNIFICANT CHANGE UP (ref 0–0.9)
LYMPHOCYTES # BLD AUTO: 18.6 % — SIGNIFICANT CHANGE UP (ref 13–44)
LYMPHOCYTES # BLD AUTO: 2.03 K/UL — SIGNIFICANT CHANGE UP (ref 1–3.3)
MCHC RBC-ENTMCNC: 29.6 PG — SIGNIFICANT CHANGE UP (ref 27–34)
MCHC RBC-ENTMCNC: 31.5 G/DL — LOW (ref 32–36)
MCV RBC AUTO: 94 FL — SIGNIFICANT CHANGE UP (ref 80–100)
MONOCYTES # BLD AUTO: 0.82 K/UL — SIGNIFICANT CHANGE UP (ref 0–0.9)
MONOCYTES NFR BLD AUTO: 7.5 % — SIGNIFICANT CHANGE UP (ref 2–14)
NEUTROPHILS # BLD AUTO: 7.69 K/UL — HIGH (ref 1.8–7.4)
NEUTROPHILS NFR BLD AUTO: 70.4 % — SIGNIFICANT CHANGE UP (ref 43–77)
NRBC # BLD: 0 /100 WBCS — SIGNIFICANT CHANGE UP (ref 0–0)
PLATELET # BLD AUTO: 263 K/UL — SIGNIFICANT CHANGE UP (ref 150–400)
POTASSIUM SERPL-MCNC: 4.7 MMOL/L — SIGNIFICANT CHANGE UP (ref 3.5–5.3)
POTASSIUM SERPL-SCNC: 4.7 MMOL/L — SIGNIFICANT CHANGE UP (ref 3.5–5.3)
RBC # BLD: 4.32 M/UL — SIGNIFICANT CHANGE UP (ref 3.8–5.2)
RBC # FLD: 12.9 % — SIGNIFICANT CHANGE UP (ref 10.3–14.5)
SODIUM SERPL-SCNC: 141 MMOL/L — SIGNIFICANT CHANGE UP (ref 135–145)
TOPIRAMATE SERPL-MCNC: <1 MCG/ML — SIGNIFICANT CHANGE UP
WBC # BLD: 10.93 K/UL — HIGH (ref 3.8–10.5)
WBC # FLD AUTO: 10.93 K/UL — HIGH (ref 3.8–10.5)

## 2024-12-17 PROCEDURE — 70551 MRI BRAIN STEM W/O DYE: CPT

## 2024-12-17 PROCEDURE — 70551 MRI BRAIN STEM W/O DYE: CPT | Mod: 26

## 2024-12-17 PROCEDURE — 95816 EEG AWAKE AND DROWSY: CPT

## 2024-12-17 PROCEDURE — 70544 MR ANGIOGRAPHY HEAD W/O DYE: CPT

## 2024-12-17 PROCEDURE — 70544 MR ANGIOGRAPHY HEAD W/O DYE: CPT | Mod: 26,XU

## 2024-12-17 PROCEDURE — 99232 SBSQ HOSP IP/OBS MODERATE 35: CPT

## 2024-12-17 PROCEDURE — 99231 SBSQ HOSP IP/OBS SF/LOW 25: CPT

## 2024-12-17 RX ADMIN — Medication 5000 UNIT(S): at 00:13

## 2024-12-17 RX ADMIN — Medication 5000 UNIT(S): at 18:15

## 2024-12-17 RX ADMIN — TOPIRAMATE 100 MILLIGRAM(S): 25 TABLET ORAL at 18:15

## 2024-12-17 RX ADMIN — Medication 81 MILLIGRAM(S): at 13:27

## 2024-12-17 RX ADMIN — LITHIUM CARBONATE 300 MILLIGRAM(S): 300 CAPSULE ORAL at 13:27

## 2024-12-17 RX ADMIN — Medication 40 MILLIGRAM(S): at 21:04

## 2024-12-17 RX ADMIN — LAMOTRIGINE 225 MILLIGRAM(S): 50 TABLET, EXTENDED RELEASE ORAL at 18:15

## 2024-12-17 RX ADMIN — ESCITALOPRAM OXALATE 5 MILLIGRAM(S): 10 TABLET, FILM COATED ORAL at 13:27

## 2024-12-17 RX ADMIN — METOPROLOL TARTRATE 25 MILLIGRAM(S): 100 TABLET, FILM COATED ORAL at 21:04

## 2024-12-17 RX ADMIN — LAMOTRIGINE 225 MILLIGRAM(S): 50 TABLET, EXTENDED RELEASE ORAL at 05:43

## 2024-12-17 RX ADMIN — TOPIRAMATE 100 MILLIGRAM(S): 25 TABLET ORAL at 05:43

## 2024-12-17 RX ADMIN — SUMATRIPTAN SUCCINATE 100 MILLIGRAM(S): 100 TABLET, FILM COATED ORAL at 06:30

## 2024-12-17 RX ADMIN — AMLODIPINE BESYLATE 5 MILLIGRAM(S): 10 TABLET ORAL at 05:49

## 2024-12-17 RX ADMIN — Medication 5000 UNIT(S): at 09:08

## 2024-12-17 RX ADMIN — SUMATRIPTAN SUCCINATE 100 MILLIGRAM(S): 100 TABLET, FILM COATED ORAL at 05:49

## 2024-12-17 RX ADMIN — LORAZEPAM 1 MILLIGRAM(S): 2 TABLET ORAL at 09:36

## 2024-12-17 NOTE — PROGRESS NOTE ADULT - SUBJECTIVE AND OBJECTIVE BOX
Neurology follow up note    DEEPTHI [id number]      Interval History:    Patient feels ok no new complaints.    Allergies    sulfa drugs (Rash)  penicillins (Rash)    Intolerances        MEDICATIONS    acetaminophen     Tablet .. 650 milliGRAM(s) Oral every 6 hours PRN  amLODIPine   Tablet 5 milliGRAM(s) Oral daily  aspirin enteric coated 81 milliGRAM(s) Oral daily  atorvastatin 40 milliGRAM(s) Oral at bedtime  escitalopram 5 milliGRAM(s) Oral daily  heparin   Injectable 5000 Unit(s) SubCutaneous every 8 hours  lamoTRIgine 225 milliGRAM(s) Oral two times a day  lithium 300 milliGRAM(s) Oral daily  metoprolol succinate ER 25 milliGRAM(s) Oral at bedtime  SUMAtriptan 100 milliGRAM(s) Oral every 6 hours PRN  topiramate 100 milliGRAM(s) Oral two times a day              Vital Signs Last 24 Hrs  T(C): 37 (17 Dec 2024 07:56), Max: 37.2 (16 Dec 2024 19:41)  T(F): 98.6 (17 Dec 2024 07:56), Max: 98.9 (16 Dec 2024 19:41)  HR: 68 (17 Dec 2024 07:56) (66 - 76)  BP: 170/80 (17 Dec 2024 07:56) (150/81 - 170/80)  BP(mean): --  RR: 18 (17 Dec 2024 07:56) (16 - 18)  SpO2: 98% (17 Dec 2024 07:56) (96% - 99%)    Parameters below as of 17 Dec 2024 07:56  Patient On (Oxygen Delivery Method): room air    REVIEW OF SYSTEMS:  CONSTITUTIONAL:  The patient denies fever, chills, night sweats.  HEAD:  Occasional headaches.  EYES:  No double vision.  EARS:  No ringing in the ears.  NECK:  No neck pain.  CARDIOVASCULAR:  No chest pain.  RESPIRATORY:  No shortness of breath.  ABDOMEN:  No nausea, vomiting, or abdominal pain.  EXTREMITIES/NEUROLOGICAL:  No numbness or tingling.  MUSCULOSKELETAL:  Occasional joint pain.    PHYSICAL EXAMINATION:  HEAD:  Normocephalic, atraumatic.  EYES:  No scleral icterus.  EARS:  Hearing appeared to be intact.  NECK:  Supple.  CARDIOVASCULAR:  S1, S2 heard.  RESPIRATORY:  Air entry bilaterally.  ABDOMEN:  Soft, nontender.  EXTREMITIES:  No clubbing or cyanosis was noted.    NEUROLOGIC:  The patient is awake, alert.  Was able to say her correct age and month.  Upon conversion with the patient, at times, she did repeat the same sentences over and over.  Was able to name simple objects.  Was able to make out number of fingers in each eye.  Speech was fluent.  Smile symmetric.  Motor, bilateral upper and lower extremities 4/5.  Sensory bilateral upper and lower extremities intact to light touch.  No drift of either upper or lower extremities.        LABS:  CBC Full  -  ( 17 Dec 2024 06:00 )  WBC Count : 10.93 K/uL  RBC Count : 4.32 M/uL  Hemoglobin : 12.8 g/dL  Hematocrit : 40.6 %  Platelet Count - Automated : 263 K/uL  Mean Cell Volume : 94.0 fL  Mean Cell Hemoglobin : 29.6 pg  Mean Cell Hemoglobin Concentration : 31.5 g/dL  Auto Neutrophil # : 7.69 K/uL  Auto Lymphocyte # : 2.03 K/uL  Auto Monocyte # : 0.82 K/uL  Auto Eosinophil # : 0.23 K/uL  Auto Basophil # : 0.10 K/uL  Auto Neutrophil % : 70.4 %  Auto Lymphocyte % : 18.6 %  Auto Monocyte % : 7.5 %  Auto Eosinophil % : 2.1 %  Auto Basophil % : 0.9 %    Urinalysis Basic - ( 17 Dec 2024 06:00 )    Color: x / Appearance: x / SG: x / pH: x  Gluc: 94 mg/dL / Ketone: x  / Bili: x / Urobili: x   Blood: x / Protein: x / Nitrite: x   Leuk Esterase: x / RBC: x / WBC x   Sq Epi: x / Non Sq Epi: x / Bacteria: x      12-17    141  |  105  |  17  ----------------------------<  94  4.7   |  21[L]  |  1.27    Ca    9.8      17 Dec 2024 06:00      Hemoglobin A1C:       Vitamin B12         RADIOLOGY      CLINICAL INDICATIONS:AMS    TECHNIQUE: MRA brain. 3-D time of flight imaging with 2D MIP   reconstructions.    COMPARISON: CTA brain and neck dated 2/12/2024    FINDINGS:  Fetal origin to the left posterior cerebral artery.  The [place] and vertebrobasilar system are unremarkable without   evidence of stenosis, occlusion or saccular aneurysm dilation. No   evidence for arterial venous malformation. The vertebral arteries are   codominant.        =====================      IMPRESSION:    MRI BRAIN: No acute intracranial pathology.    MRA BRAIN: Unremarkable.  ANALYSIS AND PLAN:  This is a 69-year-old with altered mental status.    .For altered mental status, at present unclear etiology.  Questionable metabolic encephalopathy.  Question was could be a complex migraine, questionable any subclinical seizure events, questionable exacerbation of underlying psychiatric disorders.  Examination is limited to evaluate for cerebrovascular accident.  No motor deficit, but does repeat things over.  .For history of migraines, continue home medication.  .For hyperlipidemia, continue statin.  -For history of seizures, continue home epilepsy medications   For history of hypertension, monitor systolic blood pressure.  A. continue  patient on her aspirin.  .For history of anxiety and depression, Psychiatry followup.  will plan for mri mra head will attempt EEG  back in feb had similar event had mri and EEG   MRI negative  appears more verbal today 12/17  spoke to patient about spinal tap refusing called father today     Spoke with father, Brody, at [phone]. 12/16 went to voicemail [email or web address] understands the above process.    52 minutes of time spent with the patient; plan of care, reviewing data, speaking to multidisciplinary care team with greater than 50% time in counseling, care coordination.    Thank you for courtesy of consultation.

## 2024-12-17 NOTE — SOCIAL WORK PROGRESS NOTE - NSSWPROGRESSNOTE_GEN_ALL_CORE
CLARIBEL spoke with patients father Brody yesterday who asked us to please speak with patient's sister valdez gordon [phone]. He said its getting too overwhelming and he wants to be involved but also wants her to assist and to speak with her, she has medical background. CLARIBEL called admitting and they added her as contact, I spoke with her this morning and we are waiting for MRI which will be done today. CLARIBEL provided support and will follow for recommendations

## 2024-12-17 NOTE — BH CONSULTATION LIAISON ASSESSMENT NOTE - CURRENT MEDICATION
MEDICATIONS  (STANDING):  amLODIPine   Tablet 5 milliGRAM(s) Oral daily  aspirin enteric coated 81 milliGRAM(s) Oral daily  atorvastatin 40 milliGRAM(s) Oral at bedtime  escitalopram 5 milliGRAM(s) Oral daily  heparin   Injectable 5000 Unit(s) SubCutaneous every 8 hours  lamoTRIgine 225 milliGRAM(s) Oral two times a day  lithium 300 milliGRAM(s) Oral daily  metoprolol succinate ER 25 milliGRAM(s) Oral at bedtime  topiramate 100 milliGRAM(s) Oral two times a day    MEDICATIONS  (PRN):  acetaminophen     Tablet .. 650 milliGRAM(s) Oral every 6 hours PRN Temp greater or equal to 38C (100.4F)  SUMAtriptan 100 milliGRAM(s) Oral every 6 hours PRN Migraine

## 2024-12-17 NOTE — BH CONSULTATION LIAISON ASSESSMENT NOTE - CURRENT ACTIVE IDEATION
Medicine Discharge Summary  Rohan Monroe MRN: [id number]  1944  ___________________________________          Date of Admission:  12/10/2018  Date of Discharge:  12/14/2018   Admitting Physician:  Marilu Wallace MD  Discharge Physician:  Sacha Weber MD   Discharging Service:  [place] [id number]  Primary Provider:              Jamie Foster         Reason for Admission:     Rohan Monroe is a 73-year-old male with medical history significant for sarcoidosis on methotrexate and infliximab, very severe COPD (on 4-6 L home O2), CAD s/p ROSALIE to D2 and mLAD (05/2017), HFpEF, atrial flutter s/p ablation with post-ablation recurrence, severe pulmonary HTN, HTN, type 2 DM, CKD stage III, and recent diagnosis of PCP pneumonia (11/20) who was admitted on 12/10 with acute on chronic dyspnea on exertion felt to be multifactorial and secondary to underlying pulmonary sarcoid, PCP pneumonia, and mild fluid overload secondary to chronic HFpEF and RADHA. Please see more detail in H&P          Discharge Diagnosis:     1. Acute heart failure exacerbation   2. Chronic HFpEF (EF 60-65% in March 2018)   3. CAD s/p PCI with ROSALIE to mid-LAD and D2 in 5/2017   4. Severe dyspnea on exertion   5. Recent pneumocystis pneumonia  6. Pulmonary sarcoidosis   7. 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 RADHA on CKD stage III   9. Hyperkalemia   10. Atrial flutter and fibrillation s/p ablation with post-ablation recurrence  11. Severe pulmonary HTN  12. Normocytic anemia  13. Hyponatremia- resolved   14. Gout   15. Hypothyroidism  16. History of HepC  17. Type 2 DM  18. Insomnia         Consultations:     - Infectious diseases   - Pulmonary   - Nephrology          Procedures, Imaging & Significant Findings:     Renal ultrasound (12/12)  No hydronephrosis. Normal-appearing kidneys.     CT chest without contrast (12/12)  1. Grossly stable appearance of fibrotic  changes that are likely interstitial lung disease related to patient's history of sarcoidosis. Stable groundglass opacities in the lung bases that were new on previous examination dated 11/18/2018. Right middle lobe consolidation is decreased. These are likely to be infectious (such as PJP) versus acute exacerbation of interstitial lung disease.  2. No significant change in spiculated nodule in the left upper lobe measuring 9 x 8 mm since 2004.  3. Enlargement of the main pulmonary artery which can be seen with pulmonary hypertension  4. Suspicious subcarinal lymph node measuring 2.6 cm, previously measured 2.0 cm.    TTE (12/12)  Global and regional left ventricular function is normal with an EF of 55% by visual estimation Grade II or moderate diastolic dysfunction. Right ventricular function, chamber size, wall motion, and thickness are normal. Trileaflet aortic sclerosis without stenosis. Mild tricuspid insufficiency is present. Pulmonary hypertension is present with right ventricular systolic pressure is 46mmHg above the right atrial pressure. The inferior vena cava was normal in size with preserved respiratory variability. No pericardial effusion is present. Compared to prior study dated 11/19/2018, rhythm is now regular and LVEF is normal. Diastolic dysfunction is present. Moderate pulmonary hypertension is unchanged.          Hospital Course by Problem:      # Acute heart failure exacerbation   # Chronic HFpEF (EF 60-65% in March 2018)   # CAD s/p PCI with ROSALIE to mid-LAD and D2 in 5/2017   - Presents with weight gain of ~6 kg and increased dyspnea/orthopnea since discharge on 11/26 concerning for fluid overload in the setting of reduced lasix dose and worsening RADHA.  - BNP elevated but decreased from previous. Troponin negative. EKG with 1st degree AV block, no ischemic changes. No signs of shock.    - Diuresis with furosemide 40 mg IV on 12/11. Patient feels much better after 1 dose of diuresis    - TTE  (12/11) showed LVEF of 55% by visual estimation Grade II or moderate diastolic dysfunction. Normal IVC.   - Worsening dyspnea is likely multifactorial and secondary to other numerous pulmonary issues.  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 Follows with pulmonology, Dr. Perlman. Was previously on 3L O2 at home, recently increased to 4-6 L following PCP diagnosis. O2 requirements stable without worsening hypoxia.   - Ongoing dyspnea is likely multifactorial and secondary to ongoing PCP pneumonia in the setting of underlying severe pulmonary HTN and pulmonary sarcoid.  - CT chest without contrast (12/12) showed grossly stable appearance of fibrotic changes that are likely interstitial lung disease related to patient's history of sarcoidosis. Stable groundglass opacities in the lung bases that were new on previous examination dated 11/18/2018. These are likely to be infectious versus exacerbation of interstitial lung disease. Suspicious subcarinal lymph node measuring 2.6 cm, previously measured 2.0 cm   - Per ID recommendation: Holding bactrim due to RADHA. S/p one day of pentamidine on 12/10, now transitioned to atovaquone 750 mg q 12 h until 12/15 ( to complete 21 days of treatment) . After that date, transition to prophylaxis dosing 1500 mg po daily due to ongoing immunosuppressive state.  - Pulmonary plan to follow up CT scan within 3 months      # RADHA on CKD stage III   # Hyperkalemia   - Baseline creatinine recently around 1.9-2.4 (2.39 on 11/26), elevated to 4.42 on admission.  Potassium elevated to 5.6 in ED, shifted with lasix. UA with hyaline casts and FeUrea concerning for pre-renal etiology.   - RADHA likely multifactorial and in part secondary to decreased creatinine secretion related to bactrim use for PCP pneumonia. Lasix was decreased during recent admission.   - Nephrology was consulted, hold off losartan and Bactrim, Cr is down trending during the course of stay    - Cr is 2.11 on discharge (12/14), Will have Cr check with [facility] follow up    # Atrial flutter and fibrillation s/p ablation with post-ablation recurrence  - Recently admitted to OSH on 11/18 with Afib with RVR, s/p spontaneous conversion but recurrence on 11/21. Patient converted to sinus rhythm 11/25 after treatment of PCP pneumonia was initiated along with uptitration of diltiazem, but flipped back into Afib on 11/26 prior to recent discharge. HR bradycardic in the ED.   - [id number] 4. Was previously on coumadin, but stopped due to patient preference along with heme occult positive study during recent admission.   - Continue diltiazem 360 mg daily with metoprolol 50 mg bid    - EP follow up per cardiology recommendation     # Severe pulmonary HTN  - RHC 11/19, PVR is 9 and wedge was slightly elevated, 11 but patient in rapid Afib at that time.   - Continue PTA sildenafil 20mg TID      # Normocytic anemia  - Hgb 9.4 on admission, within recent baseline. No active bleeding.    # Hyponatremia- resolved   - Mild, Na 131 down from 133 on 12/6, now resolved.      # Gout   - Per patient never formally diagnosed, but does take colchicine for flares.  Started colchicine 11/24. No current flare.      Chronic Conditions      # Hypothyroidism  - Continue PTA levothyroxine     # History of HepC  - Treated in the past     # Type 2 DM  - Last A1C 6.4     # Insomnia  - Cont PTA PRN melatonin and PRN remeron           Follow up plan summary:     - Follow up with cardiology (EP and [facility] clinic) on 12/19/2018   - Need to  "establish PCP in  (patient will permanently move to  on 12/27)          Physical Exam on day of Discharge:     Blood pressure 137/78, pulse 54, temperature 97.8  F (36.6  C), temperature source Oral, resp. rate 16, height 1.727 m (5' 8\"), weight 71.9 kg (158 lb 9.6 oz), SpO2 95 %.    Constitutional: awake, alert, sitting on the chair, NAD, on NC   HEENT: normalmocephalic, atraumatic, anicteric sclera without conjunctival injection  Neck: no JVD   Cardiovascular: RRR, normal S1/S2, no murmurs/rubs/gallops appreciated   Respiratory: diffuse crackles bilaterally, no wheezing   Gastrointestinal: soft, nontender, nondistended, normal bowel sounds  Ext: warm and well perfused, trace LE edema b/l   Skin: no rashes noted on exposed skin,, venous stasis changes in LE   Neurologic: alert and oriented, CN II-XII grossly intact, moving all extremities, nonfocal   Psychiatric: appropriate affect         Discharge Medications:     Discharge Medication List as of 12/14/2018  2:31 PM      START taking these medications    Details   !! atovaquone (MEPRON) 750 MG/5ML suspension Take 5 mLs (750 mg) by mouth every 12 hours for 1 day, Disp-10 mL, R-0, E-Prescribe      !! atovaquone (MEPRON) 750 MG/5ML suspension Take 10 mLs (1,500 mg) by mouth daily, Disp-600 mL, R-0, E-PrescribeLong term PCP prophylaxis per ID rec       !! - Potential duplicate medications found. Please discuss with provider.      CONTINUE these medications which have CHANGED    Details   furosemide (LASIX) 20 MG tablet Take 1 tablet (20 mg) by mouth daily May take extra 20 mg as needed for wt .gain >3#, Disp-30 tablet, R-0, E-Prescribe         CONTINUE these medications which have NOT CHANGED    Details   albuterol (PROAIR HFA/PROVENTIL HFA/VENTOLIN HFA) 108 (90 Base) MCG/ACT Inhaler Inhale 2 puffs into the lungs every 6 hours as needed for shortness of breath / dyspnea, Disp-3 Inhaler, R-6, E-Prescribe      aspirin 81 MG EC tablet Take 81 mg by mouth daily, " Historical      atorvastatin (LIPITOR) 40 MG tablet TAKE ONE TABLET BY MOUTH ONE TIME DAILY , Disp-90 tablet, R-3, E-Prescribe      colchicine (COLCYRS) 0.6 MG tablet Take 1 tablet (0.6 mg) by mouth 2 times daily, Disp-4 tablet, R-0, E-Prescribe      diltiazem (CARDIZEM CD/CARTIA XT) 360 MG 24 hr CD capsule Take 1 capsule (360 mg) by mouth daily, Disp-90 capsule, R-3, E-Prescribe      fluticasone-vilanterol (BREO ELLIPTA) 100-25 MCG/INH inhaler Inhale 1 puff into the lungs daily, Disp-3 Inhaler, R-3, E-Prescribe      levothyroxine (SYNTHROID/LEVOTHROID) 125 MCG tablet Take 1 tablet (125 mcg) by mouth daily, Disp-90 tablet, R-3, E-Prescribe      losartan (COZAAR) 25 MG tablet Take 1 tablet (25 mg) by mouth daily, Disp-60 tablet, R-3, E-Prescribe      melatonin 1 MG TABS tablet Take 1 tablet (1 mg) by mouth nightly as needed for sleep, Disp-30 tablet, R-0, E-Prescribe      methotrexate 2.5 MG tablet CHEMO Take 3 tablets (7.5 mg) by mouth once a week On Mondays, Disp-48 tablet, R-3, E-Prescribe      metoprolol tartrate (LOPRESSOR) 50 MG tablet Take 1 tablet (50 mg) by mouth 2 times daily, Disp-60 tablet, R-1, E-Prescribe      mirtazapine (REMERON) 15 MG tablet Take 15 mg by mouth At Bedtime., Historical      Multiple Vitamins-Minerals (MULTIVITAMIN & MINERAL PO) Take 1 tablet by mouth daily., Historical      sildenafil (REVATIO) 20 MG tablet TAKE ONE TABLET BY MOUTH THREE TIMES DAILY , Disp-270 tablet, R-3, E-Prescribe      tiotropium (SPIRIVA HANDIHALER) 18 MCG inhaled capsule Inhale contents of one capsule daily., Disp-90 capsule, R-3, E-Prescribe      blood glucose monitoring (ACCU-CHEK RONNIE PLUS) test strip Use to test blood sugar 2 times daily or as directed.  3 month supply.Disp-200 each, [id number]      blood glucose monitoring (ACCU-CHEK FASTCLIX) lancets Use to test blood sugar 2 times daily or as directed.  102 lancets per box.  3 month supply., Disp-2 Box, R-3, E-Prescribe      blood glucose monitoring  (NO BRAND SPECIFIED) meter device kit Use to test blood sugar 2 times daily.Disp-1 kit, [id number]      ciclopirox (LOPROX) 0.77 % cream Apply topically 2 times daily as neededHistorical      DOCUSATE SODIUM PO Take 50 mg by mouth daily, Historical      inFLIXimab (REMICADE) 100 MG injection Inject 100 mg into the vein every 28 days , Historical      !! order for DME Please provide patient with updated oxygen equipment.  Patient now requires 4 LPM via nasal canula with activity.Disp-1 Device, R-0, Local Print      !! order for DME Updated Oxygen: Patient requires supplemental Oxygen 3 LPM via nasal canula with activity and 2 LPM nocturnally. Please provide patient with a portable oxygen concentrator for improved mobility.  Okay to spot check or titrated for conserving to keep stat s above 90%. Oxygen will be for a lifetime.Disp-1 Device, R-0, Local Print      !! order for DME She requested for a 4 wheel walker, would like to change it to 4 wheel walker with a seat and oxygen tank durant.     Need this faxed over to her   [phone] Device, R-1, Local Print      polyethylene glycol (MIRALAX/GLYCOLAX) powder Take 17 g by mouth daily as needed for constipation, Disp-138 g, R-11, E-Prescribe      Urea 40 % CREA Externally apply topically daily as needed for dry skinHistorical       !! - Potential duplicate medications found. Please discuss with provider.      STOP taking these medications       sulfamethoxazole-trimethoprim (BACTRIM DS/SEPTRA DS) 800-160 MG per tablet Comments:   Reason for Stopping:         sulfamethoxazole-trimethoprim (BACTRIM/SEPTRA) 400-80 MG per tablet Comments:   Reason for Stopping:                    Discharge Instructions and Follow-Up:     Discharge Procedure Orders   Medication Therapy Management Referral   Referral Type: Med Therapy Management   Number of Visits Requested: 1     Home care nursing referral   Referral Type: Home Health Therapies & Aides   Number of Visits  Requested: 1     MD face to face encounter   Order Comments: Documentation of Face to Face and Certification for Home Health Services    I certify that patient: Rohan Monroe is under my care and that I, or a nurse practitioner or physician's assistant working with me, had a face-to-face encounter that meets the physician face-to-face encounter requirements with this patient on: 12/13/2018.    This encounter with the patient was in whole, or in part, for the following medical condition, which is the primary reason for home health care: PMH of pulmonary sarcoidosis on immunosuppression, COPD (4-6 L O2 at baseline), CAD s/p ROSALIE to D2 and LAD, HFpEF, a flutter status post ablation, pulmonary HTN, HTN, DM2, CKD III, and recent diagnosis of PCP PNA on treatment dose pentamidine who was admitted on 12/10 with worsening dyspnea on exertion..    I certify that, based on my findings, the following services are medically necessary home health services: Nursing, Occupational Therapy and Physical Therapy.    My clinical findings support the need for the above services because: Nurse is needed: To provide assessment and oversight required in the home to assure adherence to the medical plan due to: complex home medical needs. Occupational Therapy Services are needed to assess and treat ADL safety. Physical Therapy Services are needed to assess and treat the following functional impairments: endurance.    Further, I certify that my clinical findings support that this patient is homebound (i.e. absences from home require considerable and taxing effort and are for medical reasons or Mu-ism services or infrequently or of short duration when for other reasons) because: Leaving home is medically contraindicated for the following reason(s): Dyspnea on exertion that makes it so they cannot leave their home for needed services without clinical deterioration. and Infection risk / immunocompromised state where it is safer for  them to receive services in the home...    Based on the above findings. I certify that this patient is confined to the home and needs intermittent skilled nursing care, physical therapy and/or speech therapy.  The patient is under my care, and I have initiated the establishment of the plan of care.  This patient will be followed by a physician who will periodically review the plan of care.  Physician/Provider to provide follow up care: aJmie Foster    Attending hospital physician (the Medicare certified [place] provider): Sacha Weber  Physician Signature: See electronic signature associated with these discharge orders.  Date: 12/13/2018     Reason for your hospital stay   Order Comments: Your were hospitalized at [facility] with heart failure, atrial fibrillation/flutter, acute kidney injury and sarcoidosis in the lungs. You were treated with Lasix and oxygen support.  Over your hospitalization your symptoms improved and today you are ready to be discharged. Please continue taking atovaquone 750 mg twice daily until 12/15/2018 (to complete 21 days course for PCP treatment). On 12/16/2018, please take atovaquone 1,500 mg daily for prophylaxis.         It was a pleasure meeting with you today. Thank you for allowing me and my team the privilege of caring for you today. You are the reason we are here, and I truly hope we provided you with the excellent service you deserve. Please let us know if there is anything else we can do for you so that we can be sure you are leaving completely satisfied with your care experience.     Adult [facility] Follow-up and recommended labs and tests   Order Comments: - Follow up with cardiology (EP and [facility] clinic) on 12/19/2018     Appointments on [place] and/or [facility] (with [facility] or [facility] provider or service). Call [phone] if you haven't heard regarding these appointments within 7 days of discharge.     Follow Up and recommended  "labs and tests   Order Comments: - Please set up primary care at [facility]     Activity   Order Comments: Your activity upon discharge: activity as tolerated     Order Specific Question Answer Comments   Is discharge order? Yes      When to contact your care team   Order Comments: Call [facility] clinic if you have any of the following:  increased shortness of breath, weight gain, chest pain, palpitation, dizziness, lightheadedness.     Full Code     Order Specific Question Answer Comments   Code status determined by: Discussion with patient/legal decision maker      Oxygen Adult   Order Comments: Renew Home Oxygen Order  Renew previous prescription.  Expected treatment length is indefinite (99 months).    Attending Provider: Sacha Weber*  Physician signature: See electronic signature associated with these discharge orders  Date of Order: December 14, 2018     Diet   Order Comments: Follow this diet upon discharge: Orders Placed This Encounter      Fluid restriction 2000 ML FLUID      2 Gram Sodium Diet     Order Specific Question Answer Comments   Is discharge order? Yes              Discharge Disposition:     Home         Condition on Discharge:   Discharge condition: Fair   Code status on discharge: Full Code      Date of service: 12/15/2018    Patient discussed with staff attending, Dr. Weber and the note reflects our joint plan.     Supavit \"Mac\" MD [place]   PGY-2, Internal Medicine  Pager: [phone]  " No

## 2024-12-17 NOTE — BH CONSULTATION LIAISON ASSESSMENT NOTE - HPI (INCLUDE ILLNESS QUALITY, SEVERITY, DURATION, TIMING, CONTEXT, MODIFYING FACTORS, ASSOCIATED SIGNS AND SYMPTOMS)
Patient seen, evaluated and chart reviewed. Patient is a a 68 y/o WF with PMH of HTN, Dyslipidemia, TIA, CHF, Migraine, Seizure Disorder, Tardive Dyskinesia, Pill Rolling Tremors, Chronic Pancreatitis, Diverticulitis, GERD, OCD, Insomnia, Anxiety, and Depression who presented with AMS. Patient reportedly was last seen well was on Monday (5 days ago), and yesterday her father visited her at home as he didn't hear from her since  then, found her locked naked in the bathroom and confused, and today he called EMS as she didn't improve. Patient is awake & alert but still confused & disoriented, unable to give any history. Patient was seen on two occasions, is currently in the process of neurological work-up, which so far has been negative, and presents with some thought blocking/motor aphasia.

## 2024-12-17 NOTE — BH CONSULTATION LIAISON ASSESSMENT NOTE - NSBHCHARTREVIEWLAB_PSY_A_CORE FT
12.8   10.93 )-----------( 263      ( 17 Dec 2024 06:00 )             40.6   12-17    141  |  105  |  17  ----------------------------<  94  4.7   |  21[L]  |  1.27    Ca    9.8      17 Dec 2024 06:00

## 2024-12-17 NOTE — PROGRESS NOTE ADULT - ASSESSMENT
Metabolic encephalopathy- Improving   Unknown etiology   Infection ruled out   Stroke, seizure  ruled out   Continue on telemetry, neuro checks  Continue on daily low dose Aspirin, atorvastatin  Gentle  hydration  A1c, lipid profile, and TSH normal   TTE normal   Neurology following   MRI and EEG normal   Psychiatry following     Dehydration  Possibly secondary to poor PO intake related to her AMS  Continue IV fluids   Encourage oral intake     MANE on CKD- Improving    Avoid nephrotoxins & renally dose meds    Leukocytosis- Improving   Possibly related to the above, no evidence suggestive of an infection,   Follow up blood culture   Monitor fever and WBC curve     Seizure Disorder  Tardive Dyskinesia  Pill Rolling Tremors  Migraine   Continue lamotrigine 225 mg twice daily   Continue lithium 300 mg daily   Continue Topiramate 100 mg twice daily   Sumatriptan prn    HTN (hypertension)  Continue metoprolol 25mg   Continue  Amlodipine 5 mg daily   Increase Lisinopril to 5 mg daily     Need for prophylactic measure  Heparin

## 2024-12-17 NOTE — BH CONSULTATION LIAISON ASSESSMENT NOTE - NSBHMSEMUSCLE_PSY_A_CORE
Normal muscle tone/strength Normal rate, regular rhythm.  Heart sounds S1, S2.  No murmurs, rubs or gallops.

## 2024-12-17 NOTE — PROGRESS NOTE ADULT - SUBJECTIVE AND OBJECTIVE BOX
Patient is a 69y old  Female who presents with a chief complaint of AMS (17 Dec 2024 13:19)      INTERVAL HPI/OVERNIGHT EVENTS:  Overnight Nothing significant happened.   MRI and EEG done today, normal results.   Pt was seen today at bedside, no acute complaints. Denies fever, chills, abdominal pain, cough , sob, chest pain. Speech is better today. Spoke with father at bedside.     MEDICATIONS  (STANDING):  amLODIPine   Tablet 5 milliGRAM(s) Oral daily  aspirin enteric coated 81 milliGRAM(s) Oral daily  atorvastatin 40 milliGRAM(s) Oral at bedtime  escitalopram 5 milliGRAM(s) Oral daily  heparin   Injectable 5000 Unit(s) SubCutaneous every 8 hours  lamoTRIgine 225 milliGRAM(s) Oral two times a day  lithium 300 milliGRAM(s) Oral daily  metoprolol succinate ER 25 milliGRAM(s) Oral at bedtime  topiramate 100 milliGRAM(s) Oral two times a day    MEDICATIONS  (PRN):  acetaminophen     Tablet .. 650 milliGRAM(s) Oral every 6 hours PRN Temp greater or equal to 38C (100.4F)  SUMAtriptan 100 milliGRAM(s) Oral every 6 hours PRN Migraine      Allergies    sulfa drugs (Rash)  penicillins (Rash)    Intolerances        REVIEW OF SYSTEMS:  CONSTITUTIONAL: No fever, weight loss, or fatigue  EYES: No eye pain, visual disturbances, or discharge  ENMT:  No difficulty hearing, tinnitus, vertigo; No sinus or throat pain  NECK: No pain or stiffness  RESPIRATORY: No cough, wheezing, chills or hemoptysis; No shortness of breath  CARDIOVASCULAR: No chest pain, palpitations, lightheadedness, or leg swelling  GASTROINTESTINAL: No abdominal or epigastric pain. No nausea, vomiting, or hematemesis; No diarrhea or constipation. No melena or hematochezia.  GENITOURINARY: No dysuria, frequency, hematuria, or incontinence  NEUROLOGICAL: No headaches, memory loss, vertigo, loss of strength, numbness, or tremors  SKIN: No itching, burning, rashes, or lesions   MUSCULOSKELETAL: No joint pain or swelling; No muscle, back, or extremity pain  PSYCHIATRIC: No depression, anxiety, or mood swings        Vital Signs Last 24 Hrs  T(C): 37 (17 Dec 2024 07:56), Max: 37.2 (16 Dec 2024 19:41)  T(F): 98.6 (17 Dec 2024 07:56), Max: 98.9 (16 Dec 2024 19:41)  HR: 68 (17 Dec 2024 07:56) (66 - 76)  BP: 170/80 (17 Dec 2024 07:56) (150/81 - 170/80)  BP(mean): --  RR: 18 (17 Dec 2024 07:56) (16 - 18)  SpO2: 98% (17 Dec 2024 07:56) (97% - 99%)    Parameters below as of 17 Dec 2024 07:56  Patient On (Oxygen Delivery Method): room air        PHYSICAL EXAM:  GENERAL: NAD, well-groomed, well-developed  HEAD:  Atraumatic, Normocephalic  EYES: EOMI, PERRLA, conjunctiva and sclera clear  ENMT: Moist mucous membranes,   NECK: Supple, No JVD, Normal thyroid  NERVOUS SYSTEM:  Alert & Oriented X 2, speech difficulty but better than before,  tremors of the hands and legs , no motor weakness  CHEST/LUNG: Clear to auscultation bilaterally; No rales, rhonchi, wheezing, or rubs  HEART: Regular rate and rhythm; No murmurs, rubs, or gallops  ABDOMEN: Soft, Nontender, Nondistended; Bowel sounds present  EXTREMITIES:  2+ Peripheral Pulses, No clubbing, cyanosis, or edema  Psych: Calm, cooperative     LABS:                        12.8   10.93 )-----------( 263      ( 17 Dec 2024 06:00 )             40.6     17 Dec 2024 06:00    141    |  105    |  17     ----------------------------<  94     4.7     |  21     |  1.27     Ca    9.8        17 Dec 2024 06:00        Urinalysis Basic - ( 17 Dec 2024 06:00 )    Color: x / Appearance: x / SG: x / pH: x  Gluc: 94 mg/dL / Ketone: x  / Bili: x / Urobili: x   Blood: x / Protein: x / Nitrite: x   Leuk Esterase: x / RBC: x / WBC x   Sq Epi: x / Non Sq Epi: x / Bacteria: x

## 2024-12-17 NOTE — BH CONSULTATION LIAISON ASSESSMENT NOTE - NSBHCHARTREVIEWVS_PSY_A_CORE FT
Vital Signs Last 24 Hrs  T(C): 37 (17 Dec 2024 07:56), Max: 37.2 (16 Dec 2024 19:41)  T(F): 98.6 (17 Dec 2024 07:56), Max: 98.9 (16 Dec 2024 19:41)  HR: 68 (17 Dec 2024 07:56) (66 - 76)  BP: 170/80 (17 Dec 2024 07:56) (150/81 - 170/80)  BP(mean): --  RR: 18 (17 Dec 2024 07:56) (16 - 18)  SpO2: 98% (17 Dec 2024 07:56) (97% - 99%)    Parameters below as of 17 Dec 2024 07:56  Patient On (Oxygen Delivery Method): room air

## 2024-12-18 ENCOUNTER — TRANSCRIPTION ENCOUNTER (OUTPATIENT)
Age: 69
End: 2024-12-18

## 2024-12-18 VITALS
DIASTOLIC BLOOD PRESSURE: 66 MMHG | TEMPERATURE: 98 F | SYSTOLIC BLOOD PRESSURE: 116 MMHG | RESPIRATION RATE: 15 BRPM | OXYGEN SATURATION: 98 %

## 2024-12-18 LAB
ANION GAP SERPL CALC-SCNC: 13 MMOL/L — SIGNIFICANT CHANGE UP (ref 5–17)
BASOPHILS # BLD AUTO: 0.1 K/UL — SIGNIFICANT CHANGE UP (ref 0–0.2)
BASOPHILS NFR BLD AUTO: 0.9 % — SIGNIFICANT CHANGE UP (ref 0–2)
BUN SERPL-MCNC: 16 MG/DL — SIGNIFICANT CHANGE UP (ref 7–23)
CALCIUM SERPL-MCNC: 9.7 MG/DL — SIGNIFICANT CHANGE UP (ref 8.4–10.5)
CHLORIDE SERPL-SCNC: 105 MMOL/L — SIGNIFICANT CHANGE UP (ref 96–108)
CO2 SERPL-SCNC: 22 MMOL/L — SIGNIFICANT CHANGE UP (ref 22–31)
CREAT SERPL-MCNC: 1.44 MG/DL — HIGH (ref 0.5–1.3)
EGFR: 39 ML/MIN/1.73M2 — LOW
EOSINOPHIL # BLD AUTO: 0.28 K/UL — SIGNIFICANT CHANGE UP (ref 0–0.5)
EOSINOPHIL NFR BLD AUTO: 2.4 % — SIGNIFICANT CHANGE UP (ref 0–6)
GLUCOSE SERPL-MCNC: 103 MG/DL — HIGH (ref 70–99)
HCT VFR BLD CALC: 39.2 % — SIGNIFICANT CHANGE UP (ref 34.5–45)
HGB BLD-MCNC: 12.6 G/DL — SIGNIFICANT CHANGE UP (ref 11.5–15.5)
IMM GRANULOCYTES NFR BLD AUTO: 0.4 % — SIGNIFICANT CHANGE UP (ref 0–0.9)
LYMPHOCYTES # BLD AUTO: 1.96 K/UL — SIGNIFICANT CHANGE UP (ref 1–3.3)
LYMPHOCYTES # BLD AUTO: 17 % — SIGNIFICANT CHANGE UP (ref 13–44)
MCHC RBC-ENTMCNC: 29.4 PG — SIGNIFICANT CHANGE UP (ref 27–34)
MCHC RBC-ENTMCNC: 32.1 G/DL — SIGNIFICANT CHANGE UP (ref 32–36)
MCV RBC AUTO: 91.4 FL — SIGNIFICANT CHANGE UP (ref 80–100)
MONOCYTES # BLD AUTO: 0.91 K/UL — HIGH (ref 0–0.9)
MONOCYTES NFR BLD AUTO: 7.9 % — SIGNIFICANT CHANGE UP (ref 2–14)
NEUTROPHILS # BLD AUTO: 8.24 K/UL — HIGH (ref 1.8–7.4)
NEUTROPHILS NFR BLD AUTO: 71.4 % — SIGNIFICANT CHANGE UP (ref 43–77)
NRBC # BLD: 0 /100 WBCS — SIGNIFICANT CHANGE UP (ref 0–0)
PLATELET # BLD AUTO: 275 K/UL — SIGNIFICANT CHANGE UP (ref 150–400)
POTASSIUM SERPL-MCNC: 3.6 MMOL/L — SIGNIFICANT CHANGE UP (ref 3.5–5.3)
POTASSIUM SERPL-SCNC: 3.6 MMOL/L — SIGNIFICANT CHANGE UP (ref 3.5–5.3)
RBC # BLD: 4.29 M/UL — SIGNIFICANT CHANGE UP (ref 3.8–5.2)
RBC # FLD: 13 % — SIGNIFICANT CHANGE UP (ref 10.3–14.5)
SODIUM SERPL-SCNC: 140 MMOL/L — SIGNIFICANT CHANGE UP (ref 135–145)
WBC # BLD: 11.54 K/UL — HIGH (ref 3.8–10.5)
WBC # FLD AUTO: 11.54 K/UL — HIGH (ref 3.8–10.5)

## 2024-12-18 PROCEDURE — 80201 ASSAY OF TOPIRAMATE: CPT

## 2024-12-18 PROCEDURE — 93005 ELECTROCARDIOGRAM TRACING: CPT

## 2024-12-18 PROCEDURE — 80061 LIPID PANEL: CPT

## 2024-12-18 PROCEDURE — 76376 3D RENDER W/INTRP POSTPROCES: CPT

## 2024-12-18 PROCEDURE — 83036 HEMOGLOBIN GLYCOSYLATED A1C: CPT

## 2024-12-18 PROCEDURE — 85025 COMPLETE CBC W/AUTO DIFF WBC: CPT

## 2024-12-18 PROCEDURE — 93356 MYOCRD STRAIN IMG SPCKL TRCK: CPT

## 2024-12-18 PROCEDURE — 80175 DRUG SCREEN QUAN LAMOTRIGINE: CPT

## 2024-12-18 PROCEDURE — 93306 TTE W/DOPPLER COMPLETE: CPT

## 2024-12-18 PROCEDURE — 82746 ASSAY OF FOLIC ACID SERUM: CPT

## 2024-12-18 PROCEDURE — 84443 ASSAY THYROID STIM HORMONE: CPT

## 2024-12-18 PROCEDURE — 99285 EMERGENCY DEPT VISIT HI MDM: CPT

## 2024-12-18 PROCEDURE — 71045 X-RAY EXAM CHEST 1 VIEW: CPT

## 2024-12-18 PROCEDURE — 80048 BASIC METABOLIC PNL TOTAL CA: CPT

## 2024-12-18 PROCEDURE — 80178 ASSAY OF LITHIUM: CPT

## 2024-12-18 PROCEDURE — 99231 SBSQ HOSP IP/OBS SF/LOW 25: CPT

## 2024-12-18 PROCEDURE — 81001 URINALYSIS AUTO W/SCOPE: CPT

## 2024-12-18 PROCEDURE — 97530 THERAPEUTIC ACTIVITIES: CPT

## 2024-12-18 PROCEDURE — 97165 OT EVAL LOW COMPLEX 30 MIN: CPT

## 2024-12-18 PROCEDURE — 70450 CT HEAD/BRAIN W/O DYE: CPT | Mod: MC

## 2024-12-18 PROCEDURE — 83735 ASSAY OF MAGNESIUM: CPT

## 2024-12-18 PROCEDURE — 82607 VITAMIN B-12: CPT

## 2024-12-18 PROCEDURE — 80307 DRUG TEST PRSMV CHEM ANLYZR: CPT

## 2024-12-18 PROCEDURE — 87040 BLOOD CULTURE FOR BACTERIA: CPT

## 2024-12-18 PROCEDURE — 97161 PT EVAL LOW COMPLEX 20 MIN: CPT

## 2024-12-18 PROCEDURE — 36415 COLL VENOUS BLD VENIPUNCTURE: CPT

## 2024-12-18 PROCEDURE — 80053 COMPREHEN METABOLIC PANEL: CPT

## 2024-12-18 PROCEDURE — 99239 HOSP IP/OBS DSCHRG MGMT >30: CPT

## 2024-12-18 RX ORDER — AMLODIPINE BESYLATE 10 MG/1
1 TABLET ORAL
Qty: 30 | Refills: 0
Start: 2024-12-18 | End: 2025-01-16

## 2024-12-18 RX ORDER — ACETAMINOPHEN, ASPIRIN, AND CAFFEINE 194; 227; 33 MG/1; MG/1; MG/1
2 TABLET, COATED ORAL ONCE
Refills: 0 | Status: COMPLETED | OUTPATIENT
Start: 2024-12-18 | End: 2024-12-18

## 2024-12-18 RX ADMIN — ACETAMINOPHEN, ASPIRIN, AND CAFFEINE 2 TABLET(S): 194; 227; 33 TABLET, COATED ORAL at 11:59

## 2024-12-18 RX ADMIN — Medication 5000 UNIT(S): at 00:04

## 2024-12-18 RX ADMIN — Medication 81 MILLIGRAM(S): at 11:59

## 2024-12-18 RX ADMIN — Medication 5000 UNIT(S): at 09:15

## 2024-12-18 RX ADMIN — LAMOTRIGINE 225 MILLIGRAM(S): 50 TABLET, EXTENDED RELEASE ORAL at 05:26

## 2024-12-18 RX ADMIN — SUMATRIPTAN SUCCINATE 100 MILLIGRAM(S): 100 TABLET, FILM COATED ORAL at 10:20

## 2024-12-18 RX ADMIN — LITHIUM CARBONATE 300 MILLIGRAM(S): 300 CAPSULE ORAL at 12:00

## 2024-12-18 RX ADMIN — TOPIRAMATE 100 MILLIGRAM(S): 25 TABLET ORAL at 05:26

## 2024-12-18 RX ADMIN — ESCITALOPRAM OXALATE 5 MILLIGRAM(S): 10 TABLET, FILM COATED ORAL at 11:59

## 2024-12-18 RX ADMIN — SUMATRIPTAN SUCCINATE 100 MILLIGRAM(S): 100 TABLET, FILM COATED ORAL at 09:20

## 2024-12-18 RX ADMIN — AMLODIPINE BESYLATE 5 MILLIGRAM(S): 10 TABLET ORAL at 05:26

## 2024-12-18 NOTE — DISCHARGE NOTE NURSING/CASE MANAGEMENT/SOCIAL WORK - PATIENT PORTAL LINK FT
You can access the FollowMyHealth Patient Portal offered by [facility] by registering at the following website: [email or web address]. By joining [employer]’s FollowMyHealth portal, you will also be able to view your health information using other applications (apps) compatible with our system.

## 2024-12-18 NOTE — PHYSICAL THERAPY INITIAL EVALUATION ADULT - GENERAL OBSERVATIONS, REHAB EVAL
Chart reviewed, Pt found in semireclining in bed, pt vitals stable, +Cardiac Monitor,+hep lock
Pt encountered seated in bedside chair, +tele, NAD

## 2024-12-18 NOTE — PROGRESS NOTE ADULT - SUBJECTIVE AND OBJECTIVE BOX
Neurology follow up note    DEEPTHI [id number]      Interval History:    Patient feels ok seen with her sister     Allergies    sulfa drugs (Rash)  penicillins (Rash)    Intolerances        MEDICATIONS    acetaminophen     Tablet .. 650 milliGRAM(s) Oral every 6 hours PRN  acetaminophen 250 mG/aspirin 250 mG/caffeine 65 mG 2 Tablet(s) Oral once  amLODIPine   Tablet 5 milliGRAM(s) Oral daily  aspirin enteric coated 81 milliGRAM(s) Oral daily  atorvastatin 40 milliGRAM(s) Oral at bedtime  escitalopram 5 milliGRAM(s) Oral daily  heparin   Injectable 5000 Unit(s) SubCutaneous every 8 hours  lamoTRIgine 225 milliGRAM(s) Oral two times a day  lithium 300 milliGRAM(s) Oral daily  metoprolol succinate ER 25 milliGRAM(s) Oral at bedtime  SUMAtriptan 100 milliGRAM(s) Oral every 6 hours PRN  topiramate 100 milliGRAM(s) Oral two times a day              Vital Signs Last 24 Hrs  T(C): 37 (18 Dec 2024 07:44), Max: 37.1 (17 Dec 2024 16:25)  T(F): 98.6 (18 Dec 2024 07:44), Max: 98.7 (17 Dec 2024 16:25)  HR: 71 (18 Dec 2024 07:44) (71 - 98)  BP: 160/99 (18 Dec 2024 13:09) (139/88 - 160/99)  BP(mean): --  RR: 18 (18 Dec 2024 07:44) (18 - 18)  SpO2: 97% (18 Dec 2024 07:44) (97% - 99%)    Parameters below as of 18 Dec 2024 07:44  Patient On (Oxygen Delivery Method): room air      REVIEW OF SYSTEMS:  CONSTITUTIONAL:  The patient denies fever, chills, night sweats.  HEAD:  Occasional headaches.  EYES:  No double vision.  EARS:  No ringing in the ears.  NECK:  No neck pain.  CARDIOVASCULAR:  No chest pain.  RESPIRATORY:  No shortness of breath.  ABDOMEN:  No nausea, vomiting, or abdominal pain.  EXTREMITIES/NEUROLOGICAL:  No numbness or tingling.  MUSCULOSKELETAL:  Occasional joint pain.    PHYSICAL EXAMINATION:  HEAD:  Normocephalic, atraumatic.  EYES:  No scleral icterus.  EARS:  Hearing appeared to be intact.  NECK:  Supple.  CARDIOVASCULAR:  S1, S2 heard.  RESPIRATORY:  Air entry bilaterally.  ABDOMEN:  Soft, nontender.  EXTREMITIES:  No clubbing or cyanosis was noted.    NEUROLOGIC:  The patient is awake, alert.  Was able to say her correct sister name Upon conversion with the patient, at times, she did repeat the same sentences over and over.  able to tell time today Speech was fluent.  Smile symmetric.  Motor, bilateral upper and lower extremities 4/5.  Sensory bilateral upper and lower extremities intact to light touch.  No drift of either upper or lower extremities. spoke to sister able to walk at her baseline                   LABS:  CBC Full  -  ( 18 Dec 2024 06:00 )  WBC Count : 11.54 K/uL  RBC Count : 4.29 M/uL  Hemoglobin : 12.6 g/dL  Hematocrit : 39.2 %  Platelet Count - Automated : 275 K/uL  Mean Cell Volume : 91.4 fL  Mean Cell Hemoglobin : 29.4 pg  Mean Cell Hemoglobin Concentration : 32.1 g/dL  Auto Neutrophil # : 8.24 K/uL  Auto Lymphocyte # : 1.96 K/uL  Auto Monocyte # : 0.91 K/uL  Auto Eosinophil # : 0.28 K/uL  Auto Basophil # : 0.10 K/uL  Auto Neutrophil % : 71.4 %  Auto Lymphocyte % : 17.0 %  Auto Monocyte % : 7.9 %  Auto Eosinophil % : 2.4 %  Auto Basophil % : 0.9 %    Urinalysis Basic - ( 18 Dec 2024 06:00 )    Color: x / Appearance: x / SG: x / pH: x  Gluc: 103 mg/dL / Ketone: x  / Bili: x / Urobili: x   Blood: x / Protein: x / Nitrite: x   Leuk Esterase: x / RBC: x / WBC x   Sq Epi: x / Non Sq Epi: x / Bacteria: x      12-18    140  |  105  |  16  ----------------------------<  103[H]  3.6   |  22  |  1.44[H]    Ca    9.7      18 Dec 2024 06:00      Hemoglobin A1C:       Vitamin B12         RADIOLOGY  Head CT dated 12/13/2024. MRI brain dated 2/13/2024.    TECHNIQUE: MRI brain: Multiplanar, multisequence MR imaging of the brain   are obtained without the administration of intravenous Gadavist contrast.    FINDINGS:  There is no abnormal restricted diffusion to suggest acute infarction.   Scattered periventricular and subcortical white matter T2 /FLAIR   hyperintensities are seen without mass effect, nonspecific, likely   representing mild chronic microvascular changes. Normal T2 flow-voids are   seen within  the intracranial vasculature. The lateral ventricles and   cortical sulci are age-appropriate in size and configuration. There is no   mass, mass effect, or extra-axial fluid collection. There is no   susceptibility artifact to suggest hemorrhage.Midline structures are   normal.  The visualized paranasal sinuses, mastoid air cells and orbits   are unremarkable.          ======================      CLINICAL INDICATIONS:AMS    TECHNIQUE: MRA brain. 3-D time of flight imaging with 2D MIP   reconstructions.    COMPARISON: CTA brain and neck dated 2/12/2024    FINDINGS:  Fetal origin to the left posterior cerebral artery.  The [place] and vertebrobasilar system are unremarkable without   evidence of stenosis, occlusion or saccular aneurysm dilation. No   evidence for arterial venous malformation. The vertebral arteries are   codominant.        =====================      IMPRESSION:    MRI BRAIN: No acute intracranial pathology.    MRA BRAIN: Unremarkable.      ANALYSIS AND PLAN:  This is a 69-year-old with altered mental status.    .For altered mental status, at present unclear etiology.  Questionable metabolic encephalopathy.  Question was could be a complex migraine, questionable any subclinical seizure events, questionable exacerbation of underlying psychiatric disorders.  .For history of migraines, continue home medication.  .For hyperlipidemia, continue statin.  -For history of seizures, continue home epilepsy medications   For history of hypertension, monitor systolic blood pressure.  A. continue  patient on her aspirin.  .For history of anxiety and depression, Psychiatry followup.  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    Thank you for courtesy of consultation.

## 2024-12-18 NOTE — PHYSICAL THERAPY INITIAL EVALUATION ADULT - ADDITIONAL COMMENTS
As per PT eval on Feb 2024, Pt lives in a private home with her father. Pt has 3 steps to enter and 1 FOS (about 11 steps) with handrail to the basement where her bed/bath are location. +walk-in shower +reports she was independent with all ADLs PTA. +progressive eyeglasses (reports double vision though improved at this time). Pt was ambulating independently PTA without AD (states she has a SC at home).
As per PT eval on Feb 2024, Pt lives in a private home with her father. Pt has 3 steps to enter and 1 FOS (about 11 steps) with handrail to the basement where her bed/bath are location. +walk-in shower +reports she was independent with all ADLs PTA. +progressive eyeglasses (reports double vision though improved at this time). Pt was ambulating independently PTA without AD (states she has a SC at home).

## 2024-12-18 NOTE — CASE MANAGEMENT PROGRESS NOTE - NSCMPROGRESSNOTE_GEN_ALL_CORE
CM met with patient and her sister Lucille Jerome [phone], patient cleared for discharge home and patient and family are agreeable with discharge. Patient will be staying with her father and he will assist as needed. Referral sent to [facility] [place] [phone] as per family request for medication management and awaiting reply. Patients sister will transport patient home.

## 2024-12-18 NOTE — PHYSICAL THERAPY INITIAL EVALUATION ADULT - GAIT TRAINING, PT EVAL
Patient will ambulate 100 feet independently with most appropriate AD within 1-2 weeks for safe household ambulation.

## 2024-12-18 NOTE — DISCHARGE NOTE PROVIDER - CARE PROVIDER_API CALL
Rafael Samuels  Neurology  [address] [place] [address]  Phone: [phone]  Fax: [phone]  Follow Up Time:

## 2024-12-18 NOTE — PHYSICAL THERAPY INITIAL EVALUATION ADULT - TRANSFER TRAINING, PT EVAL
Patient will perform transfers independently with most appropriate AD within 1-2 weeks to promote safety and reduce risk of falls.

## 2024-12-18 NOTE — BH CONSULTATION LIAISON PROGRESS NOTE - NSBHFUPINTERVALHXFT_PSY_A_CORE
Patient seen, evaluated and chart reviewed. Patient seen in the presence of her sister. Patient appears to be significantly more lucid, with sister reporting her concern about possibility of a relapse. Patient has been under the care of her psychiatrist Dr. Jer Collins, and she sees him monthly. There are some concerns about patient not eating well. No evidence of psychosis, sorin or depression.

## 2024-12-18 NOTE — PHYSICAL THERAPY INITIAL EVALUATION ADULT - SITTING BALANCE: DYNAMIC
Detail Level: Generalized General Sunscreen Counseling: Sun protect with sunscreen and clothing. Remember to reapply sunscreen.\\nContinue spf 30 good balance

## 2024-12-18 NOTE — PHYSICAL THERAPY INITIAL EVALUATION ADULT - NSPTDISCHREC_GEN_A_CORE
Pt is at baseline, functional independence at present./No skilled PT needs
home w/ assist vs [facility]

## 2024-12-18 NOTE — PHYSICAL THERAPY INITIAL EVALUATION ADULT - PERTINENT HX OF CURRENT PROBLEM, REHAB EVAL
As per ED provider note, A 69-year-old female with history of hypertension, bipolar, migraines, TIA, tardive dyskinesia, seizures brought in by ambulance for confusion.  As per father he dropped her off at her house on Monday.  He did not hear from her for the past few days, which is abnormal.  Usually they talk daily.  She was not answering her phone calls so he came to her house yesterday, found her locked in the bathroom naked.  Patient still confused today so called 911.  Unknown trauma.  Patient has no complaints currently however limited historian due to altered mental status. Father explains that she has had numerous episodes of confusion, has been worked up for TIA/stroke and seizures.  Patient has been evaluated by neurology and psychiatrist previously. No anticoagulation.  Unsure if patient took extra medication since pill bottles were in the bathroom. Patient limited historian due to altered mental status.
Pt is a 70 y/o male with PMH of HTN, Dyslipidemia, TIA, CHF, Migraine, Seizure Disorder, Tardive Dyskinesia, Pill Rolling Tremors, Chronic Pancreatitis, Diverticulitis, GERD, OCD, Insomnia, Anxiety, and Depression who presented with AMS. Patient reportedly was last seen well was on Monday (5 days ago), and yesterday her father visited her at home as he didn't hear from her since  then, found her locked naked in the bathroom and confused, and today he called EMS as she didn't improve. Patient is awake & alert but still confused & disoriented, unable to give any history. her father reported earlier to ED team that she had a similar episode in the past & her w/u then didn't reveal a specific cause of her condition. CXR (12/13): No acute cardiopulmonary disease process. CT head (12/13): Age-appropriate involutional changes. No hemorrhage. MRI head (12/17): no acute intracranial pathology. Unremarkable.

## 2024-12-18 NOTE — DISCHARGE NOTE PROVIDER - ATTENDING DISCHARGE PHYSICAL EXAMINATION:
GENERAL: NAD, well-groomed, well-developed  HEAD:  Atraumatic, Normocephalic  EYES: EOMI, PERRLA, conjunctiva and sclera clear  ENMT: Moist mucous membranes,   NECK: Supple, No JVD, Normal thyroid  NERVOUS SYSTEM:  Alert & Oriented X 3, speech difficulty but better than before, con complete full sentence,  tremors of the hands and legs , no motor weakness  CHEST/LUNG: Clear to auscultation bilaterally; No rales, rhonchi, wheezing, or rubs  HEART: Regular rate and rhythm; No murmurs, rubs, or gallops  ABDOMEN: Soft, Nontender, Nondistended; Bowel sounds present  EXTREMITIES:  2+ Peripheral Pulses, No clubbing, cyanosis, or edema  Psych: Calm, cooperative

## 2024-12-18 NOTE — DISCHARGE NOTE PROVIDER - NSDCMRMEDTOKEN_GEN_ALL_CORE_FT
Ambien 10 mg oral tablet: 1.5 tab(s) orally once a day (at bedtime) Takes 1 tab every night and an additional 0.5 tab if needed  aspirin 81 mg oral delayed release tablet: 1 tab(s) orally once a day  atorvastatin 40 mg oral tablet: 1 tab(s) orally once a day  escitalopram 5 mg oral tablet: 1 tab(s) orally once a day  gabapentin 300 mg oral tablet: 1 tab(s) orally 2 times a day patient endorses taking it prn  LaMICtal 200 mg oral tablet: 1 tab(s) orally 2 times a day Please take a total 225 mg of Lamictal twice daily.  LaMICtal 25 mg oral tablet: 1 tab(s) orally 2 times a day Please take a total 225 mg of Lamictal twice daily.  lithium 300 mg oral capsule: 1 cap(s) orally once a day  Metoprolol Succinate ER 25 mg oral tablet, extended release: 1 tab(s) orally once a day  SUMAtriptan 100 mg oral tablet: 1 tab(s) orally every 4 hours as needed for migraine take up to two tablets daily  topiramate 100 mg oral tablet: 1 tab(s) orally 2 times a day   acetaminophen-caffeine 500 mg-65 mg oral tablet: 2 tab(s) orally every 8 hours  Ambien 10 mg oral tablet: 1.5 tab(s) orally once a day (at bedtime) Takes 1 tab every night and an additional 0.5 tab if needed  amLODIPine 5 mg oral tablet: 1 tab(s) orally once a day  aspirin 81 mg oral delayed release tablet: 1 tab(s) orally once a day  atorvastatin 40 mg oral tablet: 1 tab(s) orally once a day  escitalopram 5 mg oral tablet: 1 tab(s) orally once a day  gabapentin 300 mg oral tablet: 1 tab(s) orally 2 times a day patient endorses taking it prn  LaMICtal 200 mg oral tablet: 1 tab(s) orally 2 times a day Please take a total 225 mg of Lamictal twice daily.  LaMICtal 25 mg oral tablet: 1 tab(s) orally 2 times a day Please take a total 225 mg of Lamictal twice daily.  lithium 300 mg oral capsule: 1 cap(s) orally once a day  Metoprolol Succinate ER 25 mg oral tablet, extended release: 1 tab(s) orally once a day  SUMAtriptan 100 mg oral tablet: 1 tab(s) orally every 4 hours as needed for migraine take up to two tablets daily  topiramate 100 mg oral tablet: 1 tab(s) orally 2 times a day

## 2024-12-18 NOTE — SOCIAL WORK PROGRESS NOTE - NSSWPROGRESSNOTE_GEN_ALL_CORE
SW met with patient and father and sister at bedside.  Family are concerned about patient going home alone.  She is not eating and was not taking her medications and sleeping most of day pta.   SW left message for Dr Petty to discuss case.    Pt needs Medicaid to be eligible for many psychiatric programs.  CLARIBEL referred her to [facility]  for care coordination and left message for Medicaid specialist to see if they can assist.  Provided family with number for mobile crisis as well.   SW to remain available for needs and support.

## 2024-12-18 NOTE — PROGRESS NOTE ADULT - SUBJECTIVE AND OBJECTIVE BOX
Patient is a 69y old  Female who presents with a chief complaint of AMS (17 Dec 2024 15:56)      INTERVAL HPI/OVERNIGHT EVENTS:  Overnight nothing significant happened.   Pt was seen today at bedside, complaints of headache, Denies fever, chills, abdominal pain, cough , sob, chest pain. Father and sister at bedside. She is AAO x 3     MEDICATIONS  (STANDING):  acetaminophen 250 mG/aspirin 250 mG/caffeine 65 mG 2 Tablet(s) Oral once  amLODIPine   Tablet 5 milliGRAM(s) Oral daily  aspirin enteric coated 81 milliGRAM(s) Oral daily  atorvastatin 40 milliGRAM(s) Oral at bedtime  escitalopram 5 milliGRAM(s) Oral daily  heparin   Injectable 5000 Unit(s) SubCutaneous every 8 hours  lamoTRIgine 225 milliGRAM(s) Oral two times a day  lithium 300 milliGRAM(s) Oral daily  metoprolol succinate ER 25 milliGRAM(s) Oral at bedtime  topiramate 100 milliGRAM(s) Oral two times a day    MEDICATIONS  (PRN):  acetaminophen     Tablet .. 650 milliGRAM(s) Oral every 6 hours PRN Temp greater or equal to 38C (100.4F)  SUMAtriptan 100 milliGRAM(s) Oral every 6 hours PRN Migraine      Allergies    sulfa drugs (Rash)  penicillins (Rash)    Intolerances        REVIEW OF SYSTEMS:  CONSTITUTIONAL: No fever, weight loss, or fatigue  EYES: No eye pain, visual disturbances, or discharge  ENMT:  No difficulty hearing, tinnitus, vertigo; No sinus or throat pain  NECK: No pain or stiffness  RESPIRATORY: No cough, wheezing, chills or hemoptysis; No shortness of breath  CARDIOVASCULAR: No chest pain, palpitations, lightheadedness, or leg swelling  GASTROINTESTINAL: No abdominal or epigastric pain. No nausea, vomiting, or hematemesis; No diarrhea or constipation. No melena or hematochezia.  GENITOURINARY: No dysuria, frequency, hematuria, or incontinence  NEUROLOGICAL: admits headaches, denies memory loss, vertigo, loss of strength, numbness, or tremors  SKIN: No itching, burning, rashes, or lesions   MUSCULOSKELETAL: No joint pain or swelling; No muscle, back, or extremity pain  PSYCHIATRIC: No depression, anxiety, or mood swings        Vital Signs Last 24 Hrs  T(C): 37 (18 Dec 2024 07:44), Max: 37.1 (17 Dec 2024 16:25)  T(F): 98.6 (18 Dec 2024 07:44), Max: 98.7 (17 Dec 2024 16:25)  HR: 71 (18 Dec 2024 07:44) (71 - 98)  BP: 153/67 (18 Dec 2024 07:44) (139/88 - 153/67)  BP(mean): --  RR: 18 (18 Dec 2024 07:44) (18 - 18)  SpO2: 97% (18 Dec 2024 07:44) (97% - 99%)    Parameters below as of 18 Dec 2024 07:44  Patient On (Oxygen Delivery Method): room air        PHYSICAL EXAM:  GENERAL: NAD, well-groomed, well-developed  HEAD:  Atraumatic, Normocephalic  EYES: EOMI, PERRLA, conjunctiva and sclera clear  ENMT: Moist mucous membranes,   NECK: Supple, No JVD, Normal thyroid  NERVOUS SYSTEM:  Alert & Oriented X 3, speech difficulty but better than before, con complete full sentence,  tremors of the hands and legs , no motor weakness  CHEST/LUNG: Clear to auscultation bilaterally; No rales, rhonchi, wheezing, or rubs  HEART: Regular rate and rhythm; No murmurs, rubs, or gallops  ABDOMEN: Soft, Nontender, Nondistended; Bowel sounds present  EXTREMITIES:  2+ Peripheral Pulses, No clubbing, cyanosis, or edema  Psych: Calm, cooperative     LABS:                        12.6   11.54 )-----------( 275      ( 18 Dec 2024 06:00 )             39.2     18 Dec 2024 06:00    140    |  105    |  16     ----------------------------<  103    3.6     |  22     |  1.44     Ca    9.7        18 Dec 2024 06:00        Urinalysis Basic - ( 18 Dec 2024 06:00 )    Color: x / Appearance: x / SG: x / pH: x  Gluc: 103 mg/dL / Ketone: x  / Bili: x / Urobili: x   Blood: x / Protein: x / Nitrite: x   Leuk Esterase: x / RBC: x / WBC x   Sq Epi: x / Non Sq Epi: x / Bacteria: x

## 2024-12-18 NOTE — BH CONSULTATION LIAISON PROGRESS NOTE - NSBHCHARTREVIEWLAB_PSY_A_CORE FT
12.6   11.54 )-----------( 275      ( 18 Dec 2024 06:00 )             39.2   12-18    140  |  105  |  16  ----------------------------<  103[H]  3.6   |  22  |  1.44[H]    Ca    9.7      18 Dec 2024 06:00

## 2024-12-18 NOTE — DISCHARGE NOTE PROVIDER - HOSPITAL COURSE
68 y/o F with PMH of HTN, Dyslipidemia, TIA, CHF, Migraine, Seizure Disorder, Tardive Dyskinesia, Pill Rolling Tremors, Chronic Pancreatitis, Diverticulitis, GERD, OCD, Insomnia, Anxiety, and Depression who presented with AMS. CT head negative for acute pathology. Pt was on stroke protocol, neuro checks was done routinely. Neurology was consulted, MRI, EEG were negative. Infectious workup ruled out. Her home meds were resumed. Psychiatry was consulted pt was deemed to have capacity, overtime her condition improved. Her labs were monitored routinely. Pt is hemodynamically stable for discharge.

## 2024-12-18 NOTE — PROGRESS NOTE ADULT - ASSESSMENT
Metabolic encephalopathy- Improved   Unknown etiology but most likely in the setting of missed dose of medications, condition is improving since medications were reinstated   Infection ruled out   Stroke, seizure  ruled out   Continue on telemetry, neuro checks  Continue on daily low dose Aspirin, atorvastatin  A1c, lipid profile, and TSH normal   TTE normal   Neurology following   MRI and EEG normal   Psychiatry following     Dehydration  Possibly secondary to poor PO intake related to her AMS  Pt continues to refuse to eat, will request psychiatry re eval    Continue IV fluids   Encourage oral intake     MANE on CKD- Improving    Avoid nephrotoxins & renally dose meds      Seizure Disorder  Tardive Dyskinesia  Pill Rolling Tremors  Bipolar disorder   Migraine   Continue lamotrigine 225 mg twice daily   Continue lithium 300 mg daily   Continue Topiramate 100 mg twice daily   Sumatriptan prn  Excedrin prn     HTN (hypertension)  Continue metoprolol 25mg   Continue  Amlodipine 5 mg daily       Need for prophylactic measure  Heparin     Disposition   According to PT and social work

## 2024-12-18 NOTE — DISCHARGE NOTE NURSING/CASE MANAGEMENT/SOCIAL WORK - NSDCPEFALRISK_GEN_ALL_CORE
For information on Fall & Injury Prevention, visit: [email or web address] OR  [email or web address] OR  https://www.cdc.gov/steadi/patient.html

## 2024-12-18 NOTE — BH CONSULTATION LIAISON PROGRESS NOTE - CURRENT MEDICATION
MEDICATIONS  (STANDING):  acetaminophen 250 mG/aspirin 250 mG/caffeine 65 mG 2 Tablet(s) Oral once  amLODIPine   Tablet 5 milliGRAM(s) Oral daily  aspirin enteric coated 81 milliGRAM(s) Oral daily  atorvastatin 40 milliGRAM(s) Oral at bedtime  escitalopram 5 milliGRAM(s) Oral daily  heparin   Injectable 5000 Unit(s) SubCutaneous every 8 hours  lamoTRIgine 225 milliGRAM(s) Oral two times a day  lithium 300 milliGRAM(s) Oral daily  metoprolol succinate ER 25 milliGRAM(s) Oral at bedtime  topiramate 100 milliGRAM(s) Oral two times a day    MEDICATIONS  (PRN):  acetaminophen     Tablet .. 650 milliGRAM(s) Oral every 6 hours PRN Temp greater or equal to 38C (100.4F)  SUMAtriptan 100 milliGRAM(s) Oral every 6 hours PRN Migraine

## 2024-12-18 NOTE — DISCHARGE NOTE PROVIDER - NSCORESITESY/N_GEN_A_CORE_RD
Physical Therapy Visit    Visit Type: Daily Treatment Note  Visit: 5  Referring Provider: Kendall Briseno MD  Medical Diagnosis (from order): N81.11 - Midline cystocele  N81.6 - Rectocele     SUBJECTIVE                                                                                                               Patient reports that the prolapse seems less on the whole even though stress level has been exponentially more. She states that her fiance is sick, she had a power outage, and has been shoveling. She states that even shoveling feels better. She reports that she had some GI distress after eating food at a work function and cancelled last visit from that.    She thinks the exercises are going okay and doing more deep breathing. She reports that she's tried the exercises in sitting on the way to work in the car.      OBJECTIVE                                                                                                                                Pelvic Health  Internal Vaginal Exam  - Obturator internus:         Left: no tightness and no tenderness        Right: tightness and tenderness  - Arcus tendineus levator ani:         Left: tightness and no tenderness        Right: tightness and no tenderness  Pelvic Floor  - Strength: 2-weak squeeze, no lift  Special Testing  Biofeedback   - Pelvic floor electrode: surface emg   - Equipment: Mamayas  Lying:   - Baseline restin (normal is 2-4 microvolts)    - Pelvic floor contraction: strong 30   - Pelvic floor endurance: not assessed this date   - Pelvic floor relaxation: delayed relaxation, incomplete relaxation however improved to 2 with diaphragmatic breathing  Sitting:   - Baseline restin (normal is 2-4 microvolts)    - Pelvic floor contraction: strong, isolated 15   - Pelvic floor endurance: not assessed this date   - Pelvic floor relaxation: delayed relaxation however complete 0.5     - Training provided: verbal and visual instruction      
(EMG readings in microvolts)       Treatment     Neuromuscular Re-Education  Ischemic pressure to right obturator internus and bilateral levator ani. Patient reported decreased tenderness to palpation following, mild residual discomfort noted throughout.     Strain-counterstrain of right obturator internus. Patient reported further decreased tenderness to palpation following.    Patient instructed in pelvic floor muscle coordination program (Kegels):  - Quick contractions with strong contraction and full relaxation instructed: 1 second  Patient to perform home exercise program 3 times daily  To be performed in sitting  Emphasized importance of muscle isolation and limiting co-contraction of gluteals and maintaining normal breath control. Instructed to visualize muscles and action, to attend to sensation of the contraction and relaxation. Focus on relaxation. Not to be performed while driving.    Skilled input: verbal instruction/cues, demonstration and as detailed above    Writer verbally educated and received verbal consent for hand placement, positioning of patient, and techniques to be performed today from patient for clothing adjustments for techniques, hand placement and palpation for techniques, therapist position for techniques and modality application as described above and how they are pertinent to the patient's plan of care.  Verbal consent received today for internal pelvic floor muscle assessment and treatment.   Patient provided continued consent during evaluation and treatment.  Patient was given alternative options.  Benefits and drawbacks were explained.  Third person was offered to be in room during session, patient declined.  Home Exercise Program  Access Code: [email or web address]  URL: [email or web address]  Date: 01/10/2024  Prepared by: Lacy Cason Notes  Exhale on exertion!  Keep breathing while you do exercises.  You can do the stretch in standing or sitting.  The bridge is 
for getting your pelvis up during the day after you've been on your feet.  Keep up the great work!  Keep up on the self check ins throughout the day to help with downtraining/deactivating your pelvic floor muscles.      Exercises  - Seated Hip Adductor Stretch  - 2 x daily - 7 x weekly - 1 sets - 1-2 reps - 20-30 hold  - Supine Bridge  - 1 x daily - 7 x weekly - 1 sets - 1 reps - slow and controlled hold  - Seated Pelvic Floor Contraction  - 3 x daily - 7 x weekly - 2-3 sets - 10 reps  - Seated Lateral Pelvic Tilt on Swiss Ball  - 2 x daily - 7 x weekly - 2 sets - 10 reps - slow and controlled hold  - Seated Piriformis Stretch  - 1 x daily - 5-7 x weekly - 1-2 sets - 3 reps - 30 second hold      ASSESSMENT                                                                                                            Patient demonstrated difficulty with complete relaxation in lying during pelvic floor muscle contractions, however this improved with transition to sitting and verbal and visual instruction. Overall improvement is indicated by ability to transition to performance in sitting. Anticipate continued improvement with skilled physical therapy and adherence to home exercise program.    Education:   - Results of above outlined education: Verbalizes understanding, Demonstrates understanding and Needs reinforcement    PLAN                                                                                                                           Suggestions for next session as indicated: Progress per plan of care, internal soft tissue mobilization (patient agreeable), quick flick progression, semi-contractions       Therapy procedure time and total treatment time can be found documented on the Time Entry flowsheet    
No
20

## 2024-12-18 NOTE — DISCHARGE NOTE NURSING/CASE MANAGEMENT/SOCIAL WORK - FINANCIAL ASSISTANCE
[facility] provides services at a reduced cost to those who are determined to be eligible through [facility]’s financial assistance program. Information regarding [facility]’s financial assistance program can be found by going to [email or web address] or by calling [phone].

## 2024-12-19 LAB
CULTURE RESULTS: SIGNIFICANT CHANGE UP
CULTURE RESULTS: SIGNIFICANT CHANGE UP
SPECIMEN SOURCE: SIGNIFICANT CHANGE UP
SPECIMEN SOURCE: SIGNIFICANT CHANGE UP

## 2024-12-24 ENCOUNTER — RX RENEWAL (OUTPATIENT)
Age: 69
End: 2024-12-24

## 2024-12-26 ENCOUNTER — APPOINTMENT (OUTPATIENT)
Dept: INTERNAL MEDICINE | Facility: CLINIC | Age: 69
End: 2024-12-26

## 2024-12-27 ENCOUNTER — APPOINTMENT (OUTPATIENT)
Dept: GASTROENTEROLOGY | Facility: CLINIC | Age: 69
End: 2024-12-27

## 2025-01-02 ENCOUNTER — APPOINTMENT (OUTPATIENT)
Dept: NEUROLOGY | Facility: CLINIC | Age: 70
End: 2025-01-02
Payer: MEDICARE

## 2025-01-02 ENCOUNTER — APPOINTMENT (OUTPATIENT)
Dept: PAIN MANAGEMENT | Facility: CLINIC | Age: 70
End: 2025-01-02

## 2025-01-02 DIAGNOSIS — H53.2 DIPLOPIA: ICD-10-CM

## 2025-01-02 DIAGNOSIS — R25.1 TREMOR, UNSPECIFIED: ICD-10-CM

## 2025-01-02 PROCEDURE — 99214 OFFICE O/P EST MOD 30 MIN: CPT

## 2025-01-02 PROCEDURE — G2211 COMPLEX E/M VISIT ADD ON: CPT

## 2025-01-03 ENCOUNTER — APPOINTMENT (OUTPATIENT)
Dept: INTERNAL MEDICINE | Facility: CLINIC | Age: 70
End: 2025-01-03

## 2025-01-07 ENCOUNTER — APPOINTMENT (OUTPATIENT)
Dept: CARDIOLOGY | Facility: CLINIC | Age: 70
End: 2025-01-07

## 2025-01-23 ENCOUNTER — TRANSCRIPTION ENCOUNTER (OUTPATIENT)
Age: 70
End: 2025-01-23

## 2025-01-23 RX ORDER — IPRATROPIUM BROMIDE 21 UG/1
0.03 SPRAY NASAL TWICE DAILY
Qty: 1 | Refills: 0 | Status: ACTIVE | COMMUNITY
Start: 2025-01-23 | End: 1900-01-01

## 2025-01-27 RX ORDER — ZIPRASIDONE HYDROCHLORIDE 80 MG/1
CAPSULE ORAL
Refills: 0 | Status: ACTIVE | COMMUNITY

## 2025-01-28 ENCOUNTER — APPOINTMENT (OUTPATIENT)
Dept: CARDIOLOGY | Facility: CLINIC | Age: 70
End: 2025-01-28
Payer: MEDICARE

## 2025-01-28 ENCOUNTER — APPOINTMENT (OUTPATIENT)
Dept: NEUROLOGY | Facility: CLINIC | Age: 70
End: 2025-01-28
Payer: MEDICARE

## 2025-01-28 ENCOUNTER — NON-APPOINTMENT (OUTPATIENT)
Age: 70
End: 2025-01-28

## 2025-01-28 VITALS
SYSTOLIC BLOOD PRESSURE: 158 MMHG | HEART RATE: 68 BPM | DIASTOLIC BLOOD PRESSURE: 82 MMHG | OXYGEN SATURATION: 98 % | HEIGHT: 63 IN

## 2025-01-28 DIAGNOSIS — I10 ESSENTIAL (PRIMARY) HYPERTENSION: ICD-10-CM

## 2025-01-28 DIAGNOSIS — E78.5 HYPERLIPIDEMIA, UNSPECIFIED: ICD-10-CM

## 2025-01-28 DIAGNOSIS — I44.7 LEFT BUNDLE-BRANCH BLOCK, UNSPECIFIED: ICD-10-CM

## 2025-01-28 PROCEDURE — 95816 EEG AWAKE AND DROWSY: CPT

## 2025-01-28 PROCEDURE — 99214 OFFICE O/P EST MOD 30 MIN: CPT

## 2025-01-28 PROCEDURE — G2211 COMPLEX E/M VISIT ADD ON: CPT

## 2025-01-28 PROCEDURE — 93000 ELECTROCARDIOGRAM COMPLETE: CPT

## 2025-01-28 RX ORDER — AMLODIPINE BESYLATE 2.5 MG/1
2.5 TABLET ORAL DAILY
Qty: 30 | Refills: 1 | Status: ACTIVE | COMMUNITY
Start: 2025-01-28 | End: 1900-01-01

## 2025-01-29 ENCOUNTER — APPOINTMENT (OUTPATIENT)
Dept: NEUROLOGY | Facility: CLINIC | Age: 70
End: 2025-01-29
Payer: MEDICARE

## 2025-01-29 PROCEDURE — 95700 EEG CONT REC W/VID EEG TECH: CPT

## 2025-01-29 PROCEDURE — 95708 EEG WO VID EA 12-26HR UNMNTR: CPT

## 2025-01-29 PROCEDURE — 95719 EEG PHYS/QHP EA INCR W/O VID: CPT

## 2025-01-30 ENCOUNTER — NON-APPOINTMENT (OUTPATIENT)
Age: 70
End: 2025-01-30

## 2025-01-31 ENCOUNTER — APPOINTMENT (OUTPATIENT)
Dept: CARDIOLOGY | Facility: CLINIC | Age: 70
End: 2025-01-31

## 2025-02-03 NOTE — OCCUPATIONAL THERAPY INITIAL EVALUATION ADULT - SITTING BALANCE: DYNAMIC
74-year-old Female with h/o DM type 2, TAVR 8/2024 on Plavix , A-fib on Xarelto, CHF, HLD, PVD, b/l LE venous insufficiency, bilateral CFA stenoses s/p Rt SFA endarterectomy on 12/17/24, recently hospitalized on 1/3 with right inguinal cellulitis/ postsurgical infection s/p right SFA endarterectomy with underlying inguinal collection treated with vancomycin 750 mg IV q12h, cefepime 2 gm IV q12h and metronidazole 500 mg IV q8h, then PO abx with PO cipro was admitted on 1/22 fever to Tmax of 101.9F, Soft BP in 90s/40-50s. Patient met sepsis criteria, cultures obtained and patient received vancomycin IV and ceftriaxone, then meropenem.    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 TAVR  #Pyuria. UTI with ESBL E.coli  #PVD  #Allergy to PCN  -cultures reviewed  -on meropenem 1 gm IV q8h and caspofungin 50 mg IV qd # 11  -tolerating abx well so far; no side effects noted  -monitor closely in ej of PCN allergy history  -surgical evaluation appreciated - s/p washout --> Dr. Manjarrez felt there is no further wound infection  -local wound care  -cardiology evaluation appreciated   -change abx to ertapenem 1 gm IV qd for 31 more days to treat for probable SBE  -continue abx coverage   -repeat BC x 2 noted   -plan for longer term IV abx therapy  -will need PICC line and outpatient abx therapy for 31 more days  -f/u cultures  -monitor temps  -f/u CBC  -supportive care  2. Other issues:   -care per medicine    d/w medicine team and Dr. Colin    The patient may need prolonged IV antimicrobial therapy      good balance

## 2025-02-04 ENCOUNTER — APPOINTMENT (OUTPATIENT)
Dept: PAIN MANAGEMENT | Facility: CLINIC | Age: 70
End: 2025-02-04
Payer: MEDICARE

## 2025-02-04 DIAGNOSIS — M54.17 RADICULOPATHY, LUMBOSACRAL REGION: ICD-10-CM

## 2025-02-04 PROCEDURE — 64483 NJX AA&/STRD TFRM EPI L/S 1: CPT | Mod: 50

## 2025-02-04 RX ORDER — OXYCODONE AND ACETAMINOPHEN 5; 325 MG/1; MG/1
5-325 TABLET ORAL
Qty: 16 | Refills: 0 | Status: ACTIVE | COMMUNITY
Start: 2025-02-04 | End: 1900-01-01

## 2025-02-11 ENCOUNTER — APPOINTMENT (OUTPATIENT)
Dept: ORTHOPEDIC SURGERY | Facility: CLINIC | Age: 70
End: 2025-02-11
Payer: MEDICARE

## 2025-02-11 DIAGNOSIS — M51.379 OTH INTVRT DISC DEGEN, LUMBOSACR W/O LUM BCK OR LW EXTRM PN: ICD-10-CM

## 2025-02-11 DIAGNOSIS — M43.16 SPONDYLOLISTHESIS, LUMBAR REGION: ICD-10-CM

## 2025-02-11 DIAGNOSIS — M48.062 SPINAL STENOSIS, LUMBAR REGION WITH NEUROGENIC CLAUDICATION: ICD-10-CM

## 2025-02-11 DIAGNOSIS — Z96.642 PRESENCE OF LEFT ARTIFICIAL HIP JOINT: ICD-10-CM

## 2025-02-11 PROCEDURE — 99214 OFFICE O/P EST MOD 30 MIN: CPT

## 2025-02-11 PROCEDURE — 73503 X-RAY EXAM HIP UNI 4/> VIEWS: CPT | Mod: LT

## 2025-02-11 RX ORDER — OXYCODONE AND ACETAMINOPHEN 5; 325 MG/1; MG/1
5-325 TABLET ORAL
Qty: 30 | Refills: 0 | Status: ACTIVE | COMMUNITY
Start: 2025-02-11 | End: 1900-01-01

## 2025-02-11 RX ORDER — METHYLPREDNISOLONE 4 MG/1
4 TABLET ORAL
Qty: 1 | Refills: 1 | Status: ACTIVE | COMMUNITY
Start: 2025-02-11 | End: 1900-01-01

## 2025-02-13 ENCOUNTER — APPOINTMENT (OUTPATIENT)
Dept: CARDIOLOGY | Facility: CLINIC | Age: 70
End: 2025-02-13

## 2025-02-20 ENCOUNTER — NON-APPOINTMENT (OUTPATIENT)
Age: 70
End: 2025-02-20

## 2025-02-24 ENCOUNTER — NON-APPOINTMENT (OUTPATIENT)
Age: 70
End: 2025-02-24

## 2025-02-25 ENCOUNTER — NON-APPOINTMENT (OUTPATIENT)
Age: 70
End: 2025-02-25

## 2025-02-25 ENCOUNTER — APPOINTMENT (OUTPATIENT)
Dept: ORTHOPEDIC SURGERY | Facility: CLINIC | Age: 70
End: 2025-02-25

## 2025-02-27 ENCOUNTER — APPOINTMENT (OUTPATIENT)
Dept: PAIN MANAGEMENT | Facility: CLINIC | Age: 70
End: 2025-02-27
Payer: MEDICARE

## 2025-02-27 VITALS — WEIGHT: 118 LBS | BODY MASS INDEX: 20.91 KG/M2 | HEIGHT: 63 IN

## 2025-02-27 DIAGNOSIS — M54.50 LOW BACK PAIN, UNSPECIFIED: ICD-10-CM

## 2025-02-27 PROCEDURE — 99214 OFFICE O/P EST MOD 30 MIN: CPT

## 2025-03-06 ENCOUNTER — APPOINTMENT (OUTPATIENT)
Dept: PAIN MANAGEMENT | Facility: CLINIC | Age: 70
End: 2025-03-06
Payer: MEDICARE

## 2025-03-06 DIAGNOSIS — M54.17 RADICULOPATHY, LUMBOSACRAL REGION: ICD-10-CM

## 2025-03-06 PROCEDURE — 62323 NJX INTERLAMINAR LMBR/SAC: CPT

## 2025-03-17 NOTE — PROGRESS NOTE ADULT - SUBJECTIVE AND OBJECTIVE BOX
Patient's daughter Mallory FELIX would like a call back with xrays taken on 3/14/25. Please advise    Patient is a 69y old  Female who presents with a chief complaint of AMS (15 Dec 2024 10:32)      INTERVAL HPI/OVERNIGHT EVENTS:  Overnight ---  Pt was seen today at bedside, no acute complaints. Denies fever, chills, abdominal pain, cough , sob, chest pain.     MEDICATIONS  (STANDING):  amLODIPine   Tablet 5 milliGRAM(s) Oral daily  aspirin enteric coated 81 milliGRAM(s) Oral daily  atorvastatin 40 milliGRAM(s) Oral at bedtime  escitalopram 5 milliGRAM(s) Oral daily  heparin   Injectable 5000 Unit(s) SubCutaneous every 8 hours  lamoTRIgine 225 milliGRAM(s) Oral two times a day  lithium 300 milliGRAM(s) Oral daily  metoprolol succinate ER 25 milliGRAM(s) Oral at bedtime  topiramate 100 milliGRAM(s) Oral two times a day    MEDICATIONS  (PRN):  acetaminophen     Tablet .. 650 milliGRAM(s) Oral every 6 hours PRN Temp greater or equal to 38C (100.4F)  SUMAtriptan 100 milliGRAM(s) Oral every 6 hours PRN Migraine      Allergies    sulfa drugs (Rash)  penicillins (Rash)    Intolerances        REVIEW OF SYSTEMS:  CONSTITUTIONAL: No fever, weight loss, or fatigue  EYES: No eye pain, visual disturbances, or discharge  ENMT:  No difficulty hearing, tinnitus, vertigo; No sinus or throat pain  NECK: No pain or stiffness  RESPIRATORY: No cough, wheezing, chills or hemoptysis; No shortness of breath  CARDIOVASCULAR: No chest pain, palpitations, lightheadedness, or leg swelling  GASTROINTESTINAL: No abdominal or epigastric pain. No nausea, vomiting, or hematemesis; No diarrhea or constipation. No melena or hematochezia.  GENITOURINARY: No dysuria, frequency, hematuria, or incontinence  NEUROLOGICAL: No headaches, memory loss, vertigo, loss of strength, numbness, or tremors  SKIN: No itching, burning, rashes, or lesions   MUSCULOSKELETAL: No joint pain or swelling; No muscle, back, or extremity pain  PSYCHIATRIC: No depression, anxiety, or mood swings        Vital Signs Last 24 Hrs  T(C): 36.8 (16 Dec 2024 07:42), Max: 38.1 (15 Dec 2024 14:11)  T(F): 98.3 (16 Dec 2024 07:42), Max: 100.5 (15 Dec 2024 14:11)  HR: 88 (16 Dec 2024 07:42) (63 - 104)  BP: 149/60 (16 Dec 2024 07:42) (149/60 - 185/73)  BP(mean): --  RR: 18 (16 Dec 2024 07:42) (18 - 20)  SpO2: 97% (16 Dec 2024 07:42) (97% - 98%)    Parameters below as of 16 Dec 2024 04:15  Patient On (Oxygen Delivery Method): room air        PHYSICAL EXAM:  GENERAL: NAD, well-groomed, well-developed  HEAD:  Atraumatic, Normocephalic  EYES: EOMI, PERRLA, conjunctiva and sclera clear  ENMT: Moist mucous membranes,   NECK: Supple, No JVD, Normal thyroid  NERVOUS SYSTEM:  Alert & Oriented X 3  CHEST/LUNG: Clear to auscultation bilaterally; No rales, rhonchi, wheezing, or rubs  HEART: Regular rate and rhythm; No murmurs, rubs, or gallops  ABDOMEN: Soft, Nontender, Nondistended; Bowel sounds present  EXTREMITIES:  2+ Peripheral Pulses, No clubbing, cyanosis, or edema  SKIN: No rashes or lesions    LABS:      Ca    10.0       15 Dec 2024 06:00        Urinalysis Basic - ( 15 Dec 2024 06:00 )    Color: x / Appearance: x / SG: x / pH: x  Gluc: 118 mg/dL / Ketone: x  / Bili: x / Urobili: x   Blood: x / Protein: x / Nitrite: x   Leuk Esterase: x / RBC: x / WBC x   Sq Epi: x / Non Sq Epi: x / Bacteria: x      CAPILLARY BLOOD GLUCOSE          RADIOLOGY & ADDITIONAL TESTS:    Imaging Personally Reviewed:      EKG:    Patient is a 69y old  Female who presents with a chief complaint of AMS (15 Dec 2024 10:32)      INTERVAL HPI/OVERNIGHT EVENTS:  Overnight nothing significant happened.   Pt was seen today at bedside, no acute complaints. Denies fever, chills, abdominal pain, cough , sob, chest pain.     MEDICATIONS  (STANDING):  amLODIPine   Tablet 5 milliGRAM(s) Oral daily  aspirin enteric coated 81 milliGRAM(s) Oral daily  atorvastatin 40 milliGRAM(s) Oral at bedtime  escitalopram 5 milliGRAM(s) Oral daily  heparin   Injectable 5000 Unit(s) SubCutaneous every 8 hours  lamoTRIgine 225 milliGRAM(s) Oral two times a day  lithium 300 milliGRAM(s) Oral daily  metoprolol succinate ER 25 milliGRAM(s) Oral at bedtime  topiramate 100 milliGRAM(s) Oral two times a day    MEDICATIONS  (PRN):  acetaminophen     Tablet .. 650 milliGRAM(s) Oral every 6 hours PRN Temp greater or equal to 38C (100.4F)  SUMAtriptan 100 milliGRAM(s) Oral every 6 hours PRN Migraine      Allergies    sulfa drugs (Rash)  penicillins (Rash)    Intolerances        REVIEW OF SYSTEMS:  CONSTITUTIONAL: No fever, weight loss, or fatigue  EYES: No eye pain, visual disturbances, or discharge  ENMT:  No difficulty hearing, tinnitus, vertigo; No sinus or throat pain  NECK: No pain or stiffness  RESPIRATORY: No cough, wheezing, chills or hemoptysis; No shortness of breath  CARDIOVASCULAR: No chest pain, palpitations, lightheadedness, or leg swelling  GASTROINTESTINAL: No abdominal or epigastric pain. No nausea, vomiting, or hematemesis; No diarrhea or constipation. No melena or hematochezia.  GENITOURINARY: No dysuria, frequency, hematuria, or incontinence  NEUROLOGICAL: No headaches, memory loss, vertigo, loss of strength, numbness, or tremors  SKIN: No itching, burning, rashes, or lesions   MUSCULOSKELETAL: No joint pain or swelling; No muscle, back, or extremity pain  PSYCHIATRIC: No depression, anxiety, or mood swings        Vital Signs Last 24 Hrs  T(C): 36.8 (16 Dec 2024 07:42), Max: 38.1 (15 Dec 2024 14:11)  T(F): 98.3 (16 Dec 2024 07:42), Max: 100.5 (15 Dec 2024 14:11)  HR: 88 (16 Dec 2024 07:42) (63 - 104)  BP: 149/60 (16 Dec 2024 07:42) (149/60 - 185/73)  BP(mean): --  RR: 18 (16 Dec 2024 07:42) (18 - 20)  SpO2: 97% (16 Dec 2024 07:42) (97% - 98%)    Parameters below as of 16 Dec 2024 04:15  Patient On (Oxygen Delivery Method): room air        PHYSICAL EXAM:  GENERAL: NAD, well-groomed, well-developed  HEAD:  Atraumatic, Normocephalic  EYES: EOMI, PERRLA, conjunctiva and sclera clear  ENMT: Moist mucous membranes,   NECK: Supple, No JVD, Normal thyroid  NERVOUS SYSTEM:  Alert & Oriented X 1, speech difficulty,  tremors of the hands and legs , no motor weakness  CHEST/LUNG: Clear to auscultation bilaterally; No rales, rhonchi, wheezing, or rubs  HEART: Regular rate and rhythm; No murmurs, rubs, or gallops  ABDOMEN: Soft, Nontender, Nondistended; Bowel sounds present  EXTREMITIES:  2+ Peripheral Pulses, No clubbing, cyanosis, or edema  Psych: Calm, cooperative       LABS:      Ca    10.0       15 Dec 2024 06:00        Urinalysis Basic - ( 15 Dec 2024 06:00 )    Color: x / Appearance: x / SG: x / pH: x  Gluc: 118 mg/dL / Ketone: x  / Bili: x / Urobili: x   Blood: x / Protein: x / Nitrite: x   Leuk Esterase: x / RBC: x / WBC x   Sq Epi: x / Non Sq Epi: x / Bacteria: x

## 2025-03-24 ENCOUNTER — APPOINTMENT (OUTPATIENT)
Dept: ORTHOPEDIC SURGERY | Facility: CLINIC | Age: 70
End: 2025-03-24
Payer: MEDICARE

## 2025-03-24 DIAGNOSIS — M48.062 SPINAL STENOSIS, LUMBAR REGION WITH NEUROGENIC CLAUDICATION: ICD-10-CM

## 2025-03-24 DIAGNOSIS — M43.16 SPONDYLOLISTHESIS, LUMBAR REGION: ICD-10-CM

## 2025-03-24 PROCEDURE — 72170 X-RAY EXAM OF PELVIS: CPT

## 2025-03-24 PROCEDURE — 72110 X-RAY EXAM L-2 SPINE 4/>VWS: CPT

## 2025-03-24 PROCEDURE — 99215 OFFICE O/P EST HI 40 MIN: CPT

## 2025-03-27 ENCOUNTER — APPOINTMENT (OUTPATIENT)
Dept: PAIN MANAGEMENT | Facility: CLINIC | Age: 70
End: 2025-03-27

## 2025-03-31 ENCOUNTER — RESULT REVIEW (OUTPATIENT)
Age: 70
End: 2025-03-31

## 2025-04-02 ENCOUNTER — APPOINTMENT (OUTPATIENT)
Facility: CLINIC | Age: 70
End: 2025-04-02
Payer: MEDICARE

## 2025-04-02 DIAGNOSIS — K86.1 OTHER CHRONIC PANCREATITIS: ICD-10-CM

## 2025-04-02 DIAGNOSIS — R14.0 ABDOMINAL DISTENSION (GASEOUS): ICD-10-CM

## 2025-04-02 DIAGNOSIS — R79.9 ABNORMAL FINDING OF BLOOD CHEMISTRY, UNSPECIFIED: ICD-10-CM

## 2025-04-02 PROCEDURE — 99214 OFFICE O/P EST MOD 30 MIN: CPT | Mod: 2W

## 2025-04-07 NOTE — OCCUPATIONAL THERAPY INITIAL EVALUATION ADULT - PRECAUTIONS/LIMITATIONS, REHAB EVAL
Pt's wife called stating they went for MRI this morning but were unable to complete. She states his pacemaker battery needs to be replaced before he can have test done. Will reschedule once that is completed  
fall precautions

## 2025-04-15 ENCOUNTER — RX RENEWAL (OUTPATIENT)
Age: 70
End: 2025-04-15

## 2025-04-15 ENCOUNTER — APPOINTMENT (OUTPATIENT)
Dept: ORTHOPEDIC SURGERY | Facility: CLINIC | Age: 70
End: 2025-04-15
Payer: MEDICARE

## 2025-04-15 DIAGNOSIS — M51.379 OTH INTVRT DISC DEGEN, LUMBOSACR W/O LUM BCK OR LW EXTRM PN: ICD-10-CM

## 2025-04-15 DIAGNOSIS — Z96.642 PRESENCE OF LEFT ARTIFICIAL HIP JOINT: ICD-10-CM

## 2025-04-15 DIAGNOSIS — M70.72 OTHER BURSITIS OF HIP, LEFT HIP: ICD-10-CM

## 2025-04-15 PROCEDURE — 99213 OFFICE O/P EST LOW 20 MIN: CPT

## 2025-04-16 ENCOUNTER — APPOINTMENT (OUTPATIENT)
Dept: ORTHOPEDIC SURGERY | Facility: CLINIC | Age: 70
End: 2025-04-16
Payer: MEDICARE

## 2025-04-16 VITALS — HEIGHT: 63 IN | WEIGHT: 118 LBS | BODY MASS INDEX: 20.91 KG/M2

## 2025-04-16 DIAGNOSIS — M43.16 SPONDYLOLISTHESIS, LUMBAR REGION: ICD-10-CM

## 2025-04-16 DIAGNOSIS — M48.062 SPINAL STENOSIS, LUMBAR REGION WITH NEUROGENIC CLAUDICATION: ICD-10-CM

## 2025-04-16 PROCEDURE — 99214 OFFICE O/P EST MOD 30 MIN: CPT
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
## 2025-04-18 ENCOUNTER — NON-APPOINTMENT (OUTPATIENT)
Age: 70
End: 2025-04-18

## 2025-04-18 ENCOUNTER — APPOINTMENT (OUTPATIENT)
Dept: CARDIOLOGY | Facility: CLINIC | Age: 70
End: 2025-04-18
Payer: MEDICARE

## 2025-04-18 VITALS
OXYGEN SATURATION: 94 % | BODY MASS INDEX: 24.63 KG/M2 | DIASTOLIC BLOOD PRESSURE: 78 MMHG | HEIGHT: 63 IN | SYSTOLIC BLOOD PRESSURE: 120 MMHG | HEART RATE: 64 BPM | WEIGHT: 139 LBS

## 2025-04-18 DIAGNOSIS — I44.7 LEFT BUNDLE-BRANCH BLOCK, UNSPECIFIED: ICD-10-CM

## 2025-04-18 DIAGNOSIS — Z01.810 ENCOUNTER FOR PREPROCEDURAL CARDIOVASCULAR EXAMINATION: ICD-10-CM

## 2025-04-18 DIAGNOSIS — R94.31 ABNORMAL ELECTROCARDIOGRAM [ECG] [EKG]: ICD-10-CM

## 2025-04-18 DIAGNOSIS — I10 ESSENTIAL (PRIMARY) HYPERTENSION: ICD-10-CM

## 2025-04-18 PROCEDURE — G2211 COMPLEX E/M VISIT ADD ON: CPT

## 2025-04-18 PROCEDURE — 99214 OFFICE O/P EST MOD 30 MIN: CPT

## 2025-04-18 PROCEDURE — 93000 ELECTROCARDIOGRAM COMPLETE: CPT | Mod: NC

## 2025-04-25 RX ORDER — OXYCODONE AND ACETAMINOPHEN 5; 325 MG/1; MG/1
5-325 TABLET ORAL EVERY 6 HOURS
Qty: 30 | Refills: 0 | Status: ACTIVE | COMMUNITY
Start: 2025-04-15 | End: 1900-01-01

## 2025-04-30 ENCOUNTER — OUTPATIENT (OUTPATIENT)
Dept: OUTPATIENT SERVICES | Facility: HOSPITAL | Age: 70
LOS: 1 days | End: 2025-04-30

## 2025-04-30 VITALS
DIASTOLIC BLOOD PRESSURE: 78 MMHG | SYSTOLIC BLOOD PRESSURE: 117 MMHG | OXYGEN SATURATION: 99 % | TEMPERATURE: 98 F | WEIGHT: 132.06 LBS | HEIGHT: 62 IN | HEART RATE: 63 BPM | RESPIRATION RATE: 18 BRPM

## 2025-04-30 DIAGNOSIS — F31.9 BIPOLAR DISORDER, UNSPECIFIED: ICD-10-CM

## 2025-04-30 DIAGNOSIS — M54.50 LOW BACK PAIN, UNSPECIFIED: ICD-10-CM

## 2025-04-30 DIAGNOSIS — I10 ESSENTIAL (PRIMARY) HYPERTENSION: ICD-10-CM

## 2025-04-30 DIAGNOSIS — Z96.642 PRESENCE OF LEFT ARTIFICIAL HIP JOINT: Chronic | ICD-10-CM

## 2025-04-30 DIAGNOSIS — Z98.89 OTHER SPECIFIED POSTPROCEDURAL STATES: Chronic | ICD-10-CM

## 2025-04-30 DIAGNOSIS — M54.17 RADICULOPATHY, LUMBOSACRAL REGION: ICD-10-CM

## 2025-04-30 DIAGNOSIS — Z90.49 ACQUIRED ABSENCE OF OTHER SPECIFIED PARTS OF DIGESTIVE TRACT: Chronic | ICD-10-CM

## 2025-04-30 LAB
A1C WITH ESTIMATED AVERAGE GLUCOSE RESULT: 5.1 % — SIGNIFICANT CHANGE UP (ref 4–5.6)
ANION GAP SERPL CALC-SCNC: 13 MMOL/L — SIGNIFICANT CHANGE UP (ref 7–14)
BASOPHILS # BLD AUTO: 0.07 K/UL — SIGNIFICANT CHANGE UP (ref 0–0.2)
BASOPHILS NFR BLD AUTO: 0.8 % — SIGNIFICANT CHANGE UP (ref 0–2)
BLD GP AB SCN SERPL QL: NEGATIVE — SIGNIFICANT CHANGE UP
BUN SERPL-MCNC: 18 MG/DL — SIGNIFICANT CHANGE UP (ref 7–23)
CALCIUM SERPL-MCNC: 9.4 MG/DL — SIGNIFICANT CHANGE UP (ref 8.4–10.5)
CHLORIDE SERPL-SCNC: 103 MMOL/L — SIGNIFICANT CHANGE UP (ref 98–107)
CO2 SERPL-SCNC: 21 MMOL/L — LOW (ref 22–31)
CREAT SERPL-MCNC: 1.29 MG/DL — SIGNIFICANT CHANGE UP (ref 0.5–1.3)
EGFR: 45 ML/MIN/1.73M2 — LOW
EGFR: 45 ML/MIN/1.73M2 — LOW
EOSINOPHIL # BLD AUTO: 0.16 K/UL — SIGNIFICANT CHANGE UP (ref 0–0.5)
EOSINOPHIL NFR BLD AUTO: 1.9 % — SIGNIFICANT CHANGE UP (ref 0–6)
ESTIMATED AVERAGE GLUCOSE: 100 — SIGNIFICANT CHANGE UP
GLUCOSE SERPL-MCNC: 104 MG/DL — HIGH (ref 70–99)
HCT VFR BLD CALC: 40 % — SIGNIFICANT CHANGE UP (ref 34.5–45)
HGB BLD-MCNC: 12.6 G/DL — SIGNIFICANT CHANGE UP (ref 11.5–15.5)
IMM GRANULOCYTES NFR BLD AUTO: 0.2 % — SIGNIFICANT CHANGE UP (ref 0–0.9)
LYMPHOCYTES # BLD AUTO: 1.61 K/UL — SIGNIFICANT CHANGE UP (ref 1–3.3)
LYMPHOCYTES # BLD AUTO: 19.5 % — SIGNIFICANT CHANGE UP (ref 13–44)
MCHC RBC-ENTMCNC: 29.6 PG — SIGNIFICANT CHANGE UP (ref 27–34)
MCHC RBC-ENTMCNC: 31.5 G/DL — LOW (ref 32–36)
MCV RBC AUTO: 94.1 FL — SIGNIFICANT CHANGE UP (ref 80–100)
MONOCYTES # BLD AUTO: 0.64 K/UL — SIGNIFICANT CHANGE UP (ref 0–0.9)
MONOCYTES NFR BLD AUTO: 7.8 % — SIGNIFICANT CHANGE UP (ref 2–14)
NEUTROPHILS # BLD AUTO: 5.74 K/UL — SIGNIFICANT CHANGE UP (ref 1.8–7.4)
NEUTROPHILS NFR BLD AUTO: 69.8 % — SIGNIFICANT CHANGE UP (ref 43–77)
PLATELET # BLD AUTO: 240 K/UL — SIGNIFICANT CHANGE UP (ref 150–400)
POTASSIUM SERPL-MCNC: 4.4 MMOL/L — SIGNIFICANT CHANGE UP (ref 3.5–5.3)
POTASSIUM SERPL-SCNC: 4.4 MMOL/L — SIGNIFICANT CHANGE UP (ref 3.5–5.3)
RBC # BLD: 4.25 M/UL — SIGNIFICANT CHANGE UP (ref 3.8–5.2)
RBC # FLD: 13.4 % — SIGNIFICANT CHANGE UP (ref 10.3–14.5)
RH IG SCN BLD-IMP: POSITIVE — SIGNIFICANT CHANGE UP
SODIUM SERPL-SCNC: 137 MMOL/L — SIGNIFICANT CHANGE UP (ref 135–145)
WBC # BLD: 8.24 K/UL — SIGNIFICANT CHANGE UP (ref 3.8–10.5)
WBC # FLD AUTO: 8.24 K/UL — SIGNIFICANT CHANGE UP (ref 3.8–10.5)

## 2025-04-30 RX ORDER — ZIPRASIDONE HYDROCHLORIDE 40 MG/1
1 CAPSULE ORAL
Refills: 0 | DISCHARGE

## 2025-04-30 NOTE — H&P PST ADULT - HISTORY OF PRESENT ILLNESS
69-year-old woman with a history of remote cardiomyopathy (at age 52) with recovery/normalization of LV systolic function, left bundle branch block, hypertension, hypertriglyceridemia, and psychiatric illness (bipolar affective, ) presents for preop evaluation with c/o bilateral leg pain after sustaining fall July 2024.  Patient reports pains is worse in the "posterior thigh and left foot numbness".  Patient was evaluated and sent for MRI and patient was dx with         She was hospitalized in mid-January 2024 ([place]) with neurologic symptoms, including word finding difficulties, visual disturbance, gait instability, and problems with memory. Brain imaging with MRI revealed no acute findings. CVA, TIA, and migraine were included in the differential diagnosis. As per patient " levels of Ziprasidone was low which cause the altered mental status".   ? 69-year-old woman with a history of remote cardiomyopathy (at age 52) with recovery/normalization of LV systolic function, left bundle branch block, hypertension, hypertriglyceridemia, and psychiatric illness (bipolar affective, ) presents for preop evaluation with c/o bilateral leg pain after sustaining fall July 2024.  Patient reports pains is worse in the "posterior thigh and left foot numbness".  Patient was evaluated and sent for MRI and patient was dx with spondylolisthesis lumbar region, radiculopathy lumbosacral region, spinal stenosis.  Patient is now schedule for Laminectomy Fusion L4-5 on 05/08/25.    Of note patient was hospitalized December 2024  ([place]) with neurologic symptoms, including word finding difficulties, visual disturbance, gait instability, and problems with memory. Brain imaging with MRI revealed no acute findings ([employer]). CVA, TIA, and migraine were included in the differential diagnosis. As per patient " levels of Ziprasidone was low which cause the altered mental status".   ?

## 2025-04-30 NOTE — H&P PST ADULT - NS PRO PASSIVE SMOKE EXP
LOW SALT FOR BLOOD PRESSURE CONTROL. LOW CARBOHYDRATE FOR BLOOD SUGAR AND WEIGHT CONTROL. LOW FAT DIET FOR CHOLESTEROL CONTROL. DRINK ENOUGH FLUIDS FOR BETTER KIDNEY FUNCTION. TAKE PRINIVIL 2.5 MG. TOPROL XL 25 MG., IMDUR  30 MG. 1 TAB. AND ASA 81 MG. DAILY  FOR BLOOD PRESSURE AND HEART PROBLEM  CONTROL. OK TO STOP PLAVIX. TAKE LIPITOR 10 MG. DAILY    FOR CHOLESTEROL CONTROL. [facility] REGULAR WALKING ADVISED. RETURN COLOGUARD  KIT WITH STOOL SAMPLE SMEAR. FASTING FOR LAB WORK PRIOR TO NEXT VISIT. INJECTION FLU VACCINE GIVEN TODAY. CALL FOR  ANY ADVERSE REACTION. NEXT APPOINTMENT IN 3 MONTHS. No

## 2025-04-30 NOTE — H&P PST ADULT - NSANTHOBSERVEDRD_ENT_A_CORE
62F w/ active endometrial cancer and anxiety who presented w/ SOB, epigastric pain, nausea, vomiting, polyuria, and polydipsia and subsequently admitted to ICU for DKA.     #DKA  - secondary to Keytruda  - AG closed  - pt alert and oriented and will tolerate PO, will transition to SQ insulin 10 units w/ ISS  - diabetic diet, diabetic education    #Hypokalemia and hypophosphatemia  - continue to monitor and replete as necessary     #Endometrial cancer  - next Keytruda was planned for 10/11  - discussed w/ Dr. Greer     #DVT PPx  - lovenox SQ    #Advanced directives: full code    #Dispo: D/c home in next 24-48 hours after extensive diabetic education, will need supplies 62F w/ active endometrial cancer and anxiety who presented w/ SOB, epigastric pain, nausea, vomiting, polyuria, and polydipsia and subsequently admitted to ICU for DKA.     #DKA on admission  - secondary to Keytruda  - AG closed  - pt alert and oriented and will tolerate PO, will transition to SQ insulin 10 units w/ ISS  - diabetic diet, diabetic education    #KATHRYN  - likely 2/2 DKA  - now resolved    #Hypokalemia and hypophosphatemia  - continue to monitor and replete as necessary     #Endometrial cancer  - next Keytruda was planned for 10/11  - discussed w/ Dr. Greer     #DVT PPx  - lovenox SQ    #Advanced directives: full code    #Dispo: D/c home in next 24-48 hours after extensive diabetic education, will need supplies No

## 2025-04-30 NOTE — H&P PST ADULT - NSANTHOSAYNRD_GEN_A_CORE
No. DALTON screening performed.  STOP BANG Legend: 0-2 = LOW Risk; 3-4 = INTERMEDIATE Risk; 5-8 = HIGH Risk

## 2025-04-30 NOTE — H&P PST ADULT - CARDIOVASCULAR COMMENTS
hx HTN and HLD as per cardio note remote hx of cardiomyopathy (at age 52) with recovery/normalization of LV systolic function, left bundle branch block, denies MI

## 2025-04-30 NOTE — H&P PST ADULT - LAST ECHOCARDIOGRAM
1/12/2024. Normal left ventricular size and systolic function with ejection fraction 55%. Mild diastolic dysfunction. Moderate left ventricular hypertrophy. Normal right ventricular size and function. Mild mitral regurgitation. Mild pulmonic regurgitation. Trace tricuspid regurgitation

## 2025-04-30 NOTE — H&P PST ADULT - FUNCTIONAL STATUS
METs =4.64 activities limited since fall July 2024 but walks around with walker, and climb stairs holding to the rails, do laundry etc/4-10 METS

## 2025-04-30 NOTE — H&P PST ADULT - NS PRO FEM REPRO HEALTH SCREEN
Call placed again to [facility]. Mother states that patient has had no result with etodolac. Reports that patient has been struggling with controlling migraines when she gets them. Requesting new rescue medication for her to take with her to [place]. Reports that the pain was not touched by medications currently prescribed on several occasions since prescribing last.    mammogram

## 2025-04-30 NOTE — H&P PST ADULT - NEGATIVE ENMT SYMPTOMS
no hearing difficulty/no ear pain/no vertigo/no sinus symptoms/no nose bleeds/no recurrent cold sores/no gum bleeding

## 2025-04-30 NOTE — H&P PST ADULT - NSICDXPASTSURGICALHX_GEN_ALL_CORE_FT
PAST SURGICAL HISTORY:  History of left hip replacement     History of tonsillectomy at 18 yrs old    S/P laparoscopic cholecystectomy

## 2025-04-30 NOTE — H&P PST ADULT - NSICDXPASTMEDICALHX_GEN_ALL_CORE_FT
PAST MEDICAL HISTORY:  Avascular necrosis of bone of hip, left     Bipolar 1 disorder     Bipolar disorder last episode of suicidal thoughts was 1.5 yrs ago.  Pt also self mutilate last was "many yrs ago maybe 20 yrs ago".    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

## 2025-04-30 NOTE — H&P PST ADULT - ATTENDING COMMENTS
Patient seen and examined in preoperative holding area.  Symptoms are stable. Low back pain with bilateral lower extremity radiculopathy and neurogenic claudication.  Treated with conservative management including PT, NSAIDS, KENYON without relief. Imaging  L4-5 spondylolisthesis and severe central stenosis. Discussion was had regarding further treatment options including continued conservative care vs surgical decompression and fusion of L4-5.  On preop evaluation found to have osteoporosis. Discussed option of delaying surgery to increase bone density utilizing medications with patient and family vs surgical decompression and fusion at this time. Given her severe pain patient is unable to wait long enough to allow for significant effects of medications to increase bone density. Discussion with patient at length in the office and again in the preoperative holding area of risks and benefits including but not limited to bleeding, infection, damage to surrounding structures, durotomy, csf leak, iatrogenic instability, recurrent herniation, need for revision procedure, persistent or new numbness, persistent or new weakness, persistent or new radicular pain, hardware failure, pseudoarthrosis, hardware malposition. All questions were answered with verbalized understanding. Informed consent was obtained.  Will proceed with planned procedure L4-5 Laminectomy and Fusion with instrumentation.

## 2025-04-30 NOTE — H&P PST ADULT - PROBLEM SELECTOR PLAN 1
Scheduled for Laminectomy Fusion L4-5 on 05/08/25.  Preop instructions provided and patient verbalizes understanding.  Labs done and results pending.  Famotidine provided with instructions. Hibiclens provided with instructions and was signed by patient. Teach-back method was utilized to assess patient's understanding. Patient verbalized understanding. Scheduled for Laminectomy Fusion L4-5 on 05/08/25.  Preop instructions provided and patient verbalizes understanding.  Labs done and results pending.  Famotidine provided with instructions. Hibiclens provided with instructions and was signed by patient. Teach-back method was utilized to assess patient's understanding. Patient verbalized understanding.  Spine ERP instructions reinforced with patient.

## 2025-04-30 NOTE — H&P PST ADULT - PRIMARY CARE PROVIDER
dr. juan murdock  pmd  [phone]                                                  dr. katt humphreys   cardio

## 2025-05-01 LAB
MRSA PCR RESULT.: SIGNIFICANT CHANGE UP
S AUREUS DNA NOSE QL NAA+PROBE: SIGNIFICANT CHANGE UP

## 2025-05-07 NOTE — ASU PATIENT PROFILE, ADULT - FALL HARM RISK - RISK INTERVENTIONS
Assistance OOB with selected safe patient handling equipment/Assistance with ambulation/Communicate Fall Risk and Risk Factors to all staff, patient, and family/Discuss with provider need for PT consult/Monitor gait and stability/Reinforce activity limits and safety measures with patient and family/Visual Cue: Yellow wristband/Bed in lowest position, wheels locked, appropriate side rails in place/Call bell, personal items and telephone in reach/Instruct patient to call for assistance before getting out of bed or chair/Non-slip footwear when patient is out of bed/[place] to call system/Physically safe environment - no spills, clutter or unnecessary equipment/Purposeful Proactive Rounding/Room/bathroom lighting operational, light cord in reach Assistance OOB with selected safe patient handling equipment/Assistance with ambulation/Communicate Fall Risk and Risk Factors to all staff, patient, and family/Discuss with provider need for PT consult/Monitor gait and stability/Provide patient with walking aids - walker, cane, crutches/Reinforce activity limits and safety measures with patient and family/Visual Cue: Yellow wristband/Bed in lowest position, wheels locked, appropriate side rails in place/Call bell, personal items and telephone in reach/Instruct patient to call for assistance before getting out of bed or chair/Non-slip footwear when patient is out of bed/[place] to call system/Physically safe environment - no spills, clutter or unnecessary equipment/Purposeful Proactive Rounding/Room/bathroom lighting operational, light cord in reach

## 2025-05-07 NOTE — ASU PATIENT PROFILE, ADULT - DOES PATIENT HAVE ADVANCE DIRECTIVE
IP Sepsis Screen (most recent)       Sepsis Screen (IP) - 01/24/24 1123       Is the patient's history or complaint suggestive of a possible infection? Yes  -CB    Are there at least two of the following signs and symptoms present? Yes  -CB    Sepsis signs/symptoms - Tachypnea Tachypnea     >20  -CB    Sepsis signs/symptoms - WBC WBC < 4,000 or WBC > 12,000  -CB    Are any of the following organ dysfunction criteria present and not considered to be due to a chronic condition? Yes  -CB    Organ Dysfunction Criteria - Resp Comp Respiratory Compromise: Requiring > 5L NC  -CB    Initiate Sepsis Protocol No  -CB    Reason sepsis not considered Pt. receiving appropriate management  -CB              User Key  (r) = Recorded By, (t) = Taken By, (c) = Cosigned By      Initials Name    Lamar Suárez RN                    Yes

## 2025-05-08 ENCOUNTER — TRANSCRIPTION ENCOUNTER (OUTPATIENT)
Age: 70
End: 2025-05-08

## 2025-05-08 ENCOUNTER — APPOINTMENT (OUTPATIENT)
Dept: ORTHOPEDIC SURGERY | Facility: HOSPITAL | Age: 70
End: 2025-05-08
Payer: MEDICARE

## 2025-05-08 ENCOUNTER — INPATIENT (INPATIENT)
Facility: HOSPITAL | Age: 70
LOS: 4 days | Discharge: NURSING, REHAB OR LONG-TERM CARE | End: 2025-05-13
Attending: STUDENT IN AN ORGANIZED HEALTH CARE EDUCATION/TRAINING PROGRAM | Admitting: STUDENT IN AN ORGANIZED HEALTH CARE EDUCATION/TRAINING PROGRAM
Payer: MEDICARE

## 2025-05-08 VITALS
SYSTOLIC BLOOD PRESSURE: 144 MMHG | OXYGEN SATURATION: 100 % | RESPIRATION RATE: 16 BRPM | DIASTOLIC BLOOD PRESSURE: 68 MMHG | HEART RATE: 67 BPM | TEMPERATURE: 98 F | WEIGHT: 132.06 LBS | HEIGHT: 62 IN

## 2025-05-08 DIAGNOSIS — Z98.89 OTHER SPECIFIED POSTPROCEDURAL STATES: Chronic | ICD-10-CM

## 2025-05-08 DIAGNOSIS — Z96.642 PRESENCE OF LEFT ARTIFICIAL HIP JOINT: Chronic | ICD-10-CM

## 2025-05-08 DIAGNOSIS — M54.50 LOW BACK PAIN, UNSPECIFIED: ICD-10-CM

## 2025-05-08 DIAGNOSIS — Z90.49 ACQUIRED ABSENCE OF OTHER SPECIFIED PARTS OF DIGESTIVE TRACT: Chronic | ICD-10-CM

## 2025-05-08 LAB
ADD ON TEST-SPECIMEN IN LAB: SIGNIFICANT CHANGE UP
ALBUMIN SERPL ELPH-MCNC: 4.1 G/DL — SIGNIFICANT CHANGE UP (ref 3.3–5)
ALP SERPL-CCNC: 87 U/L — SIGNIFICANT CHANGE UP (ref 40–120)
ALT FLD-CCNC: 11 U/L — SIGNIFICANT CHANGE UP (ref 4–33)
ANION GAP SERPL CALC-SCNC: 14 MMOL/L — SIGNIFICANT CHANGE UP (ref 7–14)
AST SERPL-CCNC: 25 U/L — SIGNIFICANT CHANGE UP (ref 4–32)
BILIRUB DIRECT SERPL-MCNC: <0.2 MG/DL — SIGNIFICANT CHANGE UP (ref 0–0.3)
BILIRUB INDIRECT FLD-MCNC: >0.1 MG/DL — SIGNIFICANT CHANGE UP (ref 0–1)
BILIRUB SERPL-MCNC: 0.3 MG/DL — SIGNIFICANT CHANGE UP (ref 0.2–1.2)
BUN SERPL-MCNC: 13 MG/DL — SIGNIFICANT CHANGE UP (ref 7–23)
CALCIUM SERPL-MCNC: 9.2 MG/DL — SIGNIFICANT CHANGE UP (ref 8.4–10.5)
CHLORIDE SERPL-SCNC: 107 MMOL/L — SIGNIFICANT CHANGE UP (ref 98–107)
CO2 SERPL-SCNC: 19 MMOL/L — LOW (ref 22–31)
CREAT SERPL-MCNC: 1.2 MG/DL — SIGNIFICANT CHANGE UP (ref 0.5–1.3)
EGFR: 49 ML/MIN/1.73M2 — LOW
EGFR: 49 ML/MIN/1.73M2 — LOW
GAS PNL BLDA: SIGNIFICANT CHANGE UP
GLUCOSE BLDC GLUCOMTR-MCNC: 91 MG/DL — SIGNIFICANT CHANGE UP (ref 70–99)
GLUCOSE SERPL-MCNC: 133 MG/DL — HIGH (ref 70–99)
HCT VFR BLD CALC: 35.3 % — SIGNIFICANT CHANGE UP (ref 34.5–45)
HGB BLD-MCNC: 11.3 G/DL — LOW (ref 11.5–15.5)
MAGNESIUM SERPL-MCNC: 1.8 MG/DL — SIGNIFICANT CHANGE UP (ref 1.6–2.6)
MCHC RBC-ENTMCNC: 29.7 PG — SIGNIFICANT CHANGE UP (ref 27–34)
MCHC RBC-ENTMCNC: 32 G/DL — SIGNIFICANT CHANGE UP (ref 32–36)
MCV RBC AUTO: 92.7 FL — SIGNIFICANT CHANGE UP (ref 80–100)
NRBC # BLD AUTO: 0 K/UL — SIGNIFICANT CHANGE UP (ref 0–0)
NRBC # FLD: 0 K/UL — SIGNIFICANT CHANGE UP (ref 0–0)
NRBC BLD AUTO-RTO: 0 /100 WBCS — SIGNIFICANT CHANGE UP (ref 0–0)
PHOSPHATE SERPL-MCNC: 4.2 MG/DL — SIGNIFICANT CHANGE UP (ref 2.5–4.5)
PLATELET # BLD AUTO: 261 K/UL — SIGNIFICANT CHANGE UP (ref 150–400)
POTASSIUM SERPL-MCNC: 3.9 MMOL/L — SIGNIFICANT CHANGE UP (ref 3.5–5.3)
POTASSIUM SERPL-SCNC: 3.9 MMOL/L — SIGNIFICANT CHANGE UP (ref 3.5–5.3)
PROT SERPL-MCNC: 6.5 G/DL — SIGNIFICANT CHANGE UP (ref 6–8.3)
RBC # BLD: 3.81 M/UL — SIGNIFICANT CHANGE UP (ref 3.8–5.2)
RBC # FLD: 13 % — SIGNIFICANT CHANGE UP (ref 10.3–14.5)
SODIUM SERPL-SCNC: 140 MMOL/L — SIGNIFICANT CHANGE UP (ref 135–145)
WBC # BLD: 9.09 K/UL — SIGNIFICANT CHANGE UP (ref 3.8–10.5)
WBC # FLD AUTO: 9.09 K/UL — SIGNIFICANT CHANGE UP (ref 3.8–10.5)

## 2025-05-08 PROCEDURE — 20936 SP BONE AGRFT LOCAL ADD-ON: CPT

## 2025-05-08 PROCEDURE — 22840 INSERT SPINE FIXATION DEVICE: CPT

## 2025-05-08 PROCEDURE — 20930 SP BONE ALGRFT MORSEL ADD-ON: CPT

## 2025-05-08 PROCEDURE — 63047 LAM FACETEC & FORAMOT LUMBAR: CPT

## 2025-05-08 PROCEDURE — 22612 ARTHRD PST TQ 1NTRSPC LUMBAR: CPT

## 2025-05-08 PROCEDURE — 63048 LAM FACETEC &FORAMOT EA ADDL: CPT

## 2025-05-08 DEVICE — SCREW RELINEO POLY 6.5X45MM: Type: IMPLANTABLE DEVICE | Status: FUNCTIONAL

## 2025-05-08 DEVICE — SCREW LOCKG OPEN TULIP RELINE 5.5MM: Type: IMPLANTABLE DEVICE | Status: FUNCTIONAL

## 2025-05-08 DEVICE — PROBE SURG SPINE INSUL 8IN: Type: IMPLANTABLE DEVICE | Status: FUNCTIONAL

## 2025-05-08 DEVICE — SURGIFOAM 2 X 6CM X 7MM (12-7): Type: IMPLANTABLE DEVICE | Status: FUNCTIONAL

## 2025-05-08 DEVICE — BONE WAX 2.5GM: Type: IMPLANTABLE DEVICE | Status: FUNCTIONAL

## 2025-05-08 DEVICE — SURGIFLO MATRIX WITH THROMBIN KIT: Type: IMPLANTABLE DEVICE | Status: FUNCTIONAL

## 2025-05-08 DEVICE — IMPLANTABLE DEVICE: Type: IMPLANTABLE DEVICE | Status: FUNCTIONAL

## 2025-05-08 RX ORDER — RIMEGEPANT SULFATE 75 MG/75MG
1 TABLET, ORALLY DISINTEGRATING ORAL
Refills: 0 | DISCHARGE

## 2025-05-08 RX ORDER — ONDANSETRON HCL/PF 4 MG/2 ML
4 VIAL (ML) INJECTION EVERY 6 HOURS
Refills: 0 | Status: DISCONTINUED | OUTPATIENT
Start: 2025-05-08 | End: 2025-05-13

## 2025-05-08 RX ORDER — CYCLOBENZAPRINE HYDROCHLORIDE 15 MG/1
5 CAPSULE, EXTENDED RELEASE ORAL THREE TIMES A DAY
Refills: 0 | Status: DISCONTINUED | OUTPATIENT
Start: 2025-05-08 | End: 2025-05-13

## 2025-05-08 RX ORDER — ACETAMINOPHEN 500 MG/5ML
975 LIQUID (ML) ORAL ONCE
Refills: 0 | Status: COMPLETED | OUTPATIENT
Start: 2025-05-08 | End: 2025-05-08

## 2025-05-08 RX ORDER — CEFAZOLIN SODIUM IN 0.9 % NACL 3 G/100 ML
2000 INTRAVENOUS SOLUTION, PIGGYBACK (ML) INTRAVENOUS EVERY 8 HOURS
Refills: 0 | Status: COMPLETED | OUTPATIENT
Start: 2025-05-08 | End: 2025-05-09

## 2025-05-08 RX ORDER — LITHIUM CARBONATE 600 MG/1
1 CAPSULE, GELATIN COATED ORAL
Refills: 0 | DISCHARGE

## 2025-05-08 RX ORDER — SODIUM CHLORIDE 9 G/1000ML
500 INJECTION, SOLUTION INTRAVENOUS ONCE
Refills: 0 | Status: COMPLETED | OUTPATIENT
Start: 2025-05-09 | End: 2025-05-09

## 2025-05-08 RX ORDER — MAGNESIUM SULFATE 500 MG/ML
2 SYRINGE (ML) INJECTION ONCE
Refills: 0 | Status: COMPLETED | OUTPATIENT
Start: 2025-05-08 | End: 2025-05-08

## 2025-05-08 RX ORDER — ATORVASTATIN CALCIUM 80 MG/1
40 TABLET, FILM COATED ORAL AT BEDTIME
Refills: 0 | Status: DISCONTINUED | OUTPATIENT
Start: 2025-05-08 | End: 2025-05-13

## 2025-05-08 RX ORDER — LITHIUM CARBONATE 600 MG/1
300 CAPSULE, GELATIN COATED ORAL DAILY
Refills: 0 | Status: DISCONTINUED | OUTPATIENT
Start: 2025-05-08 | End: 2025-05-13

## 2025-05-08 RX ORDER — ZIPRASIDONE HYDROCHLORIDE 40 MG/1
80 CAPSULE ORAL
Refills: 0 | Status: DISCONTINUED | OUTPATIENT
Start: 2025-05-08 | End: 2025-05-13

## 2025-05-08 RX ORDER — GABAPENTIN 400 MG/1
300 CAPSULE ORAL
Refills: 0 | Status: DISCONTINUED | OUTPATIENT
Start: 2025-05-08 | End: 2025-05-13

## 2025-05-08 RX ORDER — POLYETHYLENE GLYCOL 3350 17 G/17G
17 POWDER, FOR SOLUTION ORAL DAILY
Refills: 0 | Status: DISCONTINUED | OUTPATIENT
Start: 2025-05-08 | End: 2025-05-13

## 2025-05-08 RX ORDER — TRAMADOL HYDROCHLORIDE 50 MG/1
50 TABLET, FILM COATED ORAL ONCE
Refills: 0 | Status: DISCONTINUED | OUTPATIENT
Start: 2025-05-08 | End: 2025-05-08

## 2025-05-08 RX ORDER — AMLODIPINE BESYLATE 10 MG/1
2.5 TABLET ORAL DAILY
Refills: 0 | Status: DISCONTINUED | OUTPATIENT
Start: 2025-05-08 | End: 2025-05-13

## 2025-05-08 RX ORDER — FENTANYL CITRATE-0.9 % NACL/PF 100MCG/2ML
50 SYRINGE (ML) INTRAVENOUS
Refills: 0 | Status: DISCONTINUED | OUTPATIENT
Start: 2025-05-08 | End: 2025-05-08

## 2025-05-08 RX ORDER — HYDROMORPHONE/SOD CHLOR,ISO/PF 2 MG/10 ML
0.5 SYRINGE (ML) INJECTION
Refills: 0 | Status: DISCONTINUED | OUTPATIENT
Start: 2025-05-08 | End: 2025-05-08

## 2025-05-08 RX ORDER — SODIUM CHLORIDE 9 G/1000ML
500 INJECTION, SOLUTION INTRAVENOUS ONCE
Refills: 0 | Status: COMPLETED | OUTPATIENT
Start: 2025-05-08 | End: 2025-05-08

## 2025-05-08 RX ORDER — LAMOTRIGINE 150 MG/1
1 TABLET ORAL
Refills: 0 | DISCHARGE

## 2025-05-08 RX ORDER — OXYCODONE HYDROCHLORIDE 30 MG/1
5 TABLET ORAL EVERY 4 HOURS
Refills: 0 | Status: DISCONTINUED | OUTPATIENT
Start: 2025-05-08 | End: 2025-05-08

## 2025-05-08 RX ORDER — ONDANSETRON HCL/PF 4 MG/2 ML
4 VIAL (ML) INJECTION ONCE
Refills: 0 | Status: DISCONTINUED | OUTPATIENT
Start: 2025-05-08 | End: 2025-05-08

## 2025-05-08 RX ORDER — PREGABALIN 75 MG/1
150 CAPSULE ORAL ONCE
Refills: 0 | Status: DISCONTINUED | OUTPATIENT
Start: 2025-05-08 | End: 2025-05-08

## 2025-05-08 RX ORDER — GABAPENTIN 400 MG/1
1 CAPSULE ORAL
Refills: 0 | DISCHARGE

## 2025-05-08 RX ORDER — OXYCODONE HYDROCHLORIDE 30 MG/1
5 TABLET ORAL ONCE
Refills: 0 | Status: DISCONTINUED | OUTPATIENT
Start: 2025-05-08 | End: 2025-05-08

## 2025-05-08 RX ORDER — TRAMADOL HYDROCHLORIDE 50 MG/1
50 TABLET, FILM COATED ORAL EVERY 8 HOURS
Refills: 0 | Status: DISCONTINUED | OUTPATIENT
Start: 2025-05-08 | End: 2025-05-13

## 2025-05-08 RX ORDER — LAMOTRIGINE 150 MG/1
200 TABLET ORAL
Refills: 0 | Status: DISCONTINUED | OUTPATIENT
Start: 2025-05-08 | End: 2025-05-13

## 2025-05-08 RX ORDER — SENNA 187 MG
2 TABLET ORAL AT BEDTIME
Refills: 0 | Status: DISCONTINUED | OUTPATIENT
Start: 2025-05-08 | End: 2025-05-13

## 2025-05-08 RX ORDER — SODIUM CHLORIDE 9 G/1000ML
1000 INJECTION, SOLUTION INTRAVENOUS
Refills: 0 | Status: DISCONTINUED | OUTPATIENT
Start: 2025-05-08 | End: 2025-05-08

## 2025-05-08 RX ORDER — ZIPRASIDONE HYDROCHLORIDE 40 MG/1
1 CAPSULE ORAL
Refills: 0 | DISCHARGE

## 2025-05-08 RX ORDER — TEMAZEPAM 15 MG/1
30 CAPSULE ORAL AT BEDTIME
Refills: 0 | Status: DISCONTINUED | OUTPATIENT
Start: 2025-05-08 | End: 2025-05-13

## 2025-05-08 RX ORDER — ZIPRASIDONE HYDROCHLORIDE 40 MG/1
60 CAPSULE ORAL
Refills: 0 | Status: DISCONTINUED | OUTPATIENT
Start: 2025-05-08 | End: 2025-05-13

## 2025-05-08 RX ORDER — OXYCODONE HYDROCHLORIDE 30 MG/1
10 TABLET ORAL EVERY 4 HOURS
Refills: 0 | Status: DISCONTINUED | OUTPATIENT
Start: 2025-05-08 | End: 2025-05-09

## 2025-05-08 RX ORDER — ATORVASTATIN CALCIUM 80 MG/1
1 TABLET, FILM COATED ORAL
Refills: 0 | DISCHARGE

## 2025-05-08 RX ORDER — AMLODIPINE BESYLATE 10 MG/1
1 TABLET ORAL
Refills: 0 | DISCHARGE

## 2025-05-08 RX ORDER — SUMATRIPTAN 100 MG/1
100 TABLET, FILM COATED ORAL DAILY
Refills: 0 | Status: DISCONTINUED | OUTPATIENT
Start: 2025-05-08 | End: 2025-05-13

## 2025-05-08 RX ORDER — LAMOTRIGINE 150 MG/1
50 TABLET ORAL
Refills: 0 | Status: DISCONTINUED | OUTPATIENT
Start: 2025-05-08 | End: 2025-05-13

## 2025-05-08 RX ORDER — METOPROLOL SUCCINATE 50 MG/1
25 TABLET, EXTENDED RELEASE ORAL DAILY
Refills: 0 | Status: DISCONTINUED | OUTPATIENT
Start: 2025-05-08 | End: 2025-05-13

## 2025-05-08 RX ORDER — UBROGEPANT 50 MG/1
1 TABLET ORAL
Refills: 0 | DISCHARGE

## 2025-05-08 RX ORDER — ACETAMINOPHEN 500 MG/5ML
975 LIQUID (ML) ORAL EVERY 8 HOURS
Refills: 0 | Status: DISCONTINUED | OUTPATIENT
Start: 2025-05-08 | End: 2025-05-13

## 2025-05-08 RX ORDER — METOPROLOL SUCCINATE 50 MG/1
1 TABLET, EXTENDED RELEASE ORAL
Refills: 0 | DISCHARGE

## 2025-05-08 RX ORDER — OXYCODONE HYDROCHLORIDE 30 MG/1
5 TABLET ORAL EVERY 4 HOURS
Refills: 0 | Status: DISCONTINUED | OUTPATIENT
Start: 2025-05-08 | End: 2025-05-09

## 2025-05-08 RX ADMIN — GABAPENTIN 300 MILLIGRAM(S): 400 CAPSULE ORAL at 17:42

## 2025-05-08 RX ADMIN — TRAMADOL HYDROCHLORIDE 50 MILLIGRAM(S): 50 TABLET, FILM COATED ORAL at 14:52

## 2025-05-08 RX ADMIN — ZIPRASIDONE HYDROCHLORIDE 80 MILLIGRAM(S): 40 CAPSULE ORAL at 19:12

## 2025-05-08 RX ADMIN — Medication 975 MILLIGRAM(S): at 14:16

## 2025-05-08 RX ADMIN — TRAMADOL HYDROCHLORIDE 50 MILLIGRAM(S): 50 TABLET, FILM COATED ORAL at 21:20

## 2025-05-08 RX ADMIN — Medication 975 MILLIGRAM(S): at 22:20

## 2025-05-08 RX ADMIN — Medication 0.5 MILLIGRAM(S): at 13:00

## 2025-05-08 RX ADMIN — SODIUM CHLORIDE 500 MILLILITER(S): 9 INJECTION, SOLUTION INTRAVENOUS at 15:55

## 2025-05-08 RX ADMIN — LAMOTRIGINE 200 MILLIGRAM(S): 150 TABLET ORAL at 18:16

## 2025-05-08 RX ADMIN — Medication 100 MILLIGRAM(S): at 17:45

## 2025-05-08 RX ADMIN — Medication 975 MILLIGRAM(S): at 14:45

## 2025-05-08 RX ADMIN — CYCLOBENZAPRINE HYDROCHLORIDE 5 MILLIGRAM(S): 15 CAPSULE, EXTENDED RELEASE ORAL at 17:41

## 2025-05-08 RX ADMIN — Medication 0.5 MILLIGRAM(S): at 12:30

## 2025-05-08 RX ADMIN — Medication 1 APPLICATION(S): at 07:17

## 2025-05-08 RX ADMIN — Medication 50 GRAM(S): at 14:15

## 2025-05-08 RX ADMIN — OXYCODONE HYDROCHLORIDE 10 MILLIGRAM(S): 30 TABLET ORAL at 15:53

## 2025-05-08 RX ADMIN — Medication 5 MILLIGRAM(S): at 22:22

## 2025-05-08 RX ADMIN — PREGABALIN 150 MILLIGRAM(S): 75 CAPSULE ORAL at 07:15

## 2025-05-08 RX ADMIN — Medication 975 MILLIGRAM(S): at 07:15

## 2025-05-08 RX ADMIN — Medication 2 TABLET(S): at 21:20

## 2025-05-08 RX ADMIN — LAMOTRIGINE 50 MILLIGRAM(S): 150 TABLET ORAL at 18:16

## 2025-05-08 RX ADMIN — TRAMADOL HYDROCHLORIDE 50 MILLIGRAM(S): 50 TABLET, FILM COATED ORAL at 14:16

## 2025-05-08 RX ADMIN — Medication 0.5 MILLIGRAM(S): at 13:20

## 2025-05-08 RX ADMIN — Medication 0.5 MILLIGRAM(S): at 13:05

## 2025-05-08 RX ADMIN — OXYCODONE HYDROCHLORIDE 10 MILLIGRAM(S): 30 TABLET ORAL at 14:55

## 2025-05-08 RX ADMIN — Medication 40 MILLIGRAM(S): at 07:16

## 2025-05-08 RX ADMIN — OXYCODONE HYDROCHLORIDE 10 MILLIGRAM(S): 30 TABLET ORAL at 19:12

## 2025-05-08 RX ADMIN — Medication 975 MILLIGRAM(S): at 21:20

## 2025-05-08 RX ADMIN — TRAMADOL HYDROCHLORIDE 50 MILLIGRAM(S): 50 TABLET, FILM COATED ORAL at 07:16

## 2025-05-08 RX ADMIN — ATORVASTATIN CALCIUM 40 MILLIGRAM(S): 80 TABLET, FILM COATED ORAL at 21:30

## 2025-05-08 RX ADMIN — TRAMADOL HYDROCHLORIDE 50 MILLIGRAM(S): 50 TABLET, FILM COATED ORAL at 22:20

## 2025-05-08 RX ADMIN — OXYCODONE HYDROCHLORIDE 10 MILLIGRAM(S): 30 TABLET ORAL at 20:15

## 2025-05-08 NOTE — PATIENT PROFILE ADULT - CAREGIVER RELATION TO PATIENT
Received fax from [facility] for refill.    LOV: 12/2/21 with Francheska Hudson    Medication: Diclofenac Sodium DR 75 MG tab    Chart shows that medication is not a part of active medications.   father

## 2025-05-08 NOTE — PHYSICAL THERAPY INITIAL EVALUATION ADULT - LEVEL OF INDEPENDENCE: SIT/STAND, REHAB EVAL
Pt refused further functional mobility due to severe anxiety/fear. Pt provided with encouragement and motivation. Pt becoming increasingly anxious with motivational support stating "I did what you asked and I'm not doing anymore." Pt returned to semi-supine in bed. BELLE Richardson made aware of patient's complaints.

## 2025-05-08 NOTE — PHYSICAL THERAPY INITIAL EVALUATION ADULT - ADDITIONAL COMMENTS
Pt states she will be discharged to her father's home after the hospital, there are +3 steps to enter, will reside on the first floor. Pt was independent with all functional mobility and ADL performance using a rolling walker for the past 1-2 months.     Pt left semi-supine in bed, all lines intact, all needs in reach, bed alarm set, in NAD. BELLE Richardson aware. Heart rate 69 beats per minute.

## 2025-05-08 NOTE — PHYSICAL THERAPY INITIAL EVALUATION ADULT - GENERAL OBSERVATIONS, REHAB EVAL
Pt received semi-supine in bed, +Bennett, +FANNY drain, all lines intact, in NAD. Pt agreeable to PT evaluation.

## 2025-05-08 NOTE — PROGRESS NOTE ADULT - SUBJECTIVE AND OBJECTIVE BOX
ORTHO POST OP CHECK SPINE     Patient resting comfortably without complaint s/p L4-5 lami/ fusion        Vital Signs Last 24 Hrs  T(C): 36.4 (08 May 2025 14:00), Max: 36.6 (08 May 2025 06:01)  T(F): 97.5 (08 May 2025 14:00), Max: 97.9 (08 May 2025 06:01)  HR: 78 (08 May 2025 16:00) (64 - 87)  BP: 118/64 (08 May 2025 15:30) (103/62 - 144/68)  BP(mean): 82 (08 May 2025 15:30) (75 - 118)  RR: 7 (08 May 2025 16:00) (7 - 17)  SpO2: 98% (08 May 2025 16:00) (95% - 100%)    Parameters below as of 08 May 2025 13:30  Patient On (Oxygen Delivery Method): room air          Spine:  Dressing clean/dry/ intact                 FANNY= 38    LE:  EHL/GC/TA 5/5          Sensory intact          DP pulses 2+/=      CBC Full  -  ( 08 May 2025 12:25 )  WBC Count : 9.09 K/uL  RBC Count : 3.81 M/uL  Hemoglobin : 11.3 g/dL  Hematocrit : 35.3 %  Platelet Count - Automated : 261 K/uL  Mean Cell Volume : 92.7 fL  Mean Cell Hemoglobin : 29.7 pg  Mean Cell Hemoglobin Concentration : 32.0 g/dL  Auto Neutrophil # : x  Auto Lymphocyte # : x  Auto Monocyte # : x  Auto Eosinophil # : x  Auto Basophil # : x  Auto Neutrophil % : x  Auto Lymphocyte % : x  Auto Monocyte % : x  Auto Eosinophil % : x  Auto Basophil % : x        05-08    140  |  107  |  13  ----------------------------<  133[H]  3.9   |  19[L]  |  1.20    Ca    9.2      08 May 2025 12:25  Phos  4.2     05-08  Mg     1.80     05-08    TPro  6.5  /  Alb  4.1  /  TBili  0.3  /  DBili  <0.2  /  AST  25  /  ALT  11  /  AlkPhos  87  05-08        U/O: 1350cc      A/P:  Stable             PT-WBAT            DVT prophylaxis -venodynes             Incentive spirometer            Pain control             Bennett             Follow up AM labs/ FANNY output

## 2025-05-08 NOTE — BRIEF OPERATIVE NOTE - NSICDXBRIEFPROCEDURE_GEN_ALL_CORE_FT
PROCEDURES:  Posterior fusion of lumbar spine with laminectomy 08-May-2025 11:52:27 L4-5 lami, L4-5 PSF Koffi Hayes

## 2025-05-08 NOTE — PATIENT PROFILE ADULT - FALL HARM RISK - HARM RISK INTERVENTIONS
Assistance with ambulation/Assistance OOB with selected safe patient handling equipment/Communicate Risk of Fall with Harm to all staff/Discuss with provider need for PT consult/Monitor gait and stability/Provide patient with walking aids - walker, cane, crutches/Reinforce activity limits and safety measures with patient and family/Sit up slowly, dangle for a short time, stand at bedside before walking/Tailored Fall Risk Interventions/Use of alarms - bed, chair and/or voice tab/Visual Cue: Yellow wristband and red socks/Bed in lowest position, wheels locked, appropriate side rails in place/Call bell, personal items and telephone in reach/Instruct patient to call for assistance before getting out of bed or chair/Non-slip footwear when patient is out of bed/[place] to call system/Physically safe environment - no spills, clutter or unnecessary equipment/Purposeful Proactive Rounding/Room/bathroom lighting operational, light cord in reach

## 2025-05-09 ENCOUNTER — TRANSCRIPTION ENCOUNTER (OUTPATIENT)
Age: 70
End: 2025-05-09

## 2025-05-09 DIAGNOSIS — E78.5 HYPERLIPIDEMIA, UNSPECIFIED: ICD-10-CM

## 2025-05-09 LAB
ANION GAP SERPL CALC-SCNC: 13 MMOL/L — SIGNIFICANT CHANGE UP (ref 7–14)
BASOPHILS # BLD AUTO: 0.02 K/UL — SIGNIFICANT CHANGE UP (ref 0–0.2)
BASOPHILS NFR BLD AUTO: 0.1 % — SIGNIFICANT CHANGE UP (ref 0–2)
BUN SERPL-MCNC: 16 MG/DL — SIGNIFICANT CHANGE UP (ref 7–23)
CALCIUM SERPL-MCNC: 8.7 MG/DL — SIGNIFICANT CHANGE UP (ref 8.4–10.5)
CHLORIDE SERPL-SCNC: 107 MMOL/L — SIGNIFICANT CHANGE UP (ref 98–107)
CO2 SERPL-SCNC: 20 MMOL/L — LOW (ref 22–31)
CREAT SERPL-MCNC: 1.17 MG/DL — SIGNIFICANT CHANGE UP (ref 0.5–1.3)
EGFR: 50 ML/MIN/1.73M2 — LOW
EGFR: 50 ML/MIN/1.73M2 — LOW
EOSINOPHIL # BLD AUTO: 0 K/UL — SIGNIFICANT CHANGE UP (ref 0–0.5)
EOSINOPHIL NFR BLD AUTO: 0 % — SIGNIFICANT CHANGE UP (ref 0–6)
GLUCOSE SERPL-MCNC: 145 MG/DL — HIGH (ref 70–99)
HCT VFR BLD CALC: 29.8 % — LOW (ref 34.5–45)
HGB BLD-MCNC: 9.6 G/DL — LOW (ref 11.5–15.5)
IANC: 12.16 K/UL — HIGH (ref 1.8–7.4)
IMM GRANULOCYTES NFR BLD AUTO: 0.7 % — SIGNIFICANT CHANGE UP (ref 0–0.9)
LYMPHOCYTES # BLD AUTO: 0.68 K/UL — LOW (ref 1–3.3)
LYMPHOCYTES # BLD AUTO: 5 % — LOW (ref 13–44)
MCHC RBC-ENTMCNC: 30.1 PG — SIGNIFICANT CHANGE UP (ref 27–34)
MCHC RBC-ENTMCNC: 32.2 G/DL — SIGNIFICANT CHANGE UP (ref 32–36)
MCV RBC AUTO: 93.4 FL — SIGNIFICANT CHANGE UP (ref 80–100)
MONOCYTES # BLD AUTO: 0.69 K/UL — SIGNIFICANT CHANGE UP (ref 0–0.9)
MONOCYTES NFR BLD AUTO: 5.1 % — SIGNIFICANT CHANGE UP (ref 2–14)
NEUTROPHILS # BLD AUTO: 12.16 K/UL — HIGH (ref 1.8–7.4)
NEUTROPHILS NFR BLD AUTO: 89.1 % — HIGH (ref 43–77)
NRBC # BLD AUTO: 0 K/UL — SIGNIFICANT CHANGE UP (ref 0–0)
NRBC # FLD: 0 K/UL — SIGNIFICANT CHANGE UP (ref 0–0)
NRBC BLD AUTO-RTO: 0 /100 WBCS — SIGNIFICANT CHANGE UP (ref 0–0)
PLATELET # BLD AUTO: 247 K/UL — SIGNIFICANT CHANGE UP (ref 150–400)
POTASSIUM SERPL-MCNC: 4 MMOL/L — SIGNIFICANT CHANGE UP (ref 3.5–5.3)
POTASSIUM SERPL-SCNC: 4 MMOL/L — SIGNIFICANT CHANGE UP (ref 3.5–5.3)
RBC # BLD: 3.19 M/UL — LOW (ref 3.8–5.2)
RBC # FLD: 13 % — SIGNIFICANT CHANGE UP (ref 10.3–14.5)
SODIUM SERPL-SCNC: 140 MMOL/L — SIGNIFICANT CHANGE UP (ref 135–145)
WBC # BLD: 13.65 K/UL — HIGH (ref 3.8–10.5)
WBC # FLD AUTO: 13.65 K/UL — HIGH (ref 3.8–10.5)

## 2025-05-09 PROCEDURE — 99223 1ST HOSP IP/OBS HIGH 75: CPT

## 2025-05-09 RX ORDER — HYDROMORPHONE/SOD CHLOR,ISO/PF 2 MG/10 ML
4 SYRINGE (ML) INJECTION EVERY 4 HOURS
Refills: 0 | Status: DISCONTINUED | OUTPATIENT
Start: 2025-05-09 | End: 2025-05-13

## 2025-05-09 RX ORDER — HYDROMORPHONE/SOD CHLOR,ISO/PF 2 MG/10 ML
2 SYRINGE (ML) INJECTION EVERY 4 HOURS
Refills: 0 | Status: DISCONTINUED | OUTPATIENT
Start: 2025-05-09 | End: 2025-05-13

## 2025-05-09 RX ADMIN — OXYCODONE HYDROCHLORIDE 10 MILLIGRAM(S): 30 TABLET ORAL at 07:00

## 2025-05-09 RX ADMIN — LAMOTRIGINE 50 MILLIGRAM(S): 150 TABLET ORAL at 06:03

## 2025-05-09 RX ADMIN — Medication 4 MILLIGRAM(S): at 10:09

## 2025-05-09 RX ADMIN — TEMAZEPAM 30 MILLIGRAM(S): 15 CAPSULE ORAL at 02:34

## 2025-05-09 RX ADMIN — ZIPRASIDONE HYDROCHLORIDE 60 MILLIGRAM(S): 40 CAPSULE ORAL at 06:03

## 2025-05-09 RX ADMIN — Medication 975 MILLIGRAM(S): at 21:20

## 2025-05-09 RX ADMIN — ZIPRASIDONE HYDROCHLORIDE 80 MILLIGRAM(S): 40 CAPSULE ORAL at 21:15

## 2025-05-09 RX ADMIN — CYCLOBENZAPRINE HYDROCHLORIDE 5 MILLIGRAM(S): 15 CAPSULE, EXTENDED RELEASE ORAL at 17:51

## 2025-05-09 RX ADMIN — Medication 975 MILLIGRAM(S): at 14:41

## 2025-05-09 RX ADMIN — LAMOTRIGINE 200 MILLIGRAM(S): 150 TABLET ORAL at 17:50

## 2025-05-09 RX ADMIN — GABAPENTIN 300 MILLIGRAM(S): 400 CAPSULE ORAL at 06:02

## 2025-05-09 RX ADMIN — Medication 2 MILLIGRAM(S): at 22:30

## 2025-05-09 RX ADMIN — Medication 975 MILLIGRAM(S): at 07:00

## 2025-05-09 RX ADMIN — ATORVASTATIN CALCIUM 40 MILLIGRAM(S): 80 TABLET, FILM COATED ORAL at 21:17

## 2025-05-09 RX ADMIN — Medication 975 MILLIGRAM(S): at 06:04

## 2025-05-09 RX ADMIN — Medication 40 MILLIGRAM(S): at 06:08

## 2025-05-09 RX ADMIN — Medication 4 MILLIGRAM(S): at 09:09

## 2025-05-09 RX ADMIN — Medication 975 MILLIGRAM(S): at 22:30

## 2025-05-09 RX ADMIN — POLYETHYLENE GLYCOL 3350 17 GRAM(S): 17 POWDER, FOR SOLUTION ORAL at 13:41

## 2025-05-09 RX ADMIN — Medication 975 MILLIGRAM(S): at 13:41

## 2025-05-09 RX ADMIN — Medication 100 MILLIGRAM(S): at 00:16

## 2025-05-09 RX ADMIN — Medication 5 MILLIGRAM(S): at 00:16

## 2025-05-09 RX ADMIN — OXYCODONE HYDROCHLORIDE 10 MILLIGRAM(S): 30 TABLET ORAL at 01:48

## 2025-05-09 RX ADMIN — Medication 2 MILLIGRAM(S): at 21:28

## 2025-05-09 RX ADMIN — OXYCODONE HYDROCHLORIDE 10 MILLIGRAM(S): 30 TABLET ORAL at 06:01

## 2025-05-09 RX ADMIN — GABAPENTIN 300 MILLIGRAM(S): 400 CAPSULE ORAL at 17:50

## 2025-05-09 RX ADMIN — Medication 2 TABLET(S): at 21:15

## 2025-05-09 RX ADMIN — OXYCODONE HYDROCHLORIDE 10 MILLIGRAM(S): 30 TABLET ORAL at 02:41

## 2025-05-09 RX ADMIN — LAMOTRIGINE 200 MILLIGRAM(S): 150 TABLET ORAL at 06:03

## 2025-05-09 RX ADMIN — LITHIUM CARBONATE 300 MILLIGRAM(S): 600 CAPSULE, GELATIN COATED ORAL at 13:41

## 2025-05-09 RX ADMIN — SODIUM CHLORIDE 500 MILLILITER(S): 9 INJECTION, SOLUTION INTRAVENOUS at 06:13

## 2025-05-09 RX ADMIN — LAMOTRIGINE 50 MILLIGRAM(S): 150 TABLET ORAL at 17:50

## 2025-05-09 RX ADMIN — Medication 1000 MILLILITER(S): at 14:46

## 2025-05-09 RX ADMIN — METOPROLOL SUCCINATE 25 MILLIGRAM(S): 50 TABLET, EXTENDED RELEASE ORAL at 06:01

## 2025-05-09 NOTE — CONSULT NOTE ADULT - PROBLEM SELECTOR RECOMMENDATION 2
Stable/chronic  c/b tardive dyskinesia   Continue Ziprasidone (home med) and lithium   Also documented h/o seizure, patient's states that she never had seizures, just seizure like activity. Continue antiepileptics for now.

## 2025-05-09 NOTE — PROGRESS NOTE ADULT - SUBJECTIVE AND OBJECTIVE BOX
ORTHO PROGRESS NOTE     No acute overnight events. Pt resting comfortably without complaint. Pain controlled       Vital Signs Last 24 Hrs  T(C): 36.9 (09 May 2025 02:00), Max: 37 (08 May 2025 22:13)  T(F): 98.5 (09 May 2025 02:00), Max: 98.6 (08 May 2025 22:13)  HR: 80 (09 May 2025 02:00) (64 - 87)  BP: 124/53 (09 May 2025 02:00) (103/62 - 139/107)  BP(mean): 87 (08 May 2025 16:00) (75 - 118)  RR: 18 (09 May 2025 02:00) (7 - 18)  SpO2: 97% (09 May 2025 02:00) (95% - 100%)    Parameters below as of 09 May 2025 02:00  Patient On (Oxygen Delivery Method): room air        Back: Dressing/ Incision Clean/Dry/Intact,  HV intact with SS output  Motor:                   C5                C6              C7               C8           T1   R            5/5                5/5            5/5             5/5          5/5  L             5/5               5/5             5/5             5/5          5/5                L2             L3             L4               L5            S1  R         5/5           5/5          5/5             5/5           5/5  L          3/5          3/5           4/5             5/5           5/5    Sensory:            C5         C6         C7      C8       T1        (0=absent, 1=impaired, 2=normal, NT=not testable)  R         2            2           2        2         2  L          2            2           2        2         2               L2          L3         L4      L5       S1         (0=absent, 1=impaired, 2=normal, NT=not testable)  R         2            2            2        2        2  L          2            2           2        2         2      A/P: 69yFemale s/p L4-5 lami fusion           - WBAT           -PT/OOB           -DVT ppx- venodynes           - Pain control           -Incentive spirometer           -FU FANNY           -Dispo [facility] ORTHO PROGRESS NOTE     No acute overnight events. Pt resting comfortably without complaint. Pain controlled       Vital Signs Last 24 Hrs  T(C): 36.9 (09 May 2025 02:00), Max: 37 (08 May 2025 22:13)  T(F): 98.5 (09 May 2025 02:00), Max: 98.6 (08 May 2025 22:13)  HR: 80 (09 May 2025 02:00) (64 - 87)  BP: 124/53 (09 May 2025 02:00) (103/62 - 139/107)  BP(mean): 87 (08 May 2025 16:00) (75 - 118)  RR: 18 (09 May 2025 02:00) (7 - 18)  SpO2: 97% (09 May 2025 02:00) (95% - 100%)    Parameters below as of 09 May 2025 02:00  Patient On (Oxygen Delivery Method): room air        Back: Dressing/ Incision Clean/Dry/Intact,  HV intact with SS output  Motor:                   C5                C6              C7               C8           T1   R            5/5                5/5            5/5             5/5          5/5  L             5/5               5/5             5/5             5/5          5/5                L2             L3             L4               L5            S1  R         5/5           5/5          5/5             5/5           5/5  L          4/5          4/5           4/5             5/5           5/5  effort limited due to back pain     Sensory:            C5         C6         C7      C8       T1        (0=absent, 1=impaired, 2=normal, NT=not testable)  R         2            2           2        2         2  L          2            2           2        2         2               L2          L3         L4      L5       S1         (0=absent, 1=impaired, 2=normal, NT=not testable)  R         2            2            2        2        2  L          2            2           2        2         2      A/P: 69yFemale s/p L4-5 lami fusion           - WBAT           -PT/OOB           -DVT ppx- venodynes           - Pain control           -Incentive spirometer           -FU Drain             -Dispo [facility]    Patient seen and examined doing well. No radicular pain down legs.  Had pain at rest preop.  Has back pain improved with oral pain medications Has not walked with PT yet.  Bennett out when ambulating.   Agree with plan above

## 2025-05-09 NOTE — DISCHARGE NOTE PROVIDER - NSDCCPTREATMENT_GEN_ALL_CORE_FT
PRINCIPAL PROCEDURE  Procedure: Posterior fusion of lumbar spine with laminectomy  Findings and Treatment: L4-5 lami, L4-5 PSF     PRINCIPAL PROCEDURE  Procedure: Posterior fusion of lumbar spine with laminectomy  Findings and Treatment: Pain control:    Standing:         -Acetaminophen 500mg - 2 tabs every 8 hours  As needed:        -Flexeril 5mg - 1 tab three times a day - Take as needed for musle spasms        -Gabapentin 100mg - 1 tab every 8 hours        -Tramadol 50mg - 1 tab every 6-8 hours - Take only if needed for MODERATE pain       -Oxycodone 5mg - 1 tab every 4-6 hours - Take only if needed for SEVERE or BREAKTHROUGH pain  Oxycodone and Tramadol have been sent to your pharmacy. Please do not drive, operate machinery, or make important decisions while taking these medications.   Other Medications: (Standing)  -Protonix 40mg - 1 tab every 24 hours - to prevent stomach irritation/ulcers  -Senna 8.6mg - 2 pills every 24 hours - stool softener  -Miralax 17g - daily - constipation   Follow up: Please follow up at your prescheduled post-operative follow up appointment with Dr. Camacho for 2 weeks after hospital discharge. Please call with any questions or concerns including fevers/chills, worsening pain, pus from the wounds, redness of the skin, new or worsening trouble when you swallow, worsening hoarsness or trouble speaking, difficulty breathing or heaviness in the chest at [phone].   Please seek immediate care or call 911 if:   -Your bandage begins to soak with blood  -Your surgical wound breaks open  -You have severe back or leg pain  -You cannot control your bladder or bowel movements  -You have a high fever with nausea and vomiting  -You have painful swelling in your neck AND trouble swallowing  -You have new or worsening trouble moving your neck, arms, or legs

## 2025-05-09 NOTE — DISCHARGE NOTE PROVIDER - NSDCMRMEDTOKEN_GEN_ALL_CORE_FT
Ambien 10 mg oral tablet: 1 tab(s) orally once a day (at bedtime)  amLODIPine 2.5 mg oral tablet: 1 tab(s) orally once a day (in the morning)  aspirin 81 mg oral tablet: 1 tab(s) orally once a day (at bedtime) LD 2025 as per cardiologist  atorvastatin 40 mg oral tablet: 1 tab(s) orally once a day (at bedtime)  gabapentin 300 mg oral capsule: 1 cap(s) orally 2 times a day  Halcion 0.25 mg oral tablet: 1 tab(s) orally once a day (at bedtime) as needed  lamoTRIgine 200 mg oral tablet: 1 tab(s) orally 2 times a day  lamoTRIgine 50 mg oral tablet, extended release: 1 tab(s) orally 2 times a day  lithium 300 mg oral tablet: 1 tab(s) orally once a day (in the morning)  metoprolol succinate 25 mg oral tablet, extended release: 1 tab(s) orally once a day (in the morning)  Nurtec ODT 75 mg oral tablet, disintegratin tab(s) orally as needed  SUMAtriptan 100 mg oral tablet: 1 tab(s) orally as needed  Ubrelvy 100 mg oral tablet: 1 tab(s) orally as needed  ziprasidone 60 mg oral capsule: 1 cap(s) orally once a day (in the morning)  ziprasidone 80 mg oral capsule: 1 cap(s) orally once a day (at bedtime)   acetaminophen 325 mg oral tablet: 3 tab(s) orally every 8 hours  Ambien 10 mg oral tablet: 1 tab(s) orally once a day (at bedtime)  amLODIPine 2.5 mg oral tablet: 1 tab(s) orally once a day (in the morning)  atorvastatin 40 mg oral tablet: 1 tab(s) orally once a day (at bedtime)  cyclobenzaprine 5 mg oral tablet: 1 tab(s) orally 3 times a day as needed for Muscle Spasm  gabapentin 300 mg oral capsule: 1 cap(s) orally 2 times a day  Halcion 0.25 mg oral tablet: 1 tab(s) orally once a day (at bedtime) as needed  HYDROmorphone 2 mg oral tablet: 1 tab(s) orally every 6 hours as needed for  severe pain MDD: 4  lamoTRIgine 200 mg oral tablet: 1 tab(s) orally 2 times a day  lamoTRIgine 50 mg oral tablet, extended release: 1 tab(s) orally 2 times a day  lithium 300 mg oral tablet: 1 tab(s) orally once a day (in the morning)  metoprolol succinate 25 mg oral tablet, extended release: 1 tab(s) orally once a day (in the morning)  Narcan 4 mg/0.1 mL nasal spray: 0.4 milligram(s) intranasally once MDD: 1  Nurtec ODT 75 mg oral tablet, disintegratin tab(s) orally as needed  pantoprazole 40 mg oral delayed release tablet: 1 tab(s) orally once a day (before a meal)  polyethylene glycol 3350 oral powder for reconstitution: 17 gram(s) orally once a day  senna leaf extract oral tablet: 2 tab(s) orally once a day (at bedtime)  SUMAtriptan 100 mg oral tablet: 1 tab(s) orally once a day as needed  traMADol 50 mg oral tablet: 1 tab(s) orally every 8 hours MDD: 3  Ubrelvy 100 mg oral tablet: 1 tab(s) orally as needed  ziprasidone 60 mg oral capsule: 1 cap(s) orally once a day (in the morning)  ziprasidone 80 mg oral capsule: 1 cap(s) orally once a day (at bedtime)   acetaminophen 325 mg oral tablet: 3 tab(s) orally every 8 hours  Ambien 10 mg oral tablet: 1 tab(s) orally once a day (at bedtime)  amLODIPine 2.5 mg oral tablet: 1 tab(s) orally once a day (in the morning)  atorvastatin 40 mg oral tablet: 1 tab(s) orally once a day (at bedtime)  cyclobenzaprine 5 mg oral tablet: 1 tab(s) orally 3 times a day As needed Muscle Spasm  gabapentin 300 mg oral capsule: 1 cap(s) orally 2 times a day  Halcion 0.25 mg oral tablet: 1 tab(s) orally once a day (at bedtime) as needed  HYDROmorphone 2 mg oral tablet: 1 tab(s) orally every 4 hours As needed Moderate Pain (4 - 6)  lamoTRIgine 200 mg oral tablet: 1 tab(s) orally 2 times a day  lamoTRIgine 50 mg oral tablet, extended release: 1 tab(s) orally 2 times a day  lithium 300 mg oral tablet: 1 tab(s) orally once a day (in the morning)  metoprolol succinate 25 mg oral tablet, extended release: 1 tab(s) orally once a day (in the morning)  Nurtec ODT 75 mg oral tablet, disintegratin tab(s) orally as needed  pantoprazole 40 mg oral delayed release tablet: 1 tab(s) orally once a day (before a meal)  polyethylene glycol 3350 oral powder for reconstitution: 17 gram(s) orally once a day  senna leaf extract oral tablet: 2 tab(s) orally once a day (at bedtime)  SUMAtriptan 100 mg oral tablet: 1 tab(s) orally once a day as needed  traMADol 50 mg oral tablet: 1 tab(s) orally every 8 hours  Ubrelvy 100 mg oral tablet: 1 tab(s) orally as needed  ziprasidone 60 mg oral capsule: 1 cap(s) orally once a day (in the morning)  ziprasidone 80 mg oral capsule: 1 cap(s) orally once a day (at bedtime)

## 2025-05-09 NOTE — DISCHARGE NOTE PROVIDER - CARE PROVIDER_API CALL
Eric Camacho  Orthopaedic Surgery  [address] [place] [id number]  Phone: [phone]  Fax: [phone]  Follow Up Time:

## 2025-05-09 NOTE — OCCUPATIONAL THERAPY INITIAL EVALUATION ADULT - GENERAL OBSERVATIONS, REHAB EVAL
Patient found semi-reclined in bed, NAD, and able to follow directions. Vitals: HR 75 BPM. Patient agreeable to participate in skilled OT evaluation.

## 2025-05-09 NOTE — CONSULT NOTE ADULT - TIME BILLING
minutes spent on total encounter; more than 50% of the visit was spent counseling and / or coordinating care by the attending physician.  The necessity of the time spent during the encounter on this date of service was due to:     review of laboratory data, radiology results, consultants' recommendations, documentation in [place], discussion with patient/ACP and interdisciplinary staff (such as , social workers, etc). Interventions were performed as documented above.

## 2025-05-09 NOTE — CONSULT NOTE ADULT - ASSESSMENT
69F w/HTN, HLD, TIA, cardiomyopathy (EF recovered), tardive dyskinesia, chronic pancreatitis, GERD OCD. Patient is now s/p L4-5 lami fusion with drain.

## 2025-05-09 NOTE — DISCHARGE NOTE PROVIDER - NSDCCPCAREPLAN_GEN_ALL_CORE_FT
PRINCIPAL DISCHARGE DIAGNOSIS  Diagnosis: Radiculopathy, lumbosacral region  Assessment and Plan of Treatment:      PRINCIPAL DISCHARGE DIAGNOSIS  Diagnosis: Radiculopathy, lumbosacral region  Assessment and Plan of Treatment: IMPORTANT: *DO NOT resume any anticoagulation/blood thinners (Aspirin, Plavix, Eliquis, Coumadin, Xarelto, ect.) until instructed by your surgeon.*  *DO NOT take any NSAIDs (Motrin, Aleve, Advil, Ibuprofen, Celebrex, ect.) until instructed by  your surgeon.*  Diet: Continue regular diet upon discharge.   Activity: No heavy lifting. Avoid straining or excessive activity. DO NOT sit for long periods of time.   -DO NOT bend, twist, or lift heavy objects for at least 3 months.   -DO NOT drive until cleared by your surgeon.   -To reduce strain/pressure on your spine place a pillow under your knees when lying on your back. Place a pillow between your knees when lying on your side.   -When you sit put your feet up on a foot rest. DO NOT lie on your stomach or with your legs flat.  -If you need to bend over, bend at your knees, not your back.  -We encourage you to take many short walks after your surgery to help blood move through your body and to prevent blood clots from forming. If you feel weak or dizzy, sit or lie down right away.   Dressings: Keep dressing clean, dry, and intact. Remove all cotton and yellow gauze 72 hours after surgery. You do not need to put a new dressing on your wound unless there is light drainage. If that occurs place Band-Aids on your wound.      Other Care:  -You may shower 72 hours after surgery but DO NOT soak dressing and/or incision. The water may run over your dressing/incision but DO NOT let the water directly hit your dressing/incision (take a shower with your wound away from the direct stream of water).  NO hot tubes, NO bath tubs, NO swimming pools.

## 2025-05-09 NOTE — CONSULT NOTE ADULT - PROBLEM/RECOMMENDATION-1
Awa, patient's nurse from [facility] calling to report INR today of 2.3. Currently taking Coumadin 4 mg daily.    DISPLAY PLAN FREE TEXT

## 2025-05-09 NOTE — DISCHARGE NOTE PROVIDER - NSDCFUSCHEDAPPT_GEN_ALL_CORE_FT
Eric Camacho  [facility] Physician [facility]  [place] [address] [place] [address]  Scheduled Appointment: 05/21/2025

## 2025-05-09 NOTE — DISCHARGE NOTE PROVIDER - HOSPITAL COURSE
69 F yo s/p L4-5 lami/fusion with Dr Camacho on 5/8/25. Patient tolerated the procedure well without any complications.  Patient tolerated physical therapy well and pain is controlled.   A medical co-management attending has followed patient  for continuity of care and management and cleared for safe discharge.  Please follow up with Dr Camacho in 1-2 weeks.  Call office to make an appointment [phone].  Please follow up with your primary care physician as medications may have changed.  Please avoid any NSAIDS, aspirin or anti-inflammatory medications unless otherwise specified by your surgeon.  Avoid any heavy lifting, bending, squatting, twisting motion,  Keep dressing and/or incision clean and dry.  Remove dressing in 2 days.   Patient may shower, please avoid aiming shower stream directly onto incision.  Sutures/staples to be removed at office postop visit POD 14.  Patient is weight bear as tolerated.  Please notify Ortho with any questions. This is a 68yo Female with PMH of cardiomyopathy, LBBB, HTN, HLD, and bipolar who presents to [facility] for orthopedic surgery. Patient s/p L4-5 lami/fusion with Dr. Camacho on 5/8/2025. Patient tolerated the procedure well without any intraoperative complications. Patient tolerated physical therapy, weight bearing as tolerated and pain was controlled. Seen by medical attending for continuity of care and management and cleared for safe discharge. As per surgeon, the patient is stable and ready for discharge. DO NOT take any NSAIDS (motrin, advil, ibuprofen, aleve), Aspirin, Anti-inflammatory medications unless instructed by your orthopedic surgeon to continue. Avoid any heavy lifting, bending, squatting, or twisting motions. Keep dressing/incision clean, dry and intact, may remove dressing two days after discharge and leave incision open to air. Any sutures/staples to be removed on post-op day #14 at your office visit. Please follow up with Dr. Camacho in 2 weeks. Please follow up with your PMD for continuity of care and management as medications may have changed.

## 2025-05-09 NOTE — CONSULT NOTE ADULT - SUBJECTIVE AND OBJECTIVE BOX
CHIEF COMPLAINT: Patient is a 69y old  Female who presents with a chief complaint of L4-5 lami/ fusion 5/8/25 (09 May 2025 06:44)      HPI: 69-year-old woman with a history of remote cardiomyopathy (at age 52) with recovery/normalization of LV systolic function, left bundle branch block, hypertension, hypertriglyceridemia, and psychiatric illness (bipolar affective, ) presents for preop evaluation with c/o bilateral leg pain after sustaining fall July 2024.  Patient reports pains is worse in the "posterior thigh and left foot numbness".  Patient was evaluated and sent for MRI and patient was dx with spondylolisthesis lumbar region, radiculopathy lumbosacral region, spinal stenosis.  Patient is now schedule for Laminectomy Fusion L4-5 on 05/08/25.    Of note patient was hospitalized December 2024  ([place]) with neurologic symptoms, including word finding difficulties, visual disturbance, gait instability, and problems with memory. Brain imaging with MRI revealed no acute findings ([employer]). CVA, TIA, and migraine were included in the differential diagnosis. As per patient " levels of Ziprasidone was low which cause the altered mental status".   ? (30 Apr 2025 13:48)    H/O HTN, HLD, TIA, cardiomyopathy (EF recovered), tardive dyskinesia, chronic pancreatitis, GERD OCD. Patient is now L4-5 lami fusion with drain.     History limited due to: [ ] Dementia  [ ] Delirium  [ ] Condition    Pain Symptoms if applicable:             	                         none	   mild         moderate         severe  	                            0	    1-3	     4-6	         7-10  Pain:  Location:	  Modifying factors:	  Associated symptoms:	    Allergies    penicillins (Rash)  sulfa drugs (Rash)    Intolerances        HOME MEDICATIONS: [x] Reviewed    MEDICATIONS  (STANDING):  acetaminophen     Tablet .. 975 milliGRAM(s) Oral every 8 hours  amLODIPine   Tablet 2.5 milliGRAM(s) Oral daily  atorvastatin 40 milliGRAM(s) Oral at bedtime  gabapentin 300 milliGRAM(s) Oral two times a day  lamoTRIgine 200 milliGRAM(s) Oral two times a day  lamoTRIgine 50 milliGRAM(s) Oral two times a day  lithium 300 milliGRAM(s) Oral daily  metoprolol succinate ER 25 milliGRAM(s) Oral daily  pantoprazole    Tablet 40 milliGRAM(s) Oral before breakfast  polyethylene glycol 3350 17 Gram(s) Oral daily  senna 2 Tablet(s) Oral at bedtime  traMADol 50 milliGRAM(s) Oral every 8 hours  ziprasidone 60 milliGRAM(s) Oral <User Schedule>  ziprasidone 80 milliGRAM(s) Oral <User Schedule>    MEDICATIONS  (PRN):  cyclobenzaprine 5 milliGRAM(s) Oral three times a day PRN Muscle Spasm  HYDROmorphone   Tablet 2 milliGRAM(s) Oral every 4 hours PRN Moderate Pain (4 - 6)  HYDROmorphone   Tablet 4 milliGRAM(s) Oral every 4 hours PRN Severe Pain (7 - 10)  ondansetron Injectable 4 milliGRAM(s) IV Push every 6 hours PRN Nausea and/or Vomiting  SUMAtriptan 100 milliGRAM(s) Oral daily PRN Migraine  temazepam 30 milliGRAM(s) Oral at bedtime PRN Insomnia  zolpidem 5 milliGRAM(s) Oral at bedtime PRN Insomnia  zolpidem 5 milliGRAM(s) Oral at bedtime PRN Insomnia      PAST MEDICAL & SURGICAL HISTORY:  Bipolar 1 disorder      Hypertension      Bipolar disorder  last episode of suicidal thoughts was 1.5 yrs ago.  Pt also self mutilate last was "many yrs ago maybe 20 yrs ago".      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, bilateral      Migraines  last episode at 54 yrs old      Pill rolling tremors      Pneumonia  8 yrs ago      Spondylolisthesis, lumbar region      Lumbosacral radiculopathy      History of tonsillectomy  at 18 yrs old      History of left hip replacement      S/P laparoscopic cholecystectomy      [x ] Reviewed     SOCIAL HISTORY:  [x] Substance abuse: Former smoker, occasional wine drinker,   [x] Alcohol use:   [x] Tobacco:     FAMILY HISTORY:  Family history of hypertension (Father, Mother)  mother and father    Family history of depression (Mother, Sibling)  mother and 3 siblings    Family history of atrial fibrillation (Mother)  mother    Family history of prostate cancer in father (Father)    Family history of breast cancer in mother (Mother)    [x] No pertinent family history in first degree relatives     REVIEW OF SYSTEMS:  [x] All other ROS negative  [  ] Unable to obtain due to poor mental status    Vital Signs Last 24 Hrs  T(C): 36.5 (09 May 2025 13:47), Max: 37 (08 May 2025 22:13)  T(F): 97.7 (09 May 2025 13:47), Max: 98.6 (08 May 2025 22:13)  HR: 68 (09 May 2025 13:47) (67 - 80)  BP: 98/45 (09 May 2025 13:47) (98/45 - 137/68)  BP(mean): 87 (08 May 2025 16:00) (75 - 89)  RR: 18 (09 May 2025 13:47) (7 - 18)  SpO2: 96% (09 May 2025 13:47) (95% - 100%)    Parameters below as of 09 May 2025 13:47  Patient On (Oxygen Delivery Method): room air        CONSTITUTIONAL: NAD, pleasant, with tardive dyskinesia   RESPIRATORY: Normal respiratory effort; no respiratory distress, CTAB  CARDIOVASCULAR: No visible JVD, No lower extremity edema; S1S2, no m,r,g  ABDOMEN: Not guarding, does not appear distended, BS+  MUSCLOSKELETAL: no clubbing or cyanosis of digits; no joint swelling   PSYCH: [id number]: No rashes or lesions; Incisions C/D/I    LABS:                        9.6    13.65 )-----------( 247      ( 09 May 2025 05:22 )             29.8     Hemoglobin: 9.6 g/dL (05-09 @ 05:22)  Hemoglobin: 11.3 g/dL (05-08 @ 12:25)    05-09    140  |  107  |  16  ----------------------------<  145[H]  4.0   |  20[L]  |  1.17    Ca    8.7      09 May 2025 05:22  Phos  4.2     05-08  Mg     1.80     05-08    TPro  6.5  /  Alb  4.1  /  TBili  0.3  /  DBili  <0.2  /  AST  25  /  ALT  11  /  AlkPhos  87  05-08      Urinalysis Basic - ( 09 May 2025 05:22 )    Color: x / Appearance: x / SG: x / pH: x  Gluc: 145 mg/dL / Ketone: x  / Bili: x / Urobili: x   Blood: x / Protein: x / Nitrite: x   Leuk Esterase: x / RBC: x / WBC x   Sq Epi: x / Non Sq Epi: x / Bacteria: x      Microbiology     RADIOLOGY & ADDITIONAL STUDIES:    EKG:   Personally Reviewed:  [x] YES     Imaging:   Personally Reviewed:  [x] YES               [ ] Consultant(s) Notes Reviewed  [x] Care Discussed with Consultants/Other Providers: Ortho PA - discussed

## 2025-05-10 LAB
HCT VFR BLD CALC: 35.9 % — SIGNIFICANT CHANGE UP (ref 34.5–45)
HGB BLD-MCNC: 10.5 G/DL — LOW (ref 11.5–15.5)
MCHC RBC-ENTMCNC: 29.2 G/DL — LOW (ref 32–36)
MCHC RBC-ENTMCNC: 30.2 PG — SIGNIFICANT CHANGE UP (ref 27–34)
MCV RBC AUTO: 103.2 FL — HIGH (ref 80–100)
NRBC # BLD AUTO: 0 K/UL — SIGNIFICANT CHANGE UP (ref 0–0)
NRBC # FLD: 0 K/UL — SIGNIFICANT CHANGE UP (ref 0–0)
NRBC BLD AUTO-RTO: 0 /100 WBCS — SIGNIFICANT CHANGE UP (ref 0–0)
PLATELET # BLD AUTO: 232 K/UL — SIGNIFICANT CHANGE UP (ref 150–400)
RBC # BLD: 3.48 M/UL — LOW (ref 3.8–5.2)
RBC # FLD: 13.4 % — SIGNIFICANT CHANGE UP (ref 10.3–14.5)
WBC # BLD: 12.41 K/UL — HIGH (ref 3.8–10.5)
WBC # FLD AUTO: 12.41 K/UL — HIGH (ref 3.8–10.5)

## 2025-05-10 RX ADMIN — LAMOTRIGINE 50 MILLIGRAM(S): 150 TABLET ORAL at 17:32

## 2025-05-10 RX ADMIN — LAMOTRIGINE 50 MILLIGRAM(S): 150 TABLET ORAL at 05:42

## 2025-05-10 RX ADMIN — ZIPRASIDONE HYDROCHLORIDE 80 MILLIGRAM(S): 40 CAPSULE ORAL at 21:47

## 2025-05-10 RX ADMIN — Medication 975 MILLIGRAM(S): at 22:40

## 2025-05-10 RX ADMIN — TRAMADOL HYDROCHLORIDE 50 MILLIGRAM(S): 50 TABLET, FILM COATED ORAL at 16:42

## 2025-05-10 RX ADMIN — LAMOTRIGINE 200 MILLIGRAM(S): 150 TABLET ORAL at 17:32

## 2025-05-10 RX ADMIN — TRAMADOL HYDROCHLORIDE 50 MILLIGRAM(S): 50 TABLET, FILM COATED ORAL at 05:42

## 2025-05-10 RX ADMIN — Medication 975 MILLIGRAM(S): at 13:12

## 2025-05-10 RX ADMIN — Medication 975 MILLIGRAM(S): at 21:48

## 2025-05-10 RX ADMIN — LITHIUM CARBONATE 300 MILLIGRAM(S): 600 CAPSULE, GELATIN COATED ORAL at 11:31

## 2025-05-10 RX ADMIN — Medication 4 MILLIGRAM(S): at 19:15

## 2025-05-10 RX ADMIN — ATORVASTATIN CALCIUM 40 MILLIGRAM(S): 80 TABLET, FILM COATED ORAL at 21:48

## 2025-05-10 RX ADMIN — ZIPRASIDONE HYDROCHLORIDE 60 MILLIGRAM(S): 40 CAPSULE ORAL at 05:42

## 2025-05-10 RX ADMIN — AMLODIPINE BESYLATE 2.5 MILLIGRAM(S): 10 TABLET ORAL at 05:43

## 2025-05-10 RX ADMIN — TRAMADOL HYDROCHLORIDE 50 MILLIGRAM(S): 50 TABLET, FILM COATED ORAL at 06:40

## 2025-05-10 RX ADMIN — Medication 975 MILLIGRAM(S): at 06:40

## 2025-05-10 RX ADMIN — Medication 975 MILLIGRAM(S): at 00:00

## 2025-05-10 RX ADMIN — GABAPENTIN 300 MILLIGRAM(S): 400 CAPSULE ORAL at 05:42

## 2025-05-10 RX ADMIN — Medication 2 MILLIGRAM(S): at 13:11

## 2025-05-10 RX ADMIN — TRAMADOL HYDROCHLORIDE 50 MILLIGRAM(S): 50 TABLET, FILM COATED ORAL at 15:54

## 2025-05-10 RX ADMIN — Medication 4 MILLIGRAM(S): at 20:35

## 2025-05-10 RX ADMIN — Medication 2 MILLIGRAM(S): at 03:19

## 2025-05-10 RX ADMIN — TEMAZEPAM 30 MILLIGRAM(S): 15 CAPSULE ORAL at 23:05

## 2025-05-10 RX ADMIN — Medication 2 MILLIGRAM(S): at 09:50

## 2025-05-10 RX ADMIN — METOPROLOL SUCCINATE 25 MILLIGRAM(S): 50 TABLET, EXTENDED RELEASE ORAL at 05:43

## 2025-05-10 RX ADMIN — LAMOTRIGINE 200 MILLIGRAM(S): 150 TABLET ORAL at 05:43

## 2025-05-10 RX ADMIN — GABAPENTIN 300 MILLIGRAM(S): 400 CAPSULE ORAL at 17:32

## 2025-05-10 RX ADMIN — CYCLOBENZAPRINE HYDROCHLORIDE 5 MILLIGRAM(S): 15 CAPSULE, EXTENDED RELEASE ORAL at 05:42

## 2025-05-10 RX ADMIN — Medication 4 MILLIGRAM(S): at 21:42

## 2025-05-10 RX ADMIN — Medication 2 MILLIGRAM(S): at 08:56

## 2025-05-10 RX ADMIN — Medication 975 MILLIGRAM(S): at 05:43

## 2025-05-10 RX ADMIN — Medication 2 MILLIGRAM(S): at 14:11

## 2025-05-10 RX ADMIN — Medication 2 MILLIGRAM(S): at 04:20

## 2025-05-10 NOTE — PROGRESS NOTE ADULT - SUBJECTIVE AND OBJECTIVE BOX
Orthopedic Surgery Progress Note     S: Patient seen and examined today. No acute events overnight. Pain is well controlled. Denies f/c, chest pain, shortness of breath, dizziness.    MEDICATIONS  (STANDING):  acetaminophen     Tablet .. 975 milliGRAM(s) Oral every 8 hours  amLODIPine   Tablet 2.5 milliGRAM(s) Oral daily  atorvastatin 40 milliGRAM(s) Oral at bedtime  gabapentin 300 milliGRAM(s) Oral two times a day  lamoTRIgine 200 milliGRAM(s) Oral two times a day  lamoTRIgine 50 milliGRAM(s) Oral two times a day  lithium 300 milliGRAM(s) Oral daily  metoprolol succinate ER 25 milliGRAM(s) Oral daily  pantoprazole    Tablet 40 milliGRAM(s) Oral before breakfast  polyethylene glycol 3350 17 Gram(s) Oral daily  senna 2 Tablet(s) Oral at bedtime  traMADol 50 milliGRAM(s) Oral every 8 hours  ziprasidone 60 milliGRAM(s) Oral <User Schedule>  ziprasidone 80 milliGRAM(s) Oral <User Schedule>    MEDICATIONS  (PRN):  cyclobenzaprine 5 milliGRAM(s) Oral three times a day PRN Muscle Spasm  HYDROmorphone   Tablet 2 milliGRAM(s) Oral every 4 hours PRN Moderate Pain (4 - 6)  HYDROmorphone   Tablet 4 milliGRAM(s) Oral every 4 hours PRN Severe Pain (7 - 10)  ondansetron Injectable 4 milliGRAM(s) IV Push every 6 hours PRN Nausea and/or Vomiting  SUMAtriptan 100 milliGRAM(s) Oral daily PRN Migraine  temazepam 30 milliGRAM(s) Oral at bedtime PRN Insomnia  zolpidem 5 milliGRAM(s) Oral at bedtime PRN Insomnia  zolpidem 5 milliGRAM(s) Oral at bedtime PRN Insomnia      Vital Signs Last 24 Hrs  T(C): 36.7 (10 May 2025 05:40), Max: 37.1 (10 May 2025 02:00)  T(F): 98 (10 May 2025 05:40), Max: 98.8 (10 May 2025 02:00)  HR: 70 (10 May 2025 05:40) (67 - 93)  BP: 159/63 (10 May 2025 05:40) (98/45 - 159/63)  BP(mean): --  RR: 16 (10 May 2025 05:40) (16 - 18)  SpO2: 99% (10 May 2025 05:40) (96% - 99%)    Parameters below as of 10 May 2025 05:40  Patient On (Oxygen Delivery Method): room air        05-09-25 @ 07:01  -  05-10-25 @ 07:00  --------------------------------------------------------  IN: 0 mL / OUT: 3400 mL / NET: -3400 mL        Physical Exam:  Gen: NAD  Spine  Dressing changed this am  Incision intact  HV in place w minimal SS output    Motor:                   C5                C6              C7               C8           T1   R            5/5                5/5            5/5             5/5          5/5  L             5/5               5/5             5/5             5/5          5/5                L2             L3             L4               L5            S1  R         5/5           5/5          5/5             5/5           5/5  L          4/5          4/5           4/5             5/5           5/5  effort limited due to back pain     Sensory:            C5         C6         C7      C8       T1        (0=absent, 1=impaired, 2=normal, NT=not testable)  R         2            2           2        2         2  L          2            2           2        2         2               L2          L3         L4      L5       S1         (0=absent, 1=impaired, 2=normal, NT=not testable)  R         2            2            2        2        2  L          2            2           2        2         2      LABS:                        9.6    13.65 )-----------( 247      ( 09 May 2025 05:22 )             29.8     05-09    140  |  107  |  16  ----------------------------<  145[H]  4.0   |  20[L]  |  1.17    Ca    8.7      09 May 2025 05:22  Phos  4.2     05-08  Mg     1.80     05-08    TPro  6.5  /  Alb  4.1  /  TBili  0.3  /  DBili  <0.2  /  AST  25  /  ALT  11  /  AlkPhos  87  05-08

## 2025-05-11 LAB
ANION GAP SERPL CALC-SCNC: 11 MMOL/L — SIGNIFICANT CHANGE UP (ref 7–14)
BUN SERPL-MCNC: 16 MG/DL — SIGNIFICANT CHANGE UP (ref 7–23)
CALCIUM SERPL-MCNC: 8.9 MG/DL — SIGNIFICANT CHANGE UP (ref 8.4–10.5)
CHLORIDE SERPL-SCNC: 107 MMOL/L — SIGNIFICANT CHANGE UP (ref 98–107)
CO2 SERPL-SCNC: 22 MMOL/L — SIGNIFICANT CHANGE UP (ref 22–31)
CREAT SERPL-MCNC: 1.07 MG/DL — SIGNIFICANT CHANGE UP (ref 0.5–1.3)
EGFR: 56 ML/MIN/1.73M2 — LOW
EGFR: 56 ML/MIN/1.73M2 — LOW
GLUCOSE SERPL-MCNC: 95 MG/DL — SIGNIFICANT CHANGE UP (ref 70–99)
HCT VFR BLD CALC: 31 % — LOW (ref 34.5–45)
HGB BLD-MCNC: 9.7 G/DL — LOW (ref 11.5–15.5)
MCHC RBC-ENTMCNC: 29.8 PG — SIGNIFICANT CHANGE UP (ref 27–34)
MCHC RBC-ENTMCNC: 31.3 G/DL — LOW (ref 32–36)
MCV RBC AUTO: 95.1 FL — SIGNIFICANT CHANGE UP (ref 80–100)
NRBC # BLD AUTO: 0 K/UL — SIGNIFICANT CHANGE UP (ref 0–0)
NRBC # FLD: 0 K/UL — SIGNIFICANT CHANGE UP (ref 0–0)
NRBC BLD AUTO-RTO: 0 /100 WBCS — SIGNIFICANT CHANGE UP (ref 0–0)
PLATELET # BLD AUTO: 224 K/UL — SIGNIFICANT CHANGE UP (ref 150–400)
POTASSIUM SERPL-MCNC: 4.1 MMOL/L — SIGNIFICANT CHANGE UP (ref 3.5–5.3)
POTASSIUM SERPL-SCNC: 4.1 MMOL/L — SIGNIFICANT CHANGE UP (ref 3.5–5.3)
RBC # BLD: 3.26 M/UL — LOW (ref 3.8–5.2)
RBC # FLD: 13.4 % — SIGNIFICANT CHANGE UP (ref 10.3–14.5)
SODIUM SERPL-SCNC: 140 MMOL/L — SIGNIFICANT CHANGE UP (ref 135–145)
WBC # BLD: 10.26 K/UL — SIGNIFICANT CHANGE UP (ref 3.8–10.5)
WBC # FLD AUTO: 10.26 K/UL — SIGNIFICANT CHANGE UP (ref 3.8–10.5)

## 2025-05-11 RX ADMIN — AMLODIPINE BESYLATE 2.5 MILLIGRAM(S): 10 TABLET ORAL at 05:13

## 2025-05-11 RX ADMIN — METOPROLOL SUCCINATE 25 MILLIGRAM(S): 50 TABLET, EXTENDED RELEASE ORAL at 05:13

## 2025-05-11 RX ADMIN — LAMOTRIGINE 50 MILLIGRAM(S): 150 TABLET ORAL at 05:13

## 2025-05-11 RX ADMIN — ZIPRASIDONE HYDROCHLORIDE 60 MILLIGRAM(S): 40 CAPSULE ORAL at 05:13

## 2025-05-11 RX ADMIN — LAMOTRIGINE 200 MILLIGRAM(S): 150 TABLET ORAL at 17:55

## 2025-05-11 RX ADMIN — LAMOTRIGINE 200 MILLIGRAM(S): 150 TABLET ORAL at 05:13

## 2025-05-11 RX ADMIN — Medication 2 MILLIGRAM(S): at 04:28

## 2025-05-11 RX ADMIN — Medication 4 MILLIGRAM(S): at 21:58

## 2025-05-11 RX ADMIN — Medication 975 MILLIGRAM(S): at 13:51

## 2025-05-11 RX ADMIN — Medication 2 MILLIGRAM(S): at 11:52

## 2025-05-11 RX ADMIN — GABAPENTIN 300 MILLIGRAM(S): 400 CAPSULE ORAL at 05:12

## 2025-05-11 RX ADMIN — Medication 4 MILLIGRAM(S): at 14:50

## 2025-05-11 RX ADMIN — Medication 4 MILLIGRAM(S): at 22:58

## 2025-05-11 RX ADMIN — Medication 4 MILLIGRAM(S): at 17:54

## 2025-05-11 RX ADMIN — LITHIUM CARBONATE 300 MILLIGRAM(S): 600 CAPSULE, GELATIN COATED ORAL at 11:17

## 2025-05-11 RX ADMIN — Medication 2 MILLIGRAM(S): at 05:12

## 2025-05-11 RX ADMIN — Medication 40 MILLIGRAM(S): at 05:12

## 2025-05-11 RX ADMIN — Medication 975 MILLIGRAM(S): at 05:12

## 2025-05-11 RX ADMIN — LAMOTRIGINE 50 MILLIGRAM(S): 150 TABLET ORAL at 17:54

## 2025-05-11 RX ADMIN — GABAPENTIN 300 MILLIGRAM(S): 400 CAPSULE ORAL at 18:46

## 2025-05-11 RX ADMIN — Medication 4 MILLIGRAM(S): at 13:51

## 2025-05-11 RX ADMIN — TEMAZEPAM 30 MILLIGRAM(S): 15 CAPSULE ORAL at 23:04

## 2025-05-11 RX ADMIN — ZIPRASIDONE HYDROCHLORIDE 80 MILLIGRAM(S): 40 CAPSULE ORAL at 20:26

## 2025-05-11 RX ADMIN — ATORVASTATIN CALCIUM 40 MILLIGRAM(S): 80 TABLET, FILM COATED ORAL at 21:58

## 2025-05-11 RX ADMIN — Medication 975 MILLIGRAM(S): at 06:15

## 2025-05-11 RX ADMIN — Medication 975 MILLIGRAM(S): at 14:26

## 2025-05-11 RX ADMIN — Medication 2 MILLIGRAM(S): at 10:52

## 2025-05-11 NOTE — PROGRESS NOTE ADULT - SUBJECTIVE AND OBJECTIVE BOX
SUBJECTIVE  Patient seen and examined at bedside. No acute events overnight. States pain is controlled. Denies fevers/chills, chest pain, or dyspnea.    OBJECTIVE  Vital Signs Last 24 Hrs  T(C): 36.7 (11 May 2025 05:10), Max: 36.8 (10 May 2025 13:22)  T(F): 98 (11 May 2025 05:10), Max: 98.2 (10 May 2025 13:22)  HR: 79 (11 May 2025 05:10) (70 - 85)  BP: 119/60 (11 May 2025 05:10) (93/49 - 145/64)  BP(mean): --  RR: 16 (11 May 2025 05:10) (16 - 18)  SpO2: 98% (11 May 2025 05:10) (95% - 100%)    Parameters below as of 11 May 2025 05:10  Patient On (Oxygen Delivery Method): room air        PHYSICAL EXAM  Gen: Lying in bed, NAD  Resp: No increased WOB  Spine:  FANNY in place w min ss output   Dressing c/d/i    Motor:                   C5                C6              C7               C8           T1   R            5/5                5/5            5/5             5/5          5/5  L             5/5               5/5             5/5             5/5          5/5                L2             L3             L4               L5            S1  R         5/5           5/5          5/5             5/5           5/5  L          4/5          4/5           4/5             5/5           5/5  effort limited due to back pain/prior hip surgery     Sensory:            C5         C6         C7      C8       T1        (0=absent, 1=impaired, 2=normal, NT=not testable)  R         2            2           2        2         2  L          2            2           2        2         2               L2          L3         L4      L5       S1         (0=absent, 1=impaired, 2=normal, NT=not testable)  R         2            2            2        2        2  L          2            2           2        2         2    LEs:  Calves soft, no TTP b/l  WWP b/l    LABS                        9.7    10.26 )-----------( 224      ( 11 May 2025 05:40 )             31.0     05-11    140  |  107  |  16  ----------------------------<  95  4.1   |  22  |  1.07    Ca    8.9      11 May 2025 05:40

## 2025-05-12 ENCOUNTER — TRANSCRIPTION ENCOUNTER (OUTPATIENT)
Age: 70
End: 2025-05-12

## 2025-05-12 DIAGNOSIS — D72.829 ELEVATED WHITE BLOOD CELL COUNT, UNSPECIFIED: ICD-10-CM

## 2025-05-12 DIAGNOSIS — M48.00 SPINAL STENOSIS, SITE UNSPECIFIED: ICD-10-CM

## 2025-05-12 DIAGNOSIS — D50.0 IRON DEFICIENCY ANEMIA SECONDARY TO BLOOD LOSS (CHRONIC): ICD-10-CM

## 2025-05-12 LAB
ANION GAP SERPL CALC-SCNC: 13 MMOL/L — SIGNIFICANT CHANGE UP (ref 7–14)
BUN SERPL-MCNC: 14 MG/DL — SIGNIFICANT CHANGE UP (ref 7–23)
CALCIUM SERPL-MCNC: 9.1 MG/DL — SIGNIFICANT CHANGE UP (ref 8.4–10.5)
CHLORIDE SERPL-SCNC: 102 MMOL/L — SIGNIFICANT CHANGE UP (ref 98–107)
CO2 SERPL-SCNC: 22 MMOL/L — SIGNIFICANT CHANGE UP (ref 22–31)
CREAT SERPL-MCNC: 1.16 MG/DL — SIGNIFICANT CHANGE UP (ref 0.5–1.3)
EGFR: 51 ML/MIN/1.73M2 — LOW
EGFR: 51 ML/MIN/1.73M2 — LOW
GLUCOSE SERPL-MCNC: 92 MG/DL — SIGNIFICANT CHANGE UP (ref 70–99)
HCT VFR BLD CALC: 32.9 % — LOW (ref 34.5–45)
HGB BLD-MCNC: 10.3 G/DL — LOW (ref 11.5–15.5)
MCHC RBC-ENTMCNC: 29.8 PG — SIGNIFICANT CHANGE UP (ref 27–34)
MCHC RBC-ENTMCNC: 31.3 G/DL — LOW (ref 32–36)
MCV RBC AUTO: 95.1 FL — SIGNIFICANT CHANGE UP (ref 80–100)
NRBC # BLD AUTO: 0 K/UL — SIGNIFICANT CHANGE UP (ref 0–0)
NRBC # FLD: 0 K/UL — SIGNIFICANT CHANGE UP (ref 0–0)
NRBC BLD AUTO-RTO: 0 /100 WBCS — SIGNIFICANT CHANGE UP (ref 0–0)
PLATELET # BLD AUTO: 254 K/UL — SIGNIFICANT CHANGE UP (ref 150–400)
POTASSIUM SERPL-MCNC: 3.9 MMOL/L — SIGNIFICANT CHANGE UP (ref 3.5–5.3)
POTASSIUM SERPL-SCNC: 3.9 MMOL/L — SIGNIFICANT CHANGE UP (ref 3.5–5.3)
RBC # BLD: 3.46 M/UL — LOW (ref 3.8–5.2)
RBC # FLD: 13.1 % — SIGNIFICANT CHANGE UP (ref 10.3–14.5)
SODIUM SERPL-SCNC: 137 MMOL/L — SIGNIFICANT CHANGE UP (ref 135–145)
WBC # BLD: 8.94 K/UL — SIGNIFICANT CHANGE UP (ref 3.8–10.5)
WBC # FLD AUTO: 8.94 K/UL — SIGNIFICANT CHANGE UP (ref 3.8–10.5)

## 2025-05-12 PROCEDURE — 99233 SBSQ HOSP IP/OBS HIGH 50: CPT

## 2025-05-12 RX ORDER — NALOXONE HYDROCHLORIDE 0.4 MG/ML
0.4 INJECTION, SOLUTION INTRAMUSCULAR; INTRAVENOUS; SUBCUTANEOUS
Qty: 1 | Refills: 0
Start: 2025-05-12 | End: 2025-05-12

## 2025-05-12 RX ORDER — HYDROMORPHONE/SOD CHLOR,ISO/PF 2 MG/10 ML
1 SYRINGE (ML) INJECTION
Qty: 28 | Refills: 0
Start: 2025-05-12 | End: 2025-05-18

## 2025-05-12 RX ORDER — ASPIRIN 325 MG
1 TABLET ORAL
Refills: 0 | DISCHARGE

## 2025-05-12 RX ORDER — POLYETHYLENE GLYCOL 3350 17 G/17G
17 POWDER, FOR SOLUTION ORAL
Qty: 238 | Refills: 0
Start: 2025-05-12 | End: 2025-05-25

## 2025-05-12 RX ORDER — SUMATRIPTAN 100 MG/1
1 TABLET, FILM COATED ORAL
Qty: 0 | Refills: 0 | DISCHARGE

## 2025-05-12 RX ORDER — CYCLOBENZAPRINE HYDROCHLORIDE 15 MG/1
1 CAPSULE, EXTENDED RELEASE ORAL
Qty: 42 | Refills: 0
Start: 2025-05-12 | End: 2025-05-25

## 2025-05-12 RX ORDER — TRAMADOL HYDROCHLORIDE 50 MG/1
1 TABLET, FILM COATED ORAL
Qty: 18 | Refills: 0
Start: 2025-05-12 | End: 2025-05-17

## 2025-05-12 RX ORDER — ACETAMINOPHEN 500 MG/5ML
3 LIQUID (ML) ORAL
Qty: 0 | Refills: 0 | DISCHARGE
Start: 2025-05-12

## 2025-05-12 RX ORDER — SENNA 187 MG
2 TABLET ORAL
Qty: 28 | Refills: 0
Start: 2025-05-12 | End: 2025-05-25

## 2025-05-12 RX ADMIN — GABAPENTIN 300 MILLIGRAM(S): 400 CAPSULE ORAL at 05:41

## 2025-05-12 RX ADMIN — Medication 4 MILLIGRAM(S): at 10:30

## 2025-05-12 RX ADMIN — Medication 4 MILLIGRAM(S): at 14:05

## 2025-05-12 RX ADMIN — Medication 4 MILLIGRAM(S): at 04:21

## 2025-05-12 RX ADMIN — Medication 4 MILLIGRAM(S): at 21:04

## 2025-05-12 RX ADMIN — LITHIUM CARBONATE 300 MILLIGRAM(S): 600 CAPSULE, GELATIN COATED ORAL at 13:02

## 2025-05-12 RX ADMIN — LAMOTRIGINE 200 MILLIGRAM(S): 150 TABLET ORAL at 06:25

## 2025-05-12 RX ADMIN — Medication 4 MILLIGRAM(S): at 22:00

## 2025-05-12 RX ADMIN — Medication 4 MILLIGRAM(S): at 15:05

## 2025-05-12 RX ADMIN — LAMOTRIGINE 50 MILLIGRAM(S): 150 TABLET ORAL at 17:29

## 2025-05-12 RX ADMIN — ATORVASTATIN CALCIUM 40 MILLIGRAM(S): 80 TABLET, FILM COATED ORAL at 22:09

## 2025-05-12 RX ADMIN — ZIPRASIDONE HYDROCHLORIDE 60 MILLIGRAM(S): 40 CAPSULE ORAL at 05:42

## 2025-05-12 RX ADMIN — GABAPENTIN 300 MILLIGRAM(S): 400 CAPSULE ORAL at 17:29

## 2025-05-12 RX ADMIN — ZIPRASIDONE HYDROCHLORIDE 80 MILLIGRAM(S): 40 CAPSULE ORAL at 20:01

## 2025-05-12 RX ADMIN — TEMAZEPAM 30 MILLIGRAM(S): 15 CAPSULE ORAL at 22:01

## 2025-05-12 RX ADMIN — LAMOTRIGINE 50 MILLIGRAM(S): 150 TABLET ORAL at 06:25

## 2025-05-12 RX ADMIN — AMLODIPINE BESYLATE 2.5 MILLIGRAM(S): 10 TABLET ORAL at 05:41

## 2025-05-12 RX ADMIN — Medication 40 MILLIGRAM(S): at 05:42

## 2025-05-12 RX ADMIN — Medication 975 MILLIGRAM(S): at 14:02

## 2025-05-12 RX ADMIN — Medication 4 MILLIGRAM(S): at 05:20

## 2025-05-12 RX ADMIN — Medication 4 MILLIGRAM(S): at 09:38

## 2025-05-12 RX ADMIN — Medication 975 MILLIGRAM(S): at 13:02

## 2025-05-12 RX ADMIN — LAMOTRIGINE 200 MILLIGRAM(S): 150 TABLET ORAL at 17:29

## 2025-05-12 RX ADMIN — METOPROLOL SUCCINATE 25 MILLIGRAM(S): 50 TABLET, EXTENDED RELEASE ORAL at 05:41

## 2025-05-12 NOTE — DISCHARGE NOTE NURSING/CASE MANAGEMENT/SOCIAL WORK - PATIENT PORTAL LINK FT
You can access the FollowMyHealth Patient Portal offered by [facility] by registering at the following website: [email or web address]. By joining [facility]’s FollowMyHealth portal, you will also be able to view your health information using other applications (apps) compatible with our system.

## 2025-05-12 NOTE — PROGRESS NOTE ADULT - SUBJECTIVE AND OBJECTIVE BOX
[facility] Division of Hospital Medicine  Brody Roblero MD  Contact via [employer] Teams (Mon-Fri 8AM-4PM)  Otherwise: contact Hospitalist in Charge at [phone]    Patient is a 69y old  Female who presents with a chief complaint of surgery (09 May 2025 13:52)    SUBJECTIVE / OVERNIGHT EVENTS:  Patient offers no new complaints. No F/C, N/V, CP, SOB, Cough, lightheadedness, dizziness, abdominal pain, diarrhea, dysuria.    MEDICATIONS  (STANDING):  acetaminophen     Tablet .. 975 milliGRAM(s) Oral every 8 hours  amLODIPine   Tablet 2.5 milliGRAM(s) Oral daily  atorvastatin 40 milliGRAM(s) Oral at bedtime  gabapentin 300 milliGRAM(s) Oral two times a day  lamoTRIgine 50 milliGRAM(s) Oral two times a day  lamoTRIgine 200 milliGRAM(s) Oral two times a day  lithium 300 milliGRAM(s) Oral daily  metoprolol succinate ER 25 milliGRAM(s) Oral daily  pantoprazole    Tablet 40 milliGRAM(s) Oral before breakfast  polyethylene glycol 3350 17 Gram(s) Oral daily  senna 2 Tablet(s) Oral at bedtime  traMADol 50 milliGRAM(s) Oral every 8 hours  ziprasidone 60 milliGRAM(s) Oral <User Schedule>  ziprasidone 80 milliGRAM(s) Oral <User Schedule>    MEDICATIONS  (PRN):  cyclobenzaprine 5 milliGRAM(s) Oral three times a day PRN Muscle Spasm  HYDROmorphone   Tablet 2 milliGRAM(s) Oral every 4 hours PRN Moderate Pain (4 - 6)  HYDROmorphone   Tablet 4 milliGRAM(s) Oral every 4 hours PRN Severe Pain (7 - 10)  ondansetron Injectable 4 milliGRAM(s) IV Push every 6 hours PRN Nausea and/or Vomiting  SUMAtriptan 100 milliGRAM(s) Oral daily PRN Migraine  temazepam 30 milliGRAM(s) Oral at bedtime PRN Insomnia  zolpidem 5 milliGRAM(s) Oral at bedtime PRN Insomnia  zolpidem 5 milliGRAM(s) Oral at bedtime PRN Insomnia      Vital Signs Last 24 Hrs  T(C): 36.8 (12 May 2025 09:42), Max: 37.6 (11 May 2025 21:21)  T(F): 98.2 (12 May 2025 09:42), Max: 99.6 (11 May 2025 21:21)  HR: 89 (12 May 2025 09:42) (68 - 89)  BP: 141/71 (12 May 2025 09:42) (109/59 - 141/71)  BP(mean): --  RR: 18 (12 May 2025 09:42) (17 - 18)  SpO2: 94% (12 May 2025 09:42) (94% - 100%)    Parameters below as of 12 May 2025 09:42  Patient On (Oxygen Delivery Method): room air      CAPILLARY BLOOD GLUCOSE        I&O's Summary    11 May 2025 07:01  -  12 May 2025 07:00  --------------------------------------------------------  IN: 0 mL / OUT: 2.5 mL / NET: -2.5 mL        PHYSICAL EXAM:  CONSTITUTIONAL: NAD  EYES: PERRLA; conjunctiva and sclera clear  ENMT: Moist oral mucosa, no pharyngeal injection or exudates; normal dentition  NECK: Supple, no palpable masses; no thyromegaly  RESPIRATORY: Normal respiratory effort; lungs are clear to auscultation bilaterally  CARDIOVASCULAR: Regular rate and rhythm, normal S1 and S2, no murmur/rub/gallop; No lower extremity edema; Peripheral pulses are 2+ bilaterally  ABDOMEN: Nontender to palpation, normoactive bowel sounds, no rebound/guarding; No hepatosplenomegaly  MUSCULOSKELETAL:  Did not assess gait; no clubbing or cyanosis of digits; no joint swelling or tenderness to palpation  BACK: drain in place, limited ROM d/t pain  PSYCH: A+O to person, place, and time; affect appropriate  NEUROLOGY: CN 2-12 are intact and symmetric; no gross sensory deficits   SKIN: No rashes; no palpable lesions, surgical dressing C/D/I    LABS:                        10.3   8.94  )-----------( 254      ( 12 May 2025 05:34 )             32.9     05-12    137  |  102  |  14  ----------------------------<  92  3.9   |  22  |  1.16    Ca    9.1      12 May 2025 05:34            Urinalysis Basic - ( 12 May 2025 05:34 )    Color: x / Appearance: x / SG: x / pH: x  Gluc: 92 mg/dL / Ketone: x  / Bili: x / Urobili: x   Blood: x / Protein: x / Nitrite: x   Leuk Esterase: x / RBC: x / WBC x   Sq Epi: x / Non Sq Epi: x / Bacteria: x        RADIOLOGY & ADDITIONAL TESTS:    Imaging Personally Reviewed:    Care Discussed with Consultants/Other Providers:    Care Discussed with Orthopedic PA about:

## 2025-05-12 NOTE — DISCHARGE NOTE NURSING/CASE MANAGEMENT/SOCIAL WORK - FINANCIAL ASSISTANCE
[facility] provides services at a reduced cost to those who are determined to be eligible through [facility]’s financial assistance program. Information regarding [facility]’s financial assistance program can be found by going to [email or web address] or by calling [phone].

## 2025-05-12 NOTE — DISCHARGE NOTE NURSING/CASE MANAGEMENT/SOCIAL WORK - NSDPDISTO_GEN_ALL_CORE
Home Sub-Acute rehab Metronidazole Counseling:  I discussed with the patient the risks of metronidazole including but not limited to seizures, nausea/vomiting, a metallic taste in the mouth, nausea/vomiting and severe allergy.

## 2025-05-12 NOTE — DISCHARGE NOTE NURSING/CASE MANAGEMENT/SOCIAL WORK - CAREGIVER ADDRESS
[Dear  ___] : Dear  [unfilled], [Courtesy Letter:] : I had the pleasure of seeing your patient, [unfilled], in my office today. [Please see my note below.] : Please see my note below. [Consult Closing:] : Thank you very much for allowing me to participate in the care of this patient.  If you have any questions, please do not hesitate to contact me. [Sincerely,] : Sincerely, [FreeTextEntry2] : Eliecer Pedersen MD\par [address]\par [place] [address] [FreeTextEntry3] : Terrence Light MD, FACS\par Urologic Oncology\par Department of Urology\par [facility]\par \par [employer] [facility] \par \par  [place]

## 2025-05-12 NOTE — PROGRESS NOTE ADULT - SUBJECTIVE AND OBJECTIVE BOX
ORTHO PROGRESS NOTE     No acute overnight events. Pt resting comfortably without complaint. Pain controlled       Vital Signs Last 24 Hrs  T(C): 37.4 (12 May 2025 01:56), Max: 37.6 (11 May 2025 21:21)  T(F): 99.4 (12 May 2025 01:56), Max: 99.6 (11 May 2025 21:21)  HR: 68 (12 May 2025 01:56) (65 - 84)  BP: 109/59 (12 May 2025 01:56) (101/50 - 128/72)  BP(mean): --  RR: 17 (12 May 2025 01:56) (17 - 18)  SpO2: 100% (12 May 2025 01:56) (95% - 100%)    Parameters below as of 12 May 2025 01:56  Patient On (Oxygen Delivery Method): room air        PHYSICAL EXAM  Gen: Lying in bed, NAD  Resp: No increased WOB  Spine:  FANNY in place w min ss output   Dressing c/d/i    Motor:                   C5                C6              C7               C8           T1   R            5/5                5/5            5/5             5/5          5/5  L             5/5               5/5             5/5             5/5          5/5                L2             L3             L4               L5            S1  R         5/5           5/5          5/5             5/5           5/5  L          4/5          4/5           4/5             5/5           5/5  effort limited due to back pain/prior hip surgery     Sensory:            C5         C6         C7      C8       T1        (0=absent, 1=impaired, 2=normal, NT=not testable)  R         2            2           2        2         2  L          2            2           2        2         2               L2          L3         L4      L5       S1         (0=absent, 1=impaired, 2=normal, NT=not testable)  R         2            2            2        2        2  L          2            2           2        2         2    LEs:  Calves soft, no TTP b/l  WWP b/l        69yFemale s/p L4-5 lami fusion    -WBAT BLLE  -PT/OT  -venodynes  -monitor drain output  -IS  -dispo: pending [facility]

## 2025-05-12 NOTE — DISCHARGE NOTE NURSING/CASE MANAGEMENT/SOCIAL WORK - NSDCPNINST_GEN_ALL_CORE
Maintain back incision and dressing clean dry and intact. Call MD with any signs of infection ie fever, redness, drainage, or with signs of bleeding, unrelieved increased pain, or persistent nausea. Avoid twisting motion and remember to logroll to turn in bed. Continue to drink plenty of fluids. Avoid strenuous activity and constipation which may be a side effect from taking certain medications and narcotics. No heavy lifting greater than 10 pounds, ie. a gallon of milk. Continue Spine precautions and Safety and fall prevention measures to prevent injury. Follow-up with MD in office as instructed.

## 2025-05-12 NOTE — DISCHARGE NOTE NURSING/CASE MANAGEMENT/SOCIAL WORK - NSDCPECAREGIVERED_GEN_ALL_CORE
Medline and [facility]aren[facility] for surgical procedure Laminectomy/post Spinal fusion, Spine Precautions, incision care, pain mgt, Tram, Dilaudid, Flexeril, as well as DC Medications and side effects literature for patient reference. Medline and carenotes for surgical procedure Lami, Spine Precautions, incision care, pain mgt, Dilaudid, Desirae, Tram, as well as DC Medications and side effects literature for patient reference.

## 2025-05-13 VITALS
SYSTOLIC BLOOD PRESSURE: 136 MMHG | OXYGEN SATURATION: 98 % | HEART RATE: 84 BPM | TEMPERATURE: 99 F | DIASTOLIC BLOOD PRESSURE: 63 MMHG | RESPIRATION RATE: 18 BRPM

## 2025-05-13 PROCEDURE — 99232 SBSQ HOSP IP/OBS MODERATE 35: CPT

## 2025-05-13 RX ORDER — TRIAZOLAM 0.25 MG
1 TABLET ORAL
Refills: 0 | DISCHARGE

## 2025-05-13 RX ORDER — CYCLOBENZAPRINE HYDROCHLORIDE 15 MG/1
1 CAPSULE, EXTENDED RELEASE ORAL
Qty: 0 | Refills: 0 | DISCHARGE
Start: 2025-05-13

## 2025-05-13 RX ORDER — HYDROMORPHONE/SOD CHLOR,ISO/PF 2 MG/10 ML
1 SYRINGE (ML) INJECTION
Qty: 0 | Refills: 0 | DISCHARGE
Start: 2025-05-13

## 2025-05-13 RX ORDER — POLYETHYLENE GLYCOL 3350 17 G/17G
17 POWDER, FOR SOLUTION ORAL
Qty: 0 | Refills: 0 | DISCHARGE
Start: 2025-05-13

## 2025-05-13 RX ORDER — TRAMADOL HYDROCHLORIDE 50 MG/1
1 TABLET, FILM COATED ORAL
Qty: 0 | Refills: 0 | DISCHARGE
Start: 2025-05-13

## 2025-05-13 RX ORDER — SENNA 187 MG
2 TABLET ORAL
Qty: 0 | Refills: 0 | DISCHARGE
Start: 2025-05-13

## 2025-05-13 RX ORDER — TRIAZOLAM 0.25 MG
1 TABLET ORAL
Qty: 14 | Refills: 5
Start: 2025-05-13 | End: 2025-08-04

## 2025-05-13 RX ORDER — HYDROMORPHONE/SOD CHLOR,ISO/PF 2 MG/10 ML
1 SYRINGE (ML) INJECTION
Qty: 30 | Refills: 0
Start: 2025-05-13 | End: 2025-05-17

## 2025-05-13 RX ADMIN — Medication 4 MILLIGRAM(S): at 01:27

## 2025-05-13 RX ADMIN — Medication 4 MILLIGRAM(S): at 02:25

## 2025-05-13 RX ADMIN — LAMOTRIGINE 200 MILLIGRAM(S): 150 TABLET ORAL at 06:27

## 2025-05-13 RX ADMIN — LITHIUM CARBONATE 300 MILLIGRAM(S): 600 CAPSULE, GELATIN COATED ORAL at 11:51

## 2025-05-13 RX ADMIN — ZIPRASIDONE HYDROCHLORIDE 60 MILLIGRAM(S): 40 CAPSULE ORAL at 06:28

## 2025-05-13 RX ADMIN — GABAPENTIN 300 MILLIGRAM(S): 400 CAPSULE ORAL at 06:27

## 2025-05-13 RX ADMIN — Medication 4 MILLIGRAM(S): at 07:11

## 2025-05-13 RX ADMIN — AMLODIPINE BESYLATE 2.5 MILLIGRAM(S): 10 TABLET ORAL at 06:28

## 2025-05-13 RX ADMIN — Medication 4 MILLIGRAM(S): at 12:12

## 2025-05-13 RX ADMIN — Medication 4 MILLIGRAM(S): at 14:17

## 2025-05-13 RX ADMIN — METOPROLOL SUCCINATE 25 MILLIGRAM(S): 50 TABLET, EXTENDED RELEASE ORAL at 06:27

## 2025-05-13 RX ADMIN — Medication 40 MILLIGRAM(S): at 06:27

## 2025-05-13 RX ADMIN — Medication 4 MILLIGRAM(S): at 11:12

## 2025-05-13 RX ADMIN — LAMOTRIGINE 50 MILLIGRAM(S): 150 TABLET ORAL at 06:27

## 2025-05-13 NOTE — PROGRESS NOTE ADULT - PROBLEM SELECTOR PLAN 1
- s/p L4-5 lami/fusion on 5/8  - Pain control with Flexeril PRN, dilaudid PO PRN, Ultram  - Bowel regiment - Senna/Miralax  - Surgical site management as per ortho  - PT/OT eval for safe dispo  - VTE ppx - IPC stocking
- s/p L4-5 lami/fusion on 5/8  - Pain control with Flexeril PRN, dilaudid PO PRN, Ultram  - Bowel regiment - Senna/Miralax  - Surgical site management as per ortho  - PT/OT eval for safe dispo  - VTE ppx - IPC stocking

## 2025-05-13 NOTE — PROGRESS NOTE ADULT - SUBJECTIVE AND OBJECTIVE BOX
[facility] Division of Hospital Medicine  Brody Roblero MD  Contact via [employer] Teams (Mon-Fri 8AM-4PM)  Otherwise: contact Hospitalist in Charge at [phone]    Patient is a 69y old  Female who presents with a chief complaint of surgery (09 May 2025 13:52)    SUBJECTIVE / OVERNIGHT EVENTS:  Patient offers no new complaints. No F/C, N/V, CP, SOB, Cough, lightheadedness, dizziness, abdominal pain, diarrhea, dysuria.    MEDICATIONS  (STANDING):  acetaminophen     Tablet .. 975 milliGRAM(s) Oral every 8 hours  amLODIPine   Tablet 2.5 milliGRAM(s) Oral daily  atorvastatin 40 milliGRAM(s) Oral at bedtime  gabapentin 300 milliGRAM(s) Oral two times a day  lamoTRIgine 200 milliGRAM(s) Oral two times a day  lamoTRIgine 50 milliGRAM(s) Oral two times a day  lithium 300 milliGRAM(s) Oral daily  metoprolol succinate ER 25 milliGRAM(s) Oral daily  pantoprazole    Tablet 40 milliGRAM(s) Oral before breakfast  polyethylene glycol 3350 17 Gram(s) Oral daily  senna 2 Tablet(s) Oral at bedtime  traMADol 50 milliGRAM(s) Oral every 8 hours  ziprasidone 60 milliGRAM(s) Oral <User Schedule>  ziprasidone 80 milliGRAM(s) Oral <User Schedule>    MEDICATIONS  (PRN):  cyclobenzaprine 5 milliGRAM(s) Oral three times a day PRN Muscle Spasm  HYDROmorphone   Tablet 2 milliGRAM(s) Oral every 4 hours PRN Moderate Pain (4 - 6)  HYDROmorphone   Tablet 4 milliGRAM(s) Oral every 4 hours PRN Severe Pain (7 - 10)  ondansetron Injectable 4 milliGRAM(s) IV Push every 6 hours PRN Nausea and/or Vomiting  SUMAtriptan 100 milliGRAM(s) Oral daily PRN Migraine  temazepam 30 milliGRAM(s) Oral at bedtime PRN Insomnia  zolpidem 5 milliGRAM(s) Oral at bedtime PRN Insomnia  zolpidem 5 milliGRAM(s) Oral at bedtime PRN Insomnia      Vital Signs Last 24 Hrs  T(C): 36.6 (13 May 2025 10:13), Max: 37.8 (13 May 2025 01:33)  T(F): 97.9 (13 May 2025 10:13), Max: 100 (13 May 2025 01:33)  HR: 72 (13 May 2025 10:13) (61 - 86)  BP: 136/55 (13 May 2025 10:13) (115/66 - 136/55)  BP(mean): --  RR: 17 (13 May 2025 10:13) (17 - 18)  SpO2: 98% (13 May 2025 10:13) (94% - 98%)    Parameters below as of 13 May 2025 10:13  Patient On (Oxygen Delivery Method): room air      CAPILLARY BLOOD GLUCOSE        I&O's Summary      PHYSICAL EXAM:  CONSTITUTIONAL: NAD  EYES: PERRLA; conjunctiva and sclera clear  ENMT: Moist oral mucosa, no pharyngeal injection or exudates; normal dentition  NECK: Supple, no palpable masses; no thyromegaly  RESPIRATORY: Normal respiratory effort; lungs are clear to auscultation bilaterally  CARDIOVASCULAR: Regular rate and rhythm, normal S1 and S2, no murmur/rub/gallop; No lower extremity edema; Peripheral pulses are 2+ bilaterally  ABDOMEN: Nontender to palpation, normoactive bowel sounds, no rebound/guarding; No hepatosplenomegaly  MUSCULOSKELETAL:  Did not assess gait; no clubbing or cyanosis of digits; no joint swelling or tenderness to palpation  BACK: drain in place, limited ROM d/t pain  PSYCH: A+O to person, place, and time; affect appropriate  NEUROLOGY: CN 2-12 are intact and symmetric; no gross sensory deficits   SKIN: No rashes; no palpable lesions, surgical dressing C/D/I    LABS:                        10.3   8.94  )-----------( 254      ( 12 May 2025 05:34 )             32.9     05-12    137  |  102  |  14  ----------------------------<  92  3.9   |  22  |  1.16    Ca    9.1      12 May 2025 05:34            Urinalysis Basic - ( 12 May 2025 05:34 )    Color: x / Appearance: x / SG: x / pH: x  Gluc: 92 mg/dL / Ketone: x  / Bili: x / Urobili: x   Blood: x / Protein: x / Nitrite: x   Leuk Esterase: x / RBC: x / WBC x   Sq Epi: x / Non Sq Epi: x / Bacteria: x        RADIOLOGY & ADDITIONAL TESTS:    Imaging Personally Reviewed:    Care Discussed with Consultants/Other Providers:    Care Discussed with Orthopedic PA about:

## 2025-05-13 NOTE — PROGRESS NOTE ADULT - PROBLEM SELECTOR PLAN 2
Acute anemia likely post-op blood loss related.  No clinical indication for PRBC transfusion.  Monitor H/H.
Acute anemia likely post-op blood loss related.  No clinical indication for PRBC transfusion.  Monitor H/H.

## 2025-05-13 NOTE — PROGRESS NOTE ADULT - ASSESSMENT
69yFemale s/p L4-5 lami fusion    -WBAT BLLE  -PT/OT  -venodynes  -monitor drain output  -IS  -dispo: pending [facility]      Anneliese Hinojosa MD, PGY-2  Orthopaedic Surgery  [facility] [id number]  [facility]        [id number]  [facility]  [id number]/ [phone]
69yFemale s/p L4-5 lami fusion    -WBAT BLLE  -PT/OT  -venodynes  -monitor drain output  -IS  -dispo: pending MERLINE Mulligan, PGY-2  Orthopedic Surgery    [facility]: [id number]  [facility]: [id number]  [facility]:  [id number]/ [phone]  
69F HTN, NICM w/ LBBB, Bipolar D/O c/b tardive dyskinesis, p/w spinal stenosis s/p L4-5 lami/fusion on 5/8 c/b post-op leukocytosis, anemia.
69F HTN, NICM w/ LBBB, Bipolar D/O c/b tardive dyskinesis, p/w spinal stenosis s/p L4-5 lami/fusion on 5/8 c/b post-op leukocytosis, anemia.

## 2025-05-13 NOTE — PROGRESS NOTE ADULT - SUBJECTIVE AND OBJECTIVE BOX
ORTHO PROGRESS NOTE     No acute overnight events. Pt resting comfortably without complaint. Pain controlled       Vital Signs Last 24 Hrs  T(C): 37.8 (13 May 2025 01:33), Max: 37.8 (13 May 2025 01:33)  T(F): 100 (13 May 2025 01:33), Max: 100 (13 May 2025 01:33)  HR: 77 (13 May 2025 01:33) (61 - 89)  BP: 115/66 (13 May 2025 01:33) (115/66 - 141/71)  BP(mean): --  RR: 18 (13 May 2025 01:33) (17 - 18)  SpO2: 94% (13 May 2025 01:33) (94% - 100%)    Parameters below as of 13 May 2025 01:33  Patient On (Oxygen Delivery Method): room air        Back: Dressing/ Incision Clean/Dry/Intact,     Motor:                   C5                C6              C7               C8           T1   R            5/5                5/5            5/5             5/5          5/5  L             5/5               5/5             5/5             5/5          5/5                L2             L3             L4               L5            S1  R         5/5           5/5          5/5             5/5           5/5  L          5/5          5/5           5/5             5/5           5/5    Sensory:            C5         C6         C7      C8       T1        (0=absent, 1=impaired, 2=normal, NT=not testable)  R         2            2           2        2         2  L          2            2           2        2         2               L2          L3         L4      L5       S1         (0=absent, 1=impaired, 2=normal, NT=not testable)  R         2            2            2        2        2  L          2            2           2        2         2      69yFemale s/p L4-5 lami fusion    -WBAT BLLE  -PT/OT  -venodynes  -monitor drain output  -IS  -dispo: pending [facility]

## 2025-05-13 NOTE — DIETITIAN INITIAL EVALUATION ADULT. - OBTAIN CURRENT WEIGHT
I called patient per Dr. Florentino as a reminder to attain echocardiogram.   Patient was currently driving as asked that I send a mychart with the phone number. I have done so.      yes

## 2025-05-21 ENCOUNTER — APPOINTMENT (OUTPATIENT)
Dept: ORTHOPEDIC SURGERY | Facility: CLINIC | Age: 70
End: 2025-05-21
Payer: MEDICARE

## 2025-05-21 VITALS — BODY MASS INDEX: 24.63 KG/M2 | HEIGHT: 63 IN | WEIGHT: 139 LBS

## 2025-05-21 DIAGNOSIS — Z98.1 ARTHRODESIS STATUS: ICD-10-CM

## 2025-05-21 DIAGNOSIS — M54.17 RADICULOPATHY, LUMBOSACRAL REGION: ICD-10-CM

## 2025-05-21 PROBLEM — M43.16 SPONDYLOLISTHESIS, LUMBAR REGION: Chronic | Status: ACTIVE | Noted: 2025-04-30

## 2025-05-21 PROCEDURE — 72100 X-RAY EXAM L-S SPINE 2/3 VWS: CPT | Mod: TC

## 2025-05-21 PROCEDURE — 99024 POSTOP FOLLOW-UP VISIT: CPT

## 2025-05-27 RX ORDER — OXYCODONE AND ACETAMINOPHEN 5; 325 MG/1; MG/1
5-325 TABLET ORAL
Qty: 40 | Refills: 0 | Status: ACTIVE | COMMUNITY
Start: 2025-05-27 | End: 1900-01-01

## 2025-05-30 ENCOUNTER — TRANSCRIPTION ENCOUNTER (OUTPATIENT)
Age: 70
End: 2025-05-30

## 2025-06-13 ENCOUNTER — APPOINTMENT (OUTPATIENT)
Dept: ORTHOPEDIC SURGERY | Facility: CLINIC | Age: 70
End: 2025-06-13
Payer: MEDICARE

## 2025-06-13 PROBLEM — M76.12 PSOAS TENDINITIS OF LEFT SIDE: Status: ACTIVE | Noted: 2025-06-13

## 2025-06-13 PROCEDURE — 99214 OFFICE O/P EST MOD 30 MIN: CPT

## 2025-06-13 PROCEDURE — 99024 POSTOP FOLLOW-UP VISIT: CPT

## 2025-06-13 PROCEDURE — 73502 X-RAY EXAM HIP UNI 2-3 VIEWS: CPT

## 2025-06-16 ENCOUNTER — APPOINTMENT (OUTPATIENT)
Dept: ORTHOPEDIC SURGERY | Facility: CLINIC | Age: 70
End: 2025-06-16
Payer: MEDICARE

## 2025-06-16 PROCEDURE — 99024 POSTOP FOLLOW-UP VISIT: CPT

## 2025-06-16 PROCEDURE — 72100 X-RAY EXAM L-S SPINE 2/3 VWS: CPT

## 2025-06-25 ENCOUNTER — APPOINTMENT (OUTPATIENT)
Dept: INTERNAL MEDICINE | Facility: CLINIC | Age: 70
End: 2025-06-25

## 2025-07-09 ENCOUNTER — OUTPATIENT (OUTPATIENT)
Dept: OUTPATIENT SERVICES | Facility: HOSPITAL | Age: 70
LOS: 1 days | End: 2025-07-09
Payer: MEDICARE

## 2025-07-09 ENCOUNTER — APPOINTMENT (OUTPATIENT)
Dept: ULTRASOUND IMAGING | Facility: CLINIC | Age: 70
End: 2025-07-09
Payer: MEDICARE

## 2025-07-09 DIAGNOSIS — Z96.642 PRESENCE OF LEFT ARTIFICIAL HIP JOINT: ICD-10-CM

## 2025-07-09 DIAGNOSIS — M76.12 PSOAS TENDINITIS, LEFT HIP: ICD-10-CM

## 2025-07-09 DIAGNOSIS — Z98.89 OTHER SPECIFIED POSTPROCEDURAL STATES: Chronic | ICD-10-CM

## 2025-07-09 DIAGNOSIS — Z96.642 PRESENCE OF LEFT ARTIFICIAL HIP JOINT: Chronic | ICD-10-CM

## 2025-07-09 DIAGNOSIS — Z90.49 ACQUIRED ABSENCE OF OTHER SPECIFIED PARTS OF DIGESTIVE TRACT: Chronic | ICD-10-CM

## 2025-07-09 PROCEDURE — 76942 ECHO GUIDE FOR BIOPSY: CPT | Mod: 26

## 2025-07-09 PROCEDURE — 20611 DRAIN/INJ JOINT/BURSA W/US: CPT | Mod: LT

## 2025-07-09 PROCEDURE — 76942 ECHO GUIDE FOR BIOPSY: CPT

## 2025-07-23 ENCOUNTER — APPOINTMENT (OUTPATIENT)
Dept: INTERNAL MEDICINE | Facility: CLINIC | Age: 70
End: 2025-07-23
Payer: MEDICARE

## 2025-07-23 VITALS
HEIGHT: 63 IN | DIASTOLIC BLOOD PRESSURE: 84 MMHG | SYSTOLIC BLOOD PRESSURE: 138 MMHG | RESPIRATION RATE: 16 BRPM | HEART RATE: 71 BPM | BODY MASS INDEX: 24.1 KG/M2 | OXYGEN SATURATION: 98 % | WEIGHT: 136 LBS | TEMPERATURE: 98.1 F

## 2025-07-23 DIAGNOSIS — F41.9 ANXIETY DISORDER, UNSPECIFIED: ICD-10-CM

## 2025-07-23 DIAGNOSIS — R51.9 HEADACHE, UNSPECIFIED: ICD-10-CM

## 2025-07-23 DIAGNOSIS — M81.0 AGE-RELATED OSTEOPOROSIS W/OUT CURRENT PATHOLOGICAL FRACTURE: ICD-10-CM

## 2025-07-23 DIAGNOSIS — E78.5 HYPERLIPIDEMIA, UNSPECIFIED: ICD-10-CM

## 2025-07-23 DIAGNOSIS — M54.9 DORSALGIA, UNSPECIFIED: ICD-10-CM

## 2025-07-23 DIAGNOSIS — F31.9 BIPOLAR DISORDER, UNSPECIFIED: ICD-10-CM

## 2025-07-23 PROCEDURE — G2211 COMPLEX E/M VISIT ADD ON: CPT

## 2025-07-23 PROCEDURE — 99214 OFFICE O/P EST MOD 30 MIN: CPT

## 2025-07-23 RX ORDER — LOSARTAN POTASSIUM 25 MG/1
25 TABLET, FILM COATED ORAL DAILY
Qty: 30 | Refills: 0 | Status: ACTIVE | COMMUNITY
Start: 2025-07-23

## 2025-07-23 RX ORDER — HYDROMORPHONE HYDROCHLORIDE 2 MG/1
2 TABLET ORAL DAILY
Refills: 0 | Status: ACTIVE | COMMUNITY
Start: 2025-07-23

## 2025-07-23 RX ORDER — ALENDRONATE SODIUM 70 MG/1
70 TABLET ORAL
Qty: 4 | Refills: 5 | Status: ACTIVE | COMMUNITY
Start: 2025-07-23 | End: 1900-01-01

## 2025-07-28 ENCOUNTER — APPOINTMENT (OUTPATIENT)
Dept: ORTHOPEDIC SURGERY | Facility: CLINIC | Age: 70
End: 2025-07-28
Payer: MEDICARE

## 2025-07-28 DIAGNOSIS — M54.17 RADICULOPATHY, LUMBOSACRAL REGION: ICD-10-CM

## 2025-07-28 DIAGNOSIS — Z98.1 ARTHRODESIS STATUS: ICD-10-CM

## 2025-07-28 DIAGNOSIS — M43.16 SPONDYLOLISTHESIS, LUMBAR REGION: ICD-10-CM

## 2025-07-28 PROCEDURE — 72100 X-RAY EXAM L-S SPINE 2/3 VWS: CPT

## 2025-07-28 PROCEDURE — 99213 OFFICE O/P EST LOW 20 MIN: CPT | Mod: 24

## 2025-07-28 RX ORDER — BACLOFEN 10 MG/1
10 TABLET ORAL 3 TIMES DAILY
Qty: 40 | Refills: 1 | Status: ACTIVE | COMMUNITY
Start: 2025-07-28 | End: 1900-01-01

## 2025-08-21 ENCOUNTER — APPOINTMENT (OUTPATIENT)
Dept: ORTHOPEDIC SURGERY | Facility: CLINIC | Age: 70
End: 2025-08-21

## 2025-09-04 ENCOUNTER — APPOINTMENT (OUTPATIENT)
Dept: ORTHOPEDIC SURGERY | Facility: CLINIC | Age: 70
End: 2025-09-04
Payer: MEDICARE

## 2025-09-04 DIAGNOSIS — Z98.1 ARTHRODESIS STATUS: ICD-10-CM

## 2025-09-04 DIAGNOSIS — M43.16 SPONDYLOLISTHESIS, LUMBAR REGION: ICD-10-CM

## 2025-09-04 DIAGNOSIS — M54.17 RADICULOPATHY, LUMBOSACRAL REGION: ICD-10-CM

## 2025-09-04 DIAGNOSIS — M54.50 LOW BACK PAIN, UNSPECIFIED: ICD-10-CM

## 2025-09-04 DIAGNOSIS — M51.379 OTH INTVRT DISC DEGEN, LUMBOSACR W/O LUM BCK OR LW EXTRM PN: ICD-10-CM

## 2025-09-04 PROCEDURE — 99213 OFFICE O/P EST LOW 20 MIN: CPT

## 2025-09-04 PROCEDURE — 72100 X-RAY EXAM L-S SPINE 2/3 VWS: CPT

## (undated) DEVICE — SYR LUER LOK 30CC

## (undated) DEVICE — FORMALIN CUPS 10% BUFFERED

## (undated) DEVICE — PACK IV START WITH CHG

## (undated) DEVICE — GLV 9 PROTEXIS (WHITE)

## (undated) DEVICE — DRAPE C ARM 41X74"

## (undated) DEVICE — TUBING CANNULA SALTER LABS NASAL ADULT 7FT

## (undated) DEVICE — SNARE LRG

## (undated) DEVICE — MIDAS REX LEGEND MATCH HEAD FLUTED LG BORE 3.0MM X 14CM

## (undated) DEVICE — SNARE POLYP SENS 27MM 240CM

## (undated) DEVICE — CATH ELCTR GLIDE PRB 7FR

## (undated) DEVICE — Device

## (undated) DEVICE — SYR LUER SLIP TIP 50CC

## (undated) DEVICE — TRAP QUICK CATCH  SINGL CHAMBER

## (undated) DEVICE — MARKER ENDO SPOT EX

## (undated) DEVICE — BRUSH COLONOSCOPY CYTOLOGY

## (undated) DEVICE — FORCEP RADIAL JAW 4 W NDL 2.2MM 2.8MM 160CM YELLOW DISP

## (undated) DEVICE — NDL HYPO SAFE 21G X 1.5" (GREEN)

## (undated) DEVICE — SUT VICRYL PLUS 0 18" OS-6 (POP-OFF)

## (undated) DEVICE — BRUSH CYTO ENDO

## (undated) DEVICE — RADIAL JAW 4 LG CAPACITY WITH NDL

## (undated) DEVICE — STERIS DEFENDO 3-PIECE KIT (AIR/WATER, SUCTION & BIOPSY VALVES)

## (undated) DEVICE — PACK NEURO

## (undated) DEVICE — SUT VICRYL PLUS 0 18" CT-1 UNDYED (POP-OFF)

## (undated) DEVICE — SUCTION YANKAUER NO CONTROL VENT

## (undated) DEVICE — SUCTION YANKAUER TAPERED BULBOUS NO VENT

## (undated) DEVICE — POSITIONER CUSHION INSERT PRONE VIEW LG

## (undated) DEVICE — LIJ-KMEDIC KERRISON RONGUER: Type: DURABLE MEDICAL EQUIPMENT

## (undated) DEVICE — SYR LUER SLIP TIP 30CC

## (undated) DEVICE — DRAPE BACK TABLE COVER 44X90"

## (undated) DEVICE — TUBING IV SET SECONDARY 34"

## (undated) DEVICE — ELCTR GROUNDING PAD ADULT COVIDIEN

## (undated) DEVICE — DRAPE MAYO STAND 30"

## (undated) DEVICE — BITE BLOCK MAXI RUBBER STAMP

## (undated) DEVICE — DRAPE SURGICAL #1010

## (undated) DEVICE — TUBING FOR SMOKE EVACUATOR (PURPLE END)

## (undated) DEVICE — TUBE O2 SUPL CRUSH RESIS CONN SOUTHSIDE ONLY

## (undated) DEVICE — FORCEP RADIAL JAW 4 W NDL 2.4MM 2.8MM 240CM ORANGE DISP

## (undated) DEVICE — ENDOCUFF VISION SZ 2 LG GRN

## (undated) DEVICE — TUBING SUCTION CONN 6FT STERILE

## (undated) DEVICE — SNARE CAPTIVATOR II RND COLD 10MM

## (undated) DEVICE — FORCEP RADIAL JAW 4 JUMBO 2.8MM 3.2MM 240CM ORANGE DISP

## (undated) DEVICE — MASK O2 NON REBREATH 3IN1 ADULT

## (undated) DEVICE — VALVE BIOPSY

## (undated) DEVICE — SYR ASEPTO

## (undated) DEVICE — ELCTR BOVIE TIP BLADE INSULATED 4" EDGE

## (undated) DEVICE — SUT HEWSON RETRIEVER

## (undated) DEVICE — TAPE SILK 3"

## (undated) DEVICE — CATH IV SAFE BC 22G X 1" (BLUE)

## (undated) DEVICE — CATH IV SAFE BC 20G X 1.16" (PINK)

## (undated) DEVICE — ENDOCUFF VISION SZ 3 SM PRPL

## (undated) DEVICE — GOWN SMARTGOWN RAGLAN XLG

## (undated) DEVICE — TUBING IV SET SECOND 34" W/O LOK-BLUNT

## (undated) DEVICE — SOL IRR POUR H2O 500ML

## (undated) DEVICE — CATH ELECHMSTAT  INJ 7FR 210CM

## (undated) DEVICE — STAPLER SKIN VISI-STAT 35 WIDE

## (undated) DEVICE — SYR ALLIANCE II INFLATION 60ML

## (undated) DEVICE — SYR IV POSIFLUSH NS 3ML 30/TY

## (undated) DEVICE — SOL IRR NS 0.9% 250ML

## (undated) DEVICE — DRAPE C ARM C-ARMOUR

## (undated) DEVICE — SUT MONOCRYL 3-0 18" PS-1

## (undated) DEVICE — NDL INJ SCLERO INTERJECT 23G

## (undated) DEVICE — MASK LRG MED AND HIGH O2 CONC M TO M 10FT

## (undated) DEVICE — SENSOR O2 FINGER XL ADULT 24/BX 6BX/CA

## (undated) DEVICE — TRAP SPECIMEN SPUTUM 40CC

## (undated) DEVICE — CANISTER SUCTION 1200CC 10/SL

## (undated) DEVICE — STRYKER BONE MILL & MEDIUM BLADE

## (undated) DEVICE — SUT VICRYL 3-0 18" SH UNDYED (POP-OFF)

## (undated) DEVICE — CLAMP BX HOT RAD JAW 3

## (undated) DEVICE — DRAPE GENERAL ENDOSCOPY

## (undated) DEVICE — WARMING BLANKET LOWER ADULT

## (undated) DEVICE — SPONGE DISSECTOR PEANUT

## (undated) DEVICE — LABELS BLANK W PEN

## (undated) DEVICE — DRSG CURITY GAUZE SPONGE 4 X 4" 12-PLY

## (undated) DEVICE — DRAPE COVER SNAP 36X30"

## (undated) DEVICE — PREP DURAPREP 26CC

## (undated) DEVICE — TUBING IV SET GRAVITY 3Y 100" MACRO

## (undated) DEVICE — ELCTR BOVIE PENCIL BLADE 10FT

## (undated) DEVICE — ELCTR BOVIE TIP BLADE INSULATED 2.75" EDGE

## (undated) DEVICE — VENODYNE/SCD SLEEVE CALF MEDIUM

## (undated) DEVICE — RETRIEVER ROTH NET PLATINUM-UNIVERSAL

## (undated) DEVICE — SUT VICRYL 2-0 18" CT-2 (POP-OFF)

## (undated) DEVICE — SOL IRR BAG NS 0.9% 1000ML

## (undated) DEVICE — SOL IRR POUR NS 0.9% 500ML

## (undated) DEVICE — DRSG TELFA 3 X 8

## (undated) DEVICE — GOWN XXXL

## (undated) DEVICE — POLY TRAP ETRAP

## (undated) DEVICE — TUBE RECTAL 24FR

## (undated) DEVICE — DRAPE 3/4 SHEET 52X76"

## (undated) DEVICE — POSITIONER JACKSON TABLE CHEST, HIP, THIGH PADS, ARM CRADLE

## (undated) DEVICE — DRAIN RESERVOIR FOR JACKSON PRATT 100CC CARDINAL

## (undated) DEVICE — SET IV PUMP BLOOD 1VALVE 180FILTER NON-DEHP

## (undated) DEVICE — SOL IRR POUR NS 0.9% 1500ML